# Patient Record
Sex: FEMALE | Race: WHITE | Employment: OTHER | URBAN - METROPOLITAN AREA
[De-identification: names, ages, dates, MRNs, and addresses within clinical notes are randomized per-mention and may not be internally consistent; named-entity substitution may affect disease eponyms.]

---

## 2017-08-15 ENCOUNTER — TELEPHONE (OUTPATIENT)
Dept: INTERNAL MEDICINE CLINIC | Age: 77
End: 2017-08-15

## 2017-08-15 DIAGNOSIS — R53.1 WEAKNESS: Primary | ICD-10-CM

## 2017-09-07 ENCOUNTER — TELEPHONE (OUTPATIENT)
Dept: INTERNAL MEDICINE CLINIC | Age: 77
End: 2017-09-07

## 2017-09-07 RX ORDER — HYDROCODONE BITARTRATE AND ACETAMINOPHEN 10; 325 MG/1; MG/1
1 TABLET ORAL DAILY
Qty: 30 TABLET | Refills: 0 | Status: SHIPPED | OUTPATIENT
Start: 2017-09-07 | End: 2017-09-28 | Stop reason: SDUPTHER

## 2017-09-07 RX ORDER — HYDROCODONE BITARTRATE AND ACETAMINOPHEN 10; 325 MG/1; MG/1
1 TABLET ORAL
Refills: 0 | Status: CANCELLED | OUTPATIENT
Start: 2017-09-07

## 2017-09-18 ENCOUNTER — TELEPHONE (OUTPATIENT)
Dept: INTERNAL MEDICINE CLINIC | Age: 77
End: 2017-09-18

## 2017-09-19 ENCOUNTER — TELEPHONE (OUTPATIENT)
Dept: INTERNAL MEDICINE CLINIC | Age: 77
End: 2017-09-19

## 2017-09-28 ENCOUNTER — TELEPHONE (OUTPATIENT)
Dept: INTERNAL MEDICINE CLINIC | Age: 77
End: 2017-09-28

## 2017-09-28 RX ORDER — HYDROCODONE BITARTRATE AND ACETAMINOPHEN 10; 325 MG/1; MG/1
1 TABLET ORAL DAILY
Qty: 60 TABLET | Refills: 0 | Status: SHIPPED | OUTPATIENT
Start: 2017-09-28 | End: 2017-10-16 | Stop reason: SDUPTHER

## 2017-10-06 ENCOUNTER — TELEPHONE (OUTPATIENT)
Dept: INTERNAL MEDICINE CLINIC | Age: 77
End: 2017-10-06

## 2017-10-16 ENCOUNTER — TELEPHONE (OUTPATIENT)
Dept: INTERNAL MEDICINE CLINIC | Age: 77
End: 2017-10-16

## 2017-10-16 RX ORDER — HYDROCODONE BITARTRATE AND ACETAMINOPHEN 10; 325 MG/1; MG/1
1 TABLET ORAL 2 TIMES DAILY
Qty: 60 TABLET | Refills: 0 | Status: SHIPPED | OUTPATIENT
Start: 2017-10-16 | End: 2017-11-10 | Stop reason: SDUPTHER

## 2017-10-16 RX ORDER — HYDROCODONE BITARTRATE AND ACETAMINOPHEN 10; 325 MG/1; MG/1
1 TABLET ORAL 2 TIMES DAILY
Refills: 0 | Status: CANCELLED | OUTPATIENT
Start: 2017-10-16

## 2017-10-16 NOTE — TELEPHONE ENCOUNTER
Spoke with pt nurse Kathi, vianey Horne pt has been w/o pain medicatiovn for 2 days. Last script per [facility] was written for 1 po daily (previously on 1 po BID).  Please advise  Pt daughter would like a call from Dr Mone Ramirez

## 2017-10-26 ENCOUNTER — TELEPHONE (OUTPATIENT)
Dept: INTERNAL MEDICINE CLINIC | Age: 77
End: 2017-10-26

## 2017-10-26 NOTE — TELEPHONE ENCOUNTER
Pt had asked for a shower chair and a lift chair. They received the scripts but ICD-10 was incorrect. Pt cannot walk, she is in a wheelchair most of the time. They need Dr. Charbel Julian to fill out one question on the Physician Order/CMN form or patient cannot get these two items. Dr. Charbel Julian signed it, dated it and put length of need on the form but did not  put an ICD 10 code on it. Please reply.    [facility]  Phone [phone]  Fax to [phone]

## 2017-10-31 ENCOUNTER — TELEPHONE (OUTPATIENT)
Dept: INTERNAL MEDICINE CLINIC | Age: 77
End: 2017-10-31

## 2017-11-10 RX ORDER — HYDROCODONE BITARTRATE AND ACETAMINOPHEN 10; 325 MG/1; MG/1
1 TABLET ORAL 2 TIMES DAILY
Qty: 60 TABLET | Refills: 0 | Status: SHIPPED | OUTPATIENT
Start: 2017-11-10 | End: 2017-11-15 | Stop reason: SDUPTHER

## 2017-11-15 RX ORDER — HYDROCODONE BITARTRATE AND ACETAMINOPHEN 10; 325 MG/1; MG/1
1 TABLET ORAL 2 TIMES DAILY
Qty: 100 TABLET | Refills: 0 | Status: SHIPPED | OUTPATIENT
Start: 2017-11-15 | End: 2017-12-01 | Stop reason: SDUPTHER

## 2017-12-01 RX ORDER — HYDROCODONE BITARTRATE AND ACETAMINOPHEN 10; 325 MG/1; MG/1
1 TABLET ORAL 2 TIMES DAILY
Qty: 100 TABLET | Refills: 0 | Status: SHIPPED | OUTPATIENT
Start: 2017-12-01 | End: 2018-01-03 | Stop reason: SDUPTHER

## 2018-01-02 ENCOUNTER — OFFICE VISIT (OUTPATIENT)
Dept: INTERNAL MEDICINE CLINIC | Age: 78
End: 2018-01-02

## 2018-01-02 VITALS
TEMPERATURE: 99.2 F | HEART RATE: 54 BPM | DIASTOLIC BLOOD PRESSURE: 70 MMHG | OXYGEN SATURATION: 97 % | SYSTOLIC BLOOD PRESSURE: 140 MMHG | RESPIRATION RATE: 14 BRPM

## 2018-01-02 DIAGNOSIS — R10.84 GENERALIZED ABDOMINAL PAIN: Primary | ICD-10-CM

## 2018-01-02 DIAGNOSIS — J40 BRONCHITIS: ICD-10-CM

## 2018-01-02 PROCEDURE — 99213 OFFICE O/P EST LOW 20 MIN: CPT | Performed by: INTERNAL MEDICINE

## 2018-01-02 PROCEDURE — 1036F TOBACCO NON-USER: CPT | Performed by: INTERNAL MEDICINE

## 2018-01-02 PROCEDURE — G8420 CALC BMI NORM PARAMETERS: HCPCS | Performed by: INTERNAL MEDICINE

## 2018-01-02 PROCEDURE — G8484 FLU IMMUNIZE NO ADMIN: HCPCS | Performed by: INTERNAL MEDICINE

## 2018-01-02 PROCEDURE — 4040F PNEUMOC VAC/ADMIN/RCVD: CPT | Performed by: INTERNAL MEDICINE

## 2018-01-02 PROCEDURE — 1090F PRES/ABSN URINE INCON ASSESS: CPT | Performed by: INTERNAL MEDICINE

## 2018-01-02 PROCEDURE — G8427 DOCREV CUR MEDS BY ELIG CLIN: HCPCS | Performed by: INTERNAL MEDICINE

## 2018-01-02 PROCEDURE — G8400 PT W/DXA NO RESULTS DOC: HCPCS | Performed by: INTERNAL MEDICINE

## 2018-01-02 PROCEDURE — 1123F ACP DISCUSS/DSCN MKR DOCD: CPT | Performed by: INTERNAL MEDICINE

## 2018-01-03 DIAGNOSIS — M47.816 LUMBAR SPONDYLOSIS: Primary | ICD-10-CM

## 2018-01-03 RX ORDER — HYDROCODONE BITARTRATE AND ACETAMINOPHEN 10; 325 MG/1; MG/1
1 TABLET ORAL 2 TIMES DAILY
Qty: 60 TABLET | Refills: 0 | Status: SHIPPED | OUTPATIENT
Start: 2018-01-03 | End: 2018-02-03

## 2018-01-03 RX ORDER — HYDROCODONE BITARTRATE AND ACETAMINOPHEN 10; 325 MG/1; MG/1
1 TABLET ORAL 2 TIMES DAILY
Qty: 60 TABLET | Refills: 0 | Status: CANCELLED | OUTPATIENT
Start: 2018-01-03 | End: 2018-02-03

## 2018-01-26 ASSESSMENT — ENCOUNTER SYMPTOMS
NAUSEA: 1
COUGH: 1
WHEEZING: 0
CHOKING: 0
SHORTNESS OF BREATH: 0
ABDOMINAL PAIN: 1

## 2018-01-26 NOTE — PROGRESS NOTES
Subjective:      Patient ID: Billie Yoo is a 69 y.o. female. HPI  Oj Thorpe is seen in follow-up from ER where she was evaluated for abdominal pain. The pain was periumbilical   And her work-up was entirely unremarkable including labs and CT. At this time it seems some better but it has not resolved. .    Also noted with some cough and phlegm. No chest pain or sob. No fever. Using cough medication at the [facility]. Review of Systems   Constitutional: Negative for chills and fever. Respiratory: Positive for cough. Negative for choking, shortness of breath and wheezing. Cardiovascular: Negative. Gastrointestinal: Positive for abdominal pain and nausea. Objective:   Physical Exam   Constitutional: She appears well-developed and well-nourished. No distress. HENT:   Mouth/Throat: Oropharynx is clear and moist.   Neck: No JVD present. Cardiovascular: Normal rate and regular rhythm. Pulmonary/Chest: Effort normal and breath sounds normal. She has no wheezes. Abdominal: Soft. Bowel sounds are normal. She exhibits no distension. There is no tenderness. Musculoskeletal: She exhibits no edema. Lymphadenopathy:     She has no cervical adenopathy. Neurological: She is alert. Skin: Skin is warm and dry. Assessment:      1. Generalized abdominal pain    2. Bronchitis                Plan:        Oj Thorpe was seen today for follow-up from hospital and cough. Diagnoses and all orders for this visit:    Generalized abdominal pain, etiology unclear  -     [facility] - [address] [place] [address] Deloris Sexton MD (BUBBA)    Bronchitis        - Symptomatic treatment with HHN and cough medication.

## 2018-02-05 DIAGNOSIS — M15.9 PRIMARY OSTEOARTHRITIS INVOLVING MULTIPLE JOINTS: Primary | ICD-10-CM

## 2018-02-05 RX ORDER — HYDROCODONE BITARTRATE AND ACETAMINOPHEN 10; 325 MG/1; MG/1
1 TABLET ORAL EVERY 6 HOURS PRN
Qty: 100 TABLET | Refills: 0 | Status: SHIPPED | OUTPATIENT
Start: 2018-02-05 | End: 2018-03-08

## 2018-03-26 DIAGNOSIS — M54.50 CHRONIC MIDLINE LOW BACK PAIN WITHOUT SCIATICA: Primary | ICD-10-CM

## 2018-03-26 DIAGNOSIS — G89.29 CHRONIC MIDLINE LOW BACK PAIN WITHOUT SCIATICA: Primary | ICD-10-CM

## 2018-03-27 RX ORDER — HYDROCODONE BITARTRATE AND ACETAMINOPHEN 10; 325 MG/1; MG/1
1 TABLET ORAL 2 TIMES DAILY
Qty: 60 TABLET | Refills: 0 | Status: SHIPPED | OUTPATIENT
Start: 2018-03-27 | End: 2018-04-24 | Stop reason: SDUPTHER

## 2018-04-24 DIAGNOSIS — M54.50 CHRONIC MIDLINE LOW BACK PAIN WITHOUT SCIATICA: ICD-10-CM

## 2018-04-24 DIAGNOSIS — G89.29 CHRONIC MIDLINE LOW BACK PAIN WITHOUT SCIATICA: ICD-10-CM

## 2018-04-24 RX ORDER — HYDROCODONE BITARTRATE AND ACETAMINOPHEN 10; 325 MG/1; MG/1
1 TABLET ORAL 2 TIMES DAILY
Qty: 60 TABLET | Refills: 0 | Status: SHIPPED | OUTPATIENT
Start: 2018-04-24 | End: 2018-05-24

## 2018-05-30 ENCOUNTER — TELEPHONE (OUTPATIENT)
Dept: INTERNAL MEDICINE CLINIC | Age: 78
End: 2018-05-30

## 2018-05-30 DIAGNOSIS — L08.9 FOOT INFECTION: Primary | ICD-10-CM

## 2018-05-30 RX ORDER — CEPHALEXIN 250 MG/1
250 CAPSULE ORAL 3 TIMES DAILY
Qty: 21 CAPSULE | Refills: 0 | Status: SHIPPED | OUTPATIENT
Start: 2018-05-30 | End: 2018-05-30 | Stop reason: SDUPTHER

## 2018-05-30 RX ORDER — CEPHALEXIN 250 MG/1
250 CAPSULE ORAL 3 TIMES DAILY
Qty: 21 CAPSULE | Refills: 0 | Status: SHIPPED | OUTPATIENT
Start: 2018-05-30 | End: 2018-06-06

## 2018-06-01 DIAGNOSIS — M15.9 PRIMARY OSTEOARTHRITIS INVOLVING MULTIPLE JOINTS: Primary | ICD-10-CM

## 2018-06-01 RX ORDER — HYDROCODONE BITARTRATE AND ACETAMINOPHEN 10; 325 MG/1; MG/1
1 TABLET ORAL 2 TIMES DAILY
Qty: 60 TABLET | Refills: 0 | Status: SHIPPED | OUTPATIENT
Start: 2018-06-01 | End: 2018-07-05 | Stop reason: SDUPTHER

## 2018-06-06 ENCOUNTER — TELEPHONE (OUTPATIENT)
Dept: INTERNAL MEDICINE CLINIC | Age: 78
End: 2018-06-06

## 2018-07-05 DIAGNOSIS — M15.9 PRIMARY OSTEOARTHRITIS INVOLVING MULTIPLE JOINTS: ICD-10-CM

## 2018-07-05 RX ORDER — HYDROCODONE BITARTRATE AND ACETAMINOPHEN 10; 325 MG/1; MG/1
TABLET ORAL
Qty: 60 TABLET | Refills: 0 | Status: SHIPPED | OUTPATIENT
Start: 2018-07-05 | End: 2018-07-30 | Stop reason: SDUPTHER

## 2018-07-30 DIAGNOSIS — M15.9 PRIMARY OSTEOARTHRITIS INVOLVING MULTIPLE JOINTS: ICD-10-CM

## 2018-07-30 RX ORDER — HYDROCODONE BITARTRATE AND ACETAMINOPHEN 10; 325 MG/1; MG/1
TABLET ORAL
Qty: 60 TABLET | Refills: 0 | Status: SHIPPED | OUTPATIENT
Start: 2018-07-30 | End: 2018-08-27 | Stop reason: SDUPTHER

## 2018-08-27 DIAGNOSIS — M15.9 PRIMARY OSTEOARTHRITIS INVOLVING MULTIPLE JOINTS: ICD-10-CM

## 2018-08-27 RX ORDER — HYDROCODONE BITARTRATE AND ACETAMINOPHEN 10; 325 MG/1; MG/1
TABLET ORAL
Qty: 60 TABLET | Refills: 0 | Status: SHIPPED | OUTPATIENT
Start: 2018-08-27 | End: 2018-09-25 | Stop reason: SDUPTHER

## 2018-09-25 DIAGNOSIS — M15.9 PRIMARY OSTEOARTHRITIS INVOLVING MULTIPLE JOINTS: ICD-10-CM

## 2018-09-25 RX ORDER — HYDROCODONE BITARTRATE AND ACETAMINOPHEN 10; 325 MG/1; MG/1
TABLET ORAL
Qty: 60 TABLET | Refills: 0 | Status: SHIPPED | OUTPATIENT
Start: 2018-09-25 | End: 2018-10-23 | Stop reason: SDUPTHER

## 2018-10-23 DIAGNOSIS — M15.9 PRIMARY OSTEOARTHRITIS INVOLVING MULTIPLE JOINTS: ICD-10-CM

## 2018-10-23 RX ORDER — HYDROCODONE BITARTRATE AND ACETAMINOPHEN 10; 325 MG/1; MG/1
TABLET ORAL
Qty: 60 TABLET | Refills: 0 | Status: ON HOLD | OUTPATIENT
Start: 2018-10-23 | End: 2018-11-09 | Stop reason: HOSPADM

## 2018-11-07 ENCOUNTER — APPOINTMENT (OUTPATIENT)
Dept: GENERAL RADIOLOGY | Age: 78
DRG: 481 | End: 2018-11-07
Payer: COMMERCIAL

## 2018-11-07 ENCOUNTER — HOSPITAL ENCOUNTER (INPATIENT)
Age: 78
LOS: 6 days | Discharge: NURSING, REHAB OR LONG-TERM CARE | DRG: 481 | End: 2018-11-13
Attending: INTERNAL MEDICINE | Admitting: INTERNAL MEDICINE
Payer: COMMERCIAL

## 2018-11-07 DIAGNOSIS — S72.144A CLOSED NONDISPLACED INTERTROCHANTERIC FRACTURE OF RIGHT FEMUR, INITIAL ENCOUNTER (HCC): Primary | ICD-10-CM
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
## 2018-11-07 PROBLEM — S72.141A CLOSED INTERTROCHANTERIC FRACTURE OF RIGHT FEMUR (HCC): Status: ACTIVE | Noted: 2018-11-07

## 2018-11-07 PROBLEM — S72.001A HIP FRACTURE REQUIRING OPERATIVE REPAIR, RIGHT, CLOSED, INITIAL ENCOUNTER (HCC): Status: ACTIVE | Noted: 2018-11-07

## 2018-11-07 LAB
ABO/RH: NORMAL
ANION GAP SERPL CALCULATED.3IONS-SCNC: 15 MMOL/L (ref 3–16)
ANTIBODY SCREEN: NORMAL
BASOPHILS ABSOLUTE: 0.1 K/UL (ref 0–0.2)
BASOPHILS RELATIVE PERCENT: 0.7 %
BUN BLDV-MCNC: 14 MG/DL (ref 7–20)
CALCIUM SERPL-MCNC: 8.4 MG/DL (ref 8.3–10.6)
CHLORIDE BLD-SCNC: 100 MMOL/L (ref 99–110)
CO2: 26 MMOL/L (ref 21–32)
CREAT SERPL-MCNC: 0.8 MG/DL (ref 0.6–1.2)
EOSINOPHILS ABSOLUTE: 0 K/UL (ref 0–0.6)
EOSINOPHILS RELATIVE PERCENT: 0.1 %
GFR AFRICAN AMERICAN: >60
GFR NON-AFRICAN AMERICAN: >60
GLUCOSE BLD-MCNC: 131 MG/DL (ref 70–99)
HCT VFR BLD CALC: 39.5 % (ref 36–48)
HEMOGLOBIN: 13.3 G/DL (ref 12–16)
INR BLD: 1.04 (ref 0.86–1.14)
LYMPHOCYTES ABSOLUTE: 1.4 K/UL (ref 1–5.1)
LYMPHOCYTES RELATIVE PERCENT: 14.6 %
MCH RBC QN AUTO: 31.3 PG (ref 26–34)
MCHC RBC AUTO-ENTMCNC: 33.7 G/DL (ref 31–36)
MCV RBC AUTO: 92.9 FL (ref 80–100)
MONOCYTES ABSOLUTE: 0.5 K/UL (ref 0–1.3)
MONOCYTES RELATIVE PERCENT: 5.3 %
NEUTROPHILS ABSOLUTE: 7.4 K/UL (ref 1.7–7.7)
NEUTROPHILS RELATIVE PERCENT: 79.3 %
PDW BLD-RTO: 14.4 % (ref 12.4–15.4)
PLATELET # BLD: 249 K/UL (ref 135–450)
PMV BLD AUTO: 9.3 FL (ref 5–10.5)
POTASSIUM SERPL-SCNC: 3.9 MMOL/L (ref 3.5–5.1)
PROTHROMBIN TIME: 11.9 SEC (ref 9.8–13)
RBC # BLD: 4.26 M/UL (ref 4–5.2)
SODIUM BLD-SCNC: 141 MMOL/L (ref 136–145)
WBC # BLD: 9.3 K/UL (ref 4–11)

## 2018-11-07 PROCEDURE — 86901 BLOOD TYPING SEROLOGIC RH(D): CPT

## 2018-11-07 PROCEDURE — 73502 X-RAY EXAM HIP UNI 2-3 VIEWS: CPT

## 2018-11-07 PROCEDURE — 6360000002 HC RX W HCPCS: Performed by: INTERNAL MEDICINE

## 2018-11-07 PROCEDURE — 96374 THER/PROPH/DIAG INJ IV PUSH: CPT

## 2018-11-07 PROCEDURE — 99223 1ST HOSP IP/OBS HIGH 75: CPT | Performed by: INTERNAL MEDICINE

## 2018-11-07 PROCEDURE — 1200000000 HC SEMI PRIVATE

## 2018-11-07 PROCEDURE — 71045 X-RAY EXAM CHEST 1 VIEW: CPT

## 2018-11-07 PROCEDURE — 93005 ELECTROCARDIOGRAM TRACING: CPT | Performed by: INTERNAL MEDICINE

## 2018-11-07 PROCEDURE — 99222 1ST HOSP IP/OBS MODERATE 55: CPT | Performed by: ORTHOPAEDIC SURGERY

## 2018-11-07 PROCEDURE — 86900 BLOOD TYPING SEROLOGIC ABO: CPT

## 2018-11-07 PROCEDURE — 86850 RBC ANTIBODY SCREEN: CPT

## 2018-11-07 PROCEDURE — 2580000003 HC RX 258: Performed by: INTERNAL MEDICINE

## 2018-11-07 PROCEDURE — 51702 INSERT TEMP BLADDER CATH: CPT

## 2018-11-07 PROCEDURE — 85610 PROTHROMBIN TIME: CPT

## 2018-11-07 PROCEDURE — 80048 BASIC METABOLIC PNL TOTAL CA: CPT

## 2018-11-07 PROCEDURE — 73560 X-RAY EXAM OF KNEE 1 OR 2: CPT

## 2018-11-07 PROCEDURE — 85025 COMPLETE CBC W/AUTO DIFF WBC: CPT

## 2018-11-07 PROCEDURE — 99285 EMERGENCY DEPT VISIT HI MDM: CPT

## 2018-11-07 RX ORDER — HYDROXYCHLOROQUINE SULFATE 200 MG/1
200 TABLET, FILM COATED ORAL DAILY
Status: DISCONTINUED | OUTPATIENT
Start: 2018-11-08 | End: 2018-11-08

## 2018-11-07 RX ORDER — MORPHINE SULFATE 4 MG/ML
4 INJECTION, SOLUTION INTRAMUSCULAR; INTRAVENOUS
Status: DISCONTINUED | OUTPATIENT
Start: 2018-11-07 | End: 2018-11-13 | Stop reason: HOSPADM

## 2018-11-07 RX ORDER — DOCUSATE SODIUM 100 MG/1
100 CAPSULE, LIQUID FILLED ORAL 2 TIMES DAILY PRN
Status: DISCONTINUED | OUTPATIENT
Start: 2018-11-07 | End: 2018-11-13 | Stop reason: HOSPADM

## 2018-11-07 RX ORDER — BUSPIRONE HYDROCHLORIDE 10 MG/1
10 TABLET ORAL 2 TIMES DAILY
Status: DISCONTINUED | OUTPATIENT
Start: 2018-11-08 | End: 2018-11-13 | Stop reason: HOSPADM

## 2018-11-07 RX ORDER — MORPHINE SULFATE 2 MG/ML
INJECTION, SOLUTION INTRAMUSCULAR; INTRAVENOUS
Status: DISPENSED
Start: 2018-11-07 | End: 2018-11-08

## 2018-11-07 RX ORDER — FLUOXETINE HYDROCHLORIDE 20 MG/1
20 CAPSULE ORAL DAILY
Status: DISCONTINUED | OUTPATIENT
Start: 2018-11-08 | End: 2018-11-08

## 2018-11-07 RX ORDER — POTASSIUM CHLORIDE 750 MG/1
10 TABLET, FILM COATED, EXTENDED RELEASE ORAL DAILY
Status: DISCONTINUED | OUTPATIENT
Start: 2018-11-08 | End: 2018-11-13 | Stop reason: HOSPADM

## 2018-11-07 RX ORDER — SODIUM CHLORIDE 9 MG/ML
INJECTION, SOLUTION INTRAVENOUS CONTINUOUS
Status: DISCONTINUED | OUTPATIENT
Start: 2018-11-07 | End: 2018-11-08 | Stop reason: ALTCHOICE

## 2018-11-07 RX ORDER — SODIUM CHLORIDE 0.9 % (FLUSH) 0.9 %
10 SYRINGE (ML) INJECTION PRN
Status: DISCONTINUED | OUTPATIENT
Start: 2018-11-07 | End: 2018-11-13 | Stop reason: HOSPADM

## 2018-11-07 RX ORDER — AMITRIPTYLINE HYDROCHLORIDE 25 MG/1
25 TABLET, FILM COATED ORAL NIGHTLY
Status: DISCONTINUED | OUTPATIENT
Start: 2018-11-08 | End: 2018-11-13 | Stop reason: HOSPADM

## 2018-11-07 RX ORDER — SODIUM CHLORIDE 0.9 % (FLUSH) 0.9 %
10 SYRINGE (ML) INJECTION EVERY 12 HOURS SCHEDULED
Status: DISCONTINUED | OUTPATIENT
Start: 2018-11-07 | End: 2018-11-13 | Stop reason: HOSPADM

## 2018-11-07 RX ORDER — MORPHINE SULFATE 2 MG/ML
2 INJECTION, SOLUTION INTRAMUSCULAR; INTRAVENOUS ONCE
Status: COMPLETED | OUTPATIENT
Start: 2018-11-07 | End: 2018-11-07

## 2018-11-07 RX ORDER — FLUTICASONE PROPIONATE 50 MCG
1 SPRAY, SUSPENSION (ML) NASAL DAILY
Status: DISCONTINUED | OUTPATIENT
Start: 2018-11-08 | End: 2018-11-13 | Stop reason: HOSPADM

## 2018-11-07 RX ORDER — LISINOPRIL 20 MG/1
20 TABLET ORAL DAILY
Status: DISCONTINUED | OUTPATIENT
Start: 2018-11-08 | End: 2018-11-13 | Stop reason: HOSPADM

## 2018-11-07 RX ORDER — ASPIRIN 81 MG/1
81 TABLET, CHEWABLE ORAL DAILY
Status: DISCONTINUED | OUTPATIENT
Start: 2018-11-08 | End: 2018-11-13 | Stop reason: HOSPADM

## 2018-11-07 RX ORDER — AMLODIPINE BESYLATE 5 MG/1
5 TABLET ORAL DAILY
Status: DISCONTINUED | OUTPATIENT
Start: 2018-11-08 | End: 2018-11-13 | Stop reason: HOSPADM

## 2018-11-07 RX ORDER — LEVOTHYROXINE SODIUM 0.1 MG/1
100 TABLET ORAL DAILY
Status: DISCONTINUED | OUTPATIENT
Start: 2018-11-08 | End: 2018-11-13 | Stop reason: HOSPADM

## 2018-11-07 RX ORDER — ONDANSETRON 2 MG/ML
4 INJECTION INTRAMUSCULAR; INTRAVENOUS EVERY 6 HOURS PRN
Status: DISCONTINUED | OUTPATIENT
Start: 2018-11-07 | End: 2018-11-13 | Stop reason: HOSPADM

## 2018-11-07 RX ORDER — PANTOPRAZOLE SODIUM 40 MG/1
40 TABLET, DELAYED RELEASE ORAL
Status: DISCONTINUED | OUTPATIENT
Start: 2018-11-08 | End: 2018-11-13 | Stop reason: HOSPADM

## 2018-11-07 RX ORDER — MORPHINE SULFATE 2 MG/ML
2 INJECTION, SOLUTION INTRAMUSCULAR; INTRAVENOUS
Status: DISCONTINUED | OUTPATIENT
Start: 2018-11-07 | End: 2018-11-13 | Stop reason: HOSPADM

## 2018-11-07 RX ORDER — METOPROLOL TARTRATE 50 MG/1
100 TABLET, FILM COATED ORAL 2 TIMES DAILY
Status: DISCONTINUED | OUTPATIENT
Start: 2018-11-07 | End: 2018-11-08

## 2018-11-07 RX ORDER — PREDNISONE 1 MG/1
5 TABLET ORAL DAILY
Status: DISCONTINUED | OUTPATIENT
Start: 2018-11-08 | End: 2018-11-13 | Stop reason: HOSPADM

## 2018-11-07 RX ADMIN — ONDANSETRON 4 MG: 2 INJECTION, SOLUTION INTRAMUSCULAR; INTRAVENOUS at 23:03

## 2018-11-07 RX ADMIN — MORPHINE SULFATE 2 MG: 2 INJECTION, SOLUTION INTRAMUSCULAR; INTRAVENOUS at 23:03

## 2018-11-07 RX ADMIN — SODIUM CHLORIDE: 9 INJECTION, SOLUTION INTRAVENOUS at 23:02

## 2018-11-07 RX ADMIN — MORPHINE SULFATE 2 MG: 2 INJECTION, SOLUTION INTRAMUSCULAR; INTRAVENOUS at 18:28

## 2018-11-07 ASSESSMENT — ENCOUNTER SYMPTOMS
COLOR CHANGE: 0
ABDOMINAL PAIN: 0
NAUSEA: 0
FACIAL SWELLING: 0
VOMITING: 0
SHORTNESS OF BREATH: 0
BACK PAIN: 0

## 2018-11-07 ASSESSMENT — PAIN SCALES - GENERAL
PAINLEVEL_OUTOF10: 6
PAINLEVEL_OUTOF10: 10
PAINLEVEL_OUTOF10: 8
PAINLEVEL_OUTOF10: 10

## 2018-11-07 ASSESSMENT — PAIN DESCRIPTION - FREQUENCY
FREQUENCY: CONTINUOUS
FREQUENCY: CONTINUOUS

## 2018-11-07 ASSESSMENT — PAIN DESCRIPTION - LOCATION
LOCATION: HIP;LEG
LOCATION: LEG

## 2018-11-07 ASSESSMENT — PAIN DESCRIPTION - ORIENTATION
ORIENTATION: RIGHT
ORIENTATION: RIGHT

## 2018-11-07 ASSESSMENT — PAIN DESCRIPTION - PAIN TYPE
TYPE: ACUTE PAIN
TYPE: ACUTE PAIN

## 2018-11-07 ASSESSMENT — PAIN DESCRIPTION - DESCRIPTORS
DESCRIPTORS: SHARP
DESCRIPTORS: SHARP

## 2018-11-07 ASSESSMENT — PAIN DESCRIPTION - ONSET
ONSET: ON-GOING
ONSET: SUDDEN

## 2018-11-07 ASSESSMENT — PAIN DESCRIPTION - PROGRESSION: CLINICAL_PROGRESSION: NOT CHANGED

## 2018-11-07 NOTE — CONSULTS
anxiety, depression   SKIN: Denies skin changes, delayed healing, rash, itching   HEMATOLOGIC: Denies easy bleeding or bruising  ENDOCRINE: Denies excessive thirst, urination, heat/cold  RESPIRATORY: Denies current dyspnea, cough  CARDIOVASCULAR: Negative for chest pain at this time. EYES: Negative for photophobia and visual disturbance. ENT:  Negative for rhinorrhea, epistaxis, sore throat, or hearing loss. GI: Denies nausea, vomiting, diarrhea   : Denies bowel or bladder issues   MUSCULOSKELETAL: Right hip pain  All other ROS reviewed in chart or with patient or family and are grossly negative. PHYSICAL EXAMINATION:  Ms. Edel Lucas is a very pleasant 69 y.o. female who seen today in no acute distress, awake, alert, and oriented. She is well nourished and groomed. Patient with normal affect. Body mass index is 22.91 kg/m². . Skin warm and dry. Resting respiratory rate is 16. Resp deep and easy. Pulse is with regular rate and rhythm    BP (!) 157/69   Pulse 64   Temp 98.4 °F (36.9 °C) (Oral)   Resp 18   Ht 5' 1\" (1.549 m)   Wt 121 lb 4.1 oz (55 kg)   SpO2 98%   BMI 22.91 kg/m²        Airway is intact  Chest: chest clear, no wheezing, rales, normal symmetric air entry, no tachypnea, retractions or cyanosis  Heart: regular rate and rhythm ; heart sounds normal   Hearing intact, pupil equal and reactive bilateral  Lymphatics; No groin or axillary enlarged lymph nodes. Neck; No swelling  Abdomen; soft, non distended. MUSCULOSKELETAL:   Right leg in external rotation with pain with ROM, Examination of both lower extrimiteis showing a decreased range of motion and muscle power of the right hip compare to the other side because of pain. There is mild swelling that can be seen, and ecchymosis over the right hip, but no wound. She has intact sensation and good pedal pulses bilateral.  She has significant tenderness on deep palpation over the right hip.      Good strength, and no instability both 11/7/2018  5:12 PM

## 2018-11-07 NOTE — ED PROVIDER NOTES
**EVALUATED BY ADVANCED PRACTICE PROVIDER**        [address]   eMERGENCY dEPARTMENT eNCOUnter      Pt Name: Robbie Norton  YIO:[id number]  Candygfyulisa 1940  Date of evaluation: 11/7/2018  Provider: BRAULIO Fuller CNP      Chief Complaint:    Chief Complaint   Patient presents with    Fall     mechanical fall after standing up from her wheelchair right upper leg       Nursing Notes, Past Medical Hx, Past Surgical Hx, Social Hx, Allergies, and Family Hx were all reviewed and agreed with or any disagreements were addressed in the HPI.    HPI:  (Location, Duration, Timing, Severity,Quality, Assoc Sx, Context, Modifying factors)  This is a  69 y.o. female who presents to the emergency department today via EMS complaining of right femur pain following a fall. Onset was just prior to arrival.  The context is she was standing up from her wheelchair when she lost her balance falling on her right hip. She denies hitting her head or losing consciousness. She denies head neck or back pain. She states when she moves her leg pain is 10/10. She is pretty comfortable at rest.  No lacerations or abrasions or crepitus. No numbness or tingling. PastMedical/Surgical History:      Diagnosis Date    Arthritis     Diverticulitis     Hypertension          Procedure Laterality Date    SPINE SURGERY      THYROIDECTOMY         Medications:  Current Discharge Medication List      CONTINUE these medications which have NOT CHANGED    Details   HYDROcodone-acetaminophen (NORCO)  MG per tablet TAKE 1 TABLET BY MOUTH TWICE DAILY.   Qty: 60 tablet, Refills: 0    Associated Diagnoses: Primary osteoarthritis involving multiple joints      docusate sodium (COLACE) 100 MG capsule Take 100 mg by mouth 2 times daily as needed for Constipation      melatonin 3 MG TABS tablet Take 3 mg by mouth daily      ondansetron (ZOFRAN) 4 MG tablet Take 4 mg by mouth 3 times daily      dicyclomine (BENTYL) 10 MG capsule Take 1 morphine (PF) injection 2 mg (2 mg Intravenous Given 11/7/18 6057)       Differential diagnosis: includes but not limited to Arterial Injury/Ischemia, Fracture, Dislocation, Infection, Compartment Syndrome, Neurologic Deficit/Injury. Patient presents with right upper leg pain following a fall. She is in a wheelchair but she does a lot out of the wheelchair. See HPI for full presentation. Physical exam as above. Tenderness to right upper leg. X-ray shows right intertrochanteric fracture. No leukocytosis or anemia. No electrolyte abnormalities or kidney dysfunction. I consulted with orthopedic surgery, Dr. Patti Perez. He came down to see and assess patient and advised the patient will go to surgery at 1:30 tomorrow afternoon. Patient was given all test results and given an opportunity to ask and have any questions answered. She was agreeable to admission. Patient's PCP was consulted and agreed to admit patient and write orders. The patient tolerated their visit well. I evaluated the patient. The physician was available for consultation as needed. The patient and / or the family were informed of the results of anytests, a time was given to answer questions, a plan was proposed and they agreed with plan. CLINICAL IMPRESSION:  1. Closed nondisplaced intertrochanteric fracture of right femur, initial encounter ([address]    2.  Fall, initial encounter        DISPOSITION Admitted 11/07/2018 06:42:41 PM      PATIENT REFERRED TO:  Brien Lu, [facility] [place] [id number]            DISCHARGE MEDICATIONS:  Current Discharge Medication List          DISCONTINUED MEDICATIONS:  Current Discharge Medication List                 (Please note the MDM and HPI sections of this note were completed with a voice recognition program.  Efforts weremade to edit the dictations but occasionally words are mis-transcribed.)    Electronically signed, Saundra Dancer, APRN - CNP, Sarah De Guzman, APRN - CNP  11/07/18 2664

## 2018-11-07 NOTE — ED NOTES
C/o mild itching at site after morphine injection. Family sts insomnia with benadryl. Pt denies need for med at this time.      Mary Beth Woodard RN  11/07/18 7716

## 2018-11-08 ENCOUNTER — APPOINTMENT (OUTPATIENT)
Dept: GENERAL RADIOLOGY | Age: 78
DRG: 481 | End: 2018-11-08
Payer: COMMERCIAL

## 2018-11-08 ENCOUNTER — ANESTHESIA (OUTPATIENT)
Dept: OPERATING ROOM | Age: 78
DRG: 481 | End: 2018-11-08
Payer: COMMERCIAL

## 2018-11-08 ENCOUNTER — ANESTHESIA EVENT (OUTPATIENT)
Dept: OPERATING ROOM | Age: 78
DRG: 481 | End: 2018-11-08
Payer: COMMERCIAL

## 2018-11-08 VITALS
TEMPERATURE: 98.6 F | DIASTOLIC BLOOD PRESSURE: 75 MMHG | RESPIRATION RATE: 1 BRPM | OXYGEN SATURATION: 100 % | SYSTOLIC BLOOD PRESSURE: 165 MMHG

## 2018-11-08 PROBLEM — F32.9 MAJOR DEPRESSIVE DISORDER: Status: ACTIVE | Noted: 2018-11-08

## 2018-11-08 PROBLEM — J45.20 MILD INTERMITTENT ASTHMA: Status: ACTIVE | Noted: 2018-11-08

## 2018-11-08 PROBLEM — F41.1 GAD (GENERALIZED ANXIETY DISORDER): Status: ACTIVE | Noted: 2018-11-08

## 2018-11-08 PROBLEM — E03.9 ACQUIRED HYPOTHYROIDISM: Status: ACTIVE | Noted: 2018-11-08

## 2018-11-08 PROBLEM — I10 ESSENTIAL HYPERTENSION: Status: ACTIVE | Noted: 2018-11-08

## 2018-11-08 PROBLEM — M06.9 RHEUMATOID ARTHRITIS (HCC): Status: ACTIVE | Noted: 2018-11-08

## 2018-11-08 PROBLEM — I73.9 PVD (PERIPHERAL VASCULAR DISEASE) (HCC): Status: ACTIVE | Noted: 2018-11-08

## 2018-11-08 PROCEDURE — 6360000002 HC RX W HCPCS: Performed by: ANESTHESIOLOGY

## 2018-11-08 PROCEDURE — 2580000003 HC RX 258: Performed by: ORTHOPAEDIC SURGERY

## 2018-11-08 PROCEDURE — 7100000000 HC PACU RECOVERY - FIRST 15 MIN: Performed by: ORTHOPAEDIC SURGERY

## 2018-11-08 PROCEDURE — 94664 DEMO&/EVAL PT USE INHALER: CPT

## 2018-11-08 PROCEDURE — 7100000001 HC PACU RECOVERY - ADDTL 15 MIN: Performed by: ORTHOPAEDIC SURGERY

## 2018-11-08 PROCEDURE — 2580000003 HC RX 258: Performed by: INTERNAL MEDICINE

## 2018-11-08 PROCEDURE — 2700000000 HC OXYGEN THERAPY PER DAY

## 2018-11-08 PROCEDURE — 93010 ELECTROCARDIOGRAM REPORT: CPT | Performed by: INTERNAL MEDICINE

## 2018-11-08 PROCEDURE — 6370000000 HC RX 637 (ALT 250 FOR IP): Performed by: INTERNAL MEDICINE

## 2018-11-08 PROCEDURE — 2709999900 HC NON-CHARGEABLE SUPPLY: Performed by: ORTHOPAEDIC SURGERY

## 2018-11-08 PROCEDURE — 6360000002 HC RX W HCPCS

## 2018-11-08 PROCEDURE — 3600000004 HC SURGERY LEVEL 4 BASE: Performed by: ORTHOPAEDIC SURGERY

## 2018-11-08 PROCEDURE — 73502 X-RAY EXAM HIP UNI 2-3 VIEWS: CPT

## 2018-11-08 PROCEDURE — 3700000000 HC ANESTHESIA ATTENDED CARE: Performed by: ORTHOPAEDIC SURGERY

## 2018-11-08 PROCEDURE — 2720000010 HC SURG SUPPLY STERILE: Performed by: ORTHOPAEDIC SURGERY

## 2018-11-08 PROCEDURE — C1769 GUIDE WIRE: HCPCS | Performed by: ORTHOPAEDIC SURGERY

## 2018-11-08 PROCEDURE — 99233 SBSQ HOSP IP/OBS HIGH 50: CPT | Performed by: INTERNAL MEDICINE

## 2018-11-08 PROCEDURE — 6360000002 HC RX W HCPCS: Performed by: NURSE PRACTITIONER

## 2018-11-08 PROCEDURE — 2500000003 HC RX 250 WO HCPCS: Performed by: ORTHOPAEDIC SURGERY

## 2018-11-08 PROCEDURE — 3600000014 HC SURGERY LEVEL 4 ADDTL 15MIN: Performed by: ORTHOPAEDIC SURGERY

## 2018-11-08 PROCEDURE — 2500000003 HC RX 250 WO HCPCS

## 2018-11-08 PROCEDURE — 0QS606Z REPOSITION RIGHT UPPER FEMUR WITH INTRAMEDULLARY INTERNAL FIXATION DEVICE, OPEN APPROACH: ICD-10-PCS | Performed by: ORTHOPAEDIC SURGERY

## 2018-11-08 PROCEDURE — 6360000002 HC RX W HCPCS: Performed by: ORTHOPAEDIC SURGERY

## 2018-11-08 PROCEDURE — 6370000000 HC RX 637 (ALT 250 FOR IP): Performed by: ORTHOPAEDIC SURGERY

## 2018-11-08 PROCEDURE — 94760 N-INVAS EAR/PLS OXIMETRY 1: CPT

## 2018-11-08 PROCEDURE — 3209999900 FLUORO FOR SURGICAL PROCEDURES

## 2018-11-08 PROCEDURE — 6360000002 HC RX W HCPCS: Performed by: INTERNAL MEDICINE

## 2018-11-08 PROCEDURE — 94761 N-INVAS EAR/PLS OXIMETRY MLT: CPT

## 2018-11-08 PROCEDURE — C1713 ANCHOR/SCREW BN/BN,TIS/BN: HCPCS | Performed by: ORTHOPAEDIC SURGERY

## 2018-11-08 PROCEDURE — 1200000000 HC SEMI PRIVATE

## 2018-11-08 PROCEDURE — 3700000001 HC ADD 15 MINUTES (ANESTHESIA): Performed by: ORTHOPAEDIC SURGERY

## 2018-11-08 PROCEDURE — 27245 TREAT THIGH FRACTURE: CPT | Performed by: ORTHOPAEDIC SURGERY

## 2018-11-08 DEVICE — LONG NAIL KIT R1.5, TI, RIGHT
Type: IMPLANTABLE DEVICE | Site: HIP | Status: FUNCTIONAL
Brand: GAMMA

## 2018-11-08 DEVICE — LAG SCREW, TI
Type: IMPLANTABLE DEVICE | Site: HIP | Status: FUNCTIONAL
Brand: GAMMA

## 2018-11-08 DEVICE — LOCKING SCREW, FULLY THREADED: Type: IMPLANTABLE DEVICE | Site: HIP | Status: FUNCTIONAL

## 2018-11-08 RX ORDER — SODIUM CHLORIDE 450 MG/100ML
INJECTION, SOLUTION INTRAVENOUS CONTINUOUS
Status: DISCONTINUED | OUTPATIENT
Start: 2018-11-08 | End: 2018-11-13 | Stop reason: HOSPADM

## 2018-11-08 RX ORDER — SODIUM CHLORIDE 0.9 % (FLUSH) 0.9 %
10 SYRINGE (ML) INJECTION EVERY 12 HOURS SCHEDULED
Status: DISCONTINUED | OUTPATIENT
Start: 2018-11-08 | End: 2018-11-08 | Stop reason: SDUPTHER

## 2018-11-08 RX ORDER — ONDANSETRON 2 MG/ML
4 INJECTION INTRAMUSCULAR; INTRAVENOUS
Status: DISCONTINUED | OUTPATIENT
Start: 2018-11-08 | End: 2018-11-08 | Stop reason: HOSPADM

## 2018-11-08 RX ORDER — CYANOCOBALAMIN 1000 UG/ML
1000 INJECTION INTRAMUSCULAR; SUBCUTANEOUS DAILY
COMMUNITY
End: 2019-07-09

## 2018-11-08 RX ORDER — DOCUSATE SODIUM 100 MG/1
100 CAPSULE, LIQUID FILLED ORAL 2 TIMES DAILY
Status: DISCONTINUED | OUTPATIENT
Start: 2018-11-08 | End: 2018-11-13 | Stop reason: HOSPADM

## 2018-11-08 RX ORDER — ONDANSETRON 2 MG/ML
4 INJECTION INTRAMUSCULAR; INTRAVENOUS EVERY 6 HOURS PRN
Status: DISCONTINUED | OUTPATIENT
Start: 2018-11-08 | End: 2018-11-08 | Stop reason: SDUPTHER

## 2018-11-08 RX ORDER — METOPROLOL SUCCINATE 50 MG/1
100 TABLET, EXTENDED RELEASE ORAL DAILY
Status: DISCONTINUED | OUTPATIENT
Start: 2018-11-08 | End: 2018-11-13 | Stop reason: HOSPADM

## 2018-11-08 RX ORDER — METOPROLOL SUCCINATE 25 MG/1
25 TABLET, EXTENDED RELEASE ORAL DAILY
COMMUNITY
End: 2020-01-17

## 2018-11-08 RX ORDER — FLUOXETINE HYDROCHLORIDE 20 MG/1
40 CAPSULE ORAL DAILY
Status: DISCONTINUED | OUTPATIENT
Start: 2018-11-09 | End: 2018-11-13 | Stop reason: HOSPADM

## 2018-11-08 RX ORDER — ACETAMINOPHEN 325 MG/1
650 TABLET ORAL EVERY 4 HOURS PRN
Status: DISCONTINUED | OUTPATIENT
Start: 2018-11-08 | End: 2018-11-13 | Stop reason: HOSPADM

## 2018-11-08 RX ORDER — GUAIFENESIN AND DEXTROMETHORPHAN HYDROBROMIDE 100; 10 MG/5ML; MG/5ML
10 SOLUTION ORAL EVERY 4 HOURS PRN
Status: ON HOLD | COMMUNITY
End: 2018-11-12 | Stop reason: HOSPADM

## 2018-11-08 RX ORDER — ONDANSETRON 2 MG/ML
INJECTION INTRAMUSCULAR; INTRAVENOUS PRN
Status: DISCONTINUED | OUTPATIENT
Start: 2018-11-08 | End: 2018-11-08 | Stop reason: SDUPTHER

## 2018-11-08 RX ORDER — FENTANYL CITRATE 50 UG/ML
50 INJECTION, SOLUTION INTRAMUSCULAR; INTRAVENOUS EVERY 5 MIN PRN
Status: DISCONTINUED | OUTPATIENT
Start: 2018-11-08 | End: 2018-11-08 | Stop reason: HOSPADM

## 2018-11-08 RX ORDER — PROMETHAZINE HYDROCHLORIDE 25 MG/ML
6.25 INJECTION, SOLUTION INTRAMUSCULAR; INTRAVENOUS
Status: DISCONTINUED | OUTPATIENT
Start: 2018-11-08 | End: 2018-11-08 | Stop reason: HOSPADM

## 2018-11-08 RX ORDER — MULTIVIT-MIN/IRON FUM/FOLIC AC 7.5 MG-4
1 TABLET ORAL DAILY
COMMUNITY

## 2018-11-08 RX ORDER — FUROSEMIDE 20 MG/1
20 TABLET ORAL DAILY
Status: ON HOLD | COMMUNITY
End: 2018-11-12 | Stop reason: HOSPADM

## 2018-11-08 RX ORDER — ALBUTEROL SULFATE 90 UG/1
2 AEROSOL, METERED RESPIRATORY (INHALATION) EVERY 6 HOURS PRN
COMMUNITY

## 2018-11-08 RX ORDER — SODIUM CHLORIDE 0.9 % (FLUSH) 0.9 %
10 SYRINGE (ML) INJECTION PRN
Status: DISCONTINUED | OUTPATIENT
Start: 2018-11-08 | End: 2018-11-08 | Stop reason: SDUPTHER

## 2018-11-08 RX ORDER — POLYETHYLENE GLYCOL 3350 17 G/17G
17 POWDER, FOR SOLUTION ORAL DAILY PRN
COMMUNITY
End: 2019-07-09

## 2018-11-08 RX ORDER — PROPOFOL 10 MG/ML
INJECTION, EMULSION INTRAVENOUS PRN
Status: DISCONTINUED | OUTPATIENT
Start: 2018-11-08 | End: 2018-11-08 | Stop reason: SDUPTHER

## 2018-11-08 RX ORDER — PREDNISONE 1 MG/1
5 TABLET ORAL DAILY
COMMUNITY

## 2018-11-08 RX ORDER — BUPIVACAINE HYDROCHLORIDE 5 MG/ML
INJECTION, SOLUTION EPIDURAL; INTRACAUDAL
Status: COMPLETED | OUTPATIENT
Start: 2018-11-08 | End: 2018-11-08

## 2018-11-08 RX ORDER — FENTANYL CITRATE 50 UG/ML
INJECTION, SOLUTION INTRAMUSCULAR; INTRAVENOUS PRN
Status: DISCONTINUED | OUTPATIENT
Start: 2018-11-08 | End: 2018-11-08 | Stop reason: SDUPTHER

## 2018-11-08 RX ORDER — FENTANYL CITRATE 50 UG/ML
25 INJECTION, SOLUTION INTRAMUSCULAR; INTRAVENOUS EVERY 5 MIN PRN
Status: DISCONTINUED | OUTPATIENT
Start: 2018-11-08 | End: 2018-11-08 | Stop reason: HOSPADM

## 2018-11-08 RX ORDER — HYDROXYCHLOROQUINE SULFATE 200 MG/1
200 TABLET, FILM COATED ORAL 2 TIMES DAILY
Status: DISCONTINUED | OUTPATIENT
Start: 2018-11-08 | End: 2018-11-13 | Stop reason: HOSPADM

## 2018-11-08 RX ORDER — CALCIUM CARBONATE 200(500)MG
1 TABLET,CHEWABLE ORAL EVERY 4 HOURS PRN
COMMUNITY
End: 2020-01-21 | Stop reason: ALTCHOICE

## 2018-11-08 RX ORDER — LIDOCAINE HYDROCHLORIDE 20 MG/ML
INJECTION, SOLUTION INFILTRATION; PERINEURAL PRN
Status: DISCONTINUED | OUTPATIENT
Start: 2018-11-08 | End: 2018-11-08 | Stop reason: SDUPTHER

## 2018-11-08 RX ADMIN — MORPHINE SULFATE 2 MG: 2 INJECTION, SOLUTION INTRAMUSCULAR; INTRAVENOUS at 12:41

## 2018-11-08 RX ADMIN — FENTANYL CITRATE 25 MCG: 50 INJECTION INTRAMUSCULAR; INTRAVENOUS at 14:13

## 2018-11-08 RX ADMIN — SODIUM CHLORIDE: 4.5 INJECTION, SOLUTION INTRAVENOUS at 17:21

## 2018-11-08 RX ADMIN — MORPHINE SULFATE 4 MG: 4 INJECTION INTRAVENOUS at 06:24

## 2018-11-08 RX ADMIN — METOPROLOL SUCCINATE 100 MG: 50 TABLET, EXTENDED RELEASE ORAL at 13:31

## 2018-11-08 RX ADMIN — MORPHINE SULFATE 4 MG: 4 INJECTION INTRAVENOUS at 01:03

## 2018-11-08 RX ADMIN — POTASSIUM CHLORIDE 10 MEQ: 750 TABLET, FILM COATED, EXTENDED RELEASE ORAL at 08:20

## 2018-11-08 RX ADMIN — Medication 2 G: at 14:11

## 2018-11-08 RX ADMIN — HYDROXYCHLOROQUINE SULFATE 200 MG: 200 TABLET, FILM COATED ORAL at 08:20

## 2018-11-08 RX ADMIN — FLUOXETINE 20 MG: 20 CAPSULE ORAL at 08:19

## 2018-11-08 RX ADMIN — FENTANYL CITRATE 25 MCG: 50 INJECTION INTRAMUSCULAR; INTRAVENOUS at 14:30

## 2018-11-08 RX ADMIN — MORPHINE SULFATE 2 MG: 2 INJECTION, SOLUTION INTRAMUSCULAR; INTRAVENOUS at 17:17

## 2018-11-08 RX ADMIN — BUSPIRONE HYDROCHLORIDE 10 MG: 10 TABLET ORAL at 00:55

## 2018-11-08 RX ADMIN — SODIUM CHLORIDE: 9 INJECTION, SOLUTION INTRAVENOUS at 12:21

## 2018-11-08 RX ADMIN — LISINOPRIL 20 MG: 20 TABLET ORAL at 08:20

## 2018-11-08 RX ADMIN — FLUTICASONE PROPIONATE 1 SPRAY: 50 SPRAY, METERED NASAL at 11:07

## 2018-11-08 RX ADMIN — PREDNISONE 5 MG: 5 TABLET ORAL at 08:19

## 2018-11-08 RX ADMIN — LIDOCAINE HYDROCHLORIDE 50 MG: 20 INJECTION, SOLUTION INFILTRATION; PERINEURAL at 14:13

## 2018-11-08 RX ADMIN — FENTANYL CITRATE 25 MCG: 50 INJECTION INTRAMUSCULAR; INTRAVENOUS at 15:07

## 2018-11-08 RX ADMIN — FENTANYL CITRATE 25 MCG: 50 INJECTION, SOLUTION INTRAMUSCULAR; INTRAVENOUS at 15:39

## 2018-11-08 RX ADMIN — AMITRIPTYLINE HYDROCHLORIDE 25 MG: 25 TABLET, FILM COATED ORAL at 00:55

## 2018-11-08 RX ADMIN — DOCUSATE SODIUM 100 MG: 100 CAPSULE, LIQUID FILLED ORAL at 23:02

## 2018-11-08 RX ADMIN — PROPOFOL 70 MG: 10 INJECTION, EMULSION INTRAVENOUS at 14:13

## 2018-11-08 RX ADMIN — MORPHINE SULFATE 4 MG: 4 INJECTION INTRAVENOUS at 19:44

## 2018-11-08 RX ADMIN — AMITRIPTYLINE HYDROCHLORIDE 25 MG: 25 TABLET, FILM COATED ORAL at 23:03

## 2018-11-08 RX ADMIN — AMLODIPINE BESYLATE 5 MG: 5 TABLET ORAL at 08:20

## 2018-11-08 RX ADMIN — BUSPIRONE HYDROCHLORIDE 10 MG: 10 TABLET ORAL at 08:20

## 2018-11-08 RX ADMIN — Medication 2 G: at 23:02

## 2018-11-08 RX ADMIN — LEVOTHYROXINE SODIUM 100 MCG: 100 TABLET ORAL at 06:24

## 2018-11-08 RX ADMIN — BUSPIRONE HYDROCHLORIDE 10 MG: 10 TABLET ORAL at 23:02

## 2018-11-08 RX ADMIN — ONDANSETRON 4 MG: 2 INJECTION INTRAMUSCULAR; INTRAVENOUS at 14:30

## 2018-11-08 RX ADMIN — PANTOPRAZOLE SODIUM 40 MG: 40 TABLET, DELAYED RELEASE ORAL at 06:24

## 2018-11-08 RX ADMIN — HYDROXYCHLOROQUINE SULFATE 200 MG: 200 TABLET, FILM COATED ORAL at 23:02

## 2018-11-08 ASSESSMENT — PULMONARY FUNCTION TESTS
PIF_VALUE: 8
PIF_VALUE: 10
PIF_VALUE: 12
PIF_VALUE: 13
PIF_VALUE: 12
PIF_VALUE: 1
PIF_VALUE: 9
PIF_VALUE: 10
PIF_VALUE: 9
PIF_VALUE: 12
PIF_VALUE: 10
PIF_VALUE: 11
PIF_VALUE: 10
PIF_VALUE: 2
PIF_VALUE: 0
PIF_VALUE: 10
PIF_VALUE: 1
PIF_VALUE: 10
PIF_VALUE: 14
PIF_VALUE: 0
PIF_VALUE: 12
PIF_VALUE: 0
PIF_VALUE: 7
PIF_VALUE: 10
PIF_VALUE: 9
PIF_VALUE: 9
PIF_VALUE: 11
PIF_VALUE: 0
PIF_VALUE: 9
PIF_VALUE: 11
PIF_VALUE: 10
PIF_VALUE: 12
PIF_VALUE: 12
PIF_VALUE: 9
PIF_VALUE: 11
PIF_VALUE: 8
PIF_VALUE: 11
PIF_VALUE: 10
PIF_VALUE: 9
PIF_VALUE: 11
PIF_VALUE: 10
PIF_VALUE: 0
PIF_VALUE: 1
PIF_VALUE: 9
PIF_VALUE: 10
PIF_VALUE: 10
PIF_VALUE: 11
PIF_VALUE: 0
PIF_VALUE: 1
PIF_VALUE: 10
PIF_VALUE: 11
PIF_VALUE: 12
PIF_VALUE: 12
PIF_VALUE: 11
PIF_VALUE: 1
PIF_VALUE: 9
PIF_VALUE: 1
PIF_VALUE: 9
PIF_VALUE: 10
PIF_VALUE: 0
PIF_VALUE: 9
PIF_VALUE: 1
PIF_VALUE: 0
PIF_VALUE: 0
PIF_VALUE: 12
PIF_VALUE: 10

## 2018-11-08 ASSESSMENT — PAIN SCALES - GENERAL
PAINLEVEL_OUTOF10: 9
PAINLEVEL_OUTOF10: 7
PAINLEVEL_OUTOF10: 5
PAINLEVEL_OUTOF10: 0
PAINLEVEL_OUTOF10: 5
PAINLEVEL_OUTOF10: 7
PAINLEVEL_OUTOF10: 1
PAINLEVEL_OUTOF10: 5
PAINLEVEL_OUTOF10: 2
PAINLEVEL_OUTOF10: 7

## 2018-11-08 ASSESSMENT — PAIN DESCRIPTION - ONSET
ONSET: ON-GOING

## 2018-11-08 ASSESSMENT — PAIN DESCRIPTION - PAIN TYPE
TYPE: SURGICAL PAIN
TYPE: ACUTE PAIN
TYPE: ACUTE PAIN
TYPE: SURGICAL PAIN
TYPE: ACUTE PAIN

## 2018-11-08 ASSESSMENT — PAIN DESCRIPTION - LOCATION
LOCATION: LEG;HIP
LOCATION: HIP;LEG
LOCATION: HIP
LOCATION: HIP;LEG
LOCATION: LEG;HIP
LOCATION: HIP;LEG
LOCATION: HIP;LEG

## 2018-11-08 ASSESSMENT — PAIN DESCRIPTION - PROGRESSION
CLINICAL_PROGRESSION: GRADUALLY IMPROVING
CLINICAL_PROGRESSION: NOT CHANGED
CLINICAL_PROGRESSION: NOT CHANGED
CLINICAL_PROGRESSION: GRADUALLY IMPROVING
CLINICAL_PROGRESSION: NOT CHANGED

## 2018-11-08 ASSESSMENT — PAIN DESCRIPTION - FREQUENCY
FREQUENCY: CONTINUOUS

## 2018-11-08 ASSESSMENT — PAIN DESCRIPTION - DESCRIPTORS
DESCRIPTORS: ACHING
DESCRIPTORS: ACHING;THROBBING
DESCRIPTORS: ACHING;SHARP
DESCRIPTORS: ACHING
DESCRIPTORS: ACHING
DESCRIPTORS: ACHING;SHARP

## 2018-11-08 ASSESSMENT — PAIN DESCRIPTION - ORIENTATION
ORIENTATION: RIGHT

## 2018-11-08 NOTE — PROGRESS NOTES
Patient arouses easily to name. Resp easy unlabored on on 2L NC with SaO2 98%. Monitor in SB-SR. Patient states pain level 1-2 of 10 and tolerable. Right hip dressing unchanged. Neurovascular checks stable to RLE. VSS. IV patent. Patient stable to transfer to room 3101. Report called to [facility] RN receiving patient into room 3101.

## 2018-11-08 NOTE — PROGRESS NOTES
Patient admitted to PACU from OR. Patient arouses to name, [place], alert. VSS. IV patent to right AC. Resp easy unlabored on room air O2 with SaO2 94%. Monitor in SR-SB. Right hip dressings x 2 dry and intact with ice pack on and stable neurovascular checks to RLE. Patient denies C/O pain or nausea.

## 2018-11-09 LAB
EKG ATRIAL RATE: 62 BPM
EKG DIAGNOSIS: NORMAL
EKG P AXIS: 69 DEGREES
EKG P-R INTERVAL: 192 MS
EKG Q-T INTERVAL: 502 MS
EKG QRS DURATION: 138 MS
EKG QTC CALCULATION (BAZETT): 509 MS
EKG R AXIS: -49 DEGREES
EKG T AXIS: -14 DEGREES
EKG VENTRICULAR RATE: 62 BPM
HCT VFR BLD CALC: 28.8 % (ref 36–48)
HEMOGLOBIN: 9.8 G/DL (ref 12–16)

## 2018-11-09 PROCEDURE — 6370000000 HC RX 637 (ALT 250 FOR IP): Performed by: INTERNAL MEDICINE

## 2018-11-09 PROCEDURE — G8988 SELF CARE GOAL STATUS: HCPCS

## 2018-11-09 PROCEDURE — 36415 COLL VENOUS BLD VENIPUNCTURE: CPT

## 2018-11-09 PROCEDURE — 2700000000 HC OXYGEN THERAPY PER DAY

## 2018-11-09 PROCEDURE — G8987 SELF CARE CURRENT STATUS: HCPCS

## 2018-11-09 PROCEDURE — 1200000000 HC SEMI PRIVATE

## 2018-11-09 PROCEDURE — 6360000002 HC RX W HCPCS: Performed by: ORTHOPAEDIC SURGERY

## 2018-11-09 PROCEDURE — 85014 HEMATOCRIT: CPT

## 2018-11-09 PROCEDURE — 6360000002 HC RX W HCPCS: Performed by: INTERNAL MEDICINE

## 2018-11-09 PROCEDURE — 97530 THERAPEUTIC ACTIVITIES: CPT

## 2018-11-09 PROCEDURE — 6370000000 HC RX 637 (ALT 250 FOR IP): Performed by: NURSE PRACTITIONER

## 2018-11-09 PROCEDURE — G8978 MOBILITY CURRENT STATUS: HCPCS

## 2018-11-09 PROCEDURE — 99233 SBSQ HOSP IP/OBS HIGH 50: CPT | Performed by: INTERNAL MEDICINE

## 2018-11-09 PROCEDURE — 94760 N-INVAS EAR/PLS OXIMETRY 1: CPT

## 2018-11-09 PROCEDURE — 2580000003 HC RX 258: Performed by: ORTHOPAEDIC SURGERY

## 2018-11-09 PROCEDURE — 2580000003 HC RX 258: Performed by: INTERNAL MEDICINE

## 2018-11-09 PROCEDURE — G8979 MOBILITY GOAL STATUS: HCPCS

## 2018-11-09 PROCEDURE — 6370000000 HC RX 637 (ALT 250 FOR IP): Performed by: ORTHOPAEDIC SURGERY

## 2018-11-09 PROCEDURE — 97162 PT EVAL MOD COMPLEX 30 MIN: CPT

## 2018-11-09 PROCEDURE — 97166 OT EVAL MOD COMPLEX 45 MIN: CPT

## 2018-11-09 PROCEDURE — 85018 HEMOGLOBIN: CPT

## 2018-11-09 RX ORDER — ASPIRIN 81 MG/1
81 TABLET ORAL 2 TIMES DAILY
Qty: 60 TABLET | Refills: 0 | Status: SHIPPED | OUTPATIENT
Start: 2018-11-09 | End: 2019-07-09

## 2018-11-09 RX ORDER — HYDROCODONE BITARTRATE AND ACETAMINOPHEN 5; 325 MG/1; MG/1
1 TABLET ORAL EVERY 6 HOURS PRN
Status: DISCONTINUED | OUTPATIENT
Start: 2018-11-09 | End: 2018-11-13 | Stop reason: HOSPADM

## 2018-11-09 RX ORDER — HYDROCODONE BITARTRATE AND ACETAMINOPHEN 5; 325 MG/1; MG/1
2 TABLET ORAL EVERY 6 HOURS PRN
Status: DISCONTINUED | OUTPATIENT
Start: 2018-11-09 | End: 2018-11-13 | Stop reason: HOSPADM

## 2018-11-09 RX ORDER — HYDROCODONE BITARTRATE AND ACETAMINOPHEN 5; 325 MG/1; MG/1
1-2 TABLET ORAL EVERY 6 HOURS PRN
Qty: 60 TABLET | Refills: 0 | Status: SHIPPED | OUTPATIENT
Start: 2018-11-09 | End: 2019-06-07 | Stop reason: SINTOL

## 2018-11-09 RX ADMIN — MORPHINE SULFATE 4 MG: 4 INJECTION INTRAVENOUS at 08:54

## 2018-11-09 RX ADMIN — LISINOPRIL 20 MG: 20 TABLET ORAL at 09:05

## 2018-11-09 RX ADMIN — ENOXAPARIN SODIUM 40 MG: 40 INJECTION SUBCUTANEOUS at 08:57

## 2018-11-09 RX ADMIN — POTASSIUM CHLORIDE 10 MEQ: 750 TABLET, FILM COATED, EXTENDED RELEASE ORAL at 08:57

## 2018-11-09 RX ADMIN — Medication 10 ML: at 19:56

## 2018-11-09 RX ADMIN — Medication 2 G: at 05:41

## 2018-11-09 RX ADMIN — FLUTICASONE PROPIONATE 1 SPRAY: 50 SPRAY, METERED NASAL at 08:55

## 2018-11-09 RX ADMIN — HYDROXYCHLOROQUINE SULFATE 200 MG: 200 TABLET, FILM COATED ORAL at 08:59

## 2018-11-09 RX ADMIN — PREDNISONE 5 MG: 5 TABLET ORAL at 09:03

## 2018-11-09 RX ADMIN — DOCUSATE SODIUM 100 MG: 100 CAPSULE, LIQUID FILLED ORAL at 09:00

## 2018-11-09 RX ADMIN — SODIUM CHLORIDE: 4.5 INJECTION, SOLUTION INTRAVENOUS at 08:53

## 2018-11-09 RX ADMIN — DOCUSATE SODIUM 100 MG: 100 CAPSULE, LIQUID FILLED ORAL at 19:56

## 2018-11-09 RX ADMIN — AMITRIPTYLINE HYDROCHLORIDE 25 MG: 25 TABLET, FILM COATED ORAL at 19:56

## 2018-11-09 RX ADMIN — LEVOTHYROXINE SODIUM 100 MCG: 100 TABLET ORAL at 05:41

## 2018-11-09 RX ADMIN — MORPHINE SULFATE 4 MG: 4 INJECTION INTRAVENOUS at 05:41

## 2018-11-09 RX ADMIN — HYDROCODONE BITARTRATE AND ACETAMINOPHEN 1 TABLET: 5; 325 TABLET ORAL at 19:55

## 2018-11-09 RX ADMIN — MORPHINE SULFATE 4 MG: 4 INJECTION INTRAVENOUS at 23:39

## 2018-11-09 RX ADMIN — HYDROXYCHLOROQUINE SULFATE 200 MG: 200 TABLET, FILM COATED ORAL at 19:56

## 2018-11-09 RX ADMIN — BUSPIRONE HYDROCHLORIDE 10 MG: 10 TABLET ORAL at 19:56

## 2018-11-09 RX ADMIN — AMLODIPINE BESYLATE 5 MG: 5 TABLET ORAL at 09:05

## 2018-11-09 RX ADMIN — ASPIRIN 81 MG CHEWABLE TABLET 81 MG: 81 TABLET CHEWABLE at 09:05

## 2018-11-09 RX ADMIN — PANTOPRAZOLE SODIUM 40 MG: 40 TABLET, DELAYED RELEASE ORAL at 05:41

## 2018-11-09 RX ADMIN — MORPHINE SULFATE 2 MG: 2 INJECTION, SOLUTION INTRAMUSCULAR; INTRAVENOUS at 03:13

## 2018-11-09 RX ADMIN — SODIUM CHLORIDE: 4.5 INJECTION, SOLUTION INTRAVENOUS at 23:40

## 2018-11-09 RX ADMIN — METOPROLOL SUCCINATE 100 MG: 50 TABLET, EXTENDED RELEASE ORAL at 08:59

## 2018-11-09 RX ADMIN — FLUOXETINE HYDROCHLORIDE 40 MG: 20 CAPSULE ORAL at 08:58

## 2018-11-09 RX ADMIN — BUSPIRONE HYDROCHLORIDE 10 MG: 10 TABLET ORAL at 09:03

## 2018-11-09 RX ADMIN — HYDROCODONE BITARTRATE AND ACETAMINOPHEN 2 TABLET: 5; 325 TABLET ORAL at 14:15

## 2018-11-09 ASSESSMENT — PAIN DESCRIPTION - PROGRESSION
CLINICAL_PROGRESSION: NOT CHANGED
CLINICAL_PROGRESSION: NOT CHANGED

## 2018-11-09 ASSESSMENT — PAIN SCALES - GENERAL
PAINLEVEL_OUTOF10: 4
PAINLEVEL_OUTOF10: 4
PAINLEVEL_OUTOF10: 5
PAINLEVEL_OUTOF10: 0
PAINLEVEL_OUTOF10: 6
PAINLEVEL_OUTOF10: 9
PAINLEVEL_OUTOF10: 5
PAINLEVEL_OUTOF10: 7
PAINLEVEL_OUTOF10: 0
PAINLEVEL_OUTOF10: 3
PAINLEVEL_OUTOF10: 6
PAINLEVEL_OUTOF10: 7

## 2018-11-09 ASSESSMENT — PAIN DESCRIPTION - PAIN TYPE
TYPE: SURGICAL PAIN

## 2018-11-09 ASSESSMENT — PAIN DESCRIPTION - LOCATION
LOCATION: HIP;LEG
LOCATION: HIP
LOCATION: LEG;HIP

## 2018-11-09 ASSESSMENT — PAIN DESCRIPTION - ONSET
ONSET: ON-GOING

## 2018-11-09 ASSESSMENT — PAIN DESCRIPTION - FREQUENCY
FREQUENCY: CONTINUOUS

## 2018-11-09 ASSESSMENT — PAIN DESCRIPTION - ORIENTATION
ORIENTATION: RIGHT

## 2018-11-09 ASSESSMENT — PAIN DESCRIPTION - DESCRIPTORS
DESCRIPTORS: ACHING;THROBBING
DESCRIPTORS: ACHING
DESCRIPTORS: ACHING;THROBBING

## 2018-11-09 NOTE — OP NOTE
[address] [place] [address] Jaspreet Recinos [address]                                OPERATIVE REPORT    PATIENT NAME: Kendell Alvarez                      :        1940  MED REC NO:   [id number]                          ROOM:       3101  ACCOUNT NO:   [de-identified]                           ADMIT DATE: 2018  PROVIDER:     Kristine Lopez MD      DATE OF PROCEDURE:  2018    PRIMARY CARE PHYSICIAN:  Marianna Cifuentes MD    PREOPERATIVE DIAGNOSIS:  Right intertrochanteric comminuted displaced  fracture. POSTOPERATIVE DIAGNOSIS:  Right intertrochanteric comminuted displaced  fracture. OPERATION PERFORMED:  Open treatment of right femur intertrochanteric  fracture with locked gamma nail. SURGEON:  MD Ricardo Merino, Surgical Assistant. ANESTHESIA:  General anesthesia. ESTIMATED BLOOD LOSS:  Minimal.    COMPLICATIONS:  None. IMPLANTS USED:  Shelly titanium long gamma nail, size 10 x 360, 80-mm  hip screw and two distal locking screws. INDICATIONS:  This is a 71-year-old white female who sustained a fall  with right intertrochanteric comminuted displaced fracture. All risks,  benefits, and alternatives were discussed with the patient and her  family, and they agreed to proceed with surgical treatment. OPERATIVE PROCEDURE:  The patient's right hip was marked. She received  2 gm of Ancef IV preoperatively. The patient was then brought to the  operating room and underwent general anesthesia. The patient was then  transferred to the fracture table. Closed reduction was performed. We  were able to achieve a good reduction. There was only a small step-off  on the lateral view. At this point, we had the right hip and lower  extremity prepped and draped in regular sterile routine fashion. A  time-out was called confirming the patient's name, site and procedure.     A small incision was made proximal to the

## 2018-11-09 NOTE — PLAN OF CARE
Problem: Falls - Risk of:  Goal: Will remain free from falls  Will remain free from falls   Outcome: Ongoing  Fall risk assessment completed. Fall precautions in place. Bed in lowest position, wheels locked, bed/chair exit alarm in place, call light within reach, and non skid footwear on. Walkway free of clutter. Pt alert and oriented and able to make needs known. Pt educated to use call light when needing to get up, and pt utilizes call light to make needs known. Will continue to monitor. Electronically signed by Thong Hughes RN on 11/9/2018 at 11:03 AM      Problem: Pain:  Goal: Pain level will decrease  Pain level will decrease   Outcome: Ongoing  Pt assessed for pain. Pt in pain and assessed with 0-10 pain rating scale. Pt given prescribed analgesic for pain. (See eMar) Pt satisfied with pain relief thus far. Will reassess and continue to monitor. Electronically signed by Thong Hughes RN on 11/9/2018 at 11:03 AM      Problem: SAFETY  Goal: Free from accidental physical injury  Outcome: Ongoing  Bed in lowest position, wheels locked, bed/chair exit alarm in place, call light within reach, and non skid footwear on. Walkway free of clutter. Pt alert and oriented and able to make needs known. Pt educated to use call light when needing to get up, and pt utilizes call light to make needs known. Will continue to monitor. Electronically signed by Thong Hughes RN on 11/9/2018 at 11:04 AM      Problem: DAILY CARE  Goal: Daily care needs are met  Outcome: Ongoing  Patient assessed to determine ability to perform daily needs and ADLs. Patient assisted with any daily needs that were not able to be met individually by patient. [place] encouraged, although patient educated to ask for assistance when needed. Will continue to monitor and assist patient with meeting daily care needs as needed.    Electronically signed by Thong Hughes RN on 11/9/2018 at 11:04 AM      Problem: SKIN INTEGRITY  Goal: Skin integrity is maintained or improved  Outcome: Ongoing  Will monitor skin and mucous members. Will turn patient every 2 hours, monitor for friction and sheering, and change dressings as needed. Will preform skin assessment every shift. Electronically signed by Abhinav Chisholm RN on 11/9/2018 at 11:04 AM      Problem: DISCHARGE BARRIERS  Goal: Patient's continuum of care needs are met  Outcome: Ongoing  The current medical regimen is effective;  continue present plan and medications.   Electronically signed by Abhinav Chisholm RN on 11/9/2018 at 11:05 AM

## 2018-11-09 NOTE — DISCHARGE INSTR - COC
and prefers mixed with applesauce    Wound Care Documentation and Therapy:  Keep tan Mepilex dressing clean and intact for 7 days after surgery then remove and leave wound to air. Mepilex is waterproof for showering. Incision 11/08/18 Hip Right (Active)   Wound Assessment JANNY 11/9/2018  8:04 AM   [place]-wound Assessment JANNY 11/9/2018  8:04 AM   Closure JANNY 11/9/2018  8:04 AM   Culture Taken No 11/8/2018  4:30 PM   Drainage Amount Small 11/9/2018  8:04 AM   Drainage Description Serosanguinous 11/9/2018  8:04 AM   Odor None 11/9/2018  8:04 AM   Dressing/Treatment Dry dressing;Ice applied 11/9/2018  8:04 AM   Dressing Changed Changed/New 11/8/2018  7:44 PM   Dressing Status Old drainage; Intact 11/9/2018  8:04 AM   Number of days: 0        Elimination:  Urinary Catheter: None   Colostomy/Ileostomy: No  Continence:   · Bowel: No  · Bladder: No  Date of Last BM: 11/12/18    Intake/Output Summary (Last 24 hours)     Intake/Output Summary (Last 24 hours) at 11/09/18 1013  Last data filed at 11/09/18 0605   Gross per 24 hour   Intake          1356.65 ml   Output             1050 ml   Net           306.65 ml     Safety Concerns:     History of Falls (last 30 days) and At Risk for Falls    Impairments/Disabilities:      Vision and Hearing    Nutrition Therapy:  Current Nutrition Therapy: DIET GENERAL;  Dietary Nutrition Supplements: Standard High Calorie Oral Supplement  Routes of Feeding: Oral  Liquids: No Restrictions  Daily Fluid Restriction: no  Last Modified Barium Swallow with Video (Video Swallowing Test): not done    Treatments at the Time of Hospital Discharge:   Respiratory Treatments: N/A  Oxygen Therapy:  is not on home oxygen therapy.   Ventilator:    - No ventilator support    Lab orders for discharge:        Rehab Therapies: Physical Therapy, Occupational Therapy and nursing care  Weight Bearing Status/Restrictions: No weight bearing restirctions  Other Medical Equipment (for information only, NOT a DME order):

## 2018-11-10 PROCEDURE — 6370000000 HC RX 637 (ALT 250 FOR IP): Performed by: INTERNAL MEDICINE

## 2018-11-10 PROCEDURE — 6370000000 HC RX 637 (ALT 250 FOR IP): Performed by: ORTHOPAEDIC SURGERY

## 2018-11-10 PROCEDURE — 94760 N-INVAS EAR/PLS OXIMETRY 1: CPT

## 2018-11-10 PROCEDURE — 1200000000 HC SEMI PRIVATE

## 2018-11-10 PROCEDURE — 6360000002 HC RX W HCPCS: Performed by: ORTHOPAEDIC SURGERY

## 2018-11-10 PROCEDURE — 2580000003 HC RX 258: Performed by: INTERNAL MEDICINE

## 2018-11-10 PROCEDURE — 6370000000 HC RX 637 (ALT 250 FOR IP): Performed by: NURSE PRACTITIONER

## 2018-11-10 PROCEDURE — 99232 SBSQ HOSP IP/OBS MODERATE 35: CPT | Performed by: INTERNAL MEDICINE

## 2018-11-10 PROCEDURE — 2700000000 HC OXYGEN THERAPY PER DAY

## 2018-11-10 RX ADMIN — PANTOPRAZOLE SODIUM 40 MG: 40 TABLET, DELAYED RELEASE ORAL at 06:13

## 2018-11-10 RX ADMIN — ACETAMINOPHEN 650 MG: 325 TABLET, FILM COATED ORAL at 23:16

## 2018-11-10 RX ADMIN — DOCUSATE SODIUM 100 MG: 100 CAPSULE, LIQUID FILLED ORAL at 08:46

## 2018-11-10 RX ADMIN — Medication 10 ML: at 08:44

## 2018-11-10 RX ADMIN — AMLODIPINE BESYLATE 5 MG: 5 TABLET ORAL at 08:44

## 2018-11-10 RX ADMIN — HYDROCODONE BITARTRATE AND ACETAMINOPHEN 1 TABLET: 5; 325 TABLET ORAL at 06:13

## 2018-11-10 RX ADMIN — POTASSIUM CHLORIDE 10 MEQ: 750 TABLET, FILM COATED, EXTENDED RELEASE ORAL at 08:45

## 2018-11-10 RX ADMIN — FLUOXETINE HYDROCHLORIDE 40 MG: 20 CAPSULE ORAL at 08:44

## 2018-11-10 RX ADMIN — ACETAMINOPHEN 650 MG: 325 TABLET, FILM COATED ORAL at 15:46

## 2018-11-10 RX ADMIN — LEVOTHYROXINE SODIUM 100 MCG: 100 TABLET ORAL at 06:13

## 2018-11-10 RX ADMIN — PREDNISONE 5 MG: 5 TABLET ORAL at 08:45

## 2018-11-10 RX ADMIN — Medication 10 ML: at 20:11

## 2018-11-10 RX ADMIN — BUSPIRONE HYDROCHLORIDE 10 MG: 10 TABLET ORAL at 08:45

## 2018-11-10 RX ADMIN — AMITRIPTYLINE HYDROCHLORIDE 25 MG: 25 TABLET, FILM COATED ORAL at 20:10

## 2018-11-10 RX ADMIN — DOCUSATE SODIUM 100 MG: 100 CAPSULE, LIQUID FILLED ORAL at 20:10

## 2018-11-10 RX ADMIN — HYDROXYCHLOROQUINE SULFATE 200 MG: 200 TABLET, FILM COATED ORAL at 08:44

## 2018-11-10 RX ADMIN — ACETAMINOPHEN 650 MG: 325 TABLET, FILM COATED ORAL at 08:45

## 2018-11-10 RX ADMIN — LISINOPRIL 20 MG: 20 TABLET ORAL at 08:45

## 2018-11-10 RX ADMIN — ENOXAPARIN SODIUM 40 MG: 40 INJECTION SUBCUTANEOUS at 08:44

## 2018-11-10 RX ADMIN — BUSPIRONE HYDROCHLORIDE 10 MG: 10 TABLET ORAL at 20:10

## 2018-11-10 RX ADMIN — METOPROLOL SUCCINATE 100 MG: 50 TABLET, EXTENDED RELEASE ORAL at 08:45

## 2018-11-10 RX ADMIN — HYDROXYCHLOROQUINE SULFATE 200 MG: 200 TABLET, FILM COATED ORAL at 20:10

## 2018-11-10 RX ADMIN — ASPIRIN 81 MG CHEWABLE TABLET 81 MG: 81 TABLET CHEWABLE at 08:45

## 2018-11-10 ASSESSMENT — PAIN SCALES - GENERAL
PAINLEVEL_OUTOF10: 0
PAINLEVEL_OUTOF10: 2
PAINLEVEL_OUTOF10: 3
PAINLEVEL_OUTOF10: 0
PAINLEVEL_OUTOF10: 3
PAINLEVEL_OUTOF10: 3
PAINLEVEL_OUTOF10: 4
PAINLEVEL_OUTOF10: 3

## 2018-11-10 ASSESSMENT — PAIN DESCRIPTION - PROGRESSION
CLINICAL_PROGRESSION: NOT CHANGED
CLINICAL_PROGRESSION: GRADUALLY WORSENING
CLINICAL_PROGRESSION: NOT CHANGED

## 2018-11-10 ASSESSMENT — PAIN DESCRIPTION - FREQUENCY
FREQUENCY: CONTINUOUS

## 2018-11-10 ASSESSMENT — PAIN DESCRIPTION - LOCATION
LOCATION: HIP

## 2018-11-10 ASSESSMENT — PAIN DESCRIPTION - PAIN TYPE
TYPE: SURGICAL PAIN

## 2018-11-10 ASSESSMENT — PAIN DESCRIPTION - ONSET
ONSET: ON-GOING

## 2018-11-10 ASSESSMENT — PAIN DESCRIPTION - DESCRIPTORS
DESCRIPTORS: ACHING

## 2018-11-10 ASSESSMENT — PAIN DESCRIPTION - ORIENTATION
ORIENTATION: RIGHT

## 2018-11-10 NOTE — PROGRESS NOTES
[facility] Progress Note  11/10/2018 10:50 AM  Subjective:   Admit Date: 11/7/2018      Chief Complaint: s/p hip pinning --vaughn well     Interval History: Pain is controlled  No c/p or SOB     Diet: DIET GENERAL;  Dietary Nutrition Supplements: Standard High Calorie Oral Supplement  Medications:   Scheduled Meds:   FLUoxetine  40 mg Oral Daily    hydroxychloroquine  200 mg Oral BID    metoprolol succinate  100 mg Oral Daily    docusate sodium  100 mg Oral BID    enoxaparin  40 mg Subcutaneous Daily    amitriptyline  25 mg Oral Nightly    amLODIPine  5 mg Oral Daily    aspirin  81 mg Oral Daily    busPIRone  10 mg Oral BID    fluticasone  1 spray Nasal Daily    levothyroxine  100 mcg Oral Daily    lisinopril  20 mg Oral Daily    pantoprazole  40 mg Oral QAM AC    potassium chloride  10 mEq Oral Daily    predniSONE  5 mg Oral Daily    sodium chloride flush  10 mL Intravenous 2 times per day     Continuous Infusions:   sodium chloride 75 mL/hr at 11/09/18 2340       Review of Systems -   General ROS: afebrile  Respiratory ROS: no cough, shortness of breath or wheezing  Cardiovascular ROS: no chest pain  Musculoskeletal ROS:positive for - rt hip pain   Neurological ROS: no TIA or stroke symptoms    Objective:   Vitals: BP (!) 142/65   Pulse 74   Temp 99.4 °F (37.4 °C) (Axillary)   Resp 16   Ht 5' 1\" (1.549 m)   Wt 112 lb 7 oz (51 kg)   SpO2 92%   BMI 21.24 kg/m²   General appearance: alert and cooperative with exam  HEENT: Head: Normocephalic, no lesions, without obvious abnormality.   Neck: no adenopathy, no carotid bruit, no JVD, supple, symmetrical, trachea midline and thyroid not enlarged, symmetric, no tenderness/mass/nodules  Lungs: diminished breath sounds bibasilar  Heart: regular rate and rhythm, S1, S2 normal, no murmur, click, rub or gallop  Abdomen: soft, non-tender; bowel sounds normal; no masses,  no organomegaly  Extremities: dry dressing   Neurologic: Mental status: Alert, oriented,

## 2018-11-10 NOTE — PROGRESS NOTES
Checking on patient Q2H for nutrition needs, hygiene needs, comfort measures, mobility, fall risk interventions, and safe environment. All precautions and interventions in place. Educated patient on use of call light and telephone. Patient verbalizes understanding. Call light/telephone in reach.   Electronically signed by Oleg Aguilera RN on 11/10/2018 at 11:57 AM

## 2018-11-11 PROCEDURE — 99232 SBSQ HOSP IP/OBS MODERATE 35: CPT | Performed by: INTERNAL MEDICINE

## 2018-11-11 PROCEDURE — 6360000002 HC RX W HCPCS: Performed by: ORTHOPAEDIC SURGERY

## 2018-11-11 PROCEDURE — 6360000002 HC RX W HCPCS: Performed by: INTERNAL MEDICINE

## 2018-11-11 PROCEDURE — 6370000000 HC RX 637 (ALT 250 FOR IP): Performed by: ORTHOPAEDIC SURGERY

## 2018-11-11 PROCEDURE — 6370000000 HC RX 637 (ALT 250 FOR IP): Performed by: NURSE PRACTITIONER

## 2018-11-11 PROCEDURE — 1200000000 HC SEMI PRIVATE

## 2018-11-11 PROCEDURE — 2580000003 HC RX 258: Performed by: INTERNAL MEDICINE

## 2018-11-11 PROCEDURE — 6370000000 HC RX 637 (ALT 250 FOR IP): Performed by: INTERNAL MEDICINE

## 2018-11-11 PROCEDURE — 94760 N-INVAS EAR/PLS OXIMETRY 1: CPT

## 2018-11-11 RX ADMIN — ENOXAPARIN SODIUM 40 MG: 40 INJECTION SUBCUTANEOUS at 09:32

## 2018-11-11 RX ADMIN — PANTOPRAZOLE SODIUM 40 MG: 40 TABLET, DELAYED RELEASE ORAL at 05:19

## 2018-11-11 RX ADMIN — HYDROCODONE BITARTRATE AND ACETAMINOPHEN 1 TABLET: 5; 325 TABLET ORAL at 21:03

## 2018-11-11 RX ADMIN — POTASSIUM CHLORIDE 10 MEQ: 750 TABLET, FILM COATED, EXTENDED RELEASE ORAL at 09:33

## 2018-11-11 RX ADMIN — Medication 10 ML: at 21:06

## 2018-11-11 RX ADMIN — LISINOPRIL 20 MG: 20 TABLET ORAL at 09:33

## 2018-11-11 RX ADMIN — AMITRIPTYLINE HYDROCHLORIDE 25 MG: 25 TABLET, FILM COATED ORAL at 21:03

## 2018-11-11 RX ADMIN — BUSPIRONE HYDROCHLORIDE 10 MG: 10 TABLET ORAL at 09:33

## 2018-11-11 RX ADMIN — HYDROCODONE BITARTRATE AND ACETAMINOPHEN 1 TABLET: 5; 325 TABLET ORAL at 03:18

## 2018-11-11 RX ADMIN — DOCUSATE SODIUM 100 MG: 100 CAPSULE, LIQUID FILLED ORAL at 21:03

## 2018-11-11 RX ADMIN — AMLODIPINE BESYLATE 5 MG: 5 TABLET ORAL at 09:33

## 2018-11-11 RX ADMIN — LEVOTHYROXINE SODIUM 100 MCG: 100 TABLET ORAL at 05:19

## 2018-11-11 RX ADMIN — HYDROXYCHLOROQUINE SULFATE 200 MG: 200 TABLET, FILM COATED ORAL at 21:03

## 2018-11-11 RX ADMIN — HYDROCODONE BITARTRATE AND ACETAMINOPHEN 2 TABLET: 5; 325 TABLET ORAL at 09:42

## 2018-11-11 RX ADMIN — Medication 10 ML: at 09:44

## 2018-11-11 RX ADMIN — BUSPIRONE HYDROCHLORIDE 10 MG: 10 TABLET ORAL at 21:03

## 2018-11-11 RX ADMIN — METOPROLOL SUCCINATE 100 MG: 50 TABLET, EXTENDED RELEASE ORAL at 09:32

## 2018-11-11 RX ADMIN — PREDNISONE 5 MG: 5 TABLET ORAL at 09:32

## 2018-11-11 RX ADMIN — ASPIRIN 81 MG CHEWABLE TABLET 81 MG: 81 TABLET CHEWABLE at 09:32

## 2018-11-11 RX ADMIN — FLUOXETINE HYDROCHLORIDE 40 MG: 20 CAPSULE ORAL at 09:33

## 2018-11-11 RX ADMIN — HYDROXYCHLOROQUINE SULFATE 200 MG: 200 TABLET, FILM COATED ORAL at 09:33

## 2018-11-11 RX ADMIN — MORPHINE SULFATE 2 MG: 2 INJECTION, SOLUTION INTRAMUSCULAR; INTRAVENOUS at 13:28

## 2018-11-11 RX ADMIN — DOCUSATE SODIUM 100 MG: 100 CAPSULE, LIQUID FILLED ORAL at 09:32

## 2018-11-11 ASSESSMENT — PAIN DESCRIPTION - ORIENTATION
ORIENTATION: RIGHT

## 2018-11-11 ASSESSMENT — PAIN DESCRIPTION - LOCATION
LOCATION: HIP

## 2018-11-11 ASSESSMENT — PAIN SCALES - GENERAL
PAINLEVEL_OUTOF10: 5
PAINLEVEL_OUTOF10: 7
PAINLEVEL_OUTOF10: 3
PAINLEVEL_OUTOF10: 1
PAINLEVEL_OUTOF10: 9
PAINLEVEL_OUTOF10: 3
PAINLEVEL_OUTOF10: 6
PAINLEVEL_OUTOF10: 6

## 2018-11-11 ASSESSMENT — PAIN DESCRIPTION - PROGRESSION
CLINICAL_PROGRESSION: NOT CHANGED

## 2018-11-11 ASSESSMENT — PAIN DESCRIPTION - FREQUENCY
FREQUENCY: CONTINUOUS

## 2018-11-11 ASSESSMENT — PAIN DESCRIPTION - DESCRIPTORS
DESCRIPTORS: ACHING

## 2018-11-11 ASSESSMENT — PAIN DESCRIPTION - ONSET
ONSET: ON-GOING

## 2018-11-11 ASSESSMENT — PAIN DESCRIPTION - PAIN TYPE
TYPE: SURGICAL PAIN

## 2018-11-11 NOTE — PROGRESS NOTES
[facility] Orthopedic Surgery   Progress Note      S/P :  SUBJECTIVE  In bed. Alert and oriented. . Pain is   described in right hip and with the intensity of moderate. Pain is described as aching. OBJECTIVE              Physical                      VITALS:  BP (!) 154/62   Pulse 78   Temp 98.4 °F (36.9 °C) (Oral)   Resp 16   Ht 5' 1\" (1.549 m)   Wt 113 lb 8.6 oz (51.5 kg)   SpO2 94%   BMI 21.45 kg/m²                     MUSCULOSKELETAL:  right foot NVI. Wiggles toes to command. Pedal pulses are palpable. NEUROLOGIC:                                  Sensory:  Touch:  Right Lower Extremity:  normal                                                 Surgical wound appears clean and dry right hip with Mepilex AG dressing.  Ice to hip    Data       CBC:   Lab Results   Component Value Date    WBC 9.3 11/07/2018    RBC 4.26 11/07/2018    HGB 9.8 11/09/2018    HCT 28.8 11/09/2018    MCV 92.9 11/07/2018    MCH 31.3 11/07/2018    MCHC 33.7 11/07/2018    RDW 14.4 11/07/2018     11/07/2018    MPV 9.3 11/07/2018        WBC:    Lab Results   Component Value Date    WBC 9.3 11/07/2018        Hemoglobin/Hematocrit:    Lab Results   Component Value Date    HGB 9.8 11/09/2018    HCT 28.8 11/09/2018        PT/INR:    Lab Results   Component Value Date    PROTIME 11.9 11/07/2018    INR 1.04 11/07/2018              Current Inpatient Medications             Current Facility-Administered Medications: HYDROcodone-acetaminophen (NORCO) 5-325 MG per tablet 1 tablet, 1 tablet, Oral, Q6H PRN  HYDROcodone-acetaminophen (NORCO) 5-325 MG per tablet 2 tablet, 2 tablet, Oral, Q6H PRN  FLUoxetine (PROZAC) capsule 40 mg, 40 mg, Oral, Daily  hydroxychloroquine (PLAQUENIL) tablet 200 mg, 200 mg, Oral, BID  metoprolol succinate (TOPROL XL) extended release tablet 100 mg, 100 mg, Oral, Daily  0.45 % sodium chloride infusion, , Intravenous, Continuous  acetaminophen (TYLENOL) tablet 650 mg, 650 mg, Oral, Q4H PRN  docusate

## 2018-11-11 NOTE — PROGRESS NOTES
MD   25 mg at 11/10/18 2010    amLODIPine (NORVASC) tablet 5 mg  5 mg Oral Daily Fifi Schuler MD   5 mg at 11/11/18 1997    aspirin chewable tablet 81 mg  81 mg Oral Daily Fifi Schuler MD   81 mg at 11/11/18 0932    busPIRone (BUSPAR) tablet 10 mg  10 mg Oral BID Fifi Schuler MD   10 mg at 11/11/18 5507    docusate sodium (COLACE) capsule 100 mg  100 mg Oral BID PRN Fifi Schuler MD        fluticasone (FLONASE) 50 MCG/ACT nasal spray 1 spray  1 spray Nasal Daily Fifi Schuler MD   Stopped at 11/10/18 1021    levothyroxine (SYNTHROID) tablet 100 mcg  100 mcg Oral Daily Fifi Schuler MD   100 mcg at 11/11/18 0519    lisinopril (PRINIVIL;ZESTRIL) tablet 20 mg  20 mg Oral Daily Fifi Schuler MD   20 mg at 11/11/18 0933    pantoprazole (PROTONIX) tablet 40 mg  40 mg Oral QAM AC Fifi Schuler MD   40 mg at 11/11/18 0519    potassium chloride (KLOR-CON) extended release tablet 10 mEq  10 mEq Oral Daily Fifi Schuler MD   10 mEq at 11/11/18 0933    predniSONE (DELTASONE) tablet 5 mg  5 mg Oral Daily Fifi Schuler MD   5 mg at 11/11/18 0932    sodium chloride flush 0.9 % injection 10 mL  10 mL Intravenous 2 times per day Fifi Schuler MD   10 mL at 11/11/18 0944    sodium chloride flush 0.9 % injection 10 mL  10 mL Intravenous PRN Fifi Schuler MD        magnesium hydroxide (MILK OF MAGNESIA) 400 MG/5ML suspension 30 mL  30 mL Oral Daily PRN Fifi Schuler MD        ondansetron [facility]) injection 4 mg  4 mg Intravenous Q6H PRN Fifi Schuler MD   4 mg at 11/07/18 2303    morphine (PF) injection 2 mg  2 mg Intravenous Q2H PRN Fifi Schuler MD   2 mg at 11/11/18 1328    Or    morphine (PF) injection 4 mg  4 mg Intravenous Q2H PRN Fifi Schuler MD   4 mg at 11/09/18 2339     Past Medical History:   Diagnosis Date    Arthritis     Diverticulitis     Hypertension      Past Surgical History:   Procedure Laterality Date    [place] OFFICE/OUTPT VISIT,PROCEDURE ONLY

## 2018-11-12 LAB
ANION GAP SERPL CALCULATED.3IONS-SCNC: 12 MMOL/L (ref 3–16)
BASOPHILS ABSOLUTE: 0 K/UL (ref 0–0.2)
BASOPHILS RELATIVE PERCENT: 0.5 %
BUN BLDV-MCNC: 13 MG/DL (ref 7–20)
CALCIUM SERPL-MCNC: 7.8 MG/DL (ref 8.3–10.6)
CHLORIDE BLD-SCNC: 103 MMOL/L (ref 99–110)
CO2: 22 MMOL/L (ref 21–32)
CREAT SERPL-MCNC: 0.6 MG/DL (ref 0.6–1.2)
EOSINOPHILS ABSOLUTE: 0 K/UL (ref 0–0.6)
EOSINOPHILS RELATIVE PERCENT: 0 %
GFR AFRICAN AMERICAN: >60
GFR NON-AFRICAN AMERICAN: >60
GLUCOSE BLD-MCNC: 92 MG/DL (ref 70–99)
HCT VFR BLD CALC: 24.6 % (ref 36–48)
HEMOGLOBIN: 8.3 G/DL (ref 12–16)
LYMPHOCYTES ABSOLUTE: 1.9 K/UL (ref 1–5.1)
LYMPHOCYTES RELATIVE PERCENT: 21.6 %
MCH RBC QN AUTO: 31.7 PG (ref 26–34)
MCHC RBC AUTO-ENTMCNC: 33.6 G/DL (ref 31–36)
MCV RBC AUTO: 94.2 FL (ref 80–100)
MONOCYTES ABSOLUTE: 0.8 K/UL (ref 0–1.3)
MONOCYTES RELATIVE PERCENT: 8.7 %
NEUTROPHILS ABSOLUTE: 6.1 K/UL (ref 1.7–7.7)
NEUTROPHILS RELATIVE PERCENT: 69.2 %
PDW BLD-RTO: 14.4 % (ref 12.4–15.4)
PLATELET # BLD: 203 K/UL (ref 135–450)
PMV BLD AUTO: 8.8 FL (ref 5–10.5)
POTASSIUM SERPL-SCNC: 3.9 MMOL/L (ref 3.5–5.1)
RBC # BLD: 2.61 M/UL (ref 4–5.2)
SODIUM BLD-SCNC: 137 MMOL/L (ref 136–145)
WBC # BLD: 8.9 K/UL (ref 4–11)

## 2018-11-12 PROCEDURE — 94760 N-INVAS EAR/PLS OXIMETRY 1: CPT

## 2018-11-12 PROCEDURE — 94761 N-INVAS EAR/PLS OXIMETRY MLT: CPT

## 2018-11-12 PROCEDURE — 1200000000 HC SEMI PRIVATE

## 2018-11-12 PROCEDURE — 97535 SELF CARE MNGMENT TRAINING: CPT

## 2018-11-12 PROCEDURE — 6370000000 HC RX 637 (ALT 250 FOR IP): Performed by: NURSE PRACTITIONER

## 2018-11-12 PROCEDURE — 6360000002 HC RX W HCPCS: Performed by: NURSE PRACTITIONER

## 2018-11-12 PROCEDURE — 80048 BASIC METABOLIC PNL TOTAL CA: CPT

## 2018-11-12 PROCEDURE — 6360000002 HC RX W HCPCS: Performed by: ORTHOPAEDIC SURGERY

## 2018-11-12 PROCEDURE — 99239 HOSP IP/OBS DSCHRG MGMT >30: CPT | Performed by: INTERNAL MEDICINE

## 2018-11-12 PROCEDURE — 6370000000 HC RX 637 (ALT 250 FOR IP): Performed by: ORTHOPAEDIC SURGERY

## 2018-11-12 PROCEDURE — 6370000000 HC RX 637 (ALT 250 FOR IP): Performed by: INTERNAL MEDICINE

## 2018-11-12 PROCEDURE — 97530 THERAPEUTIC ACTIVITIES: CPT

## 2018-11-12 PROCEDURE — 85025 COMPLETE CBC W/AUTO DIFF WBC: CPT

## 2018-11-12 PROCEDURE — 2580000003 HC RX 258: Performed by: INTERNAL MEDICINE

## 2018-11-12 RX ORDER — BISACODYL 10 MG
10 SUPPOSITORY, RECTAL RECTAL DAILY PRN
Status: DISCONTINUED | OUTPATIENT
Start: 2018-11-12 | End: 2018-11-13 | Stop reason: HOSPADM

## 2018-11-12 RX ADMIN — PANTOPRAZOLE SODIUM 40 MG: 40 TABLET, DELAYED RELEASE ORAL at 06:55

## 2018-11-12 RX ADMIN — HYDROXYCHLOROQUINE SULFATE 200 MG: 200 TABLET, FILM COATED ORAL at 09:56

## 2018-11-12 RX ADMIN — LISINOPRIL 20 MG: 20 TABLET ORAL at 09:56

## 2018-11-12 RX ADMIN — ASPIRIN 81 MG CHEWABLE TABLET 81 MG: 81 TABLET CHEWABLE at 09:56

## 2018-11-12 RX ADMIN — AMITRIPTYLINE HYDROCHLORIDE 25 MG: 25 TABLET, FILM COATED ORAL at 20:45

## 2018-11-12 RX ADMIN — BUSPIRONE HYDROCHLORIDE 10 MG: 10 TABLET ORAL at 20:45

## 2018-11-12 RX ADMIN — BUSPIRONE HYDROCHLORIDE 10 MG: 10 TABLET ORAL at 09:56

## 2018-11-12 RX ADMIN — AMLODIPINE BESYLATE 5 MG: 5 TABLET ORAL at 09:57

## 2018-11-12 RX ADMIN — HYDROXYCHLOROQUINE SULFATE 200 MG: 200 TABLET, FILM COATED ORAL at 20:44

## 2018-11-12 RX ADMIN — DOCUSATE SODIUM 100 MG: 100 CAPSULE, LIQUID FILLED ORAL at 20:45

## 2018-11-12 RX ADMIN — Medication 10 ML: at 09:57

## 2018-11-12 RX ADMIN — PREDNISONE 5 MG: 5 TABLET ORAL at 09:56

## 2018-11-12 RX ADMIN — FLUOXETINE HYDROCHLORIDE 40 MG: 20 CAPSULE ORAL at 09:56

## 2018-11-12 RX ADMIN — LEVOTHYROXINE SODIUM 100 MCG: 100 TABLET ORAL at 06:55

## 2018-11-12 RX ADMIN — FLUTICASONE PROPIONATE 1 SPRAY: 50 SPRAY, METERED NASAL at 09:57

## 2018-11-12 RX ADMIN — METOPROLOL SUCCINATE 100 MG: 50 TABLET, EXTENDED RELEASE ORAL at 09:57

## 2018-11-12 RX ADMIN — BISACODYL 10 MG: 10 SUPPOSITORY RECTAL at 16:18

## 2018-11-12 RX ADMIN — HYDROCODONE BITARTRATE AND ACETAMINOPHEN 1 TABLET: 5; 325 TABLET ORAL at 18:14

## 2018-11-12 RX ADMIN — POTASSIUM CHLORIDE 10 MEQ: 750 TABLET, FILM COATED, EXTENDED RELEASE ORAL at 09:56

## 2018-11-12 RX ADMIN — METHYLNALTREXONE BROMIDE 12 MG: 12 INJECTION, SOLUTION SUBCUTANEOUS at 13:43

## 2018-11-12 RX ADMIN — ENOXAPARIN SODIUM 40 MG: 40 INJECTION SUBCUTANEOUS at 09:56

## 2018-11-12 RX ADMIN — DOCUSATE SODIUM 100 MG: 100 CAPSULE, LIQUID FILLED ORAL at 09:57

## 2018-11-12 ASSESSMENT — PAIN SCALES - GENERAL
PAINLEVEL_OUTOF10: 6
PAINLEVEL_OUTOF10: 5
PAINLEVEL_OUTOF10: 6

## 2018-11-12 ASSESSMENT — PAIN DESCRIPTION - PROGRESSION: CLINICAL_PROGRESSION: NOT CHANGED

## 2018-11-12 ASSESSMENT — PAIN DESCRIPTION - ORIENTATION: ORIENTATION: RIGHT

## 2018-11-12 ASSESSMENT — PAIN DESCRIPTION - FREQUENCY: FREQUENCY: CONTINUOUS

## 2018-11-12 ASSESSMENT — PAIN DESCRIPTION - DESCRIPTORS: DESCRIPTORS: ACHING

## 2018-11-12 ASSESSMENT — PAIN DESCRIPTION - PAIN TYPE: TYPE: SURGICAL PAIN

## 2018-11-12 ASSESSMENT — PAIN DESCRIPTION - LOCATION: LOCATION: FOOT;HIP

## 2018-11-12 ASSESSMENT — PAIN DESCRIPTION - ONSET: ONSET: ON-GOING

## 2018-11-12 NOTE — PROGRESS NOTES
Patient alert and oriented. In bed eating dinner. Patient had small bowel movement in brief. PRN suppository given. Set to be discharged tomorrow. No IV access. Patient tolerating PO fluids. Denies pain. Call light within reach. Bed alarm on. Patient able to make needs known.

## 2018-11-12 NOTE — PLAN OF CARE
at 11:30 AM      Problem: SKIN INTEGRITY  Goal: Skin integrity is maintained or improved  Outcome: Ongoing  Pt assessed for skin break down. Skin was warm and dry to touch. Pt able to turn self and regulate head of bed with assistance. Pt reminded to turn or reposition at least every 2 hours to prevent skin breakdown. Will continue to monitor and assess. Electronically signed by Flip Guerrero RN on 11/12/18 at 11:30 AM      Problem: KNOWLEDGE DEFICIT  Goal: Patient/S.O. demonstrates understanding of disease process, treatment plan, medications, and discharge instructions. Outcome: Ongoing  Patient's level of knowledge regarding condition assessed, and patient educated on condition, treatment, and medications. All questions answered and reviewed. Will continue to monitor patient and assess for further education needs. Electronically signed by Flip Guerrero RN on 11/12/18 at 11:30 AM      Problem: DISCHARGE BARRIERS  Goal: Patient's continuum of care needs are met  Outcome: Ongoing  Assessed patients knowledge of discharge. Will continue to work with patient on discharge planning and answer patient questions. Will consult case management and  as necessary. Electronically signed by Flip Guerrero RN on 11/12/18 at 11:30 AM      Problem: Risk for Impaired Skin Integrity  Goal: Tissue integrity - skin and mucous membranes  Structural intactness and normal physiological function of skin and  mucous membranes. Outcome: Ongoing  Pt assessed for skin break down. Skin was warm and dry to touch. Pt able to turn self and regulate head of bed with assistance. Pt reminded to turn or reposition at least every 2 hours to prevent skin breakdown. Will continue to monitor and assess. Electronically signed by Flip Guerrero RN on 11/12/18 at 11:30 AM      Problem: Musculor/Skeletal Functional Status  Goal: Highest potential functional level  Outcome: Ongoing  Early and frequent ambulqtion encouraged.   Educated

## 2018-11-12 NOTE — PROGRESS NOTES
[facility] Orthopedic Surgery   Progress Note      S/P :  SUBJECTIVE  In chair. Alert and oriented. . Pain is   described in right hip and with the intensity of moderate. Pain is described as aching. OBJECTIVE              Physical                      VITALS:  BP (!) 153/66   Pulse 69   Temp 98.9 °F (37.2 °C) (Oral)   Resp 16   Ht 5' 1\" (1.549 m)   Wt 111 lb 1.8 oz (50.4 kg)   SpO2 95%   BMI 20.99 kg/m²                     MUSCULOSKELETAL:  right foot NVI. Wiggles toes to command. Pedal pulses are palpable. NEUROLOGIC:                                  Sensory:  Touch:  Right Lower Extremity:  normal                                                 Surgical wound appears clean and dry right hip with Mepilex AG dressing. Ice applied.      Data       CBC:   Lab Results   Component Value Date    WBC 8.9 11/12/2018    RBC 2.61 11/12/2018    HGB 8.3 11/12/2018    HCT 24.6 11/12/2018    MCV 94.2 11/12/2018    MCH 31.7 11/12/2018    MCHC 33.6 11/12/2018    RDW 14.4 11/12/2018     11/12/2018    MPV 8.8 11/12/2018        WBC:    Lab Results   Component Value Date    WBC 8.9 11/12/2018        Hemoglobin/Hematocrit:    Lab Results   Component Value Date    HGB 8.3 11/12/2018    HCT 24.6 11/12/2018        PT/INR:    Lab Results   Component Value Date    PROTIME 11.9 11/07/2018    INR 1.04 11/07/2018              Current Inpatient Medications             Current Facility-Administered Medications: HYDROcodone-acetaminophen (NORCO) 5-325 MG per tablet 1 tablet, 1 tablet, Oral, Q6H PRN  HYDROcodone-acetaminophen (NORCO) 5-325 MG per tablet 2 tablet, 2 tablet, Oral, Q6H PRN  FLUoxetine (PROZAC) capsule 40 mg, 40 mg, Oral, Daily  hydroxychloroquine (PLAQUENIL) tablet 200 mg, 200 mg, Oral, BID  metoprolol succinate (TOPROL XL) extended release tablet 100 mg, 100 mg, Oral, Daily  0.45 % sodium chloride infusion, , Intravenous, Continuous  acetaminophen (TYLENOL) tablet 650 mg, 650 mg, Oral, Q4H

## 2018-11-12 NOTE — CARE COORDINATION
Per Rn, patient has not had a bowel movement as of yet and will not dc to [facility] until she has. Sw informed Kris[facility] and [facility] of above. Art spoke with daughter, Elmer Yu, regarding above.      Electronically signed by Juliann Chávez on 11/12/2018 at 4:10 PM

## 2018-11-12 NOTE — CARE COORDINATION
Sw spoke with patient regarding dc plan and IMM letter. She is agreeable to dc to [facility] today. Art spoke with patient's daughter, Monisha Chiang, regarding dc today to [facility]  She is agreeable to [facility] [employer].      Electronically signed by BRIAN Ryan on 11/12/2018 at 11:46 AM

## 2018-11-13 VITALS
WEIGHT: 112.43 LBS | TEMPERATURE: 98.5 F | DIASTOLIC BLOOD PRESSURE: 64 MMHG | HEIGHT: 61 IN | RESPIRATION RATE: 16 BRPM | BODY MASS INDEX: 21.23 KG/M2 | HEART RATE: 66 BPM | SYSTOLIC BLOOD PRESSURE: 116 MMHG | OXYGEN SATURATION: 96 %

## 2018-11-13 PROCEDURE — 6370000000 HC RX 637 (ALT 250 FOR IP): Performed by: ORTHOPAEDIC SURGERY

## 2018-11-13 PROCEDURE — 97535 SELF CARE MNGMENT TRAINING: CPT

## 2018-11-13 PROCEDURE — 97530 THERAPEUTIC ACTIVITIES: CPT

## 2018-11-13 PROCEDURE — 6370000000 HC RX 637 (ALT 250 FOR IP): Performed by: INTERNAL MEDICINE

## 2018-11-13 PROCEDURE — 6360000002 HC RX W HCPCS: Performed by: ORTHOPAEDIC SURGERY

## 2018-11-13 PROCEDURE — 94760 N-INVAS EAR/PLS OXIMETRY 1: CPT

## 2018-11-13 PROCEDURE — 6370000000 HC RX 637 (ALT 250 FOR IP): Performed by: NURSE PRACTITIONER

## 2018-11-13 RX ORDER — SODIUM PHOSPHATE, DIBASIC AND SODIUM PHOSPHATE, MONOBASIC 7; 19 G/133ML; G/133ML
1 ENEMA RECTAL
Status: DISCONTINUED | OUTPATIENT
Start: 2018-11-13 | End: 2018-11-13 | Stop reason: HOSPADM

## 2018-11-13 RX ADMIN — ENOXAPARIN SODIUM 40 MG: 40 INJECTION SUBCUTANEOUS at 08:28

## 2018-11-13 RX ADMIN — ASPIRIN 81 MG CHEWABLE TABLET 81 MG: 81 TABLET CHEWABLE at 08:28

## 2018-11-13 RX ADMIN — POTASSIUM CHLORIDE 10 MEQ: 750 TABLET, FILM COATED, EXTENDED RELEASE ORAL at 08:27

## 2018-11-13 RX ADMIN — AMLODIPINE BESYLATE 5 MG: 5 TABLET ORAL at 08:27

## 2018-11-13 RX ADMIN — LEVOTHYROXINE SODIUM 100 MCG: 100 TABLET ORAL at 05:45

## 2018-11-13 RX ADMIN — HYDROCODONE BITARTRATE AND ACETAMINOPHEN 2 TABLET: 5; 325 TABLET ORAL at 00:06

## 2018-11-13 RX ADMIN — BUSPIRONE HYDROCHLORIDE 10 MG: 10 TABLET ORAL at 08:28

## 2018-11-13 RX ADMIN — METOPROLOL SUCCINATE 100 MG: 50 TABLET, EXTENDED RELEASE ORAL at 08:27

## 2018-11-13 RX ADMIN — FLUOXETINE HYDROCHLORIDE 40 MG: 20 CAPSULE ORAL at 08:28

## 2018-11-13 RX ADMIN — PREDNISONE 5 MG: 5 TABLET ORAL at 08:27

## 2018-11-13 RX ADMIN — HYDROXYCHLOROQUINE SULFATE 200 MG: 200 TABLET, FILM COATED ORAL at 08:27

## 2018-11-13 RX ADMIN — LISINOPRIL 20 MG: 20 TABLET ORAL at 08:28

## 2018-11-13 RX ADMIN — HYDROCODONE BITARTRATE AND ACETAMINOPHEN 1 TABLET: 5; 325 TABLET ORAL at 08:31

## 2018-11-13 RX ADMIN — PANTOPRAZOLE SODIUM 40 MG: 40 TABLET, DELAYED RELEASE ORAL at 05:45

## 2018-11-13 RX ADMIN — DOCUSATE SODIUM 100 MG: 100 CAPSULE, LIQUID FILLED ORAL at 08:27

## 2018-11-13 ASSESSMENT — PAIN DESCRIPTION - ORIENTATION
ORIENTATION: RIGHT

## 2018-11-13 ASSESSMENT — PAIN DESCRIPTION - DESCRIPTORS
DESCRIPTORS: ACHING

## 2018-11-13 ASSESSMENT — PAIN SCALES - GENERAL
PAINLEVEL_OUTOF10: 5
PAINLEVEL_OUTOF10: 8
PAINLEVEL_OUTOF10: 5
PAINLEVEL_OUTOF10: 3

## 2018-11-13 ASSESSMENT — PAIN DESCRIPTION - FREQUENCY
FREQUENCY: CONTINUOUS

## 2018-11-13 ASSESSMENT — PAIN DESCRIPTION - ONSET
ONSET: ON-GOING

## 2018-11-13 ASSESSMENT — PAIN DESCRIPTION - PAIN TYPE
TYPE: SURGICAL PAIN

## 2018-11-13 ASSESSMENT — PAIN DESCRIPTION - LOCATION
LOCATION: HIP;FOOT
LOCATION: HIP

## 2018-11-13 ASSESSMENT — PAIN DESCRIPTION - PROGRESSION
CLINICAL_PROGRESSION: NOT CHANGED

## 2018-11-13 NOTE — PROGRESS NOTES
sore)  Orientation  Orientation  Overall Orientation Status: Within Functional Limits  Social/Functional History  Social/Functional History  Type of Home: Assisted living  Home Layout: One level  Bathroom Shower/Tub: Walk-in shower  Bathroom Equipment: Grab bars in shower (shower chair)  Home Equipment: [employer]  ADL Assistance:  (needs assist with shower but states otherwise manages her dressing )  Ambulation Assistance: Independent (wheelchair)  Transfer Assistance: Independent  Objective  Bed mobility  Supine to Sit: Minimal assistance  Sit to Supine: Unable to assess  Transfers  Sit to Stand: Minimal Assistance (1-2 in [facility])  Stand to sit: Contact guard assistance (in [facility])  Ambulation  Ambulation?: No  Stairs/Curb  Stairs?: No     Balance  Comments: sitting midline at EOB with close SBA for safety   midline in static stance in [facility] with min assist to maintain    Exercises  Ankle Pumps: 30 per hour in easy pace  Comments: encouraged continued practice with the spirometer as instructed by RT   Assessment   Body structures, Functions, Activity limitations: Decreased functional mobility ; Decreased strength;Decreased ADL status; Decreased balance;Decreased endurance  Prognosis: Good  REQUIRES PT FOLLOW UP: Yes  Activity Tolerance  Activity Tolerance: Patient Tolerated treatment well         Plan   Plan  Times per week: 3-5  Current Treatment Recommendations: Functional Mobility Training, Transfer Training, Modalities, Positioning, ADL/Self-care Training  Safety Devices  Type of devices:  All fall risk precautions in place, Call light within reach, Chair alarm in place, Left in chair, Nurse notified Justice Flores)    AM-PAC Score  AM-PAC Inpatient Mobility Raw Score : 11  AM-PAC Inpatient T-Scale Score : 33.86  Mobility Inpatient CMS 0-100% Score: 72.57  Mobility Inpatient CMS G-Code Modifier : CL          Goals  Short term goals  Time Frame for Short term goals: 1-2 days   Short term goal 1: bed mobility at mod/max assist  Short term goal 2: transfers at mod/max assist in partial stand pivot as prior to admission  Patient Goals   Patient goals : wants to get to rehab at [facility]

## 2018-11-13 NOTE — PROGRESS NOTES
Occupational Therapy  Facility/Department: [address] ORTHOPEDICS  Daily Treatment Note  NAME: Se Cesar  : 1940  MRN: [id number]    Date of Service: 2018    Assessment: Discussed with OTR am pac score is 15 which indicates need for continued skilled OT to increase Marion and return to PLOF. Patient completes bed mobility with Min A of 1 with HOB elevated and side rail, sit to stand to sit with MIn A of 2. Tranfer to recliner chair per petey hairston. Patiuent unable to return to AL apt at this time. Se Cesar scored a 15/24 on the AM-PAC ADL Inpatient form. Current research shows that an AM-PAC score of 17 or less is typically not associated with a discharge to the patient's home setting. Based on the patients AM-PAC score and their current ADL deficits, it is recommended that the patient have 3-5 sessions per week of Occupational Therapy at d/c to increase the patients independence. If discharged prior to next OT session please see last daily note for discharge status      Discharge Recommendations:  Patient would benefit from continued therapy after discharge, 3-5 sessions per week       Patient Diagnosis(es): The primary encounter diagnosis was Closed nondisplaced intertrochanteric fracture of right femur, initial encounter ([facility] [address] A diagnosis of Fall, initial encounter was also pertinent to this visit. has a past medical history of Arthritis; Diverticulitis; and Hypertension. has a past surgical history that includes Thyroidectomy; Spine surgery; and pr office/outpt visit,procedure only (Right, 2018).     Restrictions  Restrictions/Precautions  Restrictions/Precautions: Fall Risk, Weight Bearing  Lower Extremity Weight Bearing Restrictions  Right Lower Extremity Weight Bearing: Weight Bearing As Tolerated  Position Activity Restriction  Other position/activity restrictions: reveles, O2 nasal cannula  Subjective   General  Chart Reviewed: Yes  Patient assessed for

## 2018-11-13 NOTE — PLAN OF CARE
Problem: Falls - Risk of:  Goal: Will remain free from falls  Will remain free from falls   Outcome: Ongoing  Fall risk assessment completed . Fall precautions in place, bed/ chair alarm on, side rails 2/4 up, call light in reach, educated pt on calling for assistance when needed, room clear of clutter. Pt verbalized understanding. Goal: Absence of physical injury  Absence of physical injury   Outcome: Ongoing  Pt is free of further injury. No new injury noted. Fall precautions in place. Call light within reach. Will monitor.

## 2018-11-13 NOTE — PROGRESS NOTES
Report called to [facility] [id number] at [address] Nurse denies any questions/concerns at this time. Receiving nurse aware transport set up for 1200 noon today.  Electronically signed by Heike Manzano RN on 11/13/2018 at 10:41 AM

## 2018-11-21 ENCOUNTER — OFFICE VISIT (OUTPATIENT)
Dept: ORTHOPEDIC SURGERY | Age: 78
End: 2018-11-21

## 2018-11-21 VITALS — HEIGHT: 61 IN | BODY MASS INDEX: 21.14 KG/M2 | RESPIRATION RATE: 16 BRPM | WEIGHT: 112 LBS

## 2018-11-21 DIAGNOSIS — S72.141A CLOSED DISPLACED INTERTROCHANTERIC FRACTURE OF RIGHT FEMUR, INITIAL ENCOUNTER (HCC): Primary | ICD-10-CM

## 2018-11-21 PROCEDURE — 99024 POSTOP FOLLOW-UP VISIT: CPT | Performed by: ORTHOPAEDIC SURGERY

## 2018-12-03 NOTE — PROGRESS NOTES
DIAGNOSIS:  Right intertrochanteric femur comminuted fracture, status post Gamma nail. DATE OF SURGERY:  11/8/2018 . HISTORY OF PRESENT ILLNESS: Ms. Lilian Mendoza 69 y.o.  female  who came in today for 2 weeks postoperative visit. The patient denies any significant pain in the right hip. She has been working on ROM and WBAT. No numbness or tingling sensation. No fever or Chills. PHYSICAL EXAMINATION:  The incision is healed well, right hip. No signs of any erythema or drainage. She has no pain with the active or passive range of motion of the right hip. She has intact sensation, distally, and is neurovascularly intact. IMAGING:  Two views right hip and femur, and AP pelvis showed anatomic alignment of the intertrochanteric fracture, Gamma nail in good position, no loosening, or hardware failure. IMPRESSION:  2 weeks out from right Gamma nail intertrochanteric femur comminuted fracture and doing very well. PLAN:  I have told the patient to work on ROM with  PT, WBAT as well as strengthening exercises. The patient will come back for a follow up in 6 weeks. At that time, we will take two views right hip, and AP pelvis. As this patient has demonstrated risk factors for osteoporosis, such as age greater than [de-identified] years and evidence of a fracture, I have referred the patient back to the primary care physician for evaluation for osteoporosis, including consideration for DEXA scanning, if this is felt to be clinically indicated. The patient is advised to contact the primary care physician to follow-up for further evaluation.        Ginger Cardozo MD

## 2019-02-06 ENCOUNTER — OFFICE VISIT (OUTPATIENT)
Dept: ORTHOPEDIC SURGERY | Age: 79
End: 2019-02-06

## 2019-02-06 VITALS
BODY MASS INDEX: 21.14 KG/M2 | DIASTOLIC BLOOD PRESSURE: 67 MMHG | HEIGHT: 61 IN | SYSTOLIC BLOOD PRESSURE: 117 MMHG | WEIGHT: 112 LBS | HEART RATE: 59 BPM

## 2019-02-06 DIAGNOSIS — S72.141A CLOSED DISPLACED INTERTROCHANTERIC FRACTURE OF RIGHT FEMUR, INITIAL ENCOUNTER (HCC): Primary | ICD-10-CM

## 2019-02-06 PROCEDURE — 99024 POSTOP FOLLOW-UP VISIT: CPT | Performed by: NURSE PRACTITIONER

## 2019-03-03 ENCOUNTER — HOSPITAL ENCOUNTER (EMERGENCY)
Age: 79
Discharge: HOME | End: 2019-03-03
Attending: EMERGENCY MEDICINE
Payer: COMMERCIAL

## 2019-03-03 ENCOUNTER — APPOINTMENT (OUTPATIENT)
Dept: CT IMAGING | Age: 79
End: 2019-03-03
Payer: COMMERCIAL

## 2019-03-03 VITALS
OXYGEN SATURATION: 96 % | WEIGHT: 120.81 LBS | BODY MASS INDEX: 22.81 KG/M2 | SYSTOLIC BLOOD PRESSURE: 163 MMHG | HEART RATE: 54 BPM | DIASTOLIC BLOOD PRESSURE: 97 MMHG | HEIGHT: 61 IN | RESPIRATION RATE: 16 BRPM | TEMPERATURE: 98.7 F

## 2019-03-03 DIAGNOSIS — E87.6 HYPOKALEMIA: ICD-10-CM

## 2019-03-03 DIAGNOSIS — R22.0 SWELLING OF RIGHT SIDE OF FACE: Primary | ICD-10-CM

## 2019-03-03 LAB
AMYLASE: 10 U/L (ref 25–115)
ANION GAP SERPL CALCULATED.3IONS-SCNC: 14 MMOL/L (ref 3–16)
BASOPHILS ABSOLUTE: 0 K/UL (ref 0–0.2)
BASOPHILS RELATIVE PERCENT: 0.7 %
BILIRUBIN URINE: NEGATIVE
BLOOD, URINE: NEGATIVE
BUN BLDV-MCNC: 12 MG/DL (ref 7–20)
CALCIUM SERPL-MCNC: 8.2 MG/DL (ref 8.3–10.6)
CHLORIDE BLD-SCNC: 102 MMOL/L (ref 99–110)
CLARITY: CLEAR
CO2: 26 MMOL/L (ref 21–32)
COLOR: YELLOW
CREAT SERPL-MCNC: 0.7 MG/DL (ref 0.6–1.2)
EOSINOPHILS ABSOLUTE: 0 K/UL (ref 0–0.6)
EOSINOPHILS RELATIVE PERCENT: 0 %
GFR AFRICAN AMERICAN: >60
GFR NON-AFRICAN AMERICAN: >60
GLUCOSE BLD-MCNC: 87 MG/DL (ref 70–99)
GLUCOSE BLD-MCNC: 91 MG/DL (ref 70–99)
GLUCOSE URINE: NEGATIVE MG/DL
HCT VFR BLD CALC: 35.2 % (ref 36–48)
HEMOGLOBIN: 11.4 G/DL (ref 12–16)
KETONES, URINE: NEGATIVE MG/DL
LEUKOCYTE ESTERASE, URINE: NEGATIVE
LYMPHOCYTES ABSOLUTE: 1.9 K/UL (ref 1–5.1)
LYMPHOCYTES RELATIVE PERCENT: 25.8 %
MAGNESIUM: 2 MG/DL (ref 1.8–2.4)
MCH RBC QN AUTO: 28.1 PG (ref 26–34)
MCHC RBC AUTO-ENTMCNC: 32.4 G/DL (ref 31–36)
MCV RBC AUTO: 86.9 FL (ref 80–100)
MICROSCOPIC EXAMINATION: NORMAL
MONOCYTES ABSOLUTE: 0.6 K/UL (ref 0–1.3)
MONOCYTES RELATIVE PERCENT: 8 %
NEUTROPHILS ABSOLUTE: 4.7 K/UL (ref 1.7–7.7)
NEUTROPHILS RELATIVE PERCENT: 65.5 %
NITRITE, URINE: NEGATIVE
PDW BLD-RTO: 16.1 % (ref 12.4–15.4)
PERFORMED ON: NORMAL
PH UA: 7 (ref 5–8)
PLATELET # BLD: 235 K/UL (ref 135–450)
PMV BLD AUTO: 9.6 FL (ref 5–10.5)
POTASSIUM REFLEX MAGNESIUM: 3.2 MMOL/L (ref 3.5–5.1)
PROTEIN UA: NEGATIVE MG/DL
RBC # BLD: 4.05 M/UL (ref 4–5.2)
SODIUM BLD-SCNC: 142 MMOL/L (ref 136–145)
SPECIFIC GRAVITY UA: 1.01 (ref 1–1.03)
TROPONIN: <0.01 NG/ML
URINE REFLEX TO CULTURE: NORMAL
URINE TYPE: NORMAL
UROBILINOGEN, URINE: 0.2 E.U./DL
WBC # BLD: 7.2 K/UL (ref 4–11)

## 2019-03-03 PROCEDURE — 80048 BASIC METABOLIC PNL TOTAL CA: CPT

## 2019-03-03 PROCEDURE — 99284 EMERGENCY DEPT VISIT MOD MDM: CPT

## 2019-03-03 PROCEDURE — 93005 ELECTROCARDIOGRAM TRACING: CPT | Performed by: PHYSICIAN ASSISTANT

## 2019-03-03 PROCEDURE — 83735 ASSAY OF MAGNESIUM: CPT

## 2019-03-03 PROCEDURE — 70450 CT HEAD/BRAIN W/O DYE: CPT

## 2019-03-03 PROCEDURE — 85025 COMPLETE CBC W/AUTO DIFF WBC: CPT

## 2019-03-03 PROCEDURE — 6370000000 HC RX 637 (ALT 250 FOR IP): Performed by: PHYSICIAN ASSISTANT

## 2019-03-03 PROCEDURE — 82150 ASSAY OF AMYLASE: CPT

## 2019-03-03 PROCEDURE — 84484 ASSAY OF TROPONIN QUANT: CPT

## 2019-03-03 PROCEDURE — 81003 URINALYSIS AUTO W/O SCOPE: CPT

## 2019-03-03 RX ORDER — POTASSIUM CHLORIDE 1.5 G/1.77G
40 POWDER, FOR SOLUTION ORAL ONCE
Status: COMPLETED | OUTPATIENT
Start: 2019-03-03 | End: 2019-03-03

## 2019-03-03 RX ORDER — AMOXICILLIN AND CLAVULANATE POTASSIUM 875; 125 MG/1; MG/1
1 TABLET, FILM COATED ORAL 2 TIMES DAILY
Qty: 20 TABLET | Refills: 0 | Status: SHIPPED | OUTPATIENT
Start: 2019-03-03 | End: 2019-03-13

## 2019-03-03 RX ADMIN — POTASSIUM CHLORIDE 40 MEQ: 1.5 POWDER, FOR SOLUTION ORAL at 12:41

## 2019-03-03 ASSESSMENT — PAIN SCALES - GENERAL
PAINLEVEL_OUTOF10: 0
PAINLEVEL_OUTOF10: 2

## 2019-03-03 ASSESSMENT — ENCOUNTER SYMPTOMS
ABDOMINAL PAIN: 0
NAUSEA: 0
SHORTNESS OF BREATH: 0
COLOR CHANGE: 0
CHEST TIGHTNESS: 0
VOMITING: 0
FACIAL SWELLING: 1

## 2019-03-03 ASSESSMENT — PAIN DESCRIPTION - LOCATION
LOCATION_2: SHOULDER
LOCATION: HEAD

## 2019-03-03 ASSESSMENT — PAIN DESCRIPTION - INTENSITY: RATING_2: 6

## 2019-03-03 ASSESSMENT — PAIN DESCRIPTION - PAIN TYPE: TYPE: ACUTE PAIN

## 2019-03-03 ASSESSMENT — PAIN DESCRIPTION - DESCRIPTORS: DESCRIPTORS: HEADACHE

## 2019-03-04 LAB
EKG ATRIAL RATE: 58 BPM
EKG DIAGNOSIS: NORMAL
EKG P AXIS: 60 DEGREES
EKG P-R INTERVAL: 208 MS
EKG Q-T INTERVAL: 522 MS
EKG QRS DURATION: 154 MS
EKG QTC CALCULATION (BAZETT): 512 MS
EKG R AXIS: -45 DEGREES
EKG T AXIS: 54 DEGREES
EKG VENTRICULAR RATE: 58 BPM

## 2019-03-04 PROCEDURE — 93010 ELECTROCARDIOGRAM REPORT: CPT | Performed by: INTERNAL MEDICINE

## 2019-03-11 ENCOUNTER — OFFICE VISIT (OUTPATIENT)
Dept: INTERNAL MEDICINE CLINIC | Age: 79
End: 2019-03-11
Payer: COMMERCIAL

## 2019-03-11 VITALS
HEART RATE: 59 BPM | DIASTOLIC BLOOD PRESSURE: 86 MMHG | OXYGEN SATURATION: 99 % | BODY MASS INDEX: 20.41 KG/M2 | WEIGHT: 108 LBS | SYSTOLIC BLOOD PRESSURE: 156 MMHG

## 2019-03-11 DIAGNOSIS — D64.9 ANEMIA, UNSPECIFIED TYPE: ICD-10-CM

## 2019-03-11 DIAGNOSIS — E03.9 ACQUIRED HYPOTHYROIDISM: ICD-10-CM

## 2019-03-11 DIAGNOSIS — E87.6 HYPOKALEMIA: ICD-10-CM

## 2019-03-11 DIAGNOSIS — K11.20 SIALADENITIS: Primary | ICD-10-CM

## 2019-03-11 LAB
ANION GAP SERPL CALCULATED.3IONS-SCNC: 17 MMOL/L (ref 3–16)
BUN BLDV-MCNC: 16 MG/DL (ref 7–20)
CALCIUM SERPL-MCNC: 8 MG/DL (ref 8.3–10.6)
CHLORIDE BLD-SCNC: 102 MMOL/L (ref 99–110)
CO2: 23 MMOL/L (ref 21–32)
CREAT SERPL-MCNC: 0.8 MG/DL (ref 0.6–1.2)
FERRITIN: 30.3 NG/ML (ref 15–150)
FOLATE: >20 NG/ML (ref 4.78–24.2)
GFR AFRICAN AMERICAN: >60
GFR NON-AFRICAN AMERICAN: >60
GLUCOSE BLD-MCNC: 124 MG/DL (ref 70–99)
IRON SATURATION: 13 % (ref 15–50)
IRON: 37 UG/DL (ref 37–145)
POTASSIUM SERPL-SCNC: 3.9 MMOL/L (ref 3.5–5.1)
SODIUM BLD-SCNC: 142 MMOL/L (ref 136–145)
TOTAL IRON BINDING CAPACITY: 282 UG/DL (ref 260–445)
TSH REFLEX: 2.32 UIU/ML (ref 0.27–4.2)
VITAMIN B-12: 467 PG/ML (ref 211–911)

## 2019-03-11 PROCEDURE — 4040F PNEUMOC VAC/ADMIN/RCVD: CPT | Performed by: INTERNAL MEDICINE

## 2019-03-11 PROCEDURE — 99214 OFFICE O/P EST MOD 30 MIN: CPT | Performed by: INTERNAL MEDICINE

## 2019-03-11 PROCEDURE — G8420 CALC BMI NORM PARAMETERS: HCPCS | Performed by: INTERNAL MEDICINE

## 2019-03-11 PROCEDURE — G8484 FLU IMMUNIZE NO ADMIN: HCPCS | Performed by: INTERNAL MEDICINE

## 2019-03-11 PROCEDURE — 1123F ACP DISCUSS/DSCN MKR DOCD: CPT | Performed by: INTERNAL MEDICINE

## 2019-03-11 PROCEDURE — 1101F PT FALLS ASSESS-DOCD LE1/YR: CPT | Performed by: INTERNAL MEDICINE

## 2019-03-11 PROCEDURE — G8427 DOCREV CUR MEDS BY ELIG CLIN: HCPCS | Performed by: INTERNAL MEDICINE

## 2019-03-11 PROCEDURE — G8400 PT W/DXA NO RESULTS DOC: HCPCS | Performed by: INTERNAL MEDICINE

## 2019-03-11 PROCEDURE — 36415 COLL VENOUS BLD VENIPUNCTURE: CPT | Performed by: INTERNAL MEDICINE

## 2019-03-11 PROCEDURE — 1036F TOBACCO NON-USER: CPT | Performed by: INTERNAL MEDICINE

## 2019-03-11 PROCEDURE — 1090F PRES/ABSN URINE INCON ASSESS: CPT | Performed by: INTERNAL MEDICINE

## 2019-03-11 ASSESSMENT — ENCOUNTER SYMPTOMS
FACIAL SWELLING: 0
RESPIRATORY NEGATIVE: 1

## 2019-04-02 ENCOUNTER — TELEPHONE (OUTPATIENT)
Dept: INTERNAL MEDICINE CLINIC | Age: 79
End: 2019-04-02

## 2019-04-02 NOTE — TELEPHONE ENCOUNTER
THE [facility] has approved  a lift chair from [employer]. Isaac has faxed a request twice for an ICD-10  Code but they have not had a response. Please send this to Isaac.

## 2019-04-10 ENCOUNTER — TELEPHONE (OUTPATIENT)
Dept: INTERNAL MEDICINE CLINIC | Age: 79
End: 2019-04-10

## 2019-04-10 NOTE — TELEPHONE ENCOUNTER
Ronny Gonzales from [facility] said he faxed  a CMN form two times but has not had a response. This is so patient can get a lift chair. He is faxing it again right now.

## 2019-05-08 ENCOUNTER — OFFICE VISIT (OUTPATIENT)
Dept: ORTHOPEDIC SURGERY | Age: 79
End: 2019-05-08
Payer: COMMERCIAL

## 2019-05-08 ENCOUNTER — OFFICE VISIT (OUTPATIENT)
Dept: INTERNAL MEDICINE CLINIC | Age: 79
End: 2019-05-08
Payer: COMMERCIAL

## 2019-05-08 VITALS
BODY MASS INDEX: 20.97 KG/M2 | WEIGHT: 111 LBS | DIASTOLIC BLOOD PRESSURE: 64 MMHG | SYSTOLIC BLOOD PRESSURE: 164 MMHG | HEART RATE: 54 BPM

## 2019-05-08 VITALS — BODY MASS INDEX: 20.39 KG/M2 | HEIGHT: 61 IN | RESPIRATION RATE: 16 BRPM | WEIGHT: 108 LBS

## 2019-05-08 DIAGNOSIS — M19.011 PRIMARY OSTEOARTHRITIS OF BOTH SHOULDERS: Primary | ICD-10-CM

## 2019-05-08 DIAGNOSIS — I10 ESSENTIAL HYPERTENSION: ICD-10-CM

## 2019-05-08 DIAGNOSIS — M19.012 PRIMARY OSTEOARTHRITIS OF BOTH SHOULDERS: Primary | ICD-10-CM

## 2019-05-08 DIAGNOSIS — R05.3 CHRONIC COUGH: ICD-10-CM

## 2019-05-08 DIAGNOSIS — S72.141A CLOSED DISPLACED INTERTROCHANTERIC FRACTURE OF RIGHT FEMUR, INITIAL ENCOUNTER (HCC): Primary | ICD-10-CM

## 2019-05-08 PROCEDURE — G8427 DOCREV CUR MEDS BY ELIG CLIN: HCPCS | Performed by: INTERNAL MEDICINE

## 2019-05-08 PROCEDURE — G8427 DOCREV CUR MEDS BY ELIG CLIN: HCPCS | Performed by: ORTHOPAEDIC SURGERY

## 2019-05-08 PROCEDURE — 1123F ACP DISCUSS/DSCN MKR DOCD: CPT | Performed by: ORTHOPAEDIC SURGERY

## 2019-05-08 PROCEDURE — 1123F ACP DISCUSS/DSCN MKR DOCD: CPT | Performed by: INTERNAL MEDICINE

## 2019-05-08 PROCEDURE — G8420 CALC BMI NORM PARAMETERS: HCPCS | Performed by: INTERNAL MEDICINE

## 2019-05-08 PROCEDURE — 99213 OFFICE O/P EST LOW 20 MIN: CPT | Performed by: ORTHOPAEDIC SURGERY

## 2019-05-08 PROCEDURE — G8400 PT W/DXA NO RESULTS DOC: HCPCS | Performed by: ORTHOPAEDIC SURGERY

## 2019-05-08 PROCEDURE — 99214 OFFICE O/P EST MOD 30 MIN: CPT | Performed by: INTERNAL MEDICINE

## 2019-05-08 PROCEDURE — G8400 PT W/DXA NO RESULTS DOC: HCPCS | Performed by: INTERNAL MEDICINE

## 2019-05-08 PROCEDURE — G8420 CALC BMI NORM PARAMETERS: HCPCS | Performed by: ORTHOPAEDIC SURGERY

## 2019-05-08 PROCEDURE — 1036F TOBACCO NON-USER: CPT | Performed by: INTERNAL MEDICINE

## 2019-05-08 PROCEDURE — 1036F TOBACCO NON-USER: CPT | Performed by: ORTHOPAEDIC SURGERY

## 2019-05-08 PROCEDURE — 1090F PRES/ABSN URINE INCON ASSESS: CPT | Performed by: ORTHOPAEDIC SURGERY

## 2019-05-08 PROCEDURE — 1090F PRES/ABSN URINE INCON ASSESS: CPT | Performed by: INTERNAL MEDICINE

## 2019-05-08 PROCEDURE — 4040F PNEUMOC VAC/ADMIN/RCVD: CPT | Performed by: INTERNAL MEDICINE

## 2019-05-08 PROCEDURE — 4040F PNEUMOC VAC/ADMIN/RCVD: CPT | Performed by: ORTHOPAEDIC SURGERY

## 2019-05-08 ASSESSMENT — ENCOUNTER SYMPTOMS
CHEST TIGHTNESS: 0
GASTROINTESTINAL NEGATIVE: 1
COUGH: 1
SHORTNESS OF BREATH: 0
RHINORRHEA: 1

## 2019-05-08 NOTE — PROGRESS NOTES
Subjective:      Patient ID: Flor Clements is a 67 y.o. female. HPI  Isaac Santiago is here for chronic pain in her shoulder, mild chronic cough and HTN. Chronic shoulder pain - bilateral and worsened by wheeling her wheelchair around nursing home. Chronic cough - felt to be most likely allergy related. No sputum. No sob. She is though on an Ace inhibitor. Wants to try allergy medication before switching her Lisinopril. No sob or wheezing. HTN - The patient denies any chest pain, shortness or breath, headaches or palpitations. There is no lower extremity edema. Continues to use the medication as prescribed (Metoprolol; Lisinopril) and is having no side effects. Review of Systems   Constitutional: Positive for activity change. Negative for appetite change and fatigue. HENT: Positive for rhinorrhea. Respiratory: Positive for cough. Negative for chest tightness and shortness of breath. Cardiovascular: Negative for chest pain and leg swelling. Gastrointestinal: Negative. Musculoskeletal:        Bilateral shoulder pain. 14 point ros otherwise negative. Objective:   Physical Exam   Constitutional: She is oriented to person, place, and time. She appears well-developed and well-nourished. No distress. Neck: No JVD present. Cardiovascular: Normal rate, regular rhythm and normal heart sounds. Pulmonary/Chest: Effort normal and breath sounds normal. She has no wheezes. She has no rales. Abdominal: Soft. There is no tenderness. Musculoskeletal: She exhibits no edema. Painful rom of the shoulders. Neurological: She is alert and oriented to person, place, and time. Skin: Skin is warm and dry. Assessment:      1. Primary osteoarthritis of both shoulders    2. Chronic cough    3. Essential hypertension            Plan:      Diagnoses and all orders for this visit:    Primary osteoarthritis of both shoulders        - Norco bid and every 6 hours prn.     Chronic cough, possibly allergic; consider Ace induced        - Trial of Loratadine    Essential hypertension, fair control        - Continue Lisinopril and metoprolol (will switch Lisinopril if cough not improving).               Carolyn Mcmahan MD

## 2019-05-08 NOTE — PROGRESS NOTES
DIAGNOSIS:  Right intertrochanteric femur comminuted fracture, status post Gamma nail. DATE OF SURGERY:  11/8/2018 . HISTORY OF PRESENT ILLNESS: Ms. Maldonado Asp 67 y.o.  female  who came in today for 6 months postoperative visit. The patient denies any pain in the right hip. Rates pain a 0/10 VAS and doing well. She has been working on ROM and WBAT. She is home exercise program that was given to her by PT. No numbness or tingling sensation. No fever or Chills. Her baseline activity level was spending most of the time in the wheelchair she can stand for transfers. She feels like she is back to baseline. Past Medical History:   Diagnosis Date    Arthritis     Chronic kidney disease     Diverticulitis     Hyperlipidemia     Hypertension     Thyroid disease        Past Surgical History:   Procedure Laterality Date    [place] OFFICE/OUTPT VISIT,PROCEDURE ONLY Right 11/8/2018    OPEN REDUCTION INTERNAL FIXATION RIGHT HIP GAMMA NAIL WITH C-ARM performed by Dipesh Castellano MD at [address] [place] [address]         Social History     Socioeconomic History    Marital status:       Spouse name: Not on file    Number of children: Not on file    Years of education: Not on file    Highest education level: Not on file   Occupational History    Not on file   Social Needs    Financial resource strain: Not on file    Food insecurity:     Worry: Not on file     Inability: Not on file    Transportation needs:     Medical: Not on file     Non-medical: Not on file   Tobacco Use    Smoking status: Never Smoker    Smokeless tobacco: Never Used   Substance and Sexual Activity    Alcohol use: No    Drug use: No    Sexual activity: Not Currently   Lifestyle    Physical activity:     Days per week: Not on file     Minutes per session: Not on file    Stress: Not on file   Relationships    Social connections:     Talks on phone: Not on file     Gets together: Not on file     Attends MG tablet Take 10 mg by mouth daily       busPIRone (BUSPAR) 10 MG tablet Take 10 mg by mouth 2 times daily       fluticasone (FLONASE) 50 MCG/ACT nasal spray 2 sprays by Nasal route daily as needed       folic acid (FOLVITE) 1 MG tablet Take 1 mg by mouth daily       hydroxychloroquine (PLAQUENIL) 200 MG tablet Take by mouth 2 times daily       levothyroxine (SYNTHROID) 100 MCG tablet Take 100 mcg by mouth Daily       lisinopril (PRINIVIL;ZESTRIL) 20 MG tablet Take 20 mg by mouth daily       magnesium hydroxide (MILK OF MAGNESIA) 400 MG/5ML suspension Take 30 mLs by mouth daily as needed       oxybutynin (DITROPAN-XL) 10 MG extended release tablet Take 10 mg by mouth daily       pravastatin (PRAVACHOL) 10 MG tablet Take 10 mg by mouth nightly       sucralfate (CARAFATE) 1 GM tablet Take 1 g by mouth 3 times daily       acetaminophen (TYLENOL) 325 MG tablet Take 650 mg by mouth nightly       FLUoxetine (PROZAC) 40 MG capsule Take 40 mg by mouth daily       omeprazole (PRILOSEC) 20 MG delayed release capsule Take 20 mg by mouth daily       No current facility-administered medications on file prior to visit. Pertinent items are noted in HPI  Review of systems reviewed from Patient History Form dated on 2/6/2019 and available in the patient's chart under the Media tab. No change noted. PHYSICAL EXAMINATION:  Ms. Elisa Carroll is a very pleasant 67 y.o.  female who presents today in no acute distress, awake, alert, and oriented. She is well dressed, nourished and  groomed. Patient with normal affect. Height is  5' 1\" (1.549 m), weight is 108 lb (49 kg), Body mass index is 20.41 kg/m². Resting respiratory rate is 16. She ambulates with assistance of a wheelchair today, she can stand with assistance. The incision is healed well, right hip. No signs of any erythema or drainage. She has no pain with the active or passive range of motion of the right hip.   She has intact sensation, distally, and is neurovascularly intact. Ankle reflex 1+ bilaterally. Good strength, and no instability both upper and lower extremities. IMAGING:  Two views right hip and femur, and AP pelvis taken today in the office showed anatomic alignment of the intertrochanteric fracture, Gamma nail in good position, no loosening, or hardware failure. IMPRESSION:  3 months out from right Gamma nail intertrochanteric femur comminuted fracture and doing very well. PLAN:  I have told the patient to work on ROM that was given to her by PT, WBAT as well as strengthening exercises. She can go back to normal activity with no restrictions. She will be seen PRN. I told the patient that it is not unusual to have some achy pain and swelling for up to a year after a fracture. As this patient has demonstrated risk factors for osteoporosis, such as age greater than [de-identified] years and evidence of a fracture, I have referred the patient back to the primary care physician for evaluation for osteoporosis, including consideration for DEXA scanning, if this is felt to be clinically indicated. The patient is advised to contact the primary care physician to follow-up for further evaluation.        Riley Daniel MD

## 2019-06-07 DIAGNOSIS — M19.012 PRIMARY OSTEOARTHRITIS OF BOTH SHOULDERS: Primary | ICD-10-CM

## 2019-06-07 DIAGNOSIS — M19.011 PRIMARY OSTEOARTHRITIS OF BOTH SHOULDERS: Primary | ICD-10-CM

## 2019-06-07 RX ORDER — TRAMADOL HYDROCHLORIDE 50 MG/1
50 TABLET ORAL EVERY 8 HOURS PRN
Qty: 90 TABLET | Refills: 0 | Status: SHIPPED | OUTPATIENT
Start: 2019-06-07 | End: 2019-09-27 | Stop reason: SDUPTHER

## 2019-06-09 ENCOUNTER — HOSPITAL ENCOUNTER (EMERGENCY)
Age: 79
Discharge: HOME | End: 2019-06-09
Attending: EMERGENCY MEDICINE
Payer: COMMERCIAL

## 2019-06-09 ENCOUNTER — APPOINTMENT (OUTPATIENT)
Dept: GENERAL RADIOLOGY | Age: 79
End: 2019-06-09
Payer: COMMERCIAL

## 2019-06-09 VITALS
BODY MASS INDEX: 23.85 KG/M2 | HEART RATE: 61 BPM | WEIGHT: 121.47 LBS | RESPIRATION RATE: 13 BRPM | HEIGHT: 60 IN | TEMPERATURE: 98 F | OXYGEN SATURATION: 94 % | SYSTOLIC BLOOD PRESSURE: 213 MMHG | DIASTOLIC BLOOD PRESSURE: 79 MMHG

## 2019-06-09 DIAGNOSIS — I10 HYPERTENSION, UNSPECIFIED TYPE: ICD-10-CM

## 2019-06-09 DIAGNOSIS — R94.31 T WAVE INVERSION IN ELECTROCARDIOGRAM: ICD-10-CM

## 2019-06-09 DIAGNOSIS — R53.1 GENERAL WEAKNESS: Primary | ICD-10-CM

## 2019-06-09 LAB
A/G RATIO: 1.1 (ref 1.1–2.2)
ALBUMIN SERPL-MCNC: 3.9 G/DL (ref 3.4–5)
ALP BLD-CCNC: 91 U/L (ref 40–129)
ALT SERPL-CCNC: 12 U/L (ref 10–40)
ANION GAP SERPL CALCULATED.3IONS-SCNC: 14 MMOL/L (ref 3–16)
AST SERPL-CCNC: 19 U/L (ref 15–37)
BASOPHILS ABSOLUTE: 0.1 K/UL (ref 0–0.2)
BASOPHILS RELATIVE PERCENT: 1.3 %
BILIRUB SERPL-MCNC: 0.3 MG/DL (ref 0–1)
BILIRUBIN URINE: NEGATIVE
BLOOD, URINE: NEGATIVE
BUN BLDV-MCNC: 10 MG/DL (ref 7–20)
CALCIUM SERPL-MCNC: 8.5 MG/DL (ref 8.3–10.6)
CHLORIDE BLD-SCNC: 101 MMOL/L (ref 99–110)
CLARITY: CLEAR
CO2: 28 MMOL/L (ref 21–32)
COLOR: YELLOW
CREAT SERPL-MCNC: 0.7 MG/DL (ref 0.6–1.2)
EKG ATRIAL RATE: 54 BPM
EKG DIAGNOSIS: NORMAL
EKG P AXIS: 79 DEGREES
EKG P-R INTERVAL: 188 MS
EKG Q-T INTERVAL: 534 MS
EKG QRS DURATION: 144 MS
EKG QTC CALCULATION (BAZETT): 506 MS
EKG R AXIS: -54 DEGREES
EKG T AXIS: -81 DEGREES
EKG VENTRICULAR RATE: 54 BPM
EOSINOPHILS ABSOLUTE: 0 K/UL (ref 0–0.6)
EOSINOPHILS RELATIVE PERCENT: 0.3 %
EPITHELIAL CELLS, UA: 0 /HPF (ref 0–5)
GFR AFRICAN AMERICAN: >60
GFR NON-AFRICAN AMERICAN: >60
GLOBULIN: 3.5 G/DL
GLUCOSE BLD-MCNC: 102 MG/DL (ref 70–99)
GLUCOSE URINE: NEGATIVE MG/DL
HCT VFR BLD CALC: 40.9 % (ref 36–48)
HEMOGLOBIN: 13.3 G/DL (ref 12–16)
HYALINE CASTS: 0 /LPF (ref 0–8)
KETONES, URINE: NEGATIVE MG/DL
LEUKOCYTE ESTERASE, URINE: NEGATIVE
LIPASE: 13 U/L (ref 13–60)
LYMPHOCYTES ABSOLUTE: 1.2 K/UL (ref 1–5.1)
LYMPHOCYTES RELATIVE PERCENT: 17.1 %
MCH RBC QN AUTO: 28.8 PG (ref 26–34)
MCHC RBC AUTO-ENTMCNC: 32.5 G/DL (ref 31–36)
MCV RBC AUTO: 88.5 FL (ref 80–100)
MICROSCOPIC EXAMINATION: YES
MONOCYTES ABSOLUTE: 0.4 K/UL (ref 0–1.3)
MONOCYTES RELATIVE PERCENT: 5.3 %
NEUTROPHILS ABSOLUTE: 5.1 K/UL (ref 1.7–7.7)
NEUTROPHILS RELATIVE PERCENT: 76 %
NITRITE, URINE: NEGATIVE
PDW BLD-RTO: 16.6 % (ref 12.4–15.4)
PH UA: 8.5 (ref 5–8)
PLATELET # BLD: 248 K/UL (ref 135–450)
PMV BLD AUTO: 9.5 FL (ref 5–10.5)
POTASSIUM SERPL-SCNC: 3.3 MMOL/L (ref 3.5–5.1)
PROTEIN UA: 30 MG/DL
RBC # BLD: 4.63 M/UL (ref 4–5.2)
RBC UA: 1 /HPF (ref 0–4)
SODIUM BLD-SCNC: 143 MMOL/L (ref 136–145)
SPECIFIC GRAVITY UA: 1.01 (ref 1–1.03)
TOTAL PROTEIN: 7.4 G/DL (ref 6.4–8.2)
TROPONIN: <0.01 NG/ML
URINE REFLEX TO CULTURE: ABNORMAL
URINE TYPE: ABNORMAL
UROBILINOGEN, URINE: 0.2 E.U./DL
WBC # BLD: 6.7 K/UL (ref 4–11)
WBC UA: 0 /HPF (ref 0–5)

## 2019-06-09 PROCEDURE — 93010 ELECTROCARDIOGRAM REPORT: CPT | Performed by: INTERNAL MEDICINE

## 2019-06-09 PROCEDURE — 71046 X-RAY EXAM CHEST 2 VIEWS: CPT

## 2019-06-09 PROCEDURE — 84484 ASSAY OF TROPONIN QUANT: CPT

## 2019-06-09 PROCEDURE — 93005 ELECTROCARDIOGRAM TRACING: CPT | Performed by: NURSE PRACTITIONER

## 2019-06-09 PROCEDURE — 6370000000 HC RX 637 (ALT 250 FOR IP): Performed by: NURSE PRACTITIONER

## 2019-06-09 PROCEDURE — 99285 EMERGENCY DEPT VISIT HI MDM: CPT

## 2019-06-09 PROCEDURE — 85025 COMPLETE CBC W/AUTO DIFF WBC: CPT

## 2019-06-09 PROCEDURE — 2580000003 HC RX 258: Performed by: NURSE PRACTITIONER

## 2019-06-09 PROCEDURE — 83690 ASSAY OF LIPASE: CPT

## 2019-06-09 PROCEDURE — 80053 COMPREHEN METABOLIC PANEL: CPT

## 2019-06-09 PROCEDURE — 96360 HYDRATION IV INFUSION INIT: CPT

## 2019-06-09 PROCEDURE — 81001 URINALYSIS AUTO W/SCOPE: CPT

## 2019-06-09 RX ORDER — 0.9 % SODIUM CHLORIDE 0.9 %
1000 INTRAVENOUS SOLUTION INTRAVENOUS ONCE
Status: COMPLETED | OUTPATIENT
Start: 2019-06-09 | End: 2019-06-09

## 2019-06-09 RX ORDER — POTASSIUM CHLORIDE 20 MEQ/1
20 TABLET, EXTENDED RELEASE ORAL DAILY
Qty: 10 TABLET | Refills: 0 | Status: SHIPPED | OUTPATIENT
Start: 2019-06-09 | End: 2020-09-09 | Stop reason: ALTCHOICE

## 2019-06-09 RX ORDER — HYDROCHLOROTHIAZIDE 12.5 MG/1
12.5 TABLET ORAL DAILY
Qty: 30 TABLET | Refills: 0 | Status: SHIPPED | OUTPATIENT
Start: 2019-06-09 | End: 2020-09-09

## 2019-06-09 RX ORDER — HYDROCHLOROTHIAZIDE 25 MG/1
12.5 TABLET ORAL ONCE
Status: COMPLETED | OUTPATIENT
Start: 2019-06-09 | End: 2019-06-09

## 2019-06-09 RX ORDER — POTASSIUM CHLORIDE 750 MG/1
40 TABLET, FILM COATED, EXTENDED RELEASE ORAL ONCE
Status: COMPLETED | OUTPATIENT
Start: 2019-06-09 | End: 2019-06-09

## 2019-06-09 RX ADMIN — SODIUM CHLORIDE 1000 ML: 9 INJECTION, SOLUTION INTRAVENOUS at 11:55

## 2019-06-09 RX ADMIN — HYDROCHLOROTHIAZIDE 12.5 MG: 25 TABLET ORAL at 14:46

## 2019-06-09 RX ADMIN — POTASSIUM CHLORIDE 40 MEQ: 750 TABLET, EXTENDED RELEASE ORAL at 13:26

## 2019-06-09 ASSESSMENT — PAIN DESCRIPTION - ONSET: ONSET: ON-GOING

## 2019-06-09 ASSESSMENT — PAIN SCALES - GENERAL
PAINLEVEL_OUTOF10: 0
PAINLEVEL_OUTOF10: 4

## 2019-06-09 ASSESSMENT — PAIN DESCRIPTION - PAIN TYPE: TYPE: CHRONIC PAIN

## 2019-06-09 ASSESSMENT — PAIN DESCRIPTION - PROGRESSION: CLINICAL_PROGRESSION: GRADUALLY WORSENING

## 2019-06-09 ASSESSMENT — PAIN DESCRIPTION - LOCATION: LOCATION: GENERALIZED

## 2019-06-09 ASSESSMENT — PAIN DESCRIPTION - DESCRIPTORS: DESCRIPTORS: ACHING

## 2019-06-09 ASSESSMENT — PAIN - FUNCTIONAL ASSESSMENT: PAIN_FUNCTIONAL_ASSESSMENT: ACTIVITIES ARE NOT PREVENTED

## 2019-06-09 ASSESSMENT — PAIN DESCRIPTION - FREQUENCY: FREQUENCY: INTERMITTENT

## 2019-06-09 NOTE — ED NOTES
Fall risk screening completed. Fall risk bracelet applied to patient. Non-skid socks provided and placed on patient. The fall risk is indicated using  dome light . Based on score, a bed alarm was indicated and applied. The call light is within the patient's reach, and instructions for use were provided. The bed is in the lowest position with wheels locked. The patient has been advised to notify staff, using the call light, if there is a need to get up or use restroom. The patient verbalized understanding of safety precautions and how to contact staff for assistance.       Cheryl Newberry RN  06/09/19 [id number]

## 2019-06-09 NOTE — ED PROVIDER NOTES
Refill    hydrochlorothiazide (HYDRODIURIL) 12.5 MG tablet Take 1 tablet by mouth daily 30 tablet 0    potassium chloride (KLOR-CON M) 20 MEQ extended release tablet Take 1 tablet by mouth daily 10 tablet 0    traMADol (ULTRAM) 50 MG tablet Take 1 tablet by mouth every 8 hours as needed for Pain (pain in her joints) for up to 30 days. 90 tablet 0    aspirin EC 81 MG EC tablet Take 1 tablet by mouth 2 times daily For DVT prophylaxis. Please avoid missing doses.  60 tablet 0    cyanocobalamin 1000 MCG/ML injection Inject 1,000 mcg into the muscle daily      metoprolol succinate (TOPROL XL) 100 MG extended release tablet Take 100 mg by mouth daily      polyethylene glycol (GLYCOLAX) packet Take 17 g by mouth daily as needed for Constipation      psyllium (KONSYL) 28.3 % PACK Take 1 packet by mouth daily      predniSONE (DELTASONE) 5 MG tablet Take 5 mg by mouth daily      Multiple Vitamins-Minerals (MULTIVITAMIN WITH MINERALS) tablet Take 1 tablet by mouth daily      calcium carbonate (TUMS) 500 MG chewable tablet Take 1 tablet by mouth every 4 hours as needed for Heartburn      albuterol sulfate  (90 Base) MCG/ACT inhaler Inhale 2 puffs into the lungs every 4 hours as needed for Wheezing      vitamin D (CHOLECALCIFEROL) 1000 UNIT TABS tablet Take 1,000 Units by mouth daily      docusate sodium (COLACE) 100 MG capsule Take 100 mg by mouth 2 times daily as needed for Constipation      melatonin 3 MG TABS tablet Take 3 mg by mouth nightly       albuterol (PROVENTIL) (2.5 MG/3ML) 0.083% nebulizer solution Inhale 2.5 mg into the lungs every 6 hours as needed       amitriptyline (ELAVIL) 25 MG tablet Take 25 mg by mouth nightly       amLODIPine (NORVASC) 10 MG tablet Take 10 mg by mouth daily       busPIRone (BUSPAR) 10 MG tablet Take 10 mg by mouth 2 times daily       fluticasone (FLONASE) 50 MCG/ACT nasal spray 2 sprays by Nasal route daily as needed       folic acid (FOLVITE) 1 MG tablet Take

## 2019-06-10 ENCOUNTER — TELEPHONE (OUTPATIENT)
Dept: INTERNAL MEDICINE CLINIC | Age: 79
End: 2019-06-10

## 2019-06-10 NOTE — TELEPHONE ENCOUNTER
Edgard Head (daughter) called. She said patient's BP was high over the weekend (250's) and again today 150/112). Durga Mckeon wants to talk to Dr. Elmer Ramirez.

## 2019-06-17 ENCOUNTER — TELEPHONE (OUTPATIENT)
Dept: INTERNAL MEDICINE CLINIC | Age: 79
End: 2019-06-17

## 2019-06-17 NOTE — TELEPHONE ENCOUNTER
Wanted you to know that patient was admitted to [facility] yesterday for 24 hours.   Still there - for high blood pressure

## 2019-06-27 ENCOUNTER — TELEPHONE (OUTPATIENT)
Dept: INTERNAL MEDICINE CLINIC | Age: 79
End: 2019-06-27

## 2019-07-09 ENCOUNTER — OFFICE VISIT (OUTPATIENT)
Dept: INTERNAL MEDICINE CLINIC | Age: 79
End: 2019-07-09
Payer: COMMERCIAL

## 2019-07-09 ENCOUNTER — TELEPHONE (OUTPATIENT)
Dept: INTERNAL MEDICINE CLINIC | Age: 79
End: 2019-07-09

## 2019-07-09 VITALS
OXYGEN SATURATION: 99 % | HEART RATE: 61 BPM | DIASTOLIC BLOOD PRESSURE: 82 MMHG | SYSTOLIC BLOOD PRESSURE: 158 MMHG | TEMPERATURE: 98.5 F

## 2019-07-09 DIAGNOSIS — R00.1 BRADYCARDIA: ICD-10-CM

## 2019-07-09 DIAGNOSIS — I10 ESSENTIAL HYPERTENSION: Primary | ICD-10-CM

## 2019-07-09 PROCEDURE — 4040F PNEUMOC VAC/ADMIN/RCVD: CPT | Performed by: INTERNAL MEDICINE

## 2019-07-09 PROCEDURE — 1123F ACP DISCUSS/DSCN MKR DOCD: CPT | Performed by: INTERNAL MEDICINE

## 2019-07-09 PROCEDURE — G8420 CALC BMI NORM PARAMETERS: HCPCS | Performed by: INTERNAL MEDICINE

## 2019-07-09 PROCEDURE — 99214 OFFICE O/P EST MOD 30 MIN: CPT | Performed by: INTERNAL MEDICINE

## 2019-07-09 PROCEDURE — G8400 PT W/DXA NO RESULTS DOC: HCPCS | Performed by: INTERNAL MEDICINE

## 2019-07-09 PROCEDURE — G8427 DOCREV CUR MEDS BY ELIG CLIN: HCPCS | Performed by: INTERNAL MEDICINE

## 2019-07-09 PROCEDURE — 1036F TOBACCO NON-USER: CPT | Performed by: INTERNAL MEDICINE

## 2019-07-09 PROCEDURE — 1090F PRES/ABSN URINE INCON ASSESS: CPT | Performed by: INTERNAL MEDICINE

## 2019-07-09 RX ORDER — NIFEDIPINE 60 MG/1
60 TABLET, FILM COATED, EXTENDED RELEASE ORAL DAILY
COMMUNITY
Start: 2019-06-19

## 2019-07-19 ASSESSMENT — ENCOUNTER SYMPTOMS
GASTROINTESTINAL NEGATIVE: 1
RESPIRATORY NEGATIVE: 1

## 2019-09-27 ENCOUNTER — TELEPHONE (OUTPATIENT)
Dept: INTERNAL MEDICINE CLINIC | Age: 79
End: 2019-09-27

## 2019-09-27 DIAGNOSIS — M19.011 PRIMARY OSTEOARTHRITIS OF BOTH SHOULDERS: ICD-10-CM

## 2019-09-27 DIAGNOSIS — M19.012 PRIMARY OSTEOARTHRITIS OF BOTH SHOULDERS: ICD-10-CM

## 2019-09-27 RX ORDER — TRAMADOL HYDROCHLORIDE 50 MG/1
50 TABLET ORAL EVERY 8 HOURS PRN
Qty: 90 TABLET | Refills: 0 | Status: SHIPPED | OUTPATIENT
Start: 2019-09-27 | End: 2019-10-27

## 2019-10-25 ENCOUNTER — TELEPHONE (OUTPATIENT)
Dept: INTERNAL MEDICINE CLINIC | Age: 79
End: 2019-10-25

## 2019-10-28 ENCOUNTER — OFFICE VISIT (OUTPATIENT)
Dept: INTERNAL MEDICINE CLINIC | Age: 79
End: 2019-10-28
Payer: COMMERCIAL

## 2019-10-28 VITALS
OXYGEN SATURATION: 98 % | RESPIRATION RATE: 18 BRPM | TEMPERATURE: 98 F | HEART RATE: 79 BPM | SYSTOLIC BLOOD PRESSURE: 136 MMHG | DIASTOLIC BLOOD PRESSURE: 78 MMHG

## 2019-10-28 DIAGNOSIS — I10 ESSENTIAL HYPERTENSION: Primary | ICD-10-CM

## 2019-10-28 DIAGNOSIS — L29.9 CHRONIC PRURITUS: ICD-10-CM

## 2019-10-28 DIAGNOSIS — R10.13 EPIGASTRIC ABDOMINAL PAIN: ICD-10-CM

## 2019-10-28 DIAGNOSIS — R60.0 LOCALIZED EDEMA: ICD-10-CM

## 2019-10-28 PROBLEM — F51.01 PRIMARY INSOMNIA: Status: ACTIVE | Noted: 2017-03-03

## 2019-10-28 PROBLEM — F33.0 MDD (MAJOR DEPRESSIVE DISORDER), RECURRENT EPISODE, MILD (HCC): Status: ACTIVE | Noted: 2017-03-03

## 2019-10-28 PROBLEM — E53.8 VITAMIN B12 DEFICIENCY: Status: ACTIVE | Noted: 2017-03-03

## 2019-10-28 PROBLEM — R20.2 NUMBNESS AND TINGLING OF BOTH FEET: Status: ACTIVE | Noted: 2019-06-16

## 2019-10-28 PROBLEM — M35.3 POLYMYALGIA RHEUMATICA (HCC): Status: ACTIVE | Noted: 2017-03-03

## 2019-10-28 PROBLEM — K59.09 CHRONIC CONSTIPATION: Status: ACTIVE | Noted: 2019-07-12

## 2019-10-28 PROBLEM — R20.0 NUMBNESS AND TINGLING OF BOTH FEET: Status: ACTIVE | Noted: 2019-06-16

## 2019-10-28 PROBLEM — E78.00 PURE HYPERCHOLESTEROLEMIA: Status: ACTIVE | Noted: 2017-03-03

## 2019-10-28 PROBLEM — H90.3 SENSORINEURAL HEARING LOSS (SNHL) OF BOTH EARS: Status: ACTIVE | Noted: 2017-03-03

## 2019-10-28 PROBLEM — I16.0 HYPERTENSIVE URGENCY: Status: ACTIVE | Noted: 2019-06-16

## 2019-10-28 PROBLEM — J31.0 CHRONIC RHINITIS: Status: ACTIVE | Noted: 2018-07-12

## 2019-10-28 PROBLEM — I73.9 PERIPHERAL VASCULAR DISEASE OF LOWER EXTREMITY (HCC): Status: ACTIVE | Noted: 2017-03-03

## 2019-10-28 PROBLEM — R00.1 BRADYCARDIA: Status: ACTIVE | Noted: 2019-06-16

## 2019-10-28 PROBLEM — N39.41 URGE INCONTINENCE: Status: ACTIVE | Noted: 2018-07-12

## 2019-10-28 PROBLEM — K21.9 HIATAL HERNIA WITH GASTROESOPHAGEAL REFLUX DISEASE WITHOUT ESOPHAGITIS: Status: ACTIVE | Noted: 2017-03-03

## 2019-10-28 PROBLEM — K44.9 HIATAL HERNIA WITH GASTROESOPHAGEAL REFLUX DISEASE WITHOUT ESOPHAGITIS: Status: ACTIVE | Noted: 2017-03-03

## 2019-10-28 PROBLEM — I12.9 HYPERTENSIVE KIDNEY DISEASE WITH CHRONIC KIDNEY DISEASE STAGE II: Status: ACTIVE | Noted: 2017-03-03

## 2019-10-28 PROBLEM — N18.2 HYPERTENSIVE KIDNEY DISEASE WITH CHRONIC KIDNEY DISEASE STAGE II: Status: ACTIVE | Noted: 2017-03-03

## 2019-10-28 LAB
A/G RATIO: 2 (ref 1.1–2.2)
ALBUMIN SERPL-MCNC: 4.3 G/DL (ref 3.4–5)
ALP BLD-CCNC: 92 U/L (ref 40–129)
ALT SERPL-CCNC: 10 U/L (ref 10–40)
ANION GAP SERPL CALCULATED.3IONS-SCNC: 17 MMOL/L (ref 3–16)
AST SERPL-CCNC: 14 U/L (ref 15–37)
BASOPHILS ABSOLUTE: 0.1 K/UL (ref 0–0.2)
BASOPHILS RELATIVE PERCENT: 1 %
BILIRUB SERPL-MCNC: <0.2 MG/DL (ref 0–1)
BUN BLDV-MCNC: 16 MG/DL (ref 7–20)
CALCIUM SERPL-MCNC: 8 MG/DL (ref 8.3–10.6)
CHLORIDE BLD-SCNC: 100 MMOL/L (ref 99–110)
CO2: 20 MMOL/L (ref 21–32)
CREAT SERPL-MCNC: 0.7 MG/DL (ref 0.6–1.2)
EOSINOPHILS ABSOLUTE: 0 K/UL (ref 0–0.6)
EOSINOPHILS RELATIVE PERCENT: 0 %
GFR AFRICAN AMERICAN: >60
GFR NON-AFRICAN AMERICAN: >60
GLOBULIN: 2.1 G/DL
GLUCOSE BLD-MCNC: 126 MG/DL (ref 70–99)
HCT VFR BLD CALC: 37.8 % (ref 36–48)
HEMOGLOBIN: 12.5 G/DL (ref 12–16)
LYMPHOCYTES ABSOLUTE: 1 K/UL (ref 1–5.1)
LYMPHOCYTES RELATIVE PERCENT: 13.5 %
MCH RBC QN AUTO: 29.3 PG (ref 26–34)
MCHC RBC AUTO-ENTMCNC: 33.2 G/DL (ref 31–36)
MCV RBC AUTO: 88.3 FL (ref 80–100)
MONOCYTES ABSOLUTE: 0.3 K/UL (ref 0–1.3)
MONOCYTES RELATIVE PERCENT: 4.3 %
NEUTROPHILS ABSOLUTE: 6.3 K/UL (ref 1.7–7.7)
NEUTROPHILS RELATIVE PERCENT: 81.2 %
PDW BLD-RTO: 15.6 % (ref 12.4–15.4)
PLATELET # BLD: 255 K/UL (ref 135–450)
PMV BLD AUTO: 9.2 FL (ref 5–10.5)
POTASSIUM SERPL-SCNC: 4.2 MMOL/L (ref 3.5–5.1)
RBC # BLD: 4.28 M/UL (ref 4–5.2)
SODIUM BLD-SCNC: 137 MMOL/L (ref 136–145)
TOTAL PROTEIN: 6.4 G/DL (ref 6.4–8.2)
WBC # BLD: 7.7 K/UL (ref 4–11)

## 2019-10-28 PROCEDURE — G8420 CALC BMI NORM PARAMETERS: HCPCS | Performed by: INTERNAL MEDICINE

## 2019-10-28 PROCEDURE — G8400 PT W/DXA NO RESULTS DOC: HCPCS | Performed by: INTERNAL MEDICINE

## 2019-10-28 PROCEDURE — G8427 DOCREV CUR MEDS BY ELIG CLIN: HCPCS | Performed by: INTERNAL MEDICINE

## 2019-10-28 PROCEDURE — 1036F TOBACCO NON-USER: CPT | Performed by: INTERNAL MEDICINE

## 2019-10-28 PROCEDURE — 1123F ACP DISCUSS/DSCN MKR DOCD: CPT | Performed by: INTERNAL MEDICINE

## 2019-10-28 PROCEDURE — 99214 OFFICE O/P EST MOD 30 MIN: CPT | Performed by: INTERNAL MEDICINE

## 2019-10-28 PROCEDURE — 36415 COLL VENOUS BLD VENIPUNCTURE: CPT | Performed by: INTERNAL MEDICINE

## 2019-10-28 PROCEDURE — G8484 FLU IMMUNIZE NO ADMIN: HCPCS | Performed by: INTERNAL MEDICINE

## 2019-10-28 PROCEDURE — 4040F PNEUMOC VAC/ADMIN/RCVD: CPT | Performed by: INTERNAL MEDICINE

## 2019-10-28 PROCEDURE — 1090F PRES/ABSN URINE INCON ASSESS: CPT | Performed by: INTERNAL MEDICINE

## 2019-10-28 RX ORDER — ALUMINA, MAGNESIA, AND SIMETHICONE 2400; 2400; 240 MG/30ML; MG/30ML; MG/30ML
30 SUSPENSION ORAL
COMMUNITY
End: 2020-09-09

## 2019-10-28 RX ORDER — CYCLOBENZAPRINE HCL 5 MG
5-10 TABLET ORAL 3 TIMES DAILY PRN
COMMUNITY

## 2019-10-28 RX ORDER — METOPROLOL TARTRATE 100 MG/1
TABLET ORAL
COMMUNITY
End: 2020-01-17

## 2019-10-28 RX ORDER — LORATADINE 10 MG/1
10 TABLET ORAL DAILY
COMMUNITY
End: 2022-04-12

## 2019-10-28 RX ORDER — TRAMADOL HYDROCHLORIDE 50 MG/1
50 TABLET ORAL
COMMUNITY
End: 2020-01-17 | Stop reason: SDUPTHER

## 2019-10-28 RX ORDER — NYSTATIN 100000 [USP'U]/G
POWDER TOPICAL 2 TIMES DAILY PRN
COMMUNITY

## 2019-10-28 ASSESSMENT — ENCOUNTER SYMPTOMS
VOMITING: 0
NAUSEA: 0
RESPIRATORY NEGATIVE: 1
DIARRHEA: 0
ABDOMINAL PAIN: 1
CONSTIPATION: 0

## 2019-10-29 DIAGNOSIS — R10.10 UPPER ABDOMINAL PAIN: Primary | ICD-10-CM

## 2019-11-23 ENCOUNTER — HOSPITAL ENCOUNTER (OUTPATIENT)
Dept: CT IMAGING | Age: 79
Discharge: HOME | End: 2019-11-23
Payer: COMMERCIAL

## 2019-11-23 DIAGNOSIS — R10.10 UPPER ABDOMINAL PAIN: ICD-10-CM

## 2019-11-23 PROCEDURE — 6360000004 HC RX CONTRAST MEDICATION: Performed by: INTERNAL MEDICINE

## 2019-11-23 PROCEDURE — 74176 CT ABD & PELVIS W/O CONTRAST: CPT

## 2019-11-23 RX ADMIN — IOHEXOL 50 ML: 240 INJECTION, SOLUTION INTRATHECAL; INTRAVASCULAR; INTRAVENOUS; ORAL at 10:01

## 2020-01-19 PROBLEM — I16.0 HYPERTENSIVE URGENCY: Status: RESOLVED | Noted: 2019-06-16 | Resolved: 2020-01-19

## 2020-01-21 PROBLEM — S72.001A HIP FRACTURE REQUIRING OPERATIVE REPAIR, RIGHT, CLOSED, INITIAL ENCOUNTER (HCC): Status: RESOLVED | Noted: 2018-11-07 | Resolved: 2020-01-21

## 2020-01-21 PROBLEM — S72.141A CLOSED INTERTROCHANTERIC FRACTURE OF RIGHT FEMUR (HCC): Status: RESOLVED | Noted: 2018-11-07 | Resolved: 2020-01-21

## 2020-03-20 ENCOUNTER — HOSPITAL ENCOUNTER (OUTPATIENT)
Dept: CT IMAGING | Age: 80
Discharge: HOME | End: 2020-03-20
Payer: COMMERCIAL

## 2020-03-20 PROCEDURE — 70450 CT HEAD/BRAIN W/O DYE: CPT

## 2020-04-24 LAB
SARS-COV-2: NOT DETECTED
SOURCE: NORMAL

## 2020-07-12 ENCOUNTER — HOSPITAL ENCOUNTER (EMERGENCY)
Age: 80
Discharge: HOME | End: 2020-07-12
Attending: EMERGENCY MEDICINE
Payer: COMMERCIAL

## 2020-07-12 ENCOUNTER — APPOINTMENT (OUTPATIENT)
Dept: CT IMAGING | Age: 80
End: 2020-07-12
Payer: COMMERCIAL

## 2020-07-12 ENCOUNTER — APPOINTMENT (OUTPATIENT)
Dept: GENERAL RADIOLOGY | Age: 80
End: 2020-07-12
Payer: COMMERCIAL

## 2020-07-12 VITALS
SYSTOLIC BLOOD PRESSURE: 160 MMHG | HEIGHT: 60 IN | DIASTOLIC BLOOD PRESSURE: 129 MMHG | BODY MASS INDEX: 20.34 KG/M2 | HEART RATE: 67 BPM | OXYGEN SATURATION: 98 % | TEMPERATURE: 98.6 F | RESPIRATION RATE: 12 BRPM | WEIGHT: 103.62 LBS

## 2020-07-12 LAB
A/G RATIO: 1.3 (ref 1.1–2.2)
ALBUMIN SERPL-MCNC: 4.2 G/DL (ref 3.4–5)
ALP BLD-CCNC: 73 U/L (ref 40–129)
ALT SERPL-CCNC: 21 U/L (ref 10–40)
ANION GAP SERPL CALCULATED.3IONS-SCNC: 13 MMOL/L (ref 3–16)
AST SERPL-CCNC: 42 U/L (ref 15–37)
BASOPHILS ABSOLUTE: 0.1 K/UL (ref 0–0.2)
BASOPHILS RELATIVE PERCENT: 1 %
BILIRUB SERPL-MCNC: 0.4 MG/DL (ref 0–1)
BILIRUBIN URINE: NEGATIVE
BLOOD, URINE: NEGATIVE
BUN BLDV-MCNC: 8 MG/DL (ref 7–20)
CALCIUM SERPL-MCNC: 8.5 MG/DL (ref 8.3–10.6)
CHLORIDE BLD-SCNC: 97 MMOL/L (ref 99–110)
CLARITY: ABNORMAL
CO2: 22 MMOL/L (ref 21–32)
COLOR: YELLOW
CREAT SERPL-MCNC: 0.7 MG/DL (ref 0.6–1.2)
EOSINOPHILS ABSOLUTE: 0 K/UL (ref 0–0.6)
EOSINOPHILS RELATIVE PERCENT: 0.1 %
EPITHELIAL CELLS, UA: 0 /HPF (ref 0–5)
GFR AFRICAN AMERICAN: >60
GFR NON-AFRICAN AMERICAN: >60
GLOBULIN: 3.3 G/DL
GLUCOSE BLD-MCNC: 106 MG/DL (ref 70–99)
GLUCOSE URINE: NEGATIVE MG/DL
HCT VFR BLD CALC: 43.1 % (ref 36–48)
HEMOGLOBIN: 14.6 G/DL (ref 12–16)
HYALINE CASTS: 0 /LPF (ref 0–8)
KETONES, URINE: NEGATIVE MG/DL
LACTIC ACID: 1.1 MMOL/L (ref 0.4–2)
LEUKOCYTE ESTERASE, URINE: NEGATIVE
LIPASE: 16 U/L (ref 13–60)
LYMPHOCYTES ABSOLUTE: 1.6 K/UL (ref 1–5.1)
LYMPHOCYTES RELATIVE PERCENT: 19.6 %
MCH RBC QN AUTO: 30.7 PG (ref 26–34)
MCHC RBC AUTO-ENTMCNC: 33.9 G/DL (ref 31–36)
MCV RBC AUTO: 90.4 FL (ref 80–100)
MICROSCOPIC EXAMINATION: YES
MONOCYTES ABSOLUTE: 0.7 K/UL (ref 0–1.3)
MONOCYTES RELATIVE PERCENT: 8.3 %
NEUTROPHILS ABSOLUTE: 5.8 K/UL (ref 1.7–7.7)
NEUTROPHILS RELATIVE PERCENT: 71 %
NITRITE, URINE: NEGATIVE
PDW BLD-RTO: 15.6 % (ref 12.4–15.4)
PH UA: 8 (ref 5–8)
PLATELET # BLD: 257 K/UL (ref 135–450)
PMV BLD AUTO: 8.9 FL (ref 5–10.5)
POTASSIUM REFLEX MAGNESIUM: 3.9 MMOL/L (ref 3.5–5.1)
PROTEIN UA: NEGATIVE MG/DL
RBC # BLD: 4.77 M/UL (ref 4–5.2)
RBC UA: 1 /HPF (ref 0–4)
SODIUM BLD-SCNC: 132 MMOL/L (ref 136–145)
SPECIFIC GRAVITY UA: 1.01 (ref 1–1.03)
TOTAL PROTEIN: 7.5 G/DL (ref 6.4–8.2)
TROPONIN: <0.01 NG/ML
URINE REFLEX TO CULTURE: ABNORMAL
URINE TYPE: ABNORMAL
UROBILINOGEN, URINE: 0.2 E.U./DL
WBC # BLD: 8.1 K/UL (ref 4–11)
WBC UA: 1 /HPF (ref 0–5)

## 2020-07-12 PROCEDURE — 87040 BLOOD CULTURE FOR BACTERIA: CPT

## 2020-07-12 PROCEDURE — 96375 TX/PRO/DX INJ NEW DRUG ADDON: CPT

## 2020-07-12 PROCEDURE — 93005 ELECTROCARDIOGRAM TRACING: CPT | Performed by: EMERGENCY MEDICINE

## 2020-07-12 PROCEDURE — 81001 URINALYSIS AUTO W/SCOPE: CPT

## 2020-07-12 PROCEDURE — 6360000002 HC RX W HCPCS: Performed by: EMERGENCY MEDICINE

## 2020-07-12 PROCEDURE — 83690 ASSAY OF LIPASE: CPT

## 2020-07-12 PROCEDURE — 96376 TX/PRO/DX INJ SAME DRUG ADON: CPT

## 2020-07-12 PROCEDURE — 71046 X-RAY EXAM CHEST 2 VIEWS: CPT

## 2020-07-12 PROCEDURE — 84484 ASSAY OF TROPONIN QUANT: CPT

## 2020-07-12 PROCEDURE — 83605 ASSAY OF LACTIC ACID: CPT

## 2020-07-12 PROCEDURE — 2580000003 HC RX 258: Performed by: EMERGENCY MEDICINE

## 2020-07-12 PROCEDURE — 85025 COMPLETE CBC W/AUTO DIFF WBC: CPT

## 2020-07-12 PROCEDURE — 96374 THER/PROPH/DIAG INJ IV PUSH: CPT

## 2020-07-12 PROCEDURE — 80053 COMPREHEN METABOLIC PANEL: CPT

## 2020-07-12 PROCEDURE — 74176 CT ABD & PELVIS W/O CONTRAST: CPT

## 2020-07-12 PROCEDURE — 99284 EMERGENCY DEPT VISIT MOD MDM: CPT

## 2020-07-12 PROCEDURE — 36415 COLL VENOUS BLD VENIPUNCTURE: CPT

## 2020-07-12 RX ORDER — FENTANYL CITRATE 50 UG/ML
25 INJECTION, SOLUTION INTRAMUSCULAR; INTRAVENOUS ONCE
Status: COMPLETED | OUTPATIENT
Start: 2020-07-12 | End: 2020-07-12

## 2020-07-12 RX ORDER — FENTANYL CITRATE 50 UG/ML
12.5 INJECTION, SOLUTION INTRAMUSCULAR; INTRAVENOUS ONCE
Status: COMPLETED | OUTPATIENT
Start: 2020-07-12 | End: 2020-07-12

## 2020-07-12 RX ORDER — AMOXICILLIN AND CLAVULANATE POTASSIUM 875; 125 MG/1; MG/1
1 TABLET, FILM COATED ORAL 2 TIMES DAILY
Qty: 20 TABLET | Refills: 0 | Status: SHIPPED | OUTPATIENT
Start: 2020-07-12 | End: 2020-07-22

## 2020-07-12 RX ORDER — TRAMADOL HYDROCHLORIDE 50 MG/1
50 TABLET ORAL EVERY 8 HOURS PRN
Qty: 9 TABLET | Refills: 0 | Status: SHIPPED | OUTPATIENT
Start: 2020-07-12 | End: 2020-07-15

## 2020-07-12 RX ORDER — ONDANSETRON 2 MG/ML
4 INJECTION INTRAMUSCULAR; INTRAVENOUS ONCE
Status: COMPLETED | OUTPATIENT
Start: 2020-07-12 | End: 2020-07-12

## 2020-07-12 RX ORDER — 0.9 % SODIUM CHLORIDE 0.9 %
250 INTRAVENOUS SOLUTION INTRAVENOUS ONCE
Status: COMPLETED | OUTPATIENT
Start: 2020-07-12 | End: 2020-07-12

## 2020-07-12 RX ADMIN — FENTANYL CITRATE 12.5 MCG: 50 INJECTION INTRAMUSCULAR; INTRAVENOUS at 12:41

## 2020-07-12 RX ADMIN — FENTANYL CITRATE 25 MCG: 50 INJECTION, SOLUTION INTRAMUSCULAR; INTRAVENOUS at 14:31

## 2020-07-12 RX ADMIN — SODIUM CHLORIDE 250 ML: 9 INJECTION, SOLUTION INTRAVENOUS at 11:34

## 2020-07-12 RX ADMIN — ONDANSETRON 4 MG: 2 INJECTION INTRAMUSCULAR; INTRAVENOUS at 12:41

## 2020-07-12 ASSESSMENT — PAIN DESCRIPTION - DESCRIPTORS
DESCRIPTORS: ACHING
DESCRIPTORS: ACHING

## 2020-07-12 ASSESSMENT — PAIN SCALES - GENERAL
PAINLEVEL_OUTOF10: 8
PAINLEVEL_OUTOF10: 3
PAINLEVEL_OUTOF10: 4
PAINLEVEL_OUTOF10: 7

## 2020-07-12 ASSESSMENT — PAIN DESCRIPTION - LOCATION
LOCATION: ABDOMEN
LOCATION: ABDOMEN

## 2020-07-12 ASSESSMENT — PAIN - FUNCTIONAL ASSESSMENT
PAIN_FUNCTIONAL_ASSESSMENT: ACTIVITIES ARE NOT PREVENTED
PAIN_FUNCTIONAL_ASSESSMENT: 0-10
PAIN_FUNCTIONAL_ASSESSMENT: ACTIVITIES ARE NOT PREVENTED

## 2020-07-12 ASSESSMENT — PAIN DESCRIPTION - FREQUENCY
FREQUENCY: CONTINUOUS
FREQUENCY: CONTINUOUS

## 2020-07-12 ASSESSMENT — ENCOUNTER SYMPTOMS
SHORTNESS OF BREATH: 0
DIARRHEA: 0
ABDOMINAL PAIN: 1
RHINORRHEA: 0
VOMITING: 0
EYE REDNESS: 0
NAUSEA: 0
COUGH: 0
BACK PAIN: 0
SORE THROAT: 0

## 2020-07-12 ASSESSMENT — PAIN DESCRIPTION - ORIENTATION
ORIENTATION: MID
ORIENTATION: MID

## 2020-07-12 ASSESSMENT — PAIN DESCRIPTION - PAIN TYPE
TYPE: ACUTE PAIN
TYPE: ACUTE PAIN

## 2020-07-12 ASSESSMENT — PAIN DESCRIPTION - ONSET
ONSET: ON-GOING
ONSET: ON-GOING

## 2020-07-12 ASSESSMENT — PAIN DESCRIPTION - PROGRESSION: CLINICAL_PROGRESSION: GRADUALLY IMPROVING

## 2020-07-12 NOTE — ED PROVIDER NOTES
[facility]  eMERGENCY dEPARTMENT eNCOUnter      Pt Name: Chelsey Rock  MRN: [id number]  [place] 1940  Date of evaluation: 7/12/2020  Provider: Delgado Fair MD    CHIEF COMPLAINT       Chief Complaint   Patient presents with    Abdominal Pain     pt c/o abd pain since yesterday. pt. family states that she also threw up yesterday         HISTORY OF PRESENT ILLNESS   (Location/Symptom, Timing/Onset,Context/Setting, Quality, Duration, Modifying Factors, Severity)  Note limiting factors. Chelsey Rock is a 79 y.o. female who presents to the emergency department planing of abdominal pain. The patient comes us from a care facility. She states that she has been having abdominal pain since eating yesterday. She describes the pain is in her lower left side of her abdomen. She rates the pain as moderate. Nothing seems to make it better or worse. Today she had an episode of shaking chills. She states the pain is constant. Does not radiate. Is worse with certain movements. Nothing seems to be making it better. No dysuria or frequency. No known fever. No cough. No shortness of breath. No known exposures to COVID. HPI    NursingNotes were reviewed. REVIEW OF SYSTEMS    (2-9 systems for level 4, 10 or more for level 5)     Review of Systems   Constitutional: Positive for chills. Negative for fever. HENT: Negative for rhinorrhea and sore throat. Eyes: Negative for redness. Respiratory: Negative for cough and shortness of breath. Cardiovascular: Negative for chest pain. Gastrointestinal: Positive for abdominal pain. Negative for diarrhea, nausea and vomiting. Genitourinary: Negative for flank pain. Musculoskeletal: Negative for back pain. Neurological: Negative for headaches. Hematological: Negative for adenopathy. Psychiatric/Behavioral: Negative for confusion.        Except as noted above the remainder of the review of systems was reviewed and negative. PAST MEDICAL HISTORY     Past Medical History:   Diagnosis Date    Arthritis     Chronic kidney disease     Diverticulitis     Hyperlipidemia     Hypertension     Thyroid disease          SURGICALHISTORY       Past Surgical History:   Procedure Laterality Date    NE OFFICE/OUTPT VISIT,PROCEDURE ONLY Right 11/8/2018    OPEN REDUCTION INTERNAL FIXATION RIGHT HIP GAMMA NAIL WITH C-ARM performed by Latoya Huizar MD at [address] [place] [address]       Previous Medications    ACETAMINOPHEN (TYLENOL) 325 MG TABLET    Take 650 mg by mouth nightly     ALBUTEROL (PROVENTIL) (2.5 MG/3ML) 0.083% NEBULIZER SOLUTION    Inhale 2.5 mg into the lungs every 6 hours as needed     ALBUTEROL SULFATE  (90 BASE) MCG/ACT INHALER    Inhale 2 puffs into the lungs every 4 hours as needed for Wheezing    ALUMINUM & MAGNESIUM HYDROXIDE-SIMETHICONE (ALMACONE DOUBLE STRENGTH) 400-400-40 MG/5ML SUSP    Take 30 mLs by mouth    ASPIRIN 81 MG TABLET    Take by mouth    BETAMETHASONE VALERATE (VALISONE) 0.1 % CREAM    by Intratympanic route 2 times daily    BUSPIRONE (BUSPAR) 10 MG TABLET    Take 10 mg by mouth 4 times daily    CLONIDINE (CATAPRES) 0.1 MG TABLET    Take 0.1 mg by mouth 2 times daily    CYCLOBENZAPRINE (FLEXERIL) 5 MG TABLET    Take 5-10 mg by mouth    ECONAZOLE NITRATE 1 % CREAM    Apply topically daily. FLUOXETINE (PROZAC) 40 MG CAPSULE    Take 40 mg by mouth daily    FLUTICASONE (FLONASE) 50 MCG/ACT NASAL SPRAY    2 sprays by Nasal route daily as needed     FOLIC ACID (FOLVITE) 1 MG TABLET    Take 1 mg by mouth daily     HYDROCHLOROTHIAZIDE (HYDRODIURIL) 12.5 MG TABLET    Take 1 tablet by mouth daily    HYDROXYCHLOROQUINE (PLAQUENIL) 200 MG TABLET    Take by mouth 2 times daily     HYDROXYZINE (ATARAX) 25 MG TABLET    Take 25 mg by mouth 3 times daily as needed    KETOCONAZOLE (NIZORAL) 2 % CREAM    Apply topically daily. LEVOTHYROXINE (SYNTHROID) 100 MCG TABLET    Take 100 mcg by mouth Daily     LINACLOTIDE (LINZESS) 72 MCG CAPS CAPSULE    Take 1 capsule by mouth every morning (before breakfast)    LISINOPRIL (PRINIVIL;ZESTRIL) 20 MG TABLET    Take 40 mg by mouth daily     LORATADINE (CLARITIN) 10 MG TABLET    Take 10 mg by mouth    MAGNESIUM HYDROXIDE (MILK OF MAGNESIA) 400 MG/5ML SUSPENSION    Take 30 mLs by mouth daily as needed     MELATONIN 3 MG TABS TABLET    Take 5 mg by mouth nightly     MULTIPLE VITAMINS-MINERALS (MULTIVITAMIN WITH MINERALS) TABLET    Take 1 tablet by mouth daily    NIFEDIPINE (ADALAT CC) 60 MG EXTENDED RELEASE TABLET    Take 60 mg by mouth    NYSTATIN (MYCOSTATIN) 175158 UNIT/GM POWDER    by Intratympanic route    OMEPRAZOLE (PRILOSEC) 20 MG DELAYED RELEASE CAPSULE    Take 20 mg by mouth daily    OXYBUTYNIN (DITROPAN-XL) 5 MG EXTENDED RELEASE TABLET    Take 5 mg by mouth daily     POTASSIUM CHLORIDE (KLOR-CON M) 20 MEQ EXTENDED RELEASE TABLET    Take 1 tablet by mouth daily    PRAVASTATIN (PRAVACHOL) 10 MG TABLET    Take 10 mg by mouth nightly     PREDNISONE (DELTASONE) 5 MG TABLET    Take 5 mg by mouth daily    PSYLLIUM (KONSYL) 28.3 % PACK    Take 1 packet by mouth daily    SUCRALFATE (CARAFATE) 1 GM TABLET    Take 1 g by mouth 3 times daily     TRAZODONE (DESYREL) 50 MG TABLET    Take 2 tablets by mouth nightly       ALLERGIES     Alendronate sodium; Benadryl [diphenhydramine]; Ciprofloxacin; Crestor [rosuvastatin calcium]; Hydroxychloroquine sulfate; Minocycline; Naprosyn [naproxen]; Niaspan [niacin]; Red dye; and Risedronate    FAMILY HISTORY     History reviewed. No pertinent family history. SOCIAL HISTORY       Social History     Socioeconomic History    Marital status:       Spouse name: None    Number of children: None    Years of education: None    Highest education level: None   Occupational History    None   Social Needs    Financial resource strain: None    Food insecurity Worry: None     Inability: None    Transportation needs     Medical: None     Non-medical: None   Tobacco Use    Smoking status: Never Smoker    Smokeless tobacco: Never Used   Substance and Sexual Activity    Alcohol use: No    Drug use: No    Sexual activity: Not Currently   Lifestyle    Physical activity     Days per week: None     Minutes per session: None    Stress: None   Relationships    Social connections     Talks on phone: None     Gets together: None     Attends [employer] service: None     Active member of club or organization: None     Attends meetings of clubs or organizations: None     Relationship status: None    Intimate partner violence     Fear of current or ex partner: None     Emotionally abused: None     Physically abused: None     Forced sexual activity: None   Other Topics Concern    None   Social History Narrative    None       SCREENINGS    [place] Coma Scale  Eye Opening: Spontaneous  Best Verbal Response: Oriented  Best Motor Response: Obeys commands  Sid Coma Scale Score: 15        PHYSICAL EXAM    (up to 7 for level 4, 8 or more for level 5)     ED Triage Vitals [07/12/20 1026]   BP Temp Temp Source Pulse Resp SpO2 Height Weight   (!) 179/119 98.6 °F (37 °C) Oral 73 10 100 % 5' (1.524 m) 103 lb 9.9 oz (47 kg)       Physical Exam  Vitals signs and nursing note reviewed. Constitutional:       Appearance: She is well-developed. She is not diaphoretic. HENT:      Head: Normocephalic and atraumatic. Eyes:      General: No scleral icterus. Right eye: No discharge. Left eye: No discharge. Conjunctiva/sclera: Conjunctivae normal.   Neck:      Musculoskeletal: Neck supple. Cardiovascular:      Rate and Rhythm: Normal rate. Heart sounds: No murmur. Pulmonary:      Effort: Pulmonary effort is normal. No respiratory distress. Breath sounds: Normal breath sounds. No wheezing, rhonchi or rales.    Abdominal:      General: There is no distension. Palpations: Abdomen is soft. Tenderness: There is abdominal tenderness. There is no guarding or rebound. Comments: Tender LLQ. No rebound or guarding   Musculoskeletal: Normal range of motion. Skin:     General: Skin is warm and dry. Capillary Refill: Capillary refill takes less than 2 seconds. Neurological:      Mental Status: She is alert and oriented to person, place, and time. Psychiatric:         Behavior: Behavior normal.         DIAGNOSTIC RESULTS     EKG: All EKG's are interpreted by the Emergency Department Physician who either signs or Co-signsthis chart in the absence of a cardiologist.    The Ekg interpreted by me shows  normal sinus rhythm with a rate of 65  Axis is   Left axis deviation  QTc is  561 msec  Right bundle branch block, left anterior fascicular block, first-degree AV block  ST Segments: nonspecific changes  No significant change from prior EKG dated June 9, 2019          RADIOLOGY:   Non-plain filmimages such as CT, Ultrasound and MRI are read by the radiologist. Plain radiographic images are visualized and preliminarily interpreted by the emergency physician with the below findings:        Interpretation per the Radiologist below, if available at the time ofthis note:    XR CHEST STANDARD (2 VW)   Final Result   No convincing evidence for acute cardiopulmonary pathology. Hiatal hernia. Chronic deformity of the bilateral humeral heads. CT ABDOMEN PELVIS WO CONTRAST Additional Contrast? None   Final Result   No acute findings.                ED BEDSIDE ULTRASOUND:   Performed by ED Physician - none    LABS:  Labs Reviewed   CBC WITH AUTO DIFFERENTIAL - Abnormal; Notable for the following components:       Result Value    RDW 15.6 (*)     All other components within normal limits    Narrative:     Performed at:  [address] [address]   Phone [phone] METABOLIC PANEL W/ REFLEX TO MG FOR LOW K - Abnormal; Notable for the following components:    Sodium 132 (*)     Chloride 97 (*)     Glucose 106 (*)     AST 42 (*)     All other components within normal limits    Narrative:     Performed at:  [facility]  [address] [facility] [place] [address]   Phone [phone]   URINE RT REFLEX TO CULTURE - Abnormal; Notable for the following components:    Clarity, UA CLOUDY (*)     All other components within normal limits    Narrative:     Performed at:  [facility]  [address], [place] [address]   Phone [phone]   CULTURE, BLOOD 2   CULTURE, BLOOD 1   LIPASE    Narrative:     Performed at:  [facility]  [address], [place] [facility] [place] [address]   Phone [phone]   TROPONIN    Narrative:     Performed at:  [facility]  [address] [place] [address] [place] [address]   Phone [phone]   LACTIC ACID, PLASMA    Narrative:     Performed at:  [address] [facility] [place] [address]   Phone [phone]   MICROSCOPIC URINALYSIS    Narrative:     Performed at:  [facility]  [address] [place] [address] [facility] [place] [address]   Phone [phone]       All other labs were within normal range or not returned as of this dictation.     EMERGENCY DEPARTMENT COURSE and DIFFERENTIAL DIAGNOSIS/MDM:   Vitals:    Vitals:    07/12/20 1331 07/12/20 1347 07/12/20 1403 07/12/20 1417   BP: (!) 178/79 130/67 (!) 187/81 (!) 142/102   Pulse: 73 58 76 78   Resp: 19 16 18 21   Temp:       TempSrc:       SpO2: 97% 97% (!) 85%    Weight:       Height:               MDM  Number of Diagnoses or Management Options  Abdominal pain, left lower quadrant:   Chills without fever:   Diagnosis management comments: DDX: Hernia, colitis, diverticulitis, mesenteric ischemia, UTI, Take 1 tablet by mouth every 8 hours as needed for Pain for up to 3 days.           (Please note that portions of this note were completed with a voice recognition program.Efforts were made to edit the dictations but occasionally words are mis-transcribed.)    Reagan Masters MD (electronically signed)  Attending Emergency Physician         Reagan Masters MD  07/12/20 1811

## 2020-07-13 LAB
EKG ATRIAL RATE: 65 BPM
EKG DIAGNOSIS: NORMAL
EKG P AXIS: 67 DEGREES
EKG P-R INTERVAL: 204 MS
EKG Q-T INTERVAL: 540 MS
EKG QRS DURATION: 152 MS
EKG QTC CALCULATION (BAZETT): 561 MS
EKG R AXIS: -63 DEGREES
EKG T AXIS: -6 DEGREES
EKG VENTRICULAR RATE: 65 BPM

## 2020-07-13 PROCEDURE — 93010 ELECTROCARDIOGRAM REPORT: CPT | Performed by: INTERNAL MEDICINE

## 2020-07-14 ENCOUNTER — APPOINTMENT (OUTPATIENT)
Dept: CT IMAGING | Age: 80
End: 2020-07-14
Payer: COMMERCIAL

## 2020-07-14 ENCOUNTER — HOSPITAL ENCOUNTER (EMERGENCY)
Age: 80
Discharge: HOME | End: 2020-07-14
Attending: EMERGENCY MEDICINE
Payer: COMMERCIAL

## 2020-07-14 VITALS
DIASTOLIC BLOOD PRESSURE: 89 MMHG | SYSTOLIC BLOOD PRESSURE: 135 MMHG | WEIGHT: 106.2 LBS | HEIGHT: 61 IN | HEART RATE: 61 BPM | TEMPERATURE: 98.1 F | OXYGEN SATURATION: 100 % | BODY MASS INDEX: 20.05 KG/M2 | RESPIRATION RATE: 18 BRPM

## 2020-07-14 LAB
A/G RATIO: 1.4 (ref 1.1–2.2)
ALBUMIN SERPL-MCNC: 4 G/DL (ref 3.4–5)
ALP BLD-CCNC: 73 U/L (ref 40–129)
ALT SERPL-CCNC: 14 U/L (ref 10–40)
ANION GAP SERPL CALCULATED.3IONS-SCNC: 13 MMOL/L (ref 3–16)
AST SERPL-CCNC: 20 U/L (ref 15–37)
BASOPHILS ABSOLUTE: 0 K/UL (ref 0–0.2)
BASOPHILS RELATIVE PERCENT: 0.5 %
BILIRUB SERPL-MCNC: 0.4 MG/DL (ref 0–1)
BILIRUBIN URINE: NEGATIVE
BLOOD, URINE: NEGATIVE
BUN BLDV-MCNC: 9 MG/DL (ref 7–20)
CALCIUM SERPL-MCNC: 8.6 MG/DL (ref 8.3–10.6)
CHLORIDE BLD-SCNC: 100 MMOL/L (ref 99–110)
CLARITY: CLEAR
CO2: 23 MMOL/L (ref 21–32)
COLOR: YELLOW
CREAT SERPL-MCNC: 0.9 MG/DL (ref 0.6–1.2)
EOSINOPHILS ABSOLUTE: 0 K/UL (ref 0–0.6)
EOSINOPHILS RELATIVE PERCENT: 0 %
EPITHELIAL CELLS, UA: 2 /HPF (ref 0–5)
GFR AFRICAN AMERICAN: >60
GFR NON-AFRICAN AMERICAN: >60
GLOBULIN: 2.9 G/DL
GLUCOSE BLD-MCNC: 126 MG/DL (ref 70–99)
GLUCOSE URINE: NEGATIVE MG/DL
HCT VFR BLD CALC: 41.8 % (ref 36–48)
HEMOGLOBIN: 13.9 G/DL (ref 12–16)
HYALINE CASTS: 5 /LPF (ref 0–8)
KETONES, URINE: NEGATIVE MG/DL
LACTIC ACID, SEPSIS: 1 MMOL/L (ref 0.4–1.9)
LEUKOCYTE ESTERASE, URINE: NEGATIVE
LIPASE: 11 U/L (ref 13–60)
LYMPHOCYTES ABSOLUTE: 0.6 K/UL (ref 1–5.1)
LYMPHOCYTES RELATIVE PERCENT: 7.8 %
MCH RBC QN AUTO: 30.2 PG (ref 26–34)
MCHC RBC AUTO-ENTMCNC: 33.2 G/DL (ref 31–36)
MCV RBC AUTO: 90.9 FL (ref 80–100)
MICROSCOPIC EXAMINATION: YES
MONOCYTES ABSOLUTE: 0.4 K/UL (ref 0–1.3)
MONOCYTES RELATIVE PERCENT: 6 %
NEUTROPHILS ABSOLUTE: 6.3 K/UL (ref 1.7–7.7)
NEUTROPHILS RELATIVE PERCENT: 85.7 %
NITRITE, URINE: NEGATIVE
PDW BLD-RTO: 15.2 % (ref 12.4–15.4)
PH UA: 6 (ref 5–8)
PLATELET # BLD: 243 K/UL (ref 135–450)
PMV BLD AUTO: 8.8 FL (ref 5–10.5)
POTASSIUM REFLEX MAGNESIUM: 3.7 MMOL/L (ref 3.5–5.1)
PROTEIN UA: ABNORMAL MG/DL
RBC # BLD: 4.59 M/UL (ref 4–5.2)
RBC UA: 1 /HPF (ref 0–4)
SODIUM BLD-SCNC: 136 MMOL/L (ref 136–145)
SPECIFIC GRAVITY UA: 1.01 (ref 1–1.03)
TOTAL PROTEIN: 6.9 G/DL (ref 6.4–8.2)
URINE REFLEX TO CULTURE: ABNORMAL
URINE TYPE: ABNORMAL
UROBILINOGEN, URINE: 0.2 E.U./DL
WBC # BLD: 7.4 K/UL (ref 4–11)
WBC UA: 5 /HPF (ref 0–5)

## 2020-07-14 PROCEDURE — 83605 ASSAY OF LACTIC ACID: CPT

## 2020-07-14 PROCEDURE — 74177 CT ABD & PELVIS W/CONTRAST: CPT

## 2020-07-14 PROCEDURE — 80053 COMPREHEN METABOLIC PANEL: CPT

## 2020-07-14 PROCEDURE — 99284 EMERGENCY DEPT VISIT MOD MDM: CPT

## 2020-07-14 PROCEDURE — 6360000004 HC RX CONTRAST MEDICATION: Performed by: EMERGENCY MEDICINE

## 2020-07-14 PROCEDURE — 83690 ASSAY OF LIPASE: CPT

## 2020-07-14 PROCEDURE — 81001 URINALYSIS AUTO W/SCOPE: CPT

## 2020-07-14 PROCEDURE — 85025 COMPLETE CBC W/AUTO DIFF WBC: CPT

## 2020-07-14 RX ORDER — POLYETHYLENE GLYCOL 3350 17 G/17G
17 POWDER, FOR SOLUTION ORAL DAILY
Qty: 1 BOTTLE | Refills: 0 | Status: SHIPPED | OUTPATIENT
Start: 2020-07-14 | End: 2020-07-21

## 2020-07-14 RX ADMIN — IOPAMIDOL 75 ML: 755 INJECTION, SOLUTION INTRAVENOUS at 17:13

## 2020-07-14 ASSESSMENT — PAIN DESCRIPTION - PROGRESSION: CLINICAL_PROGRESSION: NOT CHANGED

## 2020-07-14 ASSESSMENT — PAIN SCALES - GENERAL: PAINLEVEL_OUTOF10: 10

## 2020-07-14 ASSESSMENT — PAIN DESCRIPTION - PAIN TYPE: TYPE: ACUTE PAIN

## 2020-07-14 ASSESSMENT — PAIN DESCRIPTION - ONSET: ONSET: ON-GOING

## 2020-07-14 ASSESSMENT — PAIN DESCRIPTION - DESCRIPTORS: DESCRIPTORS: ACHING

## 2020-07-14 ASSESSMENT — PAIN DESCRIPTION - ORIENTATION: ORIENTATION: MID

## 2020-07-14 ASSESSMENT — PAIN - FUNCTIONAL ASSESSMENT: PAIN_FUNCTIONAL_ASSESSMENT: PREVENTS OR INTERFERES SOME ACTIVE ACTIVITIES AND ADLS

## 2020-07-14 ASSESSMENT — PAIN DESCRIPTION - FREQUENCY: FREQUENCY: CONTINUOUS

## 2020-07-14 ASSESSMENT — PAIN DESCRIPTION - LOCATION: LOCATION: ABDOMEN

## 2020-07-14 NOTE — ED TRIAGE NOTES
Pt arrived to ED via private vehicle with complaints of abdominal pain. On initial assessment, pt states they were seen on Sunday for similar complaint. Pt was dx with diverticulitis. Pt states they have nausea without diarrhea or fever. Pt states it feel like her abdomen is full. VS noted and stable. Patient A&Ox4. Respirations easy and unlabored. Skin warm and dry and appropriate for ethnicity. No acute distress noted at this time.

## 2020-07-14 NOTE — ED PROVIDER NOTES
[address] [facility]      Pt Name: Albert Newman  MRN: [id number]  [place] 1940  Date of evaluation: 7/14/2020  Provider: Oksana Horne, [address] [place] [address]       Chief Complaint   Patient presents with    Abdominal Pain     mid abd pain. pt recently seen for same. nausea. denies diarrhea. denies fever         HISTORY OF PRESENT ILLNESS   (Location/Symptom, Timing/Onset, Context/Setting, Quality, Duration, Modifying Factors, Severity)  Note limiting factors. Albert Newman is a 79 y.o. female who presents to the emergency department with a complaint of abdominal pain that began today upon waking at approximately 6 AM.  She describes the pain is constant sharp and stabbing. She currently rates it an 8/10 intensity. She reports some slight nausea but denies any vomiting or diarrhea. She denies any fever or chills. She denies any melena medic easy or hematemesis. She denies any fall trauma or injury. She began having some pain approximately 3 days ago with nausea. The pain was very mild at that time and she came to the emergency department on July 12, 2 days ago. She underwent CT abdomen and pelvis without contrast which was unremarkable. However, there was some concern for diverticulitis and she was prescribed Augmentin 875 mg twice daily and tramadol. She has not yet followed up with her primary care physician. The patient denies any dysuria hematuria frequency urgency. She denies any back pain or flank pain. She denies any melena or hematochezia. She does have an issue with chronic constipation. She takes a laxative daily as well as Linzess. Her last bowel movement was 2 days ago. She states that that bowel movement was very large. HPI    Nursing Notes were reviewed. REVIEW OF SYSTEMS    (2-9 systems for level 4, 10 or more for level 5)       Constitutional: Negative for fever or chills.    Respiratory: Negative for shortness of breath or dyspnea on exertion. Cardiovascular: Negative for chest pain. Genitourinary: Negative for flank pain. Negative for dysuria. Negative for hematuria. Neurological: Negative for headache. Musculoskeletal:  Negative edema. Hematological: Negative for adenopathy. All systems are reviewed and are negative except for those listed above in the history of present illness and ROS. PAST MEDICAL HISTORY     Past Medical History:   Diagnosis Date    Arthritis     Chronic kidney disease     Diverticulitis     Hyperlipidemia     Hypertension     Thyroid disease          SURGICAL HISTORY       Past Surgical History:   Procedure Laterality Date    ME OFFICE/OUTPT VISIT,PROCEDURE ONLY Right 11/8/2018    OPEN REDUCTION INTERNAL FIXATION RIGHT HIP GAMMA NAIL WITH C-ARM performed by Brandy Wilkerson MD at [address] [place] [address]       Previous Medications    ACETAMINOPHEN (TYLENOL) 325 MG TABLET    Take 650 mg by mouth nightly     ALBUTEROL (PROVENTIL) (2.5 MG/3ML) 0.083% NEBULIZER SOLUTION    Inhale 2.5 mg into the lungs every 6 hours as needed     ALBUTEROL SULFATE  (90 BASE) MCG/ACT INHALER    Inhale 2 puffs into the lungs every 4 hours as needed for Wheezing    ALUMINUM & MAGNESIUM HYDROXIDE-SIMETHICONE (ALMACONE DOUBLE STRENGTH) 400-400-40 MG/5ML SUSP    Take 30 mLs by mouth    AMOXICILLIN-CLAVULANATE (AUGMENTIN) 875-125 MG PER TABLET    Take 1 tablet by mouth 2 times daily for 10 days    ASPIRIN 81 MG TABLET    Take by mouth    BETAMETHASONE VALERATE (VALISONE) 0.1 % CREAM    by Intratympanic route 2 times daily    BUSPIRONE (BUSPAR) 10 MG TABLET    Take 10 mg by mouth 4 times daily    CLONIDINE (CATAPRES) 0.1 MG TABLET    Take 0.1 mg by mouth 2 times daily    CYCLOBENZAPRINE (FLEXERIL) 5 MG TABLET    Take 5-10 mg by mouth    ECONAZOLE NITRATE 1 % CREAM    Apply topically daily.     FLUOXETINE (PROZAC) 40 MG CAPSULE Take 40 mg by mouth daily    FLUTICASONE (FLONASE) 50 MCG/ACT NASAL SPRAY    2 sprays by Nasal route daily as needed     FOLIC ACID (FOLVITE) 1 MG TABLET    Take 1 mg by mouth daily     HYDROCHLOROTHIAZIDE (HYDRODIURIL) 12.5 MG TABLET    Take 1 tablet by mouth daily    HYDROXYCHLOROQUINE (PLAQUENIL) 200 MG TABLET    Take by mouth 2 times daily     HYDROXYZINE (ATARAX) 25 MG TABLET    Take 25 mg by mouth 3 times daily as needed    KETOCONAZOLE (NIZORAL) 2 % CREAM    Apply topically daily. LEVOTHYROXINE (SYNTHROID) 100 MCG TABLET    Take 100 mcg by mouth Daily     LINACLOTIDE (LINZESS) 72 MCG CAPS CAPSULE    Take 1 capsule by mouth every morning (before breakfast)    LISINOPRIL (PRINIVIL;ZESTRIL) 20 MG TABLET    Take 40 mg by mouth daily     LORATADINE (CLARITIN) 10 MG TABLET    Take 10 mg by mouth    MAGNESIUM HYDROXIDE (MILK OF MAGNESIA) 400 MG/5ML SUSPENSION    Take 30 mLs by mouth daily as needed     MELATONIN 3 MG TABS TABLET    Take 5 mg by mouth nightly     MULTIPLE VITAMINS-MINERALS (MULTIVITAMIN WITH MINERALS) TABLET    Take 1 tablet by mouth daily    NIFEDIPINE (ADALAT CC) 60 MG EXTENDED RELEASE TABLET    Take 60 mg by mouth    NYSTATIN (MYCOSTATIN) 639142 UNIT/GM POWDER    by Intratympanic route    OMEPRAZOLE (PRILOSEC) 20 MG DELAYED RELEASE CAPSULE    Take 20 mg by mouth daily    OXYBUTYNIN (DITROPAN-XL) 5 MG EXTENDED RELEASE TABLET    Take 5 mg by mouth daily     POTASSIUM CHLORIDE (KLOR-CON M) 20 MEQ EXTENDED RELEASE TABLET    Take 1 tablet by mouth daily    PRAVASTATIN (PRAVACHOL) 10 MG TABLET    Take 10 mg by mouth nightly     PREDNISONE (DELTASONE) 5 MG TABLET    Take 5 mg by mouth daily    PSYLLIUM (KONSYL) 28.3 % PACK    Take 1 packet by mouth daily    SUCRALFATE (CARAFATE) 1 GM TABLET    Take 1 g by mouth 3 times daily     TRAMADOL (ULTRAM) 50 MG TABLET    Take 1 tablet by mouth every 8 hours as needed for Pain for up to 3 days.     TRAZODONE (DESYREL) 50 MG TABLET    Take 2 tablets by mouth evidence of trauma. Normocephalic. Eyes: Pupils equal and reactive. No photophobia. Conjunctiva normal.    HENT: Oral mucosa moist.  Airway patent. Pharynx without erythema. Nares were clear. Neck:  Soft and supple. Nontender. Heart:  Regular rate and rhythm. No murmur. Lungs:  Clear to auscultation. No wheezes, rales, or ronchi. No conversational dyspnea or accessory muscle use. Chest: Chest wall non-tender. No evidence of trauma. Abdomen:  Soft, nondistended, bowel sounds present. Mild tenderness noted in the left lower quadrant. No visible trauma. No guarding rigidity or rebound. No masses. Musculoskeletal: Extremities non-tender with full range of motion. Radial and dorsalis pedis pulses were intact. No calf tenderness erythema or edema. Neurological: Alert and oriented x 3. Speech clear. Cranial nerves II-XII intact. No facial droop. No acute focal motor or sensory deficits. Skin: Skin is warm and dry. No rash. Lymphatic:  No lympadenopathy. Psychiatric: Normal mood and affect. Behavior is normal.         DIAGNOSTIC RESULTS     EKG: All EKG's are interpreted by the Emergency Department Physician who either signs or Co-signs this chart in the absence of a cardiologist.        RADIOLOGY:   Non-plain film images such as CT, Ultrasound and MRI are read by the radiologist. Plain radiographic images are visualized and preliminarily interpreted by the emergency physician with the below findings:        Interpretation per the Radiologist below, if available at the time of this note:    CT ABDOMEN PELVIS W IV CONTRAST Additional Contrast? None   Final Result   1. No acute intra-process identified. 2. Moderate to large volume of stool throughout the proximal and mid colon   with near complete collapse of the more distal colon. No bowel obstruction   identified. The small bowel is normal in caliber. 3. Atherosclerotic disease. 4. Moderate-sized hiatal hernia.                ED BEDSIDE ULTRASOUND:   Performed by ED Physician - none    LABS:  Labs Reviewed   CBC WITH AUTO DIFFERENTIAL - Abnormal; Notable for the following components:       Result Value    Lymphocytes Absolute 0.6 (*)     All other components within normal limits    Narrative:     Performed at:  [address] [place] [address]   Phone [phone]   COMPREHENSIVE METABOLIC PANEL W/ REFLEX TO MG FOR LOW K - Abnormal; Notable for the following components:    Glucose 126 (*)     All other components within normal limits    Narrative:     Performed at:  [address] [place] [address]   Phone [phone]   LIPASE - Abnormal; Notable for the following components:    Lipase 11.0 (*)     All other components within normal limits    Narrative:     Performed at:  [address] [place] [address]   Phone [phone]   URINE RT REFLEX TO CULTURE - Abnormal; Notable for the following components:    Protein, UA TRACE (*)     All other components within normal limits    Narrative:     Performed at:  [address] [place] [address]   Phone [phone]   LACTATE, SEPSIS    Narrative:     Performed at:  [address] [place] [address]   Phone [phone]   MICROSCOPIC URINALYSIS    Narrative:     Performed at:  [facility]  [address] [place] [address] [place] [address]   Phone [phone]       All other labs were within normal range or not returned as of this dictation.     EMERGENCY DEPARTMENT COURSE and DIFFERENTIAL DIAGNOSIS/MDM:   Vitals:    Vitals:    07/14/20 1424 07/14/20 1427 07/14/20 1544 07/14/20 1648   BP:  (!) 108/57 (!) 137/98 (!) 147/113   Pulse: 83   68   Resp: 18   18   Temp:       SpO2: 95%  92% 100% Weight: 106 lb 3.2 oz (48.2 kg)      Height: 5' 1\" (1.549 m)          Patient presented with lower abdominal pain as noted above. She does have some mild to moderate tenderness in the left lower quadrant that reproduces her pain. She was sent for CT abdomen pelvis with IV contrast for further evaluation. She was offered pain medication but declined. Laboratory studies were reviewed and were unremarkable. MDM      REASSESSMENT          6:51 PM: Laboratory studies were reviewed. No significant abnormalities. Urinalysis was unremarkable. Lactate is normal.  White blood cell count is normal.  CT abdomen and pelvis with IV contrast was unremarkable. Creatinine is normal.  Patient does have a fairly large amount of stool in the colon but no evidence of obstruction. Although I cannot exclude the possibility of some mild diverticulitis, I suspect that there is a constipation component to her pain. She does appear quite comfortable and did not require medication while in the emergency department. She is hemodynamically stable. She is stable for outpatient management. Call was placed to her primary care physician Dr. Nadya Rios. The patient will be given a prescription for magnesium citrate as well as MiraLAX. I advised her to drink plenty of fluids. Advised her to continue the Augmentin which was previously prescribed. If there is no improvement in her symptoms, she may need further evaluation with colonoscopy by the gastroenterologist.  She was given a referral to gastroenterology. CRITICAL CARE TIME   Total Critical Care time was 0 minutes, excluding separately reportable procedures. There was a high probability of clinically significant/life threatening deterioration in the patient's condition which required my urgent intervention. CONSULTS:  None    PROCEDURES:  Unless otherwise noted below, none     Procedures        FINAL IMPRESSION      1. Acute abdominal pain    2.  Constipation, unspecified constipation type          DISPOSITION/PLAN   DISPOSITION        PATIENT REFERRED TO:  Travisliamcarol England, [address] [facility]  [address] [facility] [address]  [phone]    Call today      Ke Oconnell MD  [address]  [address] [place]    Call today        DISCHARGE MEDICATIONS:  New Prescriptions    MAGNESIUM CITRATE SOLUTION    Take 296 mLs by mouth once for 1 dose    POLYETHYLENE GLYCOL (MIRALAX) 17 GM/SCOOP POWDER    Take 17 g by mouth daily for 7 days PRN constipation     Controlled Substances Monitoring:     No flowsheet data found. (Please note that portions of this note were completed with a voice recognition program.  Efforts were made to edit the dictations but occasionally words are mis-transcribed. )    [address] Kulwinder Leach DO (electronically signed)  Attending Emergency Physician          Farideh Greene DO  07/14/20 2005

## 2020-07-15 ENCOUNTER — CARE COORDINATION (OUTPATIENT)
Dept: CARE COORDINATION | Age: 80
End: 2020-07-15

## 2020-07-15 NOTE — ED NOTES
D/C: Order noted for d/c. Pt confirmed d/c paperwork and 2 rx have correct name. Discharge and education instructions reviewed with patient. Teach-back successful. Pt verbalized understanding and signed d/c papers. Pt denied questions at this time. No acute distress noted. Patient instructed to follow-up as noted - return to emergency department if symptoms worsen. Patient verbalized understanding. Discharged per EDMD with discharge instructions. Pt discharged to private vehicle. Patient stable upon departure. Thanked patient for choosing [facility] for care. Provider aware of patient pain at time of discharge.      Tarun Kent RN  07/14/20 2026

## 2020-07-15 NOTE — CARE COORDINATION
Patient contacted regarding recent discharge and COVID-19 risk. Discussed COVID-19 related testing which was not done at this time. Test results were not done. Patient informed of results, if available? N/A  Patient was tested for COVID 19 in April test results Negative     Care Transition Nurse/ Ambulatory Care Manager contacted the patient by telephone to perform post discharge assessment. Verified name and  with patient as identifiers. Patient has following risk factors of: asthma, chronic kidney disease and HTN. CTN/ACM reviewed discharge instructions, medical action plan and red flags related to discharge diagnosis. Reviewed and educated them on any new and changed medications related to discharge diagnosis. Advised obtaining a 90-day supply of all daily and as-needed medications. Patient seen in ED for Abdominal pain. Patient says she is doing much better today. Patient says she has a little pain to her abdomin, but nothing like yesterday. Patient says she is going to spend today resting. Education provided regarding infection prevention, and signs and symptoms of COVID-19 and when to seek medical attention with patient who verbalized understanding. Discussed exposure protocols and quarantine from [address] you at higher risk for severe illness  and given an opportunity for questions and concerns. The patient agrees to contact the COVID-19 hotline [phone] or PCP office for questions related to their healthcare. CTN/ACM provided contact information for future reference. From CDC: Are you at higher risk for severe illness?  Wash your hands often.  Avoid close contact (6 feet, which is about two arm lengths) with people who are sick.  Put distance between yourself and other people if COVID-19 is spreading in your community.  Clean and disinfect frequently touched surfaces.  Avoid all cruise travel and non-essential air travel.    Call your healthcare professional if you have concerns about COVID-19 and your underlying condition or if you are sick. For more information on steps you can take to protect yourself, see [facility]'s How to [address] [facility] for follow-up call in 7-14 days based on severity of symptoms and risk factors.

## 2020-07-16 LAB
BLOOD CULTURE, ROUTINE: NORMAL
CULTURE, BLOOD 2: NORMAL

## 2020-07-29 ENCOUNTER — HOSPITAL ENCOUNTER (OUTPATIENT)
Dept: CT IMAGING | Age: 80
Discharge: HOME | End: 2020-07-29
Payer: COMMERCIAL

## 2020-07-29 PROCEDURE — 74174 CTA ABD&PLVS W/CONTRAST: CPT

## 2020-07-29 PROCEDURE — 6360000004 HC RX CONTRAST MEDICATION: Performed by: INTERNAL MEDICINE

## 2020-07-29 RX ADMIN — IOPAMIDOL 75 ML: 755 INJECTION, SOLUTION INTRAVENOUS at 17:10

## 2020-07-31 ENCOUNTER — CARE COORDINATION (OUTPATIENT)
Dept: CARE COORDINATION | Age: 80
End: 2020-07-31

## 2020-08-10 ENCOUNTER — INITIAL CONSULT (OUTPATIENT)
Dept: SURGERY | Age: 80
End: 2020-08-10
Payer: COMMERCIAL

## 2020-08-10 VITALS
HEART RATE: 66 BPM | HEIGHT: 61 IN | WEIGHT: 105 LBS | DIASTOLIC BLOOD PRESSURE: 66 MMHG | BODY MASS INDEX: 19.83 KG/M2 | SYSTOLIC BLOOD PRESSURE: 110 MMHG

## 2020-08-10 PROCEDURE — 99203 OFFICE O/P NEW LOW 30 MIN: CPT | Performed by: STUDENT IN AN ORGANIZED HEALTH CARE EDUCATION/TRAINING PROGRAM

## 2020-08-10 ASSESSMENT — ENCOUNTER SYMPTOMS
CHEST TIGHTNESS: 0
ABDOMINAL DISTENTION: 0
BACK PAIN: 1
COLOR CHANGE: 0
ABDOMINAL PAIN: 1
SHORTNESS OF BREATH: 0
BLOOD IN STOOL: 0
NAUSEA: 0
COUGH: 0
CONSTIPATION: 0
VOMITING: 0

## 2020-08-10 NOTE — PROGRESS NOTES
[facility] ([facility]) Vascular and Endovascular Surgery  Leo Zhou, , RPVI      Chief Complain:   Chief Complaint   Patient presents with    New Patient     Patient presents to the office for consultation reagrding celiac artery stenosis. She was referred today by Dr. Artur Bass. HPI  Pepe Casarez is a pleasant 79 y.o. 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 Jenn Guerrero presents after several months of ongoing abdominal pain and discomfort. She admits to weight loss as well. She does not recall an exact amount but merely states that her clothes are not fitting her as well as they used to. She was seen in the presence of her daughter. She currently resides in a nursing home. Due to concern for the possibility of mesenteric ischemia she underwent a CTA demonstrating moderate stenosis of her celiac artery. She is scheduled to be evaluated by Gastroenterology in the near future as well. She does not exactly endorse post-prandial pain. She describes her pain as constant, particularly in the mid-epigastrium. She denies any nausea or vomiting or change in her bowel habits at this time. She does not endorse a history of reflux. Dietary changes have not ameliorated her symptoms. She denies chest pain or shortness of breath. She no longer ambulates due to a history of TIA and currently resides in a wheelchair.      PMH  Past Medical History:   Diagnosis Date    Arthritis     Chronic kidney disease     Diverticulitis     Hyperlipidemia     Hypertension     Thyroid disease        PSH  Past Surgical History:   Procedure Laterality Date    UT OFFICE/OUTPT VISIT,PROCEDURE ONLY Right 11/8/2018    OPEN REDUCTION INTERNAL FIXATION RIGHT HIP GAMMA NAIL WITH C-ARM performed by Lexii Byrd MD at [address] [place] [address] THYROIDECTOMY         Med  Current Outpatient Medications   Medication Sig Dispense Refill    Nutritional Supplements (ENSURE CLEAR) LIQD Take 1 Bottle by mouth 2 times daily 60 Bottle 5  HYDROcodone-acetaminophen (NORCO) 5-325 MG per tablet Take 1 tablet by mouth every 4 hours as needed for Pain for up to 30 days. Intended supply: 5 days. Take lowest dose possible to manage pain 120 tablet 0    levothyroxine (SYNTHROID) 112 MCG tablet Take 1 tablet by mouth daily 90 tablet 1    ondansetron (ZOFRAN) 4 MG tablet Take 1 tablet by mouth daily as needed for Nausea or Vomiting 30 tablet 0    pantoprazole (PROTONIX) 40 MG tablet Take 1 tablet by mouth daily 30 tablet 3    traZODone (DESYREL) 50 MG tablet Take 2 tablets by mouth nightly 30 tablet 5    ketoconazole (NIZORAL) 2 % cream Apply topically daily. 60 g 1    betamethasone valerate (VALISONE) 0.1 % cream by Intratympanic route 2 times daily      econazole nitrate 1 % cream Apply topically daily.  30 g 0    linaCLOtide (LINZESS) 72 MCG CAPS capsule Take 1 capsule by mouth every morning (before breakfast) 28 capsule 0    cloNIDine (CATAPRES) 0.1 MG tablet Take 0.1 mg by mouth 2 times daily      hydrOXYzine (ATARAX) 25 MG tablet Take 25 mg by mouth 3 times daily as needed      FLUoxetine (PROZAC) 40 MG capsule Take 40 mg by mouth daily      aspirin 81 MG tablet Take by mouth      cyclobenzaprine (FLEXERIL) 5 MG tablet Take 5-10 mg by mouth      loratadine (CLARITIN) 10 MG tablet Take 10 mg by mouth      nystatin (MYCOSTATIN) 462367 UNIT/GM powder by Intratympanic route      NIFEdipine (ADALAT CC) 60 MG extended release tablet Take 60 mg by mouth      potassium chloride (KLOR-CON M) 20 MEQ extended release tablet Take 1 tablet by mouth daily 10 tablet 0    psyllium (KONSYL) 28.3 % PACK Take 1 packet by mouth daily      predniSONE (DELTASONE) 5 MG tablet Take 5 mg by mouth daily      Multiple Vitamins-Minerals (MULTIVITAMIN WITH MINERALS) tablet Take 1 tablet by mouth daily      albuterol sulfate  (90 Base) MCG/ACT inhaler Inhale 2 puffs into the lungs every 4 hours as needed for Wheezing      melatonin 3 MG TABS tablet insecurity     Worry: None     Inability: None    Transportation needs     Medical: None     Non-medical: None   Tobacco Use    Smoking status: Never Smoker    Smokeless tobacco: Never Used   Substance and Sexual Activity    Alcohol use: No    Drug use: No    Sexual activity: Not Currently   Lifestyle    Physical activity     Days per week: None     Minutes per session: None    Stress: None   Relationships    Social connections     Talks on phone: None     Gets together: None     Attends Rastafari service: None     Active member of club or organization: None     Attends meetings of clubs or organizations: None     Relationship status: None    Intimate partner violence     Fear of current or ex partner: None     Emotionally abused: None     Physically abused: None     Forced sexual activity: None   Other Topics Concern    None   Social History Narrative    None       Family History  No family history on file. ROS  Review of Systems   Constitutional: Positive for activity change, appetite change, chills and unexpected weight change. Negative for diaphoresis, fatigue and fever. HENT: Negative for congestion. Respiratory: Negative for cough, chest tightness and shortness of breath. Cardiovascular: Negative for chest pain, palpitations and leg swelling. Gastrointestinal: Positive for abdominal pain (multi-focal). Negative for abdominal distention, blood in stool, constipation, nausea and vomiting. Endocrine: Positive for cold intolerance. Genitourinary: Negative for difficulty urinating. Musculoskeletal: Positive for arthralgias and back pain. Skin: Negative for color change and rash. Neurological: Negative for dizziness, facial asymmetry and headaches. Hematological: Bruises/bleeds easily. Psychiatric/Behavioral: Negative for confusion. Physical Exam  Physical Exam  Vitals signs reviewed. Constitutional:       Appearance: She is not toxic-appearing or diaphoretic.    HENT: Head: Normocephalic and atraumatic. Ears:      Comments: Hard of hearing    Eyes:      Pupils: Pupils are equal, round, and reactive to light. Neck:      Musculoskeletal: Normal range of motion. No muscular tenderness. Cardiovascular:      Rate and Rhythm: Normal rate and regular rhythm. Pulses: Normal pulses. Heart sounds: Normal heart sounds. Pulmonary:      Effort: Pulmonary effort is normal. No respiratory distress. Breath sounds: Normal breath sounds. Abdominal:      General: There is no distension. Tenderness: There is abdominal tenderness (diffuse tenderness, worse in mid-epigastrium). There is no guarding or rebound. Musculoskeletal:         General: No swelling, tenderness or deformity. Right lower leg: No edema. Left lower leg: No edema. Skin:     General: Skin is warm and dry. Capillary Refill: Capillary refill takes less than 2 seconds. Neurological:      General: No focal deficit present. Mental Status: She is alert and oriented to person, place, and time. Cranial Nerves: No cranial nerve deficit. Psychiatric:         Mood and Affect: Mood normal.         Behavior: Behavior normal.         Thought Content: Thought content normal.         Judgment: Judgment normal.          Vitals  Vitals:    08/10/20 1350   BP: 110/66   Pulse: 66   Weight: 105 lb (47.6 kg)   Height: 5' 1\" (1.549 m)       Imaging  CTA   1. No acute findings within the abdomen or pelvis.  No CT evidence of    appendicitis.  Mild retained stool in the proximal colon. 2. No acute aortic pathology.  Mild aortoiliac atherosclerotic plaque with no    aneurysm or dissection. 3. 50-70% stenosis at the origin of the celiac artery.  The ZENOBIA is patent and    there is no SMA stenosis. 4. Osteopenia.  Stable T11 compression fracture. Assessment  1.  Celiac artery stenosis (HCC)  No intervention recommended at this time  Recommend follow up with GI for likely upper endoscopy  Would recommend medical management           Plan  Chris Carr is a 79 y.o. 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 Her abdominal pain is concerning given that she does endorse tenderness to palpation. However, based on her CTA results, she does not have associated disease in her SMA and ZENOBIA that would otherwise suggest a chronic mesenteric ischemic presentation. Her weight loss is concerning and there is an associated food fear but it does not appear that her abdominal pain is initiated by eating but rather constant which is inconsistent with mesenteric ischemia as the principle contributor. The celiac artery stenosis is of the moderate to severe quality with some demonstration of post-stenotic dilatation. However, I would still recommend that she undergo a GI evaluation first to include an upper endoscopy to get a better idea of the possible source for her symptoms. If her upper endoscopy is normal or she has evidence of mucosal ischemia then it might be prudent to re-evaluate her for treatment of her celiac artery stenosis. But given the relative poor patency and risks associated with the procedure would not recommend at this time. The patient and her daughter were both in agreement with this plan.       Orquidea Becker DO, [address] [facility] Vascular and Endovascular Surgery

## 2020-08-10 NOTE — Clinical Note
Dr. Pham Schmitt,    Thank you for sending this patient. Please see my note for my plan regarding their care.     Sincerely,    [facility], DO, [address]

## 2020-08-20 ENCOUNTER — HOSPITAL ENCOUNTER (OUTPATIENT)
Dept: GENERAL RADIOLOGY | Age: 80
Discharge: HOME | End: 2020-08-20
Payer: COMMERCIAL

## 2020-08-20 PROCEDURE — 74250 X-RAY XM SM INT 1CNTRST STD: CPT

## 2020-09-08 NOTE — PROGRESS NOTES
CLARI with Marisol Peraza at [facility], spoke with patient she states she thinks she is at [facility] [employer], [address] for daughter to call PAT. Patient states she has had a few mini strokes and her memory was affected.

## 2020-09-08 NOTE — PROGRESS NOTES
Faxed [facility]([place]) Pre-operative interview form with confirmation and notified nurse Barbara Scott , taking care of patient. Await return of information. Covid done at [facility] today per Kathi instructed her to please fax results when available.

## 2020-09-08 NOTE — PROGRESS NOTES
Preoperative Screening for Elective Surgery/Invasive Procedures While COVID-19 present in the community     Have you tested positive or have been told to self-isolate for COVID-19 like symptoms within the past 28 days? no   Do you currently have any of the following symptoms?no  o Fever >100.0 F or 99.9 F in immunocompromised patients? o New onset cough, shortness of breath or difficulty breathing?  o New onset sore throat, myalgia (muscle aches and pains), headache, loss of taste/smell or diarrhea?  Have you had a potential exposure to COVID-19 within the past 14 days by:  o Close contact with a confirmed case?no  o Close contact with a healthcare worker,  or essential infrastructure worker (grocery store, [employer], gas station, public utilities or transportation)? lives in [place]  o Do you reside in a congregate setting such as; skilled nursing facility, adult home, correctional facility, homeless shelter or other institutional setting?lives  In [place]  o Have you had recent travel to a known COVID-19 hotspot? [place]    Indicate if the patient has a positive screen by answering yes to one or more of the above questions. Patients who test positive or screen positive prior to surgery or on the day of surgery should be evaluated in conjunction with the surgeon/proceduralist/anesthesiologist to determine the urgency of the procedure.

## 2020-09-09 RX ORDER — CALCIUM CARBONATE 200(500)MG
1 TABLET,CHEWABLE ORAL EVERY 8 HOURS PRN
COMMUNITY
End: 2021-10-13

## 2020-09-09 RX ORDER — POLYETHYLENE GLYCOL 3350 17 G/17G
17 POWDER, FOR SOLUTION ORAL DAILY PRN
COMMUNITY

## 2020-09-09 NOTE — PROGRESS NOTES
[address] [facility] time_1030___________        Surgery time____1200________    Take the following medications with a sip of water: Follow your MD/Surgeons pre-procedure instructions regarding your medications    Do not eat or drink anything after 12:00 midnight prior to your surgery. This includes water chewing gum, mints and ice chips. You may brush your teeth and gargle the morning of your surgery, but do not swallow the water     Please see your family doctor/pediatrician for a history and physical and/or concerning medications. Bring any test results/reports from your physicians office. If you are under the care of a heart doctor or specialist doctor, please be aware that you may be asked to them for clearance    You may be asked to stop blood thinners such as Coumadin, Plavix, Fragmin, Lovenox, etc., or any anti-inflammatories such as:  Aspirin, Ibuprofen, Advil, Naproxen prior to your surgery. We also ask that you stop any OTC medications such as fish oil, vitamin E, glucosamine, garlic, Multivitamins, COQ 10, etc.    We ask that you do not smoke 24 hours prior to surgery  We ask that you do not  drink any alcoholic beverages 24 hours prior to surgery     You must make arrangements for a responsible adult to take you home after your surgery. For your safety you will not be allowed to leave alone or drive yourself home. Your surgery will be cancelled if you do not have a ride home. Also for your safety, it is strongly suggested that someone stay with you the first 24 hours after your surgery. A parent or legal guardian must accompany a child scheduled for surgery and plan to stay at the hospital until the child is discharged. Please do not bring other children with you. For your comfort, please wear simple loose fitting clothing to the hospital.  Please do not bring valuables.     Do not wear any make-up or nail polish on your fingers or toes      For your safety, please do not wear any jewelry or body piercing's on the day of surgery. All jewelry must be removed. If you have dentures, they will be removed before going to operating room. For your convenience, we will provide you with a container. If you wear contact lenses or glasses, they will be removed, please bring a case for them. If you have a living will and a durable power of  for healthcare, please bring in a copy. As part of our patient safety program to minimize surgical site infections, we ask you to do the following:    · Please notify your surgeon if you develop any illness between         now and the  day of your surgery. · This includes a cough, cold, fever, sore throat, nausea,         or vomiting, and diarrhea, etc.  ·  Please notify your surgeon if you experience dizziness, shortness         of breath or blurred vision between now and the time of your surgery. Do not shave your operative site 96 hours prior to surgery. For face and neck surgery, men may use an electric razor 48 hours   prior to surgery. You may shower the night before surgery or the morning of   your surgery with an antibacterial soap. You will need to bring a photo ID and insurance card    [facility] has an onsite pharmacy, would you like to utilize our pharmacy     If you will be staying overnight and use a C-pap machine, please bring   your C-pap to hospital     Our goal is to provide you with excellent care, therefore, visitors will be limited to two(2) in the room at a time so that we may focus on providing this care for you. Please contact pre-admission testing if you have any further questions. [facility] phone number:  [phone] PAT fax number:  [phone]  Please note these are generalized instructions for all surgical cases, you may be provided with more specific instructions according to your surgery.

## 2020-09-09 NOTE — PROGRESS NOTES
Pre-operative instructions faxed with confirmation, to nursing [place] EVAN ENGLE [facility]) and nurse Thiago emerson . Requested the Covid result collected 9/8/20 be faxed to PAT.

## 2020-09-09 NOTE — PROGRESS NOTES
C-Difficile admission screening and protocol:     * Admitted with diarrhea? *Prior history of C-Diff. In last 3 months? *Antibiotic use in the past 6-8 weeks? *Prior hospitalization or nursing home in the last month?     From [facility]

## 2020-09-11 ENCOUNTER — ANESTHESIA EVENT (OUTPATIENT)
Dept: ENDOSCOPY | Age: 80
End: 2020-09-11
Payer: COMMERCIAL

## 2020-09-14 ENCOUNTER — HOSPITAL ENCOUNTER (OUTPATIENT)
Age: 80
Setting detail: OUTPATIENT SURGERY
Discharge: HOME | End: 2020-09-14
Attending: INTERNAL MEDICINE | Admitting: INTERNAL MEDICINE
Payer: COMMERCIAL

## 2020-09-14 ENCOUNTER — ANESTHESIA (OUTPATIENT)
Dept: ENDOSCOPY | Age: 80
End: 2020-09-14
Payer: COMMERCIAL

## 2020-09-14 VITALS
DIASTOLIC BLOOD PRESSURE: 54 MMHG | BODY MASS INDEX: 16.99 KG/M2 | WEIGHT: 90 LBS | SYSTOLIC BLOOD PRESSURE: 115 MMHG | HEART RATE: 56 BPM | TEMPERATURE: 97 F | RESPIRATION RATE: 18 BRPM | HEIGHT: 61 IN | OXYGEN SATURATION: 97 %

## 2020-09-14 VITALS
OXYGEN SATURATION: 98 % | RESPIRATION RATE: 16 BRPM | SYSTOLIC BLOOD PRESSURE: 173 MMHG | DIASTOLIC BLOOD PRESSURE: 81 MMHG

## 2020-09-14 LAB — SARS-COV-2, NAAT: NOT DETECTED

## 2020-09-14 PROCEDURE — 7100000000 HC PACU RECOVERY - FIRST 15 MIN: Performed by: INTERNAL MEDICINE

## 2020-09-14 PROCEDURE — 2709999900 HC NON-CHARGEABLE SUPPLY: Performed by: INTERNAL MEDICINE

## 2020-09-14 PROCEDURE — 3700000001 HC ADD 15 MINUTES (ANESTHESIA): Performed by: INTERNAL MEDICINE

## 2020-09-14 PROCEDURE — 7100000001 HC PACU RECOVERY - ADDTL 15 MIN: Performed by: INTERNAL MEDICINE

## 2020-09-14 PROCEDURE — 3609017100 HC EGD: Performed by: INTERNAL MEDICINE

## 2020-09-14 PROCEDURE — 6360000002 HC RX W HCPCS: Performed by: NURSE ANESTHETIST, CERTIFIED REGISTERED

## 2020-09-14 PROCEDURE — 3700000000 HC ANESTHESIA ATTENDED CARE: Performed by: INTERNAL MEDICINE

## 2020-09-14 PROCEDURE — 2580000003 HC RX 258: Performed by: ANESTHESIOLOGY

## 2020-09-14 PROCEDURE — U0002 COVID-19 LAB TEST NON-CDC: HCPCS

## 2020-09-14 PROCEDURE — 2500000003 HC RX 250 WO HCPCS: Performed by: NURSE ANESTHETIST, CERTIFIED REGISTERED

## 2020-09-14 PROCEDURE — 7100000011 HC PHASE II RECOVERY - ADDTL 15 MIN: Performed by: INTERNAL MEDICINE

## 2020-09-14 PROCEDURE — 7100000010 HC PHASE II RECOVERY - FIRST 15 MIN: Performed by: INTERNAL MEDICINE

## 2020-09-14 RX ORDER — LIDOCAINE HYDROCHLORIDE 20 MG/ML
INJECTION, SOLUTION INFILTRATION; PERINEURAL PRN
Status: DISCONTINUED | OUTPATIENT
Start: 2020-09-14 | End: 2020-09-14 | Stop reason: SDUPTHER

## 2020-09-14 RX ORDER — METOCLOPRAMIDE 5 MG/1
5 TABLET ORAL 3 TIMES DAILY
Qty: 120 TABLET | Refills: 3 | OUTPATIENT
Start: 2020-09-14

## 2020-09-14 RX ORDER — SODIUM CHLORIDE 0.9 % (FLUSH) 0.9 %
10 SYRINGE (ML) INJECTION EVERY 12 HOURS SCHEDULED
Status: DISCONTINUED | OUTPATIENT
Start: 2020-09-14 | End: 2020-09-14 | Stop reason: HOSPADM

## 2020-09-14 RX ORDER — SODIUM CHLORIDE 9 MG/ML
INJECTION, SOLUTION INTRAVENOUS CONTINUOUS
Status: DISCONTINUED | OUTPATIENT
Start: 2020-09-14 | End: 2020-09-14 | Stop reason: HOSPADM

## 2020-09-14 RX ORDER — ONDANSETRON 2 MG/ML
4 INJECTION INTRAMUSCULAR; INTRAVENOUS
Status: DISCONTINUED | OUTPATIENT
Start: 2020-09-14 | End: 2020-09-14 | Stop reason: HOSPADM

## 2020-09-14 RX ORDER — PROPOFOL 10 MG/ML
INJECTION, EMULSION INTRAVENOUS PRN
Status: DISCONTINUED | OUTPATIENT
Start: 2020-09-14 | End: 2020-09-14 | Stop reason: SDUPTHER

## 2020-09-14 RX ORDER — SODIUM CHLORIDE 0.9 % (FLUSH) 0.9 %
10 SYRINGE (ML) INJECTION PRN
Status: DISCONTINUED | OUTPATIENT
Start: 2020-09-14 | End: 2020-09-14 | Stop reason: HOSPADM

## 2020-09-14 RX ADMIN — PROPOFOL 30 MG: 10 INJECTION, EMULSION INTRAVENOUS at 11:33

## 2020-09-14 RX ADMIN — SODIUM CHLORIDE: 9 INJECTION, SOLUTION INTRAVENOUS at 11:21

## 2020-09-14 RX ADMIN — PROPOFOL 40 MG: 10 INJECTION, EMULSION INTRAVENOUS at 11:29

## 2020-09-14 RX ADMIN — LIDOCAINE HYDROCHLORIDE 60 MG: 20 INJECTION, SOLUTION INFILTRATION; PERINEURAL at 11:29

## 2020-09-14 RX ADMIN — SODIUM CHLORIDE: 9 INJECTION, SOLUTION INTRAVENOUS at 10:59

## 2020-09-14 ASSESSMENT — PAIN SCALES - GENERAL
PAINLEVEL_OUTOF10: 0

## 2020-09-14 ASSESSMENT — PAIN DESCRIPTION - DESCRIPTORS: DESCRIPTORS: ACHING

## 2020-09-14 ASSESSMENT — PULMONARY FUNCTION TESTS
PIF_VALUE: 1
PIF_VALUE: 0
PIF_VALUE: 1
PIF_VALUE: 1
PIF_VALUE: 0
PIF_VALUE: 1
PIF_VALUE: 0
PIF_VALUE: 1
PIF_VALUE: 1

## 2020-09-14 ASSESSMENT — PAIN - FUNCTIONAL ASSESSMENT
PAIN_FUNCTIONAL_ASSESSMENT: PREVENTS OR INTERFERES SOME ACTIVE ACTIVITIES AND ADLS
PAIN_FUNCTIONAL_ASSESSMENT: 0-10

## 2020-09-14 ASSESSMENT — ENCOUNTER SYMPTOMS: SHORTNESS OF BREATH: 0

## 2020-09-14 NOTE — PROGRESS NOTES
Incontinent of urine. Melida care given and diaper changed. Tolerating oral intake and being up in chair. Discharge instructions given to patient and daughter. Verbalize understanding. Script given.

## 2020-09-14 NOTE — ANESTHESIA PRE PROCEDURE
Yes Historical Provider, MD   econazole nitrate 1 % cream Apply topically daily.  2/17/20  Yes Kimberly Welch,    cloNIDine (CATAPRES) 0.1 MG tablet Take 0.1 mg by mouth 2 times daily   Yes Historical Provider, MD   hydrOXYzine (ATARAX) 25 MG tablet Take 25 mg by mouth 3 times daily as needed   Yes Historical Provider, MD   FLUoxetine (PROZAC) 40 MG capsule Take 40 mg by mouth daily   Yes Historical Provider, MD   aspirin 81 MG tablet Take by mouth   Yes Historical Provider, MD   cyclobenzaprine (FLEXERIL) 5 MG tablet Take 5-10 mg by mouth 3 times daily as needed    Yes Historical Provider, MD   loratadine (CLARITIN) 10 MG tablet Take 10 mg by mouth   Yes Historical Provider, MD   nystatin (MYCOSTATIN) 114232 UNIT/GM powder by Intratympanic route   Yes Historical Provider, MD   NIFEdipine (ADALAT CC) 60 MG extended release tablet Take 60 mg by mouth 6/19/19  Yes Historical Provider, MD   psyllium (KONSYL) 28.3 % PACK Take 1 packet by mouth daily   Yes Historical Provider, MD   predniSONE (DELTASONE) 5 MG tablet Take 5 mg by mouth daily   Yes Historical Provider, MD   Multiple Vitamins-Minerals (MULTIVITAMIN WITH MINERALS) tablet Take 1 tablet by mouth daily   Yes Historical Provider, MD   albuterol sulfate  (90 Base) MCG/ACT inhaler Inhale 2 puffs into the lungs every 4 hours as needed for Wheezing   Yes Historical Provider, MD   melatonin 3 MG TABS tablet Take 5 mg by mouth nightly    Yes Historical Provider, MD   albuterol (PROVENTIL) (2.5 MG/3ML) 0.083% nebulizer solution Inhale 2.5 mg into the lungs every 6 hours as needed    Yes Historical Provider, MD   busPIRone (BUSPAR) 10 MG tablet Take 10 mg by mouth 4 times daily   Yes Historical Provider, MD   fluticasone (FLONASE) 50 MCG/ACT nasal spray 2 sprays by Nasal route daily as needed    Yes Historical Provider, MD   folic acid (FOLVITE) 1 MG tablet Take 1 mg by mouth daily    Yes Historical Provider, MD   hydroxychloroquine (PLAQUENIL) 200 MG tablet Take by mouth 2 times daily    Yes Historical Provider, MD   lisinopril (PRINIVIL;ZESTRIL) 20 MG tablet Take 40 mg by mouth daily    Yes Historical Provider, MD   magnesium hydroxide (MILK OF MAGNESIA) 400 MG/5ML suspension Take 30 mLs by mouth daily as needed    Yes Historical Provider, MD   oxybutynin (DITROPAN-XL) 5 MG extended release tablet Take 10 mg by mouth nightly    Yes Historical Provider, MD   pravastatin (PRAVACHOL) 10 MG tablet Take 10 mg by mouth nightly    Yes Historical Provider, MD   sucralfate (CARAFATE) 1 GM tablet Take 1 g by mouth 4 times daily 9/12/16  Yes Historical Provider, MD   acetaminophen (TYLENOL) 325 MG tablet Take 650 mg by mouth nightly    Yes Historical Provider, MD   magnesium citrate solution Take 296 mLs by mouth once for 1 dose 7/14/20 7/14/20  Rafat Guzman,    ketoconazole (NIZORAL) 2 % cream Apply topically daily. 3/10/20   Kimberly Welch DO       Current medications:    Current Facility-Administered Medications   Medication Dose Route Frequency Provider Last Rate Last Dose    0.9 % sodium chloride infusion   Intravenous Continuous Boone Mcdermott MD        sodium chloride flush 0.9 % injection 10 mL  10 mL Intravenous 2 times per day Boone Mcdemrott MD        sodium chloride flush 0.9 % injection 10 mL  10 mL Intravenous PRN Boone Mcdermott MD           Allergies:     Allergies   Allergen Reactions    Alendronate Sodium     Benadryl [Diphenhydramine]      insomnia    Ciprofloxacin     Crestor [Rosuvastatin Calcium]     Hydroxychloroquine Sulfate     Minocycline     Naprosyn [Naproxen]     Niaspan [Niacin]     Red Dye     Risedronate        Problem List:    Patient Active Problem List   Diagnosis Code    Essential hypertension I10    Rheumatoid arthritis ([facility] [address] M06.9    Major depressive disorder F32.9    Acquired hypothyroidism E03.9    REJI (generalized anxiety disorder) F41.1    Mild intermittent asthma J45.20    PVD (peripheral vascular disease) ([facility] [address] I73.9    Anemia D64.9    Bradycardia R00.1    Chronic constipation K59.09    Chronic kidney disease N18.9    Chronic rhinitis J31.0    Encounter for long-term (current) use of other medications Z79.899    Hiatal hernia with gastroesophageal reflux disease without esophagitis K44.9, K21.9    Hyperlipidemia E78.5    Hypertensive kidney disease with chronic kidney disease stage II I12.9, N18.2    MDD (major depressive disorder), recurrent episode, mild (HCC) F33.0    Myalgia and myositis [id number]    Numbness and tingling of both feet R20.0, R20.2    Osteoporosis M81.0    Peripheral vascular disease of lower extremity ([facility]) I73.9    Polymyalgia rheumatica ([facility]) M35.3    Polypharmacy Z79.899    Primary insomnia F51.01    Pure hypercholesterolemia E78.00    Sensorineural hearing loss (SNHL) of both ears H90.3    Spinal stenosis of lumbar region M48.061    Spondylosis of lumbosacral spine without myelopathy M47.817    Urge incontinence N39.41    Vitamin B12 deficiency E53.8    Vitamin D deficiency E55.9    Weight loss R63.4       Past Medical History:        Diagnosis Date    Allergic rhinitis     Anxiety     Arthritis     Asthma     Chronic kidney disease     CKD (chronic kidney disease)     Depression     Diverticulitis     Diverticulitis     GERD (gastroesophageal reflux disease)     Hyperlipidemia     Hypertension     Overactive bladder     PNA (pneumonia)     PVD (peripheral vascular disease) ([facility])     Thyroid disease     Tinea corporis     Vitamin B12 deficiency        Past Surgical History:        Procedure Laterality Date    [place] OFFICE/OUTPT VISIT,PROCEDURE ONLY Right 11/8/2018    OPEN REDUCTION INTERNAL FIXATION RIGHT HIP GAMMA NAIL WITH C-ARM performed by Cindy Joseph MD at [address] [place] [address]         Social History:    Social History     Tobacco Use    Smoking status: Never Smoker    Smokeless tobacco: Never Used Substance Use Topics    Alcohol use: No                                Counseling given: Not Answered      Vital Signs (Current):   Vitals:    09/14/20 1027   BP: (!) 145/85   Pulse: 62   Resp: 16   Temp: 97.4 °F (36.3 °C)   TempSrc: Temporal   SpO2: 98%   Weight: 90 lb (40.8 kg)   Height: 5' 1\" (1.549 m)                                              BP Readings from Last 3 Encounters:   09/14/20 (!) 145/85   09/03/20 104/60   08/10/20 110/66       NPO Status: Time of last liquid consumption: 1800                        Time of last solid consumption: 1800                        Date of last liquid consumption: 09/13/20                        Date of last solid food consumption: 09/12/20    BMI:   Wt Readings from Last 3 Encounters:   09/14/20 90 lb (40.8 kg)   08/10/20 105 lb (47.6 kg)   07/14/20 106 lb 3.2 oz (48.2 kg)     Body mass index is 17.01 kg/m². CBC:   Lab Results   Component Value Date    WBC 7.4 07/14/2020    RBC 4.59 07/14/2020    HGB 13.9 07/14/2020    HCT 41.8 07/14/2020    MCV 90.9 07/14/2020    RDW 15.2 07/14/2020     07/14/2020       CMP:   Lab Results   Component Value Date     07/14/2020    K 3.7 07/14/2020     07/14/2020    CO2 23 07/14/2020    BUN 9 07/14/2020    CREATININE 0.9 07/14/2020    GFRAA >60 07/14/2020    GFRAA 57 07/09/2011    AGRATIO 1.4 07/14/2020    LABGLOM >60 07/14/2020    GLUCOSE 126 07/14/2020    PROT 6.9 07/14/2020    PROT 6.6 07/09/2011    CALCIUM 8.6 07/14/2020    BILITOT 0.4 07/14/2020    ALKPHOS 73 07/14/2020    AST 20 07/14/2020    ALT 14 07/14/2020       POC Tests: No results for input(s): POCGLU, POCNA, POCK, POCCL, POCBUN, POCHEMO, POCHCT in the last 72 hours.     Coags:   Lab Results   Component Value Date    PROTIME 11.9 11/07/2018    INR 1.04 11/07/2018    APTT 31.8 12/22/2017       HCG (If Applicable): No results found for: PREGTESTUR, PREGSERUM, HCG, HCGQUANT     ABGs: No results found for: PHART, PO2ART, XGA6DEC, UML7HKO, BEART, [id number]

## 2020-09-14 NOTE — ANESTHESIA POSTPROCEDURE EVALUATION
[facility] Department of Anesthesiology  Post-Anesthesia Note       Name:  Ana Yanez                                  Age:  79 y.o. MRN:  [id number]     Last Vitals & Oxygen Saturation: /63   Pulse 50   Temp 96.9 °F (36.1 °C) (Temporal)   Resp 19   Ht 5' 1\" (1.549 m)   Wt 90 lb (40.8 kg)   SpO2 100%   BMI 17.01 kg/m²   Patient Vitals for the past 4 hrs:   BP Temp Temp src Pulse Resp SpO2 Height Weight   09/14/20 1205 138/63 96.9 °F (36.1 °C) Temporal 50 19 100 % -- --   09/14/20 1200 122/63 -- -- 51 11 100 % -- --   09/14/20 1155 119/60 -- -- (!) 46 17 100 % -- --   09/14/20 1150 (!) 115/59 -- -- (!) 45 15 100 % -- --   09/14/20 1145 (!) 104/58 -- -- (!) 44 17 100 % -- --   09/14/20 1140 (!) 104/58 96.9 °F (36.1 °C) Temporal (!) 45 16 97 % -- --   09/14/20 1027 (!) 145/85 97.4 °F (36.3 °C) Temporal 62 16 98 % 5' 1\" (1.549 m) 90 lb (40.8 kg)       Level of consciousness:  Awake, alert    Respiratory: Respirations easy, no distress. Stable. Cardiovascular: Hemodynamically stable. Hydration: Adequate. PONV: Adequately managed. Post-op pain: Adequately controlled. Post-op assessment: Tolerated anesthetic well without complication. Complications:  None.     Lorraine Matthews MD  September 14, 2020   12:34 PM

## 2020-09-14 NOTE — PROGRESS NOTES
Arrives to PACU, vital signs stable, IV infusing without complications, no response to voice, respirations easy and even.

## 2020-09-14 NOTE — H&P
Pre-operative History and Physical    Patient: Jah Marvin  : 1940  Acct#:     Intended Procedure:  EGD    HISTORY OF PRESENT ILLNESS:  The patient is a 79 y.o. female  who presents for/due to Nausea, vomiting, weight loss. Past Medical History:        Diagnosis Date    Allergic rhinitis     Anxiety     Arthritis     Asthma     Chronic kidney disease     CKD (chronic kidney disease)     Depression     Diverticulitis     Diverticulitis     GERD (gastroesophageal reflux disease)     Hyperlipidemia     Hypertension     Overactive bladder     PNA (pneumonia)     PVD (peripheral vascular disease) (HCC)     Thyroid disease     Tinea corporis     Vitamin B12 deficiency      Past Surgical History:        Procedure Laterality Date    [place] OFFICE/OUTPT VISIT,PROCEDURE ONLY Right 2018    OPEN REDUCTION INTERNAL FIXATION RIGHT HIP GAMMA NAIL WITH C-ARM performed by Yuniel Dye MD at [address] [place] [address]       Medications Prior to Admission:   Prior to Admission medications    Medication Sig Start Date End Date Taking? Authorizing Provider   calcium carbonate (TUMS) 500 MG chewable tablet Take 1 tablet by mouth daily   Yes Historical Provider, MD   polyethylene glycol (GLYCOLAX) 17 GM/SCOOP powder Take 17 g by mouth daily as needed   Yes Historical Provider, MD   hydrALAZINE (APRESOLINE) 10 MG tablet Take 1 tablet by mouth 2 times daily 9/3/20 9/3/21 Yes Kimberly Welch DO   pantoprazole (PROTONIX) 40 MG tablet Take 1 tablet by mouth 2 times daily 9/3/20 8/29/21 Yes Kimberly Welch DO   HYDROcodone-acetaminophen (NORCO) 5-325 MG per tablet Take 1 tablet by mouth every 8 hours as needed for Pain for up to 30 days. Intended supply: 5 days. Take lowest dose possible to manage pain  Patient taking differently: Take 1 tablet by mouth every 4 hours as needed for Pain. Intended supply: 5 days.  Take lowest dose possible to manage pain 9/3/20 10/3/20 Yes Clifford Ham DO linaCLOtide (LINZESS) 72 MCG CAPS capsule Take 1 capsule by mouth every morning (before breakfast) 8/12/20  Yes Kimberly Welch DO   Nutritional Supplements (ENSURE CLEAR) LIQD Take 1 Bottle by mouth 2 times daily 8/7/20 9/9/20 Yes Kimbelry Welch DO   levothyroxine (SYNTHROID) 112 MCG tablet Take 1 tablet by mouth daily 7/24/20  Yes Kimberly Welch DO   ondansetron (ZOFRAN) 4 MG tablet Take 1 tablet by mouth daily as needed for Nausea or Vomiting 7/23/20  Yes Kimberly Welch DO   traZODone (DESYREL) 50 MG tablet Take 2 tablets by mouth nightly 6/1/20  Yes Kimberly Welch DO   betamethasone valerate (VALISONE) 0.1 % cream by Intratympanic route 2 times daily as needed    Yes Historical Provider, MD   econazole nitrate 1 % cream Apply topically daily.  2/17/20  Yes Kimberly Welch DO   cloNIDine (CATAPRES) 0.1 MG tablet Take 0.1 mg by mouth 2 times daily   Yes Historical Provider, MD   hydrOXYzine (ATARAX) 25 MG tablet Take 25 mg by mouth 3 times daily as needed   Yes Historical Provider, MD   FLUoxetine (PROZAC) 40 MG capsule Take 40 mg by mouth daily   Yes Historical Provider, MD   aspirin 81 MG tablet Take by mouth   Yes Historical Provider, MD   cyclobenzaprine (FLEXERIL) 5 MG tablet Take 5-10 mg by mouth 3 times daily as needed    Yes Historical Provider, MD   loratadine (CLARITIN) 10 MG tablet Take 10 mg by mouth   Yes Historical Provider, MD   nystatin (MYCOSTATIN) 240360 UNIT/GM powder by Intratympanic route   Yes Historical Provider, MD   NIFEdipine (ADALAT CC) 60 MG extended release tablet Take 60 mg by mouth 6/19/19  Yes Historical Provider, MD   psyllium (KONSYL) 28.3 % PACK Take 1 packet by mouth daily   Yes Historical Provider, MD   predniSONE (DELTASONE) 5 MG tablet Take 5 mg by mouth daily   Yes Historical Provider, MD   Multiple Vitamins-Minerals (MULTIVITAMIN WITH MINERALS) tablet Take 1 tablet by mouth daily   Yes Historical Provider, MD   albuterol sulfate  (90 Base) MCG/ACT inhaler Inhale 2 reports no history of drug use. PHYSICAL EXAM:      Vital Signs: There were no vitals taken for this visit. Airway: No stridor or wheezing noted. Good air movement  Pulmonary: without wheezes. Clear to auscultation  Cardiac:regular rate and rhythm without loud murmurs  Abdomen:soft, nontender,  Bowel sounds present    Pre-Procedure Assessment / Plan:  1) EGD    ASA Grade:  ASA 3 - Patient with moderate systemic disease with functional limitations  Mallampati Classification:  Class II    Level of Sedation Plan:Deep sedation    Post Procedure plan: Return to same level of care    I assessed the patient and find that the patient is in satisfactory condition to proceed with the planned procedure and sedation plan. I have explained the risk, benefits, and alternatives to the procedure; the patient understands and agrees to proceed.        Jordyn Novak MD  9/14/2020

## 2020-09-14 NOTE — OP NOTE
Endoscopy Note    Patient: Albert Newman  : 1940  Acct#:     Procedure: Esophagogastroduodenoscopy                         Date:  2020     Surgeon:   Radha Parikh MD    Referring Physician:  Scott Salcedo DO    Indications: This is a 79y.o. year old female who presents today with Nausea, vomiting, weight loss. Postoperative Diagnosis:  1. Tortious esophagus 2. Moderate Hiatal hernia     Anesthesia:  The patient was administered IV propofol per anesthesiology team.  Please see their operative records for full details. Consent:  The patient or their legal guardian has signed an informed consent, and is aware of the potential risks, benefits, alternatives, and potential complications of this procedure. These include, but are not limited to hemorrhage, bleeding, post procedural pain, perforation, phlebitis, aspiration, hypotension, hypoxia, cardiovascular events such as arryhthmia, and possibly death. Description of Procedure: The patient was then taken to the endoscopy suite, placed in the left lateral decubitus position and the above IV sedation was administrered. The Olympus video endoscope was placed through the patient's oropharynx without difficulty to the extent of the 2nd portion of the duodenum. Both forward and retroflexed views of the stomach were obtained. Findings:    Esophagus: The esophagus has tortious but otherwise appeared normal without evidence of Low's esophagus or reflux esophagitis. Stomach: The stomach had evidence of a moderate sized hiatal hernia (38-32 cm from incisors). There was some food retained in the hernia sac. No Rohit's ulcers present. The stomach otherwise appeared normal on forward and retroflexed views. No other pathology contributing to gastric outlet obstruction.      Duodenum: The first and 2nd portions of the duodenum appeared normal with normal villous pattern    Estimated blood loss: None    The scope was then withdrawn back into the stomach, it was decompressed, and the scope was completely withdrawn. The patient tolerated the procedure well and was taken to the post anesthesia care unit in good condition. Impression:   1) See post procedure diagnoses    Recommendations:   1) Recommend trial of Reglan to promote gastric emptying. 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 Will CC LITZY Smith on note from today.  SBFT obtained in 8/20 was normal.     Jaye Vaughn MD  [address] [facility]

## 2020-10-12 ENCOUNTER — TELEPHONE (OUTPATIENT)
Dept: SURGERY | Age: 80
End: 2020-10-12

## 2020-10-12 ENCOUNTER — HOSPITAL ENCOUNTER (OUTPATIENT)
Dept: VASCULAR LAB | Age: 80
Discharge: HOME | End: 2020-10-12
Payer: COMMERCIAL

## 2020-10-12 PROCEDURE — 93976 VASCULAR STUDY: CPT

## 2020-10-12 NOTE — TELEPHONE ENCOUNTER
----- Message from Leyla Diaz DO sent at 10/12/2020  2:03 PM EDT -----  Followup to discuss possible celiac artery stent placement      Spoke with Estrella she said she will have her daughter call tomorrow to set up appt.

## 2020-10-15 ENCOUNTER — OFFICE VISIT (OUTPATIENT)
Dept: SURGERY | Age: 80
End: 2020-10-15
Payer: COMMERCIAL

## 2020-10-15 VITALS — DIASTOLIC BLOOD PRESSURE: 66 MMHG | HEIGHT: 61 IN | BODY MASS INDEX: 17.01 KG/M2 | SYSTOLIC BLOOD PRESSURE: 140 MMHG

## 2020-10-15 PROCEDURE — 99213 OFFICE O/P EST LOW 20 MIN: CPT | Performed by: STUDENT IN AN ORGANIZED HEALTH CARE EDUCATION/TRAINING PROGRAM

## 2020-10-15 ASSESSMENT — ENCOUNTER SYMPTOMS
SHORTNESS OF BREATH: 0
ABDOMINAL DISTENTION: 0
BACK PAIN: 0
CHEST TIGHTNESS: 0
VOMITING: 0
EYES NEGATIVE: 1
NAUSEA: 0
COUGH: 0
ABDOMINAL PAIN: 1

## 2020-10-15 NOTE — PROGRESS NOTES
[facility]) Vascular and Endovascular Surgery  Geraldine Cowan DO, RPVI      Chief Complaint:   Chief Complaint   Patient presents with    Follow-up     discuss procedure. JAMEEL Zaragoza is a 79 y.o. 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 The patient unfortunately has been losing quite a bit of weight. She has issues with eating. She was recently seen in the office for this evaluation given that she underwent a CT angiogram which did demonstrate severe plaque at the origin of her celiac artery. The rest of her vessels were patent. However given that celiac artery stenosis is unlikely to contribute fully to mesenteric ischemia she underwent a more thorough evaluation. She was evaluated by gastroenterology and underwent endoscopy. The findings were equivocal and there was no clear answer as to why she is having the condition she has. For the most part she has had no significant change. She does have pain. She states that pain pills appear to alleviate it. However once again she does state that she is having difficulty eating and keeping food down. She is also been losing a lot of weight. She underwent an arterial duplex this week which demonstrated increased velocities across her celiac artery greater than 400 cm a second.     PMH  Past Medical History:   Diagnosis Date    Allergic rhinitis     Anxiety     Arthritis     Asthma     Chronic kidney disease     CKD (chronic kidney disease)     Depression     Diverticulitis     Diverticulitis     GERD (gastroesophageal reflux disease)     Hyperlipidemia     Hypertension     Overactive bladder     PNA (pneumonia)     PVD (peripheral vascular disease) (HCC)     Thyroid disease     Tinea corporis     Vitamin B12 deficiency        PSH  Past Surgical History:   Procedure Laterality Date    [place] OFFICE/OUTPT VISIT,PROCEDURE ONLY Right 11/8/2018    OPEN REDUCTION INTERNAL FIXATION RIGHT HIP GAMMA NAIL WITH C-ARM performed by Nicole Jimenez MD at [address] [facility] GASTROINTESTINAL ENDOSCOPY N/A 9/14/2020    ESOPHAGOGASTRODUODENOSCOPY performed by Kris Mayberry MD at [facility]  Current Outpatient Medications   Medication Sig Dispense Refill    metoclopramide (REGLAN) 5 MG tablet Take 1 tablet by mouth 3 times daily 120 tablet 3    calcium carbonate (TUMS) 500 MG chewable tablet Take 1 tablet by mouth daily      polyethylene glycol (GLYCOLAX) 17 GM/SCOOP powder Take 17 g by mouth daily as needed      hydrALAZINE (APRESOLINE) 10 MG tablet Take 1 tablet by mouth 2 times daily 90 tablet 3    pantoprazole (PROTONIX) 40 MG tablet Take 1 tablet by mouth 2 times daily 180 tablet 3    linaCLOtide (LINZESS) 72 MCG CAPS capsule Take 1 capsule by mouth every morning (before breakfast) 28 capsule 5    Nutritional Supplements (ENSURE CLEAR) LIQD Take 1 Bottle by mouth 2 times daily 60 Bottle 5    levothyroxine (SYNTHROID) 112 MCG tablet Take 1 tablet by mouth daily 90 tablet 1    ondansetron (ZOFRAN) 4 MG tablet Take 1 tablet by mouth daily as needed for Nausea or Vomiting 30 tablet 0    traZODone (DESYREL) 50 MG tablet Take 2 tablets by mouth nightly 30 tablet 5    ketoconazole (NIZORAL) 2 % cream Apply topically daily. 60 g 1    betamethasone valerate (VALISONE) 0.1 % cream by Intratympanic route 2 times daily as needed       econazole nitrate 1 % cream Apply topically daily.  30 g 0    cloNIDine (CATAPRES) 0.1 MG tablet Take 0.1 mg by mouth 2 times daily      hydrOXYzine (ATARAX) 25 MG tablet Take 25 mg by mouth 3 times daily as needed      aspirin 81 MG tablet Take by mouth      cyclobenzaprine (FLEXERIL) 5 MG tablet Take 5-10 mg by mouth 3 times daily as needed       loratadine (CLARITIN) 10 MG tablet Take 10 mg by mouth      nystatin (MYCOSTATIN) 182692 UNIT/GM powder by Intratympanic route      NIFEdipine (ADALAT CC) 60 MG extended release tablet Take 60 mg by mouth      psyllium (KONSYL) 28.3 % PACK Take 1 packet by mouth daily      predniSONE (DELTASONE) 5 MG tablet Take 5 mg by mouth daily      Multiple Vitamins-Minerals (MULTIVITAMIN WITH MINERALS) tablet Take 1 tablet by mouth daily      albuterol sulfate  (90 Base) MCG/ACT inhaler Inhale 2 puffs into the lungs every 4 hours as needed for Wheezing      melatonin 3 MG TABS tablet Take 5 mg by mouth nightly       albuterol (PROVENTIL) (2.5 MG/3ML) 0.083% nebulizer solution Inhale 2.5 mg into the lungs every 6 hours as needed       busPIRone (BUSPAR) 10 MG tablet Take 10 mg by mouth 4 times daily      fluticasone (FLONASE) 50 MCG/ACT nasal spray 2 sprays by Nasal route daily as needed       folic acid (FOLVITE) 1 MG tablet Take 1 mg by mouth daily       hydroxychloroquine (PLAQUENIL) 200 MG tablet Take by mouth 2 times daily       lisinopril (PRINIVIL;ZESTRIL) 20 MG tablet Take 40 mg by mouth daily       magnesium hydroxide (MILK OF MAGNESIA) 400 MG/5ML suspension Take 30 mLs by mouth daily as needed       oxybutynin (DITROPAN-XL) 5 MG extended release tablet Take 10 mg by mouth nightly       pravastatin (PRAVACHOL) 10 MG tablet Take 10 mg by mouth nightly       sucralfate (CARAFATE) 1 GM tablet Take 1 g by mouth 4 times daily      acetaminophen (TYLENOL) 325 MG tablet Take 650 mg by mouth nightly       magnesium citrate solution Take 296 mLs by mouth once for 1 dose 296 mL 0    FLUoxetine (PROZAC) 40 MG capsule Take 40 mg by mouth daily       No current facility-administered medications for this visit. Allergies  Allergies   Allergen Reactions    Alendronate Sodium     Benadryl [Diphenhydramine]      insomnia    Ciprofloxacin     Crestor [Rosuvastatin Calcium]     Hydroxychloroquine Sulfate     Minocycline     Naprosyn [Naproxen]     Niaspan [Niacin]     Red Dye     Risedronate        Social History  Social History     Socioeconomic History    Marital status:   Exam  Physical Exam  Constitutional:       Appearance: Normal appearance. Comments: Frail     HENT:      Head: Normocephalic and atraumatic. Nose: Nose normal.   Eyes:      Pupils: Pupils are equal, round, and reactive to light. Neck:      Musculoskeletal: Normal range of motion. Cardiovascular:      Rate and Rhythm: Normal rate and regular rhythm. Pulmonary:      Effort: Pulmonary effort is normal. No respiratory distress. Breath sounds: No wheezing. Abdominal:      General: There is no distension. Tenderness: There is no abdominal tenderness. Musculoskeletal: Normal range of motion. General: No swelling. Skin:     General: Skin is warm and dry. Capillary Refill: Capillary refill takes less than 2 seconds. Neurological:      General: No focal deficit present. Mental Status: She is alert. Cranial Nerves: No cranial nerve deficit. Psychiatric:         Mood and Affect: Mood normal.         Behavior: Behavior normal.         Thought Content: Thought content normal.         Judgment: Judgment normal.        Vascular: 2+ radial    Vitals  Vitals:    10/15/20 1445   BP: (!) 140/66   Site: Left Upper Arm   Position: Sitting   Cuff Size: Medium Adult   Height: 5' 1\" (1.549 m)       Imaging  Visceral Duplex        SMA appears patent with less than 70% stenosis based on velocity of 200 cm/s          Celiac trunk exhibits significant amount of aliasing and has a peak systolic     velocity of 305 cm/s and end diastolic velocity of 82 cm/s suggestive of     greater than 70% stenosis; this is more severe compared to the CTA     evaluation. Assessment  1. Celiac artery stenosis (HCC)  Recommend visceral angiogram - patient declines at this time      [address] Sharla is a 79 y.o. 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   The patient symptoms are likely multifactorial.  However given her upper endoscopy was not overtly pathologic there is some concern now that potentially she has an ischemic problem. I discussed with the patient and her daughter the possibility of placing a celiac artery stent and evaluating the SMA thoroughly. This could be done from her radial artery. Therefore the risk from an access standpoint is likely low. However the calcium burden in the in the celiac artery is severe and the risk of rupture or stent collapses there. Given these concerns the patient decided not to undergo any procedure at this time. The other issue implies that celiac artery stents have very poor patency at 1 year. Sometimes it instrumentation alone can lead to further problems. While I do formally recommend that she undergo at least a visceral angiogram to determine if stent placement is feasible I understand her reticence. For now she can follow with me as needed. If her symptoms worsen then she should contact our office immediately. Her daughter states that she will discuss with the other members of the family about possible intervention later on down the line. The patient asked me about an appetite stimulant however I will defer this to her primary care physician. All questions were answered. This document was dictated using voice recognition software.        Mallorie Ya DO, [address] [facility] Vascular and Endovascular Surgery

## 2021-02-02 ENCOUNTER — TELEPHONE (OUTPATIENT)
Dept: SURGERY | Age: 81
End: 2021-02-02

## 2021-02-02 NOTE — TELEPHONE ENCOUNTER
Patient daughter, Laura Morin called. Family and patient have decided on having the surgery with Dr. Kenneth Fernandez. Would like to know if she needs to be seen prior to surgery? Please call to schedule appointment or surgery. Thanks !      Laura Morin [phone]

## 2021-02-02 NOTE — LETTER
[facility]  Cardiac Cath Lab    Phone [phone] Fax: [phone]          Patient Scheduling Form:     Date:02-   Time: 12:00  (Arrival: 10:30)    Procedure: Angiogram of the Aorta and Mesenteric Artery, Left Radial Access, Possible Brachial Access, Possible Femoral Access, Possible Revascularization    Anesthesia Required:  LOC/SED    Physician: Nadine Meraz DO  -------------------------------------------------------------------------------------------------------------------  Patients Name: Lacy Almaraz Al 89    YOB: 1940 Sex:  Female    Patient Social Security #:     Mailing Address:  [address] [place] [address]      Home Phone #: [phone]      Primary Care Physician: Christina Christian DO    Diagnosis / ICD-10 Code:Chronic K55.1 - Mesenteric Ischemia   ------------------------------------------------------------------------------------------------------------------  INSURANCE INFORMAITON:  Payor/Plan Subscr  Sex Relation Sub. Ins. ID Effective Group Num   1.  Kel Garrett* [address]  1940 Female Self [id number] 10/1/15 [id number]                                   [address] [address]       Form Completed ByJovanni Gaspar             [id number]           [id number]           [id number]           [id number]

## 2021-02-04 NOTE — TELEPHONE ENCOUNTER
Can go ahead and schedule: mesenteric angiogram, left radial possible brachial, possible femoral access. Dx: chronic mesenteric ischemia. Will need [employer] rep: Cristóbal Carr. Scheduling sheet sent to franky. Called & notified Cristóbal Carr of procedure date & time.

## 2021-02-05 ENCOUNTER — TELEPHONE (OUTPATIENT)
Dept: SURGERY | Age: 81
End: 2021-02-05

## 2021-02-05 NOTE — TELEPHONE ENCOUNTER
Spoke with patient's Daughter, Rita Roland,  regarding scheduled Angiogram on 02- with Dr. Beryle Martin. Patient is asked to arrive by 10:30 AM @ [facility]  after midnight. May take any Heart or Blood Pressure medication with a small sip of water to wash them down. Hold ASA on the morning of this procedure. She may use her Inhaler if necessary. She will need someone to drive her home following this procedure, and to stay with her for the remainder of the day. Please bring a Photo ID & Insurance card with you, and check-in at the [address] [place] [address] 149 down the right-hand hallway on the first floor. Angiogram is scheduled to start at approx. 12:00 PM and should take approx. 90 minutes. She will need to lay still in Recovery for at least an hour after the procedure to allow the incision to properly clot before she may leave. She is allowed one support person with her on the day of the procedure. If the hospital needs any further information, someone will give you a call. Patient's Daughter expressed a verbal understanding of these instructions and had no further questions at this time. Call ended.

## 2021-02-10 ENCOUNTER — HOSPITAL ENCOUNTER (OUTPATIENT)
Dept: CARDIAC CATH/INVASIVE PROCEDURES | Age: 81
Discharge: HOME | End: 2021-02-10
Payer: COMMERCIAL

## 2021-02-10 VITALS
SYSTOLIC BLOOD PRESSURE: 199 MMHG | WEIGHT: 106 LBS | DIASTOLIC BLOOD PRESSURE: 106 MMHG | HEIGHT: 61 IN | TEMPERATURE: 97.4 F | RESPIRATION RATE: 16 BRPM | OXYGEN SATURATION: 100 % | BODY MASS INDEX: 20.01 KG/M2 | HEART RATE: 78 BPM

## 2021-02-10 LAB
CALCIUM IONIZED: 1.03 MMOL/L (ref 1.12–1.32)
GFR AFRICAN AMERICAN: 52
GFR NON-AFRICAN AMERICAN: 43
GLUCOSE BLD-MCNC: 105 MG/DL (ref 70–99)
PERFORMED ON: ABNORMAL
POC CHLORIDE: 97 MMOL/L (ref 99–110)
POC CREATININE: 1.2 MG/DL (ref 0.6–1.2)
POC POTASSIUM: 4.9 MMOL/L (ref 3.5–5.1)
POC SAMPLE TYPE: ABNORMAL
POC SODIUM: 131 MMOL/L (ref 136–145)

## 2021-02-10 PROCEDURE — 2709999900 HC NON-CHARGEABLE SUPPLY

## 2021-02-10 PROCEDURE — 37236 OPEN/PERQ PLACE STENT 1ST: CPT

## 2021-02-10 PROCEDURE — C1769 GUIDE WIRE: HCPCS

## 2021-02-10 PROCEDURE — 6360000004 HC RX CONTRAST MEDICATION: Performed by: STUDENT IN AN ORGANIZED HEALTH CARE EDUCATION/TRAINING PROGRAM

## 2021-02-10 PROCEDURE — C1894 INTRO/SHEATH, NON-LASER: HCPCS

## 2021-02-10 PROCEDURE — 82565 ASSAY OF CREATININE: CPT

## 2021-02-10 PROCEDURE — 84295 ASSAY OF SERUM SODIUM: CPT

## 2021-02-10 PROCEDURE — 82330 ASSAY OF CALCIUM: CPT

## 2021-02-10 PROCEDURE — 82435 ASSAY OF BLOOD CHLORIDE: CPT

## 2021-02-10 PROCEDURE — 82947 ASSAY GLUCOSE BLOOD QUANT: CPT

## 2021-02-10 PROCEDURE — 99153 MOD SED SAME PHYS/QHP EA: CPT

## 2021-02-10 PROCEDURE — 6360000002 HC RX W HCPCS

## 2021-02-10 PROCEDURE — 2500000003 HC RX 250 WO HCPCS

## 2021-02-10 PROCEDURE — 36246 INS CATH ABD/L-EXT ART 2ND: CPT

## 2021-02-10 PROCEDURE — C1725 CATH, TRANSLUMIN NON-LASER: HCPCS

## 2021-02-10 PROCEDURE — C1887 CATHETER, GUIDING: HCPCS

## 2021-02-10 PROCEDURE — C1874 STENT, COATED/COV W/DEL SYS: HCPCS

## 2021-02-10 PROCEDURE — 76937 US GUIDE VASCULAR ACCESS: CPT | Performed by: STUDENT IN AN ORGANIZED HEALTH CARE EDUCATION/TRAINING PROGRAM

## 2021-02-10 PROCEDURE — 36246 INS CATH ABD/L-EXT ART 2ND: CPT | Performed by: STUDENT IN AN ORGANIZED HEALTH CARE EDUCATION/TRAINING PROGRAM

## 2021-02-10 PROCEDURE — 75726 ARTERY X-RAYS ABDOMEN: CPT

## 2021-02-10 PROCEDURE — 37236 OPEN/PERQ PLACE STENT 1ST: CPT | Performed by: STUDENT IN AN ORGANIZED HEALTH CARE EDUCATION/TRAINING PROGRAM

## 2021-02-10 PROCEDURE — 84132 ASSAY OF SERUM POTASSIUM: CPT

## 2021-02-10 PROCEDURE — 99152 MOD SED SAME PHYS/QHP 5/>YRS: CPT

## 2021-02-10 RX ORDER — SODIUM CHLORIDE 0.9 % (FLUSH) 0.9 %
10 SYRINGE (ML) INJECTION PRN
Status: DISCONTINUED | OUTPATIENT
Start: 2021-02-10 | End: 2021-02-11 | Stop reason: HOSPADM

## 2021-02-10 RX ORDER — SODIUM CHLORIDE 9 MG/ML
INJECTION, SOLUTION INTRAVENOUS CONTINUOUS
Status: DISCONTINUED | OUTPATIENT
Start: 2021-02-10 | End: 2021-02-11 | Stop reason: HOSPADM

## 2021-02-10 RX ORDER — ACETAMINOPHEN 325 MG/1
650 TABLET ORAL EVERY 4 HOURS PRN
Status: DISCONTINUED | OUTPATIENT
Start: 2021-02-10 | End: 2021-02-11 | Stop reason: HOSPADM

## 2021-02-10 RX ORDER — SODIUM CHLORIDE 0.9 % (FLUSH) 0.9 %
10 SYRINGE (ML) INJECTION EVERY 12 HOURS SCHEDULED
Status: DISCONTINUED | OUTPATIENT
Start: 2021-02-10 | End: 2021-02-11 | Stop reason: HOSPADM

## 2021-02-10 RX ADMIN — IOPAMIDOL 125 ML: 755 INJECTION, SOLUTION INTRAVENOUS at 12:22

## 2021-02-10 NOTE — H&P
Does not bruise/bleed easily. Psychiatric/Behavioral: Negative for agitation and behavioral problems. Past Medical History:   Diagnosis Date    Allergic rhinitis     Anxiety     Arthritis     Asthma     Chronic kidney disease     Depression     Diverticulitis     GERD (gastroesophageal reflux disease)     Hyperlipidemia     Hypertension     Overactive bladder     PVD [facility]peripheral vascular disease) ([facility])     Thyroid disease     Tinea corporis     Vitamin B12 deficiency        Past Surgical History:   Procedure Laterality Date    PA OFFICE/OUTPT VISIT,PROCEDURE ONLY Right 11/8/2018    OPEN REDUCTION INTERNAL FIXATION RIGHT HIP GAMMA NAIL WITH C-ARM performed by Flory Leiva MD at [address] [place] [address]      UPPER GASTROINTESTINAL ENDOSCOPY N/A 9/14/2020    ESOPHAGOGASTRODUODENOSCOPY performed by Christiane Mota MD at [place] [address]   Allergen Reactions    Alendronate Sodium     Benadryl [Diphenhydramine]      insomnia    Ciprofloxacin     Crestor [Rosuvastatin Calcium]     Hydroxychloroquine Sulfate     Minocycline     Naprosyn [Naproxen]     Niaspan [Niacin]     Red Dye     Risedronate        Social History     Socioeconomic History    Marital status:       Spouse name: Not on file    Number of children: Not on file    Years of education: Not on file    Highest education level: Not on file   Occupational History    Not on file   Social Needs    Financial resource strain: Not on file    Food insecurity     Worry: Not on file     Inability: Not on file    Transportation needs     Medical: Not on file     Non-medical: Not on file   Tobacco Use    Smoking status: Never Smoker    Smokeless tobacco: Never Used   Substance and Sexual Activity    Alcohol use: No    Drug use: No    Sexual activity: Not Currently   Lifestyle    Physical activity     Days per week: Not on file     Minutes per session: Not on file    Stress: Not on file   Relationships    Social connections     Talks on phone: Not on file     Gets together: Not on file     Attends Jainism service: Not on file     Active member of club or organization: Not on file     Attends meetings of clubs or organizations: Not on file     Relationship status: Not on file    Intimate partner violence     Fear of current or ex partner: Not on file     Emotionally abused: Not on file     Physically abused: Not on file     Forced sexual activity: Not on file   Other Topics Concern    Not on file   Social History Narrative    Not on file       No family history on file. - No history of bleeding or clotting disorders    Vital Signs  There were no vitals filed for this visit. Physical Examination  Physical Exam  Constitutional:       Appearance: Normal appearance. Comments: Frail     HENT:      Head: Normocephalic and atraumatic. Nose: Nose normal.   Eyes:      Pupils: Pupils are equal, round, and reactive to light. Neck:      Musculoskeletal: Normal range of motion. Cardiovascular:      Rate and Rhythm: Normal rate and regular rhythm. Pulmonary:      Effort: Pulmonary effort is normal. No respiratory distress. Breath sounds: No wheezing. Abdominal:      General: There is no distension. Tenderness: There is no abdominal tenderness. Musculoskeletal: Normal range of motion. General: No swelling. Skin:     General: Skin is warm and dry. Capillary Refill: Capillary refill takes less than 2 seconds. Neurological:      General: No focal deficit present. Mental Status: She is alert. Cranial Nerves: No cranial nerve deficit. Psychiatric:         Mood and Affect: Mood normal.         Behavior: Behavior normal.         Thought Content:  Thought content normal.         Judgment: Judgment normal.     Labs  Lab Results   Component Value Date    WBC 7.4 07/14/2020    HGB 13.9 07/14/2020    HCT 41.8 07/14/2020    MCV 90.9 07/14/2020    PLT 243 07/14/2020     Lab Results   Component Value Date     07/14/2020    K 3.7 07/14/2020     07/14/2020    CO2 23 07/14/2020    BUN 9 07/14/2020    CREATININE 1.2 02/10/2021    CREATININE 0.9 07/14/2020      No components found for: GLU    Imaging:   Visceral Duplex        SMA appears patent with less than 70% stenosis based on velocity of 200 cm/s          Celiac trunk exhibits significant amount of aliasing and has a peak systolic     velocity of 400 cm/s and end diastolic velocity of 82 cm/s suggestive of     greater than 70% stenosis; this is more severe compared to the CTA     evaluation. Assessment:   Celiac stenosis      Plan:  All risks and benefits of angiography reviewed with patient and family. Will likely need left brachial puncture. Patient and family wish to proceed.      ASA: III  Mallampatti: II      Aundrea Washburn DO, [facility] and Endovascular Surgery  2/10/2021  11:00 AM

## 2021-02-11 NOTE — OP NOTE
[address] [address]                                OPERATIVE REPORT    PATIENT NAME: Whitley Marie                      :        1940  MED REC NO:   [id number]                          ROOM:  ACCOUNT NO:   [de-identified]                           ADMIT DATE: 02/10/2021  PROVIDER:     Gabby Welch DO      DATE OF PROCEDURE:  02/10/2021    PREOPERATIVE DIAGNOSES:  Chronic mesenteric ischemia with postprandial  pain and weight loss. POSTOPERATIVE DIAGNOSES:  Chronic mesenteric ischemia with postprandial  pain and weight loss. OPERATION PERFORMED:  1. Ultrasound-guided access to the left brachial artery. Vessel was  patent and pulsatile. An image was saved and placed in the patient's  medical record. 2.  Abdominal-visceral aortogram.  3.  Selective catheterization of the celiac artery with selective  catheterization of the splenic artery. 4.  Angioplasty of the celiac artery using a 3 x 40 mm balloon. 5.  Stenting of the celiac artery using a 7 x 16 mm Lifestream-covered  stent with postdilatation via an 8 x 20 mm balloon. SURGEON:  Gabby Welch DO    ASSISTANTS:  None. CONTRAST:  100 mL. ANESTHESIA:  Local sedation. COMPLICATIONS:  None apparent. ESTIMATED BLOOD LOSS:  Minimal.    ASA CLASSIFICATION:  II.    MALLAMPATI SCORE:  II.    FINDINGS:  The patient had severe stenosis of the celiac from  poststenotic dilatation, responded well to angioplasty and stenting  apparently. INDICATIONS:  This is a 76-year-old female patient who presented to my  office five or six months ago. She previously had been evaluated for  abdominal pain. She underwent a CT scan, which demonstrated a severe  ostial stenosis of her celiac artery.   Her abdominal pain was mostly  postprandial in nature, and she was having significant amount of weight loss.  She underwent multiple examinations by other physicians in a  multidisciplinary fashion. This included upper endoscopy, which  unfortunately was concerning that her stomach was showing signs of  ischemia. The patient once again continues to lose weight and exhibit  postprandial pain. Unfortunately, as a result of that, she has been  declining in overall physical health. She re-presented to me for  discussion about celiac artery stenting. I did reassure her that the  possibility that this is the main cause of her weight loss is unlikely. It could be considered postprandial in nature, however, if there was  poor collateralization of the SMA and celiac, then this could possibly  be the source. However, her SMA is widely patent. We came to agreement  that we would probably just table this for a few months and we did. However, the patient has persisted to lose weight and she has gone  through all the other testing routes and determined other etiologies to  not be main source of her issue. Therefore, the family is requesting  that we discuss the celiac stenting procedure. All risks and benefits  including pain, infection, bleeding, embolization, rupture,  pseudoaneurysms were discussed clearly with the patient and the family. A written informed consent was signed. OPERATIVE PROCEDURE:  The patient was brought into the cath lab and  placed supine on the table. Appropriate monitoring lines including  continuous blood pressure, EKG, and pulse oximetry were placed on the  patient. A qualified nurse observer was in the room to administer  sedation. We used a combination of Versed and fentanyl. I directly  monitored this. The left brachial artery was then visualized under  ultrasound. The left antecubital space was prepped and draped in the  usual sterile fashion. A time-out was called for the indication,  patient, and laterality. of 20 minutes. There was no evidence of complication or hematoma. The  patient's left arm was then wrapped in fluffs and Coban. She was taken  to recovery room in stable condition. There were no immediate  complications. I was present and performed all aspects of this  procedure.         Leyla Smith DO    D: 02/10/2021 12:34:08       T: 02/10/2021 15:25:30     [email or web address]  Job#: [id number]     Doc#: [id number]    CC:

## 2021-02-12 ENCOUNTER — TELEPHONE (OUTPATIENT)
Dept: SURGERY | Age: 81
End: 2021-02-12

## 2021-02-12 DIAGNOSIS — I73.9 PVD (PERIPHERAL VASCULAR DISEASE) (HCC): ICD-10-CM

## 2021-02-12 DIAGNOSIS — K55.9 MESENTERIC ISCHEMIA (HCC): Primary | ICD-10-CM

## 2021-02-12 DIAGNOSIS — I65.9 OCCLUSION AND STENOSIS OF UNSPECIFIED PRECEREBRAL ARTERY: ICD-10-CM

## 2021-02-12 NOTE — TELEPHONE ENCOUNTER
----- Message from Gabby Welch DO sent at 2/12/2021  7:47 AM EST -----  One month follow up in office: visceral duplex, celiac, SMA prior to visit    Order placed, left vm with afshin (daughter) to call back and schedule 1m f/u & give number to central scheduling to schedule scan.

## 2021-03-08 DIAGNOSIS — E03.9 ACQUIRED HYPOTHYROIDISM: ICD-10-CM

## 2021-03-08 DIAGNOSIS — I10 ESSENTIAL HYPERTENSION: ICD-10-CM

## 2021-03-08 DIAGNOSIS — R20.2 PARESTHESIA OF LEFT UPPER EXTREMITY: ICD-10-CM

## 2021-03-08 LAB
A/G RATIO: 1.1 (ref 1.1–2.2)
ALBUMIN SERPL-MCNC: 3.7 G/DL (ref 3.4–5)
ALP BLD-CCNC: 76 U/L (ref 40–129)
ALT SERPL-CCNC: 8 U/L (ref 10–40)
ANION GAP SERPL CALCULATED.3IONS-SCNC: 17 MMOL/L (ref 3–16)
AST SERPL-CCNC: 22 U/L (ref 15–37)
BILIRUB SERPL-MCNC: 0.3 MG/DL (ref 0–1)
BUN BLDV-MCNC: 13 MG/DL (ref 7–20)
CALCIUM SERPL-MCNC: 8.8 MG/DL (ref 8.3–10.6)
CHLORIDE BLD-SCNC: 95 MMOL/L (ref 99–110)
CO2: 19 MMOL/L (ref 21–32)
CREAT SERPL-MCNC: 0.7 MG/DL (ref 0.6–1.2)
GFR AFRICAN AMERICAN: >60
GFR NON-AFRICAN AMERICAN: >60
GLOBULIN: 3.4 G/DL
GLUCOSE BLD-MCNC: 92 MG/DL (ref 70–99)
POTASSIUM SERPL-SCNC: 4.6 MMOL/L (ref 3.5–5.1)
SODIUM BLD-SCNC: 131 MMOL/L (ref 136–145)
T4 FREE: 1.5 NG/DL (ref 0.9–1.8)
TSH REFLEX FT4: 4.54 UIU/ML (ref 0.27–4.2)

## 2021-03-09 LAB
FOLATE: >20 NG/ML (ref 4.78–24.2)
VITAMIN B-12: 604 PG/ML (ref 211–911)

## 2021-03-10 LAB
ALBUMIN SERPL-MCNC: 3.5 G/DL (ref 3.1–4.9)
ALPHA-1-GLOBULIN: 0.3 G/DL (ref 0.2–0.4)
ALPHA-2-GLOBULIN: 1.2 G/DL (ref 0.4–1.1)
BETA GLOBULIN: 1.2 G/DL (ref 0.9–1.6)
GAMMA GLOBULIN: 0.9 G/DL (ref 0.6–1.8)
TOTAL PROTEIN: 7.1 G/DL (ref 6.4–8.2)

## 2021-03-11 LAB — SPE/IFE INTERPRETATION: NORMAL

## 2021-03-16 ENCOUNTER — HOSPITAL ENCOUNTER (OUTPATIENT)
Dept: VASCULAR LAB | Age: 81
Discharge: HOME | End: 2021-03-16
Payer: COMMERCIAL

## 2021-03-16 DIAGNOSIS — I73.9 PVD (PERIPHERAL VASCULAR DISEASE) (HCC): ICD-10-CM

## 2021-03-16 DIAGNOSIS — I65.9 OCCLUSION AND STENOSIS OF UNSPECIFIED PRECEREBRAL ARTERY: ICD-10-CM

## 2021-03-16 PROCEDURE — 93975 VASCULAR STUDY: CPT

## 2021-03-18 ENCOUNTER — OFFICE VISIT (OUTPATIENT)
Dept: SURGERY | Age: 81
End: 2021-03-18
Payer: COMMERCIAL

## 2021-03-18 VITALS — BODY MASS INDEX: 20.03 KG/M2 | WEIGHT: 106 LBS | SYSTOLIC BLOOD PRESSURE: 100 MMHG | DIASTOLIC BLOOD PRESSURE: 80 MMHG

## 2021-03-18 DIAGNOSIS — K55.1 MESENTERIC ARTERY STENOSIS (HCC): Primary | ICD-10-CM

## 2021-03-18 PROCEDURE — 1123F ACP DISCUSS/DSCN MKR DOCD: CPT | Performed by: STUDENT IN AN ORGANIZED HEALTH CARE EDUCATION/TRAINING PROGRAM

## 2021-03-18 PROCEDURE — 99213 OFFICE O/P EST LOW 20 MIN: CPT | Performed by: STUDENT IN AN ORGANIZED HEALTH CARE EDUCATION/TRAINING PROGRAM

## 2021-03-18 PROCEDURE — 1036F TOBACCO NON-USER: CPT | Performed by: STUDENT IN AN ORGANIZED HEALTH CARE EDUCATION/TRAINING PROGRAM

## 2021-03-18 PROCEDURE — G8484 FLU IMMUNIZE NO ADMIN: HCPCS | Performed by: STUDENT IN AN ORGANIZED HEALTH CARE EDUCATION/TRAINING PROGRAM

## 2021-03-18 PROCEDURE — G8420 CALC BMI NORM PARAMETERS: HCPCS | Performed by: STUDENT IN AN ORGANIZED HEALTH CARE EDUCATION/TRAINING PROGRAM

## 2021-03-18 PROCEDURE — 4040F PNEUMOC VAC/ADMIN/RCVD: CPT | Performed by: STUDENT IN AN ORGANIZED HEALTH CARE EDUCATION/TRAINING PROGRAM

## 2021-03-18 PROCEDURE — G8427 DOCREV CUR MEDS BY ELIG CLIN: HCPCS | Performed by: STUDENT IN AN ORGANIZED HEALTH CARE EDUCATION/TRAINING PROGRAM

## 2021-03-18 PROCEDURE — 1090F PRES/ABSN URINE INCON ASSESS: CPT | Performed by: STUDENT IN AN ORGANIZED HEALTH CARE EDUCATION/TRAINING PROGRAM

## 2021-03-18 PROCEDURE — G8400 PT W/DXA NO RESULTS DOC: HCPCS | Performed by: STUDENT IN AN ORGANIZED HEALTH CARE EDUCATION/TRAINING PROGRAM

## 2021-03-18 ASSESSMENT — ENCOUNTER SYMPTOMS
ABDOMINAL PAIN: 0
SHORTNESS OF BREATH: 0

## 2021-03-18 NOTE — PROGRESS NOTES
[address] (:  1940) is a 79 y.o. female,Established patient, here for evaluation of the following chief complaint(s):  Post-Op Check (S/p, angiogram performed 3/16, pt states she is feeling better however bilateral hands are numb from fingertips to elbows and bilateral legs are very weak, left arm being the worst to where she cant hardly feed herself, all of this occured after having angiogram done. )      ASSESSMENT/PLAN:  1. Mesenteric artery stenosis (HCC)    Overall improved from the perspective of her oral intake and without significant abdominal pain after eating. The numbness I cannot explain as it appears unrelated and is in all of her extremities. Likely some unusual side effect of her vaccine or something else. She is planning to undergo an EMG to further evaluate. We discussed serial imaging of her abdominal aorta and mesenteric circulation; they opted to follow up essentially PRN. For now will make plans for 1 year follow up and go from there. All questions were answered. Return in about 1 year (around 3/18/2022). SUBJECTIVE/OBJECTIVE:  This is a 79year old female patient who presents s/p celiac artery stenting. She is eating now without gross evidence of stomach pain as she had before. From that perspective she appears to have improved. However she has been experiencing numbness in all of her extremities particularly in her fingers. This apparently started after obtaining her COVID vaccine and was occurring even before performing her procedure. She has no overt weakness in her hands but she does in her legs. Review of Systems   Constitutional: Positive for appetite change. Negative for activity change. HENT: Negative for congestion. Eyes: Negative for visual disturbance. Respiratory: Negative for shortness of breath. Cardiovascular: Negative for leg swelling. Gastrointestinal: Negative for abdominal pain. Musculoskeletal: Positive for gait problem.    Skin: Negative for wound. Neurological: Positive for weakness and numbness. Psychiatric/Behavioral: Negative for agitation. Physical Exam  Constitutional:       General: She is not in acute distress. HENT:      Head: Normocephalic and atraumatic. Nose: Nose normal.   Eyes:      Extraocular Movements: Extraocular movements intact. Neck:      Musculoskeletal: Normal range of motion. Cardiovascular:      Rate and Rhythm: Normal rate and regular rhythm. Pulmonary:      Effort: Pulmonary effort is normal. No respiratory distress. Abdominal:      Palpations: Abdomen is soft. Tenderness: There is no abdominal tenderness. Musculoskeletal: Normal range of motion. Skin:     General: Skin is warm and dry. Capillary Refill: Capillary refill takes less than 2 seconds. Comments: No hematoma, minimal residual bruising from procedure   Neurological:      General: No focal deficit present. Mental Status: She is alert. Psychiatric:         Mood and Affect: Mood normal.         Behavior: Behavior normal.         Thought Content:  Thought content normal.         Judgment: Judgment normal.         DIAGNOSIS: mesenteric ischemia, chronic  VASCULAR SURGERY HX: celiac artery stent placement    PULSE EXAMINATION  Femoral:palp  Radial: palp  Brachial: palp, soft no hematoma    Aundrea Washburn DO, [address] [facility] Vascular and Endovascular Surgery

## 2021-03-22 ENCOUNTER — HOSPITAL ENCOUNTER (OUTPATIENT)
Dept: NEUROLOGY | Age: 81
Discharge: HOME | End: 2021-03-22
Payer: COMMERCIAL

## 2021-03-22 DIAGNOSIS — R20.2 PARESTHESIA AND PAIN OF BOTH UPPER EXTREMITIES: ICD-10-CM

## 2021-03-22 DIAGNOSIS — M79.601 PARESTHESIA AND PAIN OF BOTH UPPER EXTREMITIES: ICD-10-CM

## 2021-03-22 DIAGNOSIS — M79.602 PARESTHESIA AND PAIN OF BOTH UPPER EXTREMITIES: ICD-10-CM

## 2021-03-22 PROCEDURE — 95911 NRV CNDJ TEST 9-10 STUDIES: CPT

## 2021-03-22 PROCEDURE — 95886 MUSC TEST DONE W/N TEST COMP: CPT | Performed by: PSYCHIATRY & NEUROLOGY

## 2021-03-22 PROCEDURE — 95912 NRV CNDJ TEST 11-12 STUDIES: CPT

## 2021-03-22 PROCEDURE — 95911 NRV CNDJ TEST 9-10 STUDIES: CPT | Performed by: PSYCHIATRY & NEUROLOGY

## 2021-03-22 NOTE — PROCEDURES
[facility]                     [facility], [address]                             ELECTROMYOGRAM REPORT    PATIENT NAME: Leopold Andrea                      :        1940  MED REC NO:   [id number]                          ROOM:  ACCOUNT NO:   [de-identified]                           ADMIT DATE: 2021  PROVIDER:     Gage Barragan MD    DATE OF EM2021    REASON FOR EMG:  Bilateral arm pain and numbness. SUMMARY:  Bilateral median sensory nerve studies had prolonged distal  latencies. Bilateral ulnar sensory nerve studies were not recordable. The right radial sensory study had a prolonged distal latency. Bilateral median motor nerve studies had prolonged distal latencies and  moderately severe slowing of conduction velocities. Bilateral ulnar  motor nerve studies had prolonged distal latencies and diffuse slowing  along the length of the nerve, more so across the elbows. Needle EMG of  several muscles in both upper extremities showed decreased motor units  in the abductor pollicis brevis and first dorsal interosseous muscles  bilaterally. EMG DIAGNOSIS:  This patient has moderately severe generalized  sensorimotor polyneuropathy. In the presence of such neuropathy, it is  electrophysiologically not possible to diagnose any superadded  mononeuropathy like carpal tunnel syndrome as well as ulnar nerve  entrapment at the elbow.         Nora Diaz MD    D: 2021 13:52:24       T: 2021 13:56:47     [email or web address]  Job#: [id number]     Doc#: [id number]    CC:

## 2021-08-14 ENCOUNTER — HOSPITAL ENCOUNTER (INPATIENT)
Age: 81
LOS: 9 days | Discharge: NURSING, REHAB OR LONG-TERM CARE | DRG: 644 | End: 2021-08-23
Attending: EMERGENCY MEDICINE | Admitting: INTERNAL MEDICINE
Payer: COMMERCIAL

## 2021-08-14 ENCOUNTER — APPOINTMENT (OUTPATIENT)
Dept: GENERAL RADIOLOGY | Age: 81
DRG: 644 | End: 2021-08-14
Payer: COMMERCIAL

## 2021-08-14 DIAGNOSIS — M48.061 SPINAL STENOSIS OF LUMBAR REGION, UNSPECIFIED WHETHER NEUROGENIC CLAUDICATION PRESENT: ICD-10-CM

## 2021-08-14 DIAGNOSIS — E87.1 HYPONATREMIA: ICD-10-CM

## 2021-08-14 DIAGNOSIS — E87.5 HYPERKALEMIA: ICD-10-CM

## 2021-08-14 DIAGNOSIS — R00.1 SYMPTOMATIC BRADYCARDIA: Primary | ICD-10-CM

## 2021-08-14 DIAGNOSIS — M47.817 SPONDYLOSIS OF LUMBOSACRAL SPINE WITHOUT MYELOPATHY: ICD-10-CM

## 2021-08-14 DIAGNOSIS — K55.1 CHRONIC MESENTERIC ISCHEMIA (HCC): ICD-10-CM

## 2021-08-14 DIAGNOSIS — I50.9 ACUTE CONGESTIVE HEART FAILURE, UNSPECIFIED HEART FAILURE TYPE (HCC): ICD-10-CM

## 2021-08-14 DIAGNOSIS — I73.9 PERIPHERAL VASCULAR DISEASE OF LOWER EXTREMITY (HCC): ICD-10-CM

## 2021-08-14 PROBLEM — R79.89 ELEVATED TROPONIN: Status: ACTIVE | Noted: 2021-08-14

## 2021-08-14 PROBLEM — R77.8 ELEVATED TROPONIN: Status: ACTIVE | Noted: 2021-08-14

## 2021-08-14 LAB
A/G RATIO: 1.2 (ref 1.1–2.2)
ALBUMIN SERPL-MCNC: 3.8 G/DL (ref 3.4–5)
ALP BLD-CCNC: 96 U/L (ref 40–129)
ALT SERPL-CCNC: 13 U/L (ref 10–40)
ANION GAP SERPL CALCULATED.3IONS-SCNC: 15 MMOL/L (ref 3–16)
ANION GAP SERPL CALCULATED.3IONS-SCNC: 18 MMOL/L (ref 3–16)
AST SERPL-CCNC: 20 U/L (ref 15–37)
BASE EXCESS VENOUS: -5.9 MMOL/L
BASOPHILS ABSOLUTE: 0 K/UL (ref 0–0.2)
BASOPHILS RELATIVE PERCENT: 0.4 %
BILIRUB SERPL-MCNC: 0.3 MG/DL (ref 0–1)
BUN BLDV-MCNC: 19 MG/DL (ref 7–20)
BUN BLDV-MCNC: 20 MG/DL (ref 7–20)
CALCIUM SERPL-MCNC: 8.2 MG/DL (ref 8.3–10.6)
CALCIUM SERPL-MCNC: 8.4 MG/DL (ref 8.3–10.6)
CARBOXYHEMOGLOBIN: 1.3 %
CHLORIDE BLD-SCNC: 79 MMOL/L (ref 99–110)
CHLORIDE BLD-SCNC: 80 MMOL/L (ref 99–110)
CO2: 12 MMOL/L (ref 21–32)
CO2: 16 MMOL/L (ref 21–32)
CORTISOL - AM: 4.8 UG/DL (ref 4.3–22.4)
CREAT SERPL-MCNC: 1.3 MG/DL (ref 0.6–1.2)
CREAT SERPL-MCNC: 1.4 MG/DL (ref 0.6–1.2)
CREATININE URINE: 58.8 MG/DL (ref 28–259)
EOSINOPHILS ABSOLUTE: 0 K/UL (ref 0–0.6)
EOSINOPHILS RELATIVE PERCENT: 0 %
GFR AFRICAN AMERICAN: 44
GFR AFRICAN AMERICAN: 48
GFR NON-AFRICAN AMERICAN: 36
GFR NON-AFRICAN AMERICAN: 39
GLOBULIN: 3.2 G/DL
GLUCOSE BLD-MCNC: 103 MG/DL (ref 70–99)
GLUCOSE BLD-MCNC: 114 MG/DL (ref 70–99)
HCO3 VENOUS: 18 MMOL/L (ref 23–29)
HCT VFR BLD CALC: 30.4 % (ref 36–48)
HEMOGLOBIN: 10.4 G/DL (ref 12–16)
LACTIC ACID: 1.1 MMOL/L (ref 0.4–2)
LYMPHOCYTES ABSOLUTE: 0.5 K/UL (ref 1–5.1)
LYMPHOCYTES RELATIVE PERCENT: 7.1 %
MAGNESIUM: 2 MG/DL (ref 1.8–2.4)
MCH RBC QN AUTO: 28.9 PG (ref 26–34)
MCHC RBC AUTO-ENTMCNC: 34 G/DL (ref 31–36)
MCV RBC AUTO: 84.8 FL (ref 80–100)
METHEMOGLOBIN VENOUS: 0.5 %
MONOCYTES ABSOLUTE: 0.6 K/UL (ref 0–1.3)
MONOCYTES RELATIVE PERCENT: 8 %
NEUTROPHILS ABSOLUTE: 6.3 K/UL (ref 1.7–7.7)
NEUTROPHILS RELATIVE PERCENT: 84.5 %
O2 SAT, VEN: 87 %
O2 THERAPY: ABNORMAL
OSMOLALITY URINE: 293 MOSM/KG (ref 390–1070)
OSMOLALITY: 240 MOSM/KG (ref 280–301)
PCO2, VEN: 30 MMHG (ref 40–50)
PDW BLD-RTO: 15.1 % (ref 12.4–15.4)
PH VENOUS: 7.39 (ref 7.35–7.45)
PLATELET # BLD: 242 K/UL (ref 135–450)
PMV BLD AUTO: 8.8 FL (ref 5–10.5)
PO2, VEN: 53 MMHG
POTASSIUM REFLEX MAGNESIUM: 5.9 MMOL/L (ref 3.5–5.1)
POTASSIUM SERPL-SCNC: 5.5 MMOL/L (ref 3.5–5.1)
POTASSIUM SERPL-SCNC: 5.9 MMOL/L (ref 3.5–5.1)
PRO-BNP: 3112 PG/ML (ref 0–449)
RBC # BLD: 3.59 M/UL (ref 4–5.2)
SODIUM BLD-SCNC: 110 MMOL/L (ref 136–145)
SODIUM BLD-SCNC: 110 MMOL/L (ref 136–145)
SODIUM URINE: 33 MMOL/L
TCO2 CALC VENOUS: 19 MMOL/L
TOTAL PROTEIN: 7 G/DL (ref 6.4–8.2)
TROPONIN: 0.07 NG/ML
TSH REFLEX: 2.69 UIU/ML (ref 0.27–4.2)
URIC ACID, SERUM: 6.1 MG/DL (ref 2.6–6)
WBC # BLD: 7.5 K/UL (ref 4–11)

## 2021-08-14 PROCEDURE — 84443 ASSAY THYROID STIM HORMONE: CPT

## 2021-08-14 PROCEDURE — 2000000000 HC ICU R&B

## 2021-08-14 PROCEDURE — 6360000002 HC RX W HCPCS: Performed by: EMERGENCY MEDICINE

## 2021-08-14 PROCEDURE — 85025 COMPLETE CBC W/AUTO DIFF WBC: CPT

## 2021-08-14 PROCEDURE — 84484 ASSAY OF TROPONIN QUANT: CPT

## 2021-08-14 PROCEDURE — 99222 1ST HOSP IP/OBS MODERATE 55: CPT | Performed by: INTERNAL MEDICINE

## 2021-08-14 PROCEDURE — 82570 ASSAY OF URINE CREATININE: CPT

## 2021-08-14 PROCEDURE — 99283 EMERGENCY DEPT VISIT LOW MDM: CPT

## 2021-08-14 PROCEDURE — 80053 COMPREHEN METABOLIC PANEL: CPT

## 2021-08-14 PROCEDURE — 84550 ASSAY OF BLOOD/URIC ACID: CPT

## 2021-08-14 PROCEDURE — 83935 ASSAY OF URINE OSMOLALITY: CPT

## 2021-08-14 PROCEDURE — 2500000003 HC RX 250 WO HCPCS: Performed by: INTERNAL MEDICINE

## 2021-08-14 PROCEDURE — 82803 BLOOD GASES ANY COMBINATION: CPT

## 2021-08-14 PROCEDURE — 83605 ASSAY OF LACTIC ACID: CPT

## 2021-08-14 PROCEDURE — 2580000003 HC RX 258: Performed by: EMERGENCY MEDICINE

## 2021-08-14 PROCEDURE — 83930 ASSAY OF BLOOD OSMOLALITY: CPT

## 2021-08-14 PROCEDURE — 2580000003 HC RX 258: Performed by: INTERNAL MEDICINE

## 2021-08-14 PROCEDURE — 82533 TOTAL CORTISOL: CPT

## 2021-08-14 PROCEDURE — 6370000000 HC RX 637 (ALT 250 FOR IP): Performed by: INTERNAL MEDICINE

## 2021-08-14 PROCEDURE — 6360000002 HC RX W HCPCS: Performed by: INTERNAL MEDICINE

## 2021-08-14 PROCEDURE — 71045 X-RAY EXAM CHEST 1 VIEW: CPT

## 2021-08-14 PROCEDURE — 96374 THER/PROPH/DIAG INJ IV PUSH: CPT

## 2021-08-14 PROCEDURE — 83735 ASSAY OF MAGNESIUM: CPT

## 2021-08-14 PROCEDURE — 96376 TX/PRO/DX INJ SAME DRUG ADON: CPT

## 2021-08-14 PROCEDURE — 93005 ELECTROCARDIOGRAM TRACING: CPT | Performed by: PHYSICIAN ASSISTANT

## 2021-08-14 PROCEDURE — 83880 ASSAY OF NATRIURETIC PEPTIDE: CPT

## 2021-08-14 PROCEDURE — 51702 INSERT TEMP BLADDER CATH: CPT

## 2021-08-14 PROCEDURE — 84300 ASSAY OF URINE SODIUM: CPT

## 2021-08-14 PROCEDURE — 36415 COLL VENOUS BLD VENIPUNCTURE: CPT

## 2021-08-14 PROCEDURE — 94761 N-INVAS EAR/PLS OXIMETRY MLT: CPT

## 2021-08-14 RX ORDER — MIRTAZAPINE 15 MG/1
15 TABLET, FILM COATED ORAL NIGHTLY
COMMUNITY

## 2021-08-14 RX ORDER — PANTOPRAZOLE SODIUM 40 MG/1
40 TABLET, DELAYED RELEASE ORAL 2 TIMES DAILY
Status: DISCONTINUED | OUTPATIENT
Start: 2021-08-14 | End: 2021-08-23 | Stop reason: HOSPADM

## 2021-08-14 RX ORDER — GABAPENTIN 100 MG/1
100 CAPSULE ORAL 2 TIMES DAILY
COMMUNITY

## 2021-08-14 RX ORDER — ALBUTEROL SULFATE 2.5 MG/3ML
2.5 SOLUTION RESPIRATORY (INHALATION) EVERY 6 HOURS PRN
Status: DISCONTINUED | OUTPATIENT
Start: 2021-08-14 | End: 2021-08-23 | Stop reason: HOSPADM

## 2021-08-14 RX ORDER — 0.9 % SODIUM CHLORIDE 0.9 %
500 INTRAVENOUS SOLUTION INTRAVENOUS ONCE
Status: DISCONTINUED | OUTPATIENT
Start: 2021-08-14 | End: 2021-08-14

## 2021-08-14 RX ORDER — DOCUSATE SODIUM 100 MG/1
100 CAPSULE, LIQUID FILLED ORAL 2 TIMES DAILY
COMMUNITY
End: 2022-05-11

## 2021-08-14 RX ORDER — ONDANSETRON 4 MG/1
4 TABLET, ORALLY DISINTEGRATING ORAL EVERY 8 HOURS PRN
Status: DISCONTINUED | OUTPATIENT
Start: 2021-08-14 | End: 2021-08-23 | Stop reason: HOSPADM

## 2021-08-14 RX ORDER — PREDNISONE 1 MG/1
5 TABLET ORAL DAILY
Status: DISCONTINUED | OUTPATIENT
Start: 2021-08-15 | End: 2021-08-23 | Stop reason: HOSPADM

## 2021-08-14 RX ORDER — HYDRALAZINE HYDROCHLORIDE 10 MG/1
10 TABLET, FILM COATED ORAL 2 TIMES DAILY
Status: DISCONTINUED | OUTPATIENT
Start: 2021-08-14 | End: 2021-08-14 | Stop reason: DRUGHIGH

## 2021-08-14 RX ORDER — CALCIUM GLUCONATE 20 MG/ML
1000 INJECTION, SOLUTION INTRAVENOUS ONCE
Status: COMPLETED | OUTPATIENT
Start: 2021-08-14 | End: 2021-08-14

## 2021-08-14 RX ORDER — HYDRALAZINE HYDROCHLORIDE 10 MG/1
10 TABLET, FILM COATED ORAL EVERY 8 HOURS SCHEDULED
Status: DISCONTINUED | OUTPATIENT
Start: 2021-08-14 | End: 2021-08-17

## 2021-08-14 RX ORDER — HYDROXYCHLOROQUINE SULFATE 200 MG/1
200 TABLET, FILM COATED ORAL 2 TIMES DAILY
Status: DISCONTINUED | OUTPATIENT
Start: 2021-08-14 | End: 2021-08-23 | Stop reason: HOSPADM

## 2021-08-14 RX ORDER — MIRTAZAPINE 15 MG/1
7.5 TABLET, FILM COATED ORAL NIGHTLY
Status: DISCONTINUED | OUTPATIENT
Start: 2021-08-14 | End: 2021-08-15

## 2021-08-14 RX ORDER — LISINOPRIL 20 MG/1
40 TABLET ORAL DAILY
Status: DISCONTINUED | OUTPATIENT
Start: 2021-08-15 | End: 2021-08-14

## 2021-08-14 RX ORDER — ALUMINA, MAGNESIA, AND SIMETHICONE 2400; 2400; 240 MG/30ML; MG/30ML; MG/30ML
30 SUSPENSION ORAL EVERY 6 HOURS PRN
COMMUNITY

## 2021-08-14 RX ORDER — LATANOPROST 50 UG/ML
1 SOLUTION/ DROPS OPHTHALMIC NIGHTLY
COMMUNITY

## 2021-08-14 RX ORDER — SODIUM CHLORIDE 9 MG/ML
INJECTION, SOLUTION INTRAVENOUS CONTINUOUS
Status: DISCONTINUED | OUTPATIENT
Start: 2021-08-14 | End: 2021-08-14

## 2021-08-14 RX ORDER — ONDANSETRON 2 MG/ML
4 INJECTION INTRAMUSCULAR; INTRAVENOUS EVERY 6 HOURS PRN
Status: DISCONTINUED | OUTPATIENT
Start: 2021-08-14 | End: 2021-08-23 | Stop reason: HOSPADM

## 2021-08-14 RX ORDER — ASPIRIN 81 MG/1
81 TABLET, CHEWABLE ORAL DAILY
Status: DISCONTINUED | OUTPATIENT
Start: 2021-08-15 | End: 2021-08-22

## 2021-08-14 RX ORDER — ACETAMINOPHEN 325 MG/1
650 TABLET ORAL EVERY 6 HOURS PRN
Status: DISCONTINUED | OUTPATIENT
Start: 2021-08-14 | End: 2021-08-23 | Stop reason: HOSPADM

## 2021-08-14 RX ORDER — MIRTAZAPINE 15 MG/1
15 TABLET, FILM COATED ORAL NIGHTLY
Status: DISCONTINUED | OUTPATIENT
Start: 2021-08-14 | End: 2021-08-14 | Stop reason: DRUGHIGH

## 2021-08-14 RX ORDER — FOLIC ACID 1 MG/1
1 TABLET ORAL DAILY
Status: DISCONTINUED | OUTPATIENT
Start: 2021-08-15 | End: 2021-08-23 | Stop reason: HOSPADM

## 2021-08-14 RX ORDER — LEVOTHYROXINE SODIUM 0.07 MG/1
150 TABLET ORAL DAILY
Status: DISCONTINUED | OUTPATIENT
Start: 2021-08-15 | End: 2021-08-23 | Stop reason: HOSPADM

## 2021-08-14 RX ORDER — SODIUM CHLORIDE 9 MG/ML
25 INJECTION, SOLUTION INTRAVENOUS PRN
Status: DISCONTINUED | OUTPATIENT
Start: 2021-08-14 | End: 2021-08-23 | Stop reason: HOSPADM

## 2021-08-14 RX ORDER — POTASSIUM CHLORIDE 750 MG/1
20 CAPSULE, EXTENDED RELEASE ORAL DAILY
Status: ON HOLD | COMMUNITY
End: 2021-08-23 | Stop reason: HOSPADM

## 2021-08-14 RX ORDER — LEVOTHYROXINE SODIUM 0.15 MG/1
150 TABLET ORAL DAILY
COMMUNITY

## 2021-08-14 RX ORDER — FLUTICASONE PROPIONATE 50 MCG
2 SPRAY, SUSPENSION (ML) NASAL DAILY PRN
Status: DISCONTINUED | OUTPATIENT
Start: 2021-08-14 | End: 2021-08-23 | Stop reason: HOSPADM

## 2021-08-14 RX ORDER — LATANOPROST 50 UG/ML
1 SOLUTION/ DROPS OPHTHALMIC NIGHTLY
Status: DISCONTINUED | OUTPATIENT
Start: 2021-08-14 | End: 2021-08-23 | Stop reason: HOSPADM

## 2021-08-14 RX ORDER — ACETAMINOPHEN 650 MG/1
650 SUPPOSITORY RECTAL EVERY 6 HOURS PRN
Status: DISCONTINUED | OUTPATIENT
Start: 2021-08-14 | End: 2021-08-23 | Stop reason: HOSPADM

## 2021-08-14 RX ORDER — HYDROCODONE BITARTRATE AND ACETAMINOPHEN 5; 325 MG/1; MG/1
1 TABLET ORAL EVERY 4 HOURS PRN
Status: ON HOLD | COMMUNITY
End: 2021-08-20 | Stop reason: SDUPTHER

## 2021-08-14 RX ORDER — FUROSEMIDE 10 MG/ML
20 INJECTION INTRAMUSCULAR; INTRAVENOUS ONCE
Status: COMPLETED | OUTPATIENT
Start: 2021-08-14 | End: 2021-08-14

## 2021-08-14 RX ORDER — ATROPINE SULFATE 0.1 MG/ML
0.5 INJECTION INTRAVENOUS ONCE
Status: COMPLETED | OUTPATIENT
Start: 2021-08-14 | End: 2021-08-14

## 2021-08-14 RX ORDER — SODIUM CHLORIDE 0.9 % (FLUSH) 0.9 %
5-40 SYRINGE (ML) INJECTION EVERY 12 HOURS SCHEDULED
Status: DISCONTINUED | OUTPATIENT
Start: 2021-08-14 | End: 2021-08-23 | Stop reason: HOSPADM

## 2021-08-14 RX ORDER — GABAPENTIN 100 MG/1
100 CAPSULE ORAL 2 TIMES DAILY
Status: DISCONTINUED | OUTPATIENT
Start: 2021-08-14 | End: 2021-08-23 | Stop reason: HOSPADM

## 2021-08-14 RX ORDER — ATROPINE SULFATE 1 MG/ML
0.5 INJECTION, SOLUTION INTRAMUSCULAR; INTRAVENOUS; SUBCUTANEOUS
Status: COMPLETED | OUTPATIENT
Start: 2021-08-14 | End: 2021-08-14

## 2021-08-14 RX ORDER — HYDROXYCHLOROQUINE SULFATE 200 MG/1
200 TABLET, FILM COATED ORAL DAILY
Status: DISCONTINUED | OUTPATIENT
Start: 2021-08-14 | End: 2021-08-14 | Stop reason: DRUGHIGH

## 2021-08-14 RX ORDER — HYDRALAZINE HYDROCHLORIDE 10 MG/1
10 TABLET, FILM COATED ORAL EVERY 8 HOURS
COMMUNITY

## 2021-08-14 RX ORDER — LOPERAMIDE HYDROCHLORIDE 2 MG/1
2 CAPSULE ORAL 4 TIMES DAILY PRN
COMMUNITY

## 2021-08-14 RX ORDER — SODIUM CHLORIDE 0.9 % (FLUSH) 0.9 %
5-40 SYRINGE (ML) INJECTION PRN
Status: DISCONTINUED | OUTPATIENT
Start: 2021-08-14 | End: 2021-08-23 | Stop reason: HOSPADM

## 2021-08-14 RX ORDER — GUAIFENESIN AND DEXTROMETHORPHAN HYDROBROMIDE 100; 10 MG/5ML; MG/5ML
10 SOLUTION ORAL EVERY 4 HOURS PRN
COMMUNITY
End: 2022-05-11

## 2021-08-14 RX ORDER — POLYETHYLENE GLYCOL 3350 17 G/17G
17 POWDER, FOR SOLUTION ORAL DAILY PRN
Status: DISCONTINUED | OUTPATIENT
Start: 2021-08-14 | End: 2021-08-23 | Stop reason: HOSPADM

## 2021-08-14 RX ORDER — BUSPIRONE HYDROCHLORIDE 10 MG/1
10 TABLET ORAL 4 TIMES DAILY
Status: DISCONTINUED | OUTPATIENT
Start: 2021-08-14 | End: 2021-08-23 | Stop reason: HOSPADM

## 2021-08-14 RX ADMIN — ISOPROTERENOL HYDROCHLORIDE 0.5 MCG/MIN: 0.2 INJECTION, SOLUTION INTRAMUSCULAR; INTRAVENOUS at 20:14

## 2021-08-14 RX ADMIN — ENOXAPARIN SODIUM 30 MG: 30 INJECTION SUBCUTANEOUS at 20:46

## 2021-08-14 RX ADMIN — SODIUM ZIRCONIUM CYCLOSILICATE 10 G: 10 POWDER, FOR SUSPENSION ORAL at 21:47

## 2021-08-14 RX ADMIN — SODIUM BICARBONATE 25 MEQ: 84 INJECTION, SOLUTION INTRAVENOUS at 21:42

## 2021-08-14 RX ADMIN — BUSPIRONE HYDROCHLORIDE 10 MG: 10 TABLET ORAL at 18:01

## 2021-08-14 RX ADMIN — MIRTAZAPINE 7.5 MG: 15 TABLET, FILM COATED ORAL at 20:08

## 2021-08-14 RX ADMIN — HYDRALAZINE HYDROCHLORIDE 10 MG: 10 TABLET, FILM COATED ORAL at 21:47

## 2021-08-14 RX ADMIN — PANTOPRAZOLE SODIUM 40 MG: 40 TABLET, DELAYED RELEASE ORAL at 20:07

## 2021-08-14 RX ADMIN — ATROPINE SULFATE 0.5 MG: 1 INJECTION, SOLUTION INTRAMUSCULAR; INTRAVENOUS; SUBCUTANEOUS at 18:01

## 2021-08-14 RX ADMIN — SODIUM CHLORIDE, PRESERVATIVE FREE 10 ML: 5 INJECTION INTRAVENOUS at 20:09

## 2021-08-14 RX ADMIN — ATROPINE SULFATE 0.5 MG: 0.1 INJECTION PARENTERAL at 13:54

## 2021-08-14 RX ADMIN — CALCIUM GLUCONATE 1000 MG: 20 INJECTION, SOLUTION INTRAVENOUS at 21:41

## 2021-08-14 RX ADMIN — SODIUM BICARBONATE: 84 INJECTION, SOLUTION INTRAVENOUS at 22:56

## 2021-08-14 RX ADMIN — GABAPENTIN 100 MG: 100 CAPSULE ORAL at 20:07

## 2021-08-14 RX ADMIN — LATANOPROST 1 DROP: 50 SOLUTION OPHTHALMIC at 20:06

## 2021-08-14 RX ADMIN — ATROPINE SULFATE 0.5 MG: 0.1 INJECTION PARENTERAL at 14:43

## 2021-08-14 RX ADMIN — FUROSEMIDE 20 MG: 10 INJECTION, SOLUTION INTRAMUSCULAR; INTRAVENOUS at 17:27

## 2021-08-14 RX ADMIN — SODIUM CHLORIDE: 9 INJECTION, SOLUTION INTRAVENOUS at 14:44

## 2021-08-14 RX ADMIN — BUSPIRONE HYDROCHLORIDE 10 MG: 10 TABLET ORAL at 20:07

## 2021-08-14 ASSESSMENT — PAIN DESCRIPTION - FREQUENCY: FREQUENCY: CONTINUOUS

## 2021-08-14 ASSESSMENT — ENCOUNTER SYMPTOMS
SHORTNESS OF BREATH: 0
NAUSEA: 0
EYE REDNESS: 0
GASTROINTESTINAL NEGATIVE: 1
BACK PAIN: 0
ABDOMINAL PAIN: 0
CHOKING: 0
FACIAL SWELLING: 0
SORE THROAT: 0
RESPIRATORY NEGATIVE: 1
APNEA: 0
EYE DISCHARGE: 0
VOMITING: 0

## 2021-08-14 ASSESSMENT — PAIN DESCRIPTION - PAIN TYPE: TYPE: ACUTE PAIN;CHRONIC PAIN

## 2021-08-14 ASSESSMENT — PAIN DESCRIPTION - DESCRIPTORS: DESCRIPTORS: ACHING;TIRING

## 2021-08-14 ASSESSMENT — PAIN DESCRIPTION - PROGRESSION: CLINICAL_PROGRESSION: GRADUALLY WORSENING

## 2021-08-14 ASSESSMENT — PAIN DESCRIPTION - LOCATION: LOCATION: ARM

## 2021-08-14 ASSESSMENT — PAIN DESCRIPTION - ORIENTATION: ORIENTATION: RIGHT;LEFT

## 2021-08-14 ASSESSMENT — PAIN SCALES - GENERAL
PAINLEVEL_OUTOF10: 0
PAINLEVEL_OUTOF10: 7

## 2021-08-14 ASSESSMENT — PAIN - FUNCTIONAL ASSESSMENT: PAIN_FUNCTIONAL_ASSESSMENT: PREVENTS OR INTERFERES WITH MANY ACTIVE NOT PASSIVE ACTIVITIES

## 2021-08-14 NOTE — H&P
Hospital Medicine History & Physical      PCP: Delmar Rodríguez MD    Date of Admission: 8/14/2021    Date of Service: Pt seen/examined on 08/14/2021 and Admitted to Inpatient with expected LOS greater than two midnights due to medical therapy. Chief Complaint:  Weakness       History Of Present Illness:      [de-identified] y.o. female who presented to [address] [place] [address] for worsening weakness, no obvious alleviating or aggravating factors, in the context of hypothyroidism, depression, and worsening generalized edema, gradual onset, denies chest pain shortness of breath cough fever or chills she is having abdominal discomfort but denies nausea vomiting or diarrhea. She is on chronic steroids, low-dose though, she is on antidepressant, she is on HCTZ, in emergency department found to be bradycardic also given atropine and cardiology has been consulted by EDMD.  Patient found to have critical sodium of 110, patient will be admitted for further management and treatment    Past Medical History:          Diagnosis Date    Allergic rhinitis     Anxiety     Arthritis     Asthma     Chronic kidney disease     Depression     Diverticulitis     GERD (gastroesophageal reflux disease)     Hyperlipidemia     Hypertension     Overactive bladder     PVD (peripheral vascular disease) ([address]     Thyroid disease     Tinea corporis     Vitamin B12 deficiency        Past Surgical History:          Procedure Laterality Date    UT OFFICE/OUTPT VISIT,PROCEDURE ONLY Right 11/8/2018    OPEN REDUCTION INTERNAL FIXATION RIGHT HIP GAMMA NAIL WITH C-ARM performed by Heddy Sacks, MD at [address] [facility] ENDOSCOPY N/A 9/14/2020    ESOPHAGOGASTRODUODENOSCOPY performed by Norma Turner MD at [facility]       Medications Prior to Admission:      Prior to Admission medications    Medication Sig Start Date End Date Taking?  Authorizing Provider   linaCLOtide (LINZESS) 72 MCG CAPS capsule Take 1 capsule by mouth every morning (before breakfast) 3/4/21  Yes Kimberly Welch DO   metoclopramide (REGLAN) 5 MG tablet Take 1 tablet by mouth 3 times daily 9/14/20  Yes Chantale López MD   pantoprazole (PROTONIX) 40 MG tablet Take 1 tablet by mouth 2 times daily 9/3/20 8/29/21 Yes Kimberly Welch DO   NIFEdipine (ADALAT CC) 60 MG extended release tablet Take 60 mg by mouth 6/19/19  Yes Historical Provider, MD   predniSONE (DELTASONE) 5 MG tablet Take 5 mg by mouth daily   Yes Historical Provider, MD   busPIRone (BUSPAR) 10 MG tablet Take 10 mg by mouth 4 times daily   Yes Historical Provider, MD   hydroxychloroquine (PLAQUENIL) 200 MG tablet Take by mouth 2 times daily    Yes Historical Provider, MD   lisinopril (PRINIVIL;ZESTRIL) 40 MG tablet Take 40 mg by mouth daily    Yes Historical Provider, MD   pravastatin (PRAVACHOL) 10 MG tablet Take 10 mg by mouth nightly    Yes Historical Provider, MD   sucralfate (CARAFATE) 1 GM tablet Take 1 g by mouth 3 times daily  9/12/16  Yes Historical Provider, MD   levothyroxine (SYNTHROID) 137 MCG tablet Take 1 tablet by mouth daily 3/9/21   Kimberly Welch DO   diclofenac sodium (VOLTAREN) 1 % GEL Apply 2 g topically 2 times daily 2/1/21 3/3/21  Kimberly Welch DO   mirtazapine (REMERON) 15 MG tablet Take 1 tablet by mouth nightly 10/30/20   Kimberly Welch DO   calcium carbonate (TUMS) 500 MG chewable tablet Take 1 tablet by mouth daily    Historical Provider, MD   polyethylene glycol (GLYCOLAX) 17 GM/SCOOP powder Take 17 g by mouth daily as needed    Historical Provider, MD   hydrALAZINE (APRESOLINE) 10 MG tablet Take 1 tablet by mouth 2 times daily 9/3/20 9/3/21  Kimberly Welch,    Nutritional Supplements (ENSURE CLEAR) LIQD Take 1 Bottle by mouth 2 times daily 8/7/20 10/15/20  Kimberly Welch DO   ondansetron (ZOFRAN) 4 MG tablet Take 1 tablet by mouth daily as needed for Nausea or Vomiting 7/23/20   Kimberly Welch, DO   ketoconazole (NIZORAL) 2 % cream Apply topically daily. 3/10/20   Kimberly Welch DO   betamethasone valerate (VALISONE) 0.1 % cream by Intratympanic route 2 times daily as needed     Historical Provider, MD   econazole nitrate 1 % cream Apply topically daily.  2/17/20   Kimberly Welch DO   hydrOXYzine (ATARAX) 25 MG tablet Take 25 mg by mouth 3 times daily as needed    Historical Provider, MD   FLUoxetine (PROZAC) 40 MG capsule Take 40 mg by mouth daily    Historical Provider, MD   aspirin 81 MG tablet Take by mouth    Historical Provider, MD   cyclobenzaprine (FLEXERIL) 5 MG tablet Take 5-10 mg by mouth 3 times daily as needed     Historical Provider, MD   loratadine (CLARITIN) 10 MG tablet Take 10 mg by mouth    Historical Provider, MD   nystatin (MYCOSTATIN) 365444 UNIT/GM powder by Intratympanic route    Historical Provider, MD   psyllium (KONSYL) 28.3 % PACK Take 1 packet by mouth daily    Historical Provider, MD   Multiple Vitamins-Minerals (MULTIVITAMIN WITH MINERALS) tablet Take 1 tablet by mouth daily    Historical Provider, MD   albuterol sulfate  (90 Base) MCG/ACT inhaler Inhale 2 puffs into the lungs every 4 hours as needed for Wheezing    Historical Provider, MD   melatonin 3 MG TABS tablet Take 5 mg by mouth nightly     Historical Provider, MD   albuterol (PROVENTIL) (2.5 MG/3ML) 0.083% nebulizer solution Inhale 2.5 mg into the lungs every 6 hours as needed     Historical Provider, MD   fluticasone (FLONASE) 50 MCG/ACT nasal spray 2 sprays by Nasal route daily as needed     Historical Provider, MD   folic acid (FOLVITE) 1 MG tablet Take 1 mg by mouth daily     Historical Provider, MD   magnesium hydroxide (MILK OF MAGNESIA) 400 MG/5ML suspension Take 30 mLs by mouth daily as needed     Historical Provider, MD   oxybutynin (DITROPAN-XL) 5 MG extended release tablet Take 10 mg by mouth nightly     Historical Provider, MD   acetaminophen (TYLENOL) 325 MG tablet Take 650 mg by mouth nightly     Historical Provider, MD       Allergies:  Alendronate sodium, Benadryl [diphenhydramine], Ciprofloxacin, Crestor [rosuvastatin calcium], Hydroxychloroquine sulfate, Minocycline, Naprosyn [naproxen], Niaspan [niacin], Red dye, and Risedronate    Social History:      The patient currently lives at a nursing facility    TOBACCO:   reports that she has never smoked. She has never used smokeless tobacco.  ETOH:   reports no history of alcohol use. E-Cigarettes/Vaping Use     Questions Responses    E-Cigarette/Vaping Use     Start Date     Passive Exposure     Quit Date     Counseling Given     Comments             Family History:      Reviewed in detail and negative for DM    History reviewed. No pertinent family history. REVIEW OF SYSTEMS COMPLETED:   Pertinent positives as noted in the HPI. All other systems reviewed and negative. PHYSICAL EXAM PERFORMED:    /60   Pulse 53   Temp 97.1 °F (36.2 °C) (Oral)   Resp 17   Ht 5' 1\" (1.549 m)   Wt 110 lb (49.9 kg)   SpO2 97%   BMI 20.78 kg/m²     General appearance:  No apparent distress  HEENT:  Normal cephalic  Neck: Supple  Respiratory:  Normal respiratory effort. Clear to auscultation, bilaterally without Rales/Wheezes/Rhonchi. Cardiovascular:  Regular rate and rhythm with normal S1/S2 without murmurs, rubs or gallops. Abdomen: Soft, non-tender, non-distended  Musculoskeletal:  No clubbing, cyanosis. Bilateral leg edema  Skin: Skin color, texture, turgor normal.  No rashes or lesions.   Neurologic: Moving all extremities spontaneously  Psychiatric:  Alert   Capillary Refill: Brisk,3 seconds, normal  Peripheral Pulses: +2 palpable, equal bilaterally       Labs:     Recent Labs     08/14/21  1330   WBC 7.5   HGB 10.4*   HCT 30.4*        Recent Labs     08/14/21  1330   *   K 5.5*   CL 79*   CO2 16*   BUN 19   CREATININE 1.3*   CALCIUM 8.4     Recent Labs     08/14/21  1330   AST 20   ALT 13   BILITOT 0.3   ALKPHOS 96     No results for input(s): INR fluid for now, frequent BMP, I will order urine sodium, creatinine, osmolality, serum osmolarity, morning cortisol, uric acid. Plan of care discussed with in details with patient and daughters. Patient at this time is stable but she is at risk for life-threatening complications patient will be admitted to intensive care unit  2. Cardiac, symptomatic, given atropine, improved, will hold all negative chronotropic's, cardiology already consulted  3. Acute congestive heart failure, likely triggered by bradycardia, I will order echo, will wait for cardiology recommendations. I will hold on starting Lasix at this time  4. Essential hypertension, appears controlled at this time, will continue ACE  5. Rheumatoid arthritis stable for now  6. Hypothyroidism, TSH within normal limits, will continue levothyroxine  7. CKD, nephrology consulted, possible mild acute component of kidney injury. 8.  Depression, patient on Prozac, will hold for now even though she is on other medications that can cause hyponatremia will resume other medication pending further nephrology recommendations  9. Generalized weakness, due to hyponatremia and bradycardia expecting improvement. PT OT  10. Minimally elevated troponin, no chest pain, likely due to bradycardia and heart failure, cardiology will follow  11. Hyperkalemia, mild, will monitor    Critical care time including but not limited to physical exam, ordering and following on critical labs, ordering critical medications on a patient with potential life-threatening complications and not including any procedure time 42 minutes      DVT Prophylaxis: Lovenox  Diet: No diet orders on file  Code Status: Prior    PT/OT Eval Status: Ordered    Dispo -ICU       Wilbert Kiser MD    Thank you Shivam Sy MD for the opportunity to be involved in this patient's care. If you have any questions or concerns please feel free to contact me at [phone].

## 2021-08-14 NOTE — ED PROVIDER NOTES
I independently performed a history and physical on [employer]. All diagnostic, treatment, and disposition decisions were made by myself in conjunction with the advanced practice provider. Briefly, this is a [de-identified] y.o. female here for fatigue. She was sent by her nursing home for evaluation, on arrival to the emergency department she was noted to have bradycardia. Patient reports feeling generally weak over the past several days, typically is able to move herself in a wheelchair however has been unable to transfer. She also reports pain and aching in her left leg and both upper arms which is chronic. She denies any fevers, chills, chest pain or shortness of breath. On exam, patient afebrile and nontoxic. No distress. Heart bradycardic, regular rhythm. Lungs CTAB. Abdomen soft, nondistended. 2+ pitting edema symmetric lower extremities with trace edema of bilateral forearms. Drowsy, awakens easily to voice and light touch. Oriented x4. EKG  EKG was reviewed by emergency department physician in the absence of a cardiologist    Wide complex regular rhythm, rate 38, normal axis, wide QRS intervals, normal Qtc, no ST elevations or depressions, normal t-wave morphology, impression wide-complex regular rhythm, likely junctional escape rhythm, no STEMI      Screenings            MDM    Patient afebrile, nontoxic. She appears in no distress. Drowsy on my initial evaluation, however easily arousable to voice and light touch, she is protecting her airway. EKG shows regular bradycardia, likely junctional escape rhythm. Patient is not hypotensive, not in shock. BNP elevated, with peripheral edema and pulmonary vascular congestion concerning for CHF. Troponin is minimally elevated, likely secondary to strain, patient denies any chest pain, will defer heparinization. No findings of infection, patient is not septic. TSH unremarkable.   Lab work-up with very minimal hyperkalemia, also severe hyponatremia. Acute kidney injury noted. Patient received atropine with improvement in heart rate, on my reevaluation her heart was in the 50s and she was much more alert. She has remained without hypotension. I discussed case with Dr. Amanda Oh for cardiology who agrees with current management plan, if bradycardia recurs or worsens patient may require isoproterenol or dopamine infusion however will hold off for now. I also discussed case with Dr. Deepthi George for nephrology, recommends fluid restriction and will evaluate patient in house, further recommendations to follow. Case discussed with internal medicine team who evaluated patient in the emergency department and will admit for further care. Patient was alert and hemodynamically stable with heart rate in the 50s at time of admission. Critical Care:    I have discussed the case with the advanced practice provider. I have personally performed a history, physical exam, and my own medical decision making. I have reviewed the note and agree with the findings and plan. Upon my evaluation, this patient had a high probability of imminent or life-threatening deterioration due to hyponatremia, symptomatic bradycardia which required my direct attention, intervention, and personal management. Specialist consultation to cardiology and nephrology were obtained. The total critical care time personally spent while evaluating and treating this patient was 40 minutes exclusive of any time spent doing separately billable procedures. This includes time at the bedside, data interpretation, medication management, monitoring for potential decompensation and physician consultation. Specifics of interventions taken and potentially life-threatening diagnostic considerations are listed above in the medical decision making. Patient Referrals:  No follow-up provider specified.     Discharge Medications:  New Prescriptions    No medications on file       FINAL IMPRESSION  1. Symptomatic bradycardia    2. Hyponatremia    3. Acute congestive heart failure, unspecified heart failure type (HCC)        Blood pressure (!) 110/49, pulse 52, temperature 97.1 °F (36.2 °C), temperature source Oral, resp. rate 12, height 5' 1\" (1.549 m), weight 110 lb (49.9 kg), SpO2 97 %, not currently breastfeeding. For further details of [facility] emergency department encounter, please see documentation by advanced practice provider, YOHAN Cobb.     Chance Nicole DO (electronically signed)  Attending Emergency Physician       Chance Nicole DO  08/14/21 1532

## 2021-08-14 NOTE — PROGRESS NOTES
Pt arrived to ICU from ED accompanied by 2 daughters and ED RN Dione December. Pt alert and oriented but occasionally forgetful and easily distracted, states she has numbness in her hands that prevents her from using them and is bothered by her L leg, states it keeps jerking. Daughters state Pt has chronic neuropathy, had MRI with contrast yesterday to determine potential cause. 1745: Call to [facility], return call from Dr Dwight Hill, see new order.

## 2021-08-14 NOTE — PROGRESS NOTES
Pharmacy Medication Reconciliation Note     List of medications patient is currently taking is complete. Source of information:   1. ECF med list      Notes regarding home medications:   1. Added Gabapentin,Hydrocodone/APAP,Latanoprost,Loperamide,Docusate, Tussin & KCL  2. Changed Hydralazine to 10mg tid, Levothyroxine to 150mcg, & Mirtazapine to 7.5mg QHS & Fluoxetine to 30mg daily  3.  Removed Albuterol Neb,Betamethasone,Econazole,& Ketoconazole          [facility], [address] [place] [address] 8/14/2021 5:35 PM

## 2021-08-14 NOTE — CONSULTS
Nephrology Consult Note  [facility]        Reason for Consult: Hyponatremia  Consulting Physician: Flo Olmedo DO    HISTORY OF PRESENT ILLNESS:      The patient is a [de-identified] y.o. female with significant past medical history of depression, GERD, HLD and HTN  who presents with weakness. On admission, labs showed a serum sodium of 110mmol/L, K+ of 5.5mmol/L and serum creatinine of 1.3mg/dL. Noted to have chronic hyponatremia with serum sodium in the low 130s until 07/27/21 when it went down to 128mmo/L. TSH was elevated in May 2021 and Levothyroxine was adjusted. Patient on examination is able to answer questions appropriately. No seizure episode noted. Noted to be bradycardic with episode of hypotension. Cardiology was also consulted. Home meds include KCl and Lisinopril. Past Medical History:        Diagnosis Date    Allergic rhinitis     Anxiety     Arthritis     Asthma     Chronic kidney disease     Depression     Diverticulitis     GERD (gastroesophageal reflux disease)     Hyperlipidemia     Hypertension     Overactive bladder     PVD (peripheral vascular disease) (HCC)     Thyroid disease     Tinea corporis     Vitamin B12 deficiency        Past Surgical History:        Procedure Laterality Date    [facility] OFFICE/OUTPT VISIT,PROCEDURE ONLY Right 11/8/2018    OPEN REDUCTION INTERNAL FIXATION RIGHT HIP GAMMA NAIL WITH C-ARM performed by Yomi Haider MD at [address]      UPPER GASTROINTESTINAL ENDOSCOPY N/A 9/14/2020    ESOPHAGOGASTRODUODENOSCOPY performed by Kaila Avila MD at [facility]       Current Medications:    No current facility-administered medications on file prior to encounter. Current Outpatient Medications on File Prior to Encounter   Medication Sig Dispense Refill    docusate sodium (COLACE) 100 MG capsule Take 100 mg by mouth 2 times daily      gabapentin (NEURONTIN) 100 MG capsule Take 100 mg by mouth 2 times daily.       hydrALAZINE (APRESOLINE) 10 MG tablet Take 10 mg by mouth every 8 hours      potassium chloride (MICRO-K) 10 MEQ extended release capsule Take 20 mEq by mouth daily      latanoprost (XALATAN) 0.005 % ophthalmic solution Place 1 drop into both eyes nightly      levothyroxine (SYNTHROID) 150 MCG tablet Take 150 mcg by mouth Daily      mirtazapine (REMERON) 15 MG tablet Take 7.5 mg by mouth nightly      linaCLOtide (LINZESS) 72 MCG CAPS capsule Take 1 capsule by mouth every morning (before breakfast) 90 capsule 5    diclofenac sodium (VOLTAREN) 1 % GEL Apply 2 g topically 2 times daily 120 g 0    metoclopramide (REGLAN) 5 MG tablet Take 1 tablet by mouth 3 times daily (Patient taking differently: Take 5 mg by mouth 3 times daily (with meals) Hold for loose stools) 120 tablet 3    pantoprazole (PROTONIX) 40 MG tablet Take 1 tablet by mouth 2 times daily 180 tablet 3    FLUoxetine (PROZAC) 10 MG capsule Take 30 mg by mouth daily       aspirin 81 MG tablet Take 81 mg by mouth daily       loratadine (CLARITIN) 10 MG tablet Take 10 mg by mouth daily       NIFEdipine (ADALAT CC) 60 MG extended release tablet Take 60 mg by mouth daily       psyllium (KONSYL) 28.3 % PACK Take 1 packet by mouth daily      predniSONE (DELTASONE) 5 MG tablet Take 5 mg by mouth daily      Multiple Vitamins-Minerals (MULTIVITAMIN WITH MINERALS) tablet Take 1 tablet by mouth daily      melatonin 5 MG TABS tablet Take 5 mg by mouth nightly       busPIRone (BUSPAR) 10 MG tablet Take 10 mg by mouth 4 times daily      fluticasone (FLONASE) 50 MCG/ACT nasal spray 2 sprays by Nasal route nightly       folic acid (FOLVITE) 1 MG tablet Take 1 mg by mouth daily       hydroxychloroquine (PLAQUENIL) 200 MG tablet Take by mouth 2 times daily       lisinopril (PRINIVIL;ZESTRIL) 40 MG tablet Take 40 mg by mouth daily       oxybutynin (DITROPAN-XL) 5 MG extended release tablet Take 5 mg by mouth nightly       pravastatin (PRAVACHOL) 10 MG tablet Take 10 mg by mouth nightly       sucralfate (CARAFATE) 1 GM tablet Take 1 g by mouth 3 times daily (before meals)       loperamide (IMODIUM) 2 MG capsule Take 2 mg by mouth 4 times daily as needed for Diarrhea      aluminum & magnesium hydroxide-simethicone (MYLANTA) 400-400-40 MG/5ML SUSP Take 30 mLs by mouth every 6 hours as needed      HYDROcodone-acetaminophen (NORCO) 5-325 MG per tablet Take 1 tablet by mouth every 4 hours as needed for Pain.       Dextromethorphan-guaiFENesin  MG/5ML SYRP Take 10 mLs by mouth every 4 hours as needed for Cough      calcium carbonate (TUMS) 500 MG chewable tablet Take 1 tablet by mouth every 8 hours as needed       polyethylene glycol (GLYCOLAX) 17 GM/SCOOP powder Take 17 g by mouth daily as needed      Nutritional Supplements (ENSURE CLEAR) LIQD Take 1 Bottle by mouth 2 times daily 60 Bottle 5    ondansetron (ZOFRAN) 4 MG tablet Take 1 tablet by mouth daily as needed for Nausea or Vomiting (Patient taking differently: Take 4 mg by mouth every 8 hours as needed for Nausea or Vomiting ) 30 tablet 0    hydrOXYzine (ATARAX) 25 MG tablet Take 25 mg by mouth 3 times daily as needed      cyclobenzaprine (FLEXERIL) 5 MG tablet Take 5-10 mg by mouth 3 times daily as needed       nystatin (MYCOSTATIN) 478902 UNIT/GM powder by Intratympanic route 2 times daily as needed       albuterol sulfate  (90 Base) MCG/ACT inhaler Inhale 2 puffs into the lungs every 6 hours as needed for Wheezing       magnesium hydroxide (MILK OF MAGNESIA) 400 MG/5ML suspension Take 30 mLs by mouth daily as needed       acetaminophen (TYLENOL) 325 MG tablet Take 650 mg by mouth every 6 hours as needed          Allergies:  Alendronate sodium, Benadryl [diphenhydramine], Ciprofloxacin, Crestor [rosuvastatin calcium], Hydroxychloroquine sulfate, Minocycline, Naprosyn [naproxen], Niaspan [niacin], Red dye, and Risedronate    Social History:    Social History     Socioeconomic History intake/output data recorded. No intake/output data recorded. Physical Exam:  Physical Exam  Vitals reviewed. Constitutional:       General: She is not in acute distress. Appearance: Normal appearance. HENT:      Head: Normocephalic and atraumatic. Eyes:      General: No scleral icterus. Conjunctiva/sclera: Conjunctivae normal.   Cardiovascular:      Rate and Rhythm: Bradycardia present. Rhythm irregular. Pulmonary:      Effort: Pulmonary effort is normal. No respiratory distress. Breath sounds: Rales present. Abdominal:      General: Bowel sounds are normal. There is no distension. Tenderness: There is no abdominal tenderness. Musculoskeletal:      Right lower leg: Edema present. Left lower leg: Edema present. Neurological:      Mental Status: She is alert. DATA:    Recent Labs     08/14/21  1330   WBC 7.5   HGB 10.4*   HCT 30.4*   MCV 84.8        Recent Labs     08/14/21  1330   *   K 5.5*   CL 79*   CO2 16*   GLUCOSE 114*   MG 2.00   BUN 19   CREATININE 1.3*   LABGLOM 39*   GFRAA 48*           IMPRESSION/RECOMMENDATIONS:      Hyponatremia.  - Has history of chronic hyponatremia with baseline in the low 130smmol/L. TSH was elevated in 05-07/2021 but Levothyroxine was adjusted and level this admission was 2.69mmol/L.  - Work-up ordered. - Clinically hypervolemic. Will give Lasix 20mg IV x 1 and recheck sodium in 4H.  - Goal correction of not more than 6-8mmol/L in 24H. Goal of around 114mmol/L by tomorrow morning. Hyperkalemia.  - In the setting of SHANELL. Also was on KCL and Lisinopril prior to admission.  - D/C potassium supplements and ACEI. - Should improve with Lasix. - Low potassium diet. Metabolic Acidosis. - From SHANELL +/- hypoperfusion. Check lactic acid. Acute Kidney Injury.  - Suspect pre-renal in etiology in the setting of bradycardia/hypotension.  - Avoid hypotension and nephrotoxic medications. - Check urinalysis.   - Akhtar catheter for accurate Is and Os. Bradycardia. - Per cardiology. Received Atropine. Thank you for allowing me to participate in the care of this patient. Please do not hesitate to contact me if with questions. Ollie King MD  [facility] [employer]  8/14/2021

## 2021-08-14 NOTE — ED PROVIDER NOTES
[id number]      Pt Name: Boogie Klein  YYV:[id number]  [place] 1940  Date of evaluation: 8/14/2021  Provider: Keyshawn Handley PA-C     This patient was seen and evaluated by attending physician Dr. Karon Montgomery MD    Chief Complaint:    Chief Complaint   Patient presents with    Fatigue     been feeling weak over the last couple of days. MRI done yesterday.  + extremity swelling and pain in both arms. Nursing Notes, Past Medical Hx, Past Surgical Hx, Social Hx, Allergies, and Family Hx were all reviewed and agreed with or any disagreements were addressed in the HPI.    HPI: (Location, Duration, Timing, Severity, Quality, Assoc Sx, Context, Modifying factors)  This is a  [de-identified] y.o. female who presents to the emergency room with chief complaint of fatigue and extremity weakness. Patient is a nursing home. She is a full code. And was brought in with her 2 daughters by her side with complaint of being weak. They got a call from the nursing home that she is more weak than usual.  Patient denies chest pain, no nausea vomiting, no headaches. No abdominal pain. She denies any extremity weakness. She does not normally walk. Denies any headache. No other complaints. No back pain or neck pain.       PastMedical/Surgical History:      Diagnosis Date    Allergic rhinitis     Anxiety     Arthritis     Asthma     Chronic kidney disease     Depression     Diverticulitis     GERD (gastroesophageal reflux disease)     Hyperlipidemia     Hypertension     Overactive bladder     PVD (peripheral vascular disease) (HCC)     Thyroid disease     Tinea corporis     Vitamin B12 deficiency          Procedure Laterality Date    NC OFFICE/OUTPT VISIT,PROCEDURE ONLY Right 11/8/2018    OPEN REDUCTION INTERNAL FIXATION RIGHT HIP GAMMA NAIL WITH C-ARM performed by Harpreet Duran MD at [address] GASTROINTESTINAL ENDOSCOPY N/A 9/14/2020    ESOPHAGOGASTRODUODENOSCOPY performed by Larry Casiano MD at [facility] ENDOSCOPY       Medications:  Current Discharge Medication List      CONTINUE these medications which have NOT CHANGED    Details   docusate sodium (COLACE) 100 MG capsule Take 100 mg by mouth 2 times daily      gabapentin (NEURONTIN) 100 MG capsule Take 100 mg by mouth 2 times daily. hydrALAZINE (APRESOLINE) 10 MG tablet Take 10 mg by mouth every 8 hours      potassium chloride (MICRO-K) 10 MEQ extended release capsule Take 20 mEq by mouth daily      latanoprost (XALATAN) 0.005 % ophthalmic solution Place 1 drop into both eyes nightly      levothyroxine (SYNTHROID) 150 MCG tablet Take 150 mcg by mouth Daily      mirtazapine (REMERON) 15 MG tablet Take 7.5 mg by mouth nightly      linaCLOtide (LINZESS) 72 MCG CAPS capsule Take 1 capsule by mouth every morning (before breakfast)  Qty: 90 capsule, Refills: 5    Associated Diagnoses: Chronic constipation      diclofenac sodium (VOLTAREN) 1 % GEL Apply 2 g topically 2 times daily  Qty: 120 g, Refills: 0      metoclopramide (REGLAN) 5 MG tablet Take 1 tablet by mouth 3 times daily  Qty: 120 tablet, Refills: 3      pantoprazole (PROTONIX) 40 MG tablet Take 1 tablet by mouth 2 times daily  Qty: 180 tablet, Refills: 3    Associated Diagnoses: Gastroesophageal reflux disease, esophagitis presence not specified      FLUoxetine (PROZAC) 10 MG capsule Take 30 mg by mouth daily     Associated Diagnoses:  Moderate episode of recurrent major depressive disorder (HCC); REJI (generalized anxiety disorder)      aspirin 81 MG tablet Take 81 mg by mouth daily       loratadine (CLARITIN) 10 MG tablet Take 10 mg by mouth daily       NIFEdipine (ADALAT CC) 60 MG extended release tablet Take 60 mg by mouth daily       psyllium (KONSYL) 28.3 % PACK Take 1 packet by mouth daily      predniSONE (DELTASONE) 5 MG tablet Take 5 mg by mouth daily      Multiple Vitamins-Minerals (MULTIVITAMIN WITH MINERALS) tablet Take 1 tablet by mouth daily      melatonin 5 MG TABS tablet Take 5 mg by mouth nightly       busPIRone (BUSPAR) 10 MG tablet Take 10 mg by mouth 4 times daily      fluticasone (FLONASE) 50 MCG/ACT nasal spray 2 sprays by Nasal route nightly       folic acid (FOLVITE) 1 MG tablet Take 1 mg by mouth daily       hydroxychloroquine (PLAQUENIL) 200 MG tablet Take by mouth 2 times daily       lisinopril (PRINIVIL;ZESTRIL) 40 MG tablet Take 40 mg by mouth daily       oxybutynin (DITROPAN-XL) 5 MG extended release tablet Take 5 mg by mouth nightly       pravastatin (PRAVACHOL) 10 MG tablet Take 10 mg by mouth nightly       sucralfate (CARAFATE) 1 GM tablet Take 1 g by mouth 3 times daily (before meals)       loperamide (IMODIUM) 2 MG capsule Take 2 mg by mouth 4 times daily as needed for Diarrhea      aluminum & magnesium hydroxide-simethicone (MYLANTA) 400-400-40 MG/5ML SUSP Take 30 mLs by mouth every 6 hours as needed      HYDROcodone-acetaminophen (NORCO) 5-325 MG per tablet Take 1 tablet by mouth every 4 hours as needed for Pain.       Dextromethorphan-guaiFENesin  MG/5ML SYRP Take 10 mLs by mouth every 4 hours as needed for Cough      calcium carbonate (TUMS) 500 MG chewable tablet Take 1 tablet by mouth every 8 hours as needed       polyethylene glycol (GLYCOLAX) 17 GM/SCOOP powder Take 17 g by mouth daily as needed      Nutritional Supplements (ENSURE CLEAR) LIQD Take 1 Bottle by mouth 2 times daily  Qty: 60 Bottle, Refills: 5      ondansetron (ZOFRAN) 4 MG tablet Take 1 tablet by mouth daily as needed for Nausea or Vomiting  Qty: 30 tablet, Refills: 0      hydrOXYzine (ATARAX) 25 MG tablet Take 25 mg by mouth 3 times daily as needed    Associated Diagnoses: Chronic pruritus      cyclobenzaprine (FLEXERIL) 5 MG tablet Take 5-10 mg by mouth 3 times daily as needed       nystatin (MYCOSTATIN) 916080 UNIT/GM powder by Intratympanic route 2 times daily as needed       albuterol sulfate  (90 Base) MCG/ACT inhaler Inhale 2 puffs into the lungs every 6 hours as needed for Wheezing       magnesium hydroxide (MILK OF MAGNESIA) 400 MG/5ML suspension Take 30 mLs by mouth daily as needed       acetaminophen (TYLENOL) 325 MG tablet Take 650 mg by mouth every 6 hours as needed                Review of Systems:  (2-9 systems needed)  Review of Systems   Constitutional: Positive for fatigue. Negative for chills and fever. HENT: Negative for congestion, facial swelling and sore throat. Eyes: Negative for discharge and redness. Respiratory: Negative for apnea, choking and shortness of breath. Cardiovascular: Negative for chest pain. Gastrointestinal: Negative for abdominal pain, nausea and vomiting. Genitourinary: Negative for dysuria. Musculoskeletal: Negative for back pain, neck pain and neck stiffness. Neurological: Positive for weakness. Negative for dizziness, tremors, seizures and headaches. All other systems reviewed and are negative. \"Positives and Pertinent negatives as per HPI\"    Physical Exam:  Physical Exam  Vitals and nursing note reviewed. Constitutional:       Appearance: She is well-developed. She is not diaphoretic. HENT:      Head: Normocephalic and atraumatic. Nose: Nose normal.      Mouth/Throat:      Mouth: Mucous membranes are moist.      Pharynx: Oropharynx is clear. No oropharyngeal exudate or posterior oropharyngeal erythema. Eyes:      General: No scleral icterus. Right eye: No discharge. Left eye: No discharge. Conjunctiva/sclera: Conjunctivae normal.      Pupils: Pupils are equal, round, and reactive to light. Neck:      Comments: No nuchal rigidity  Cardiovascular:      Rate and Rhythm: Normal rate and regular rhythm. Heart sounds: Normal heart sounds. No murmur heard. No friction rub. No gallop. Pulmonary:      Effort: Pulmonary effort is normal. No respiratory distress.       Breath sounds: Normal breath sounds. No wheezing or rales. Chest:      Chest wall: No tenderness. Abdominal:      General: Abdomen is flat. Bowel sounds are normal. There is no distension. Palpations: Abdomen is soft. There is no mass. Tenderness: There is no abdominal tenderness. There is no guarding or rebound. Musculoskeletal:         General: Normal range of motion. Cervical back: Normal range of motion and neck supple. Skin:     General: Skin is warm and dry. Neurological:      Mental Status: She is alert and oriented to person, place, and time. She is not disoriented. GCS: GCS eye subscore is 4. GCS verbal subscore is 5. GCS motor subscore is 6. Cranial Nerves: Cranial nerves are intact. No cranial nerve deficit. Sensory: Sensation is intact. No sensory deficit. Motor: Weakness present. No tremor, abnormal muscle tone or seizure activity.       Coordination: Coordination normal.      Comments: Finger to nose normal   Psychiatric:         Behavior: Behavior normal.         MEDICAL DECISION MAKING    Vitals:    Vitals:    08/18/21 0606 08/18/21 0919 08/18/21 1030 08/18/21 1036   BP: (!) 115/42   (!) 150/61   Pulse: 61   61   Resp: 14   14   Temp:    98.1 °F (36.7 °C)   TempSrc:    Oral   SpO2: 96% 96%  95%   Weight: 122 lb 2.2 oz (55.4 kg)  123 lb 7.3 oz (56 kg)    Height:   5' 1\" (1.549 m)        LABS:  Labs Reviewed   CULTURE, URINE - Abnormal; Notable for the following components:       Result Value    Organism Escherichia coli (*)     All other components within normal limits    Narrative:     ORDER#: [id number]                          ORDERED BY: Meghna Pedraza  SOURCE: Urine Clean Catch                  COLLECTED:  08/16/21 00:59  ANTIBIOTICS AT EDUARDO.:                      RECEIVED :  08/16/21 14:49  Performed at:  [facility]  [address] [place], [place] [address]   Phone [phone]   CBC WITH AUTO DIFFERENTIAL - Abnormal; Notable for the following components:    RBC 3.59 (*)     Hemoglobin 10.4 (*)     Hematocrit 30.4 (*)     Lymphocytes Absolute 0.5 (*)     All other components within normal limits    Narrative:     Performed at:  [facility]  [address] [address]   Phone [phone]   COMPREHENSIVE METABOLIC PANEL - Abnormal; Notable for the following components:    Sodium 110 (*)     Potassium 5.5 (*)     Chloride 79 (*)     CO2 16 (*)     Glucose 114 (*)     CREATININE 1.3 (*)     GFR Non- 39 (*)     GFR  48 (*)     All other components within normal limits    Narrative:     Norm Pardon tel. [phone],  Chemistry results called to and read back by Dr. Sophia Renee, 08/14/2021 14:22, by  [facility]  Performed at:  [facility]  [address] [facility] [address]   Phone [phone]   BRAIN NATRIURETIC PEPTIDE - Abnormal; Notable for the following components:    Pro-BNP 3,112 (*)     All other components within normal limits    Narrative:     Norm Pardon tel. [phone],  Chemistry results called to and read back by Dr. Sophia Renee, 08/14/2021 14:22, by  [facility]  Performed at:  [facility]  [address] [facility] [address]   Phone [phone]   TROPONIN - Abnormal; Notable for the following components:    Troponin 0.07 (*)     All other components within normal limits    Narrative:     Norm Pardon tel. [phone],  Chemistry results called to and read back by Dr. Sophia Renee, 08/14/2021 14:22, by  [facility]  Performed at:  [facility]  [address] [facility] [address]   Phone [phone]   OSMOLALITY, URINE - Abnormal; Notable for the following components:    Osmolality, Ur 293 (*)     All other components within normal limits    Narrative:     Performed at:  [facility]  [address] [place] [address] [place] [address]   Phone [phone]   URIC ACID - Abnormal; Notable for the following components:    Uric Acid, Serum 6.1 (*)     All other components within normal limits    Narrative:     Performed at:  [address] [address]   Phone [phone]   OSMOLALITY - Abnormal; Notable for the following components:    Osmolality 240 (*)     All other components within normal limits    Narrative:     Performed at:  [address] [address]   Phone [phone]   BASIC METABOLIC PANEL - Abnormal; Notable for the following components:    Sodium 110 (*)     Potassium 5.9 (*)     Chloride 80 (*)     CO2 12 (*)     Anion Gap 18 (*)     Glucose 103 (*)     CREATININE 1.4 (*)     GFR Non- 36 (*)     GFR  44 (*)     Calcium 8.2 (*)     All other components within normal limits    Narrative:     Jerome Jazmín  SKK2W tel. [phone],  Chemistry results called to and read back by Abdias Guzman RN, 08/14/2021  19:50, by [facility]  Chemistry results called to and read back by Abdias Guzman RN, 08/14/2021  19:49, by [facility]  Collection has been rescheduled by [facility] ([facility]) at 08/14/2021 18:47 Reason: Add   on? Performed at:  [facility]  [address] [facility] [address]   Phone [phone]   BASIC METABOLIC PANEL W/ REFLEX TO MG FOR LOW K - Abnormal; Notable for the following components:    Potassium reflex Magnesium 5.9 (*)     All other components within normal limits    Narrative:     Emely Cancer tel. [phone],  Chemistry results called to and read back by Abdias Guzman RN, 08/14/2021  19:50, by [facility]  Chemistry results called to and read back by Abdias Guzman RN, 08/14/2021  19:49, by [facility]  Collection has been rescheduled by [facility] ([facility]) at 08/14/2021 18:47 Reason: Add   on?   Performed at:  [facility]  [facility] [address] [facility] [place], [place] [facility] [place] [address]   Phone [phone]   BLOOD GAS, VENOUS - Abnormal; Notable for the following components:    pCO2, Daryl 30.0 (*)     HCO3, Venous 18 (*)     All other components within normal limits    Narrative:     Performed at:  [address] [place] [address]   Phone [phone]   URINALYSIS - Abnormal; Notable for the following components:    Clarity, UA TURBID (*)     Blood, Urine MODERATE (*)     Protein, UA TRACE (*)     Leukocyte Esterase, Urine LARGE (*)     All other components within normal limits    Narrative:     Performed at:  [facility]  [address], [place] [facility] [place] [address]   Phone [phone]   BASIC METABOLIC PANEL - Abnormal; Notable for the following components:    Sodium 116 (*)     Chloride 84 (*)     CO2 19 (*)     GFR Non- 48 (*)     GFR  58 (*)     Calcium 7.8 (*)     All other components within normal limits    Narrative:     Didier Wright  [employer]CreditEaseW tel. [phone],  Chemistry results called to and read back by Angela Wagner RN, 08/15/2021  08:43, by [facility]  Performed at:  [address] [facility] [place] [address]   Phone [phone]   BASIC METABOLIC PANEL - Abnormal; Notable for the following components:    Sodium 112 (*)     Potassium 5.2 (*)     Chloride 84 (*)     CO2 16 (*)     Glucose 112 (*)     GFR Non- 48 (*)     GFR  58 (*)     Calcium 7.9 (*)     All other components within normal limits    Narrative:     Didier LEYCreditEaseW tel. [phone],  Chemistry results called to and read back by Kyle Yoon, 08/15/2021 01:43,  by [facility]  Performed at:  [address] [facility] [place] [address]   Phone [phone]   MICROSCOPIC URINALYSIS - Abnormal; Notable for the following components:    Bacteria, UA 1+ (*)     WBC, UA 835 (*)     RBC, UA 6 (*)     All other components within normal limits    Narrative:     Performed at:  [address] [employer]   Phone [phone]   BASIC METABOLIC PANEL - Abnormal; Notable for the following components:    Sodium 117 (*)     Chloride 85 (*)     CO2 19 (*)     GFR Non- 48 (*)     GFR  58 (*)     Calcium 7.9 (*)     All other components within normal limits    Narrative:     Riky LEYK2ARYA tel. [phone],  Chemistry results called to and read back by Tylor Yousif, 08/15/2021 03:44,  by [facility]  Specimen cancelled by [facility], at 02:10 08/15/2021.  not needed per rn sukh  Performed at:  [address] [employer]   Phone [phone]   SODIUM - Abnormal; Notable for the following components:    Sodium 116 (*)     All other components within normal limits    Narrative:     Riky LEYK2ARYA tel. [phone],  Chemistry results called to and read back by Efraín Celis RN, 08/15/2021  08:42, by [facility]  Performed at:  [address] [employer]   Phone [phone]   CBC WITH AUTO DIFFERENTIAL - Abnormal; Notable for the following components:    RBC 3.05 (*)     Hemoglobin 8.9 (*)     Hematocrit 26.0 (*)     Lymphocytes Absolute 0.8 (*)     All other components within normal limits    Narrative:     Performed at:  [address] [employer]   Phone [phone]   BASIC METABOLIC PANEL - Abnormal; Notable for the following components:    Sodium 118 (*)     Chloride 83 (*)     CO2 20 (*)     Glucose 115 (*)     CREATININE 1.3 (*)     GFR Non- 39 (*)     GFR  48 (*)     Calcium 8.0 (*)     All other components within normal limits    Narrative:     Kristopher Sorensen tel. [phone],  Chemistry Potassium reflex Magnesium 3.3 (*)     Chloride 89 (*)     Glucose 120 (*)     BUN 24 (*)     CREATININE 1.5 (*)     GFR Non- 33 (*)     GFR African American 40 (*)     Calcium 7.8 (*)     All other components within normal limits    Narrative:     Performed at:  [facility]  [address] [place], [place] [facility] [place] [address]   Phone [phone]   BASIC METABOLIC PANEL W/ REFLEX TO MG FOR LOW K - Abnormal; Notable for the following components:    Sodium 125 (*)     Potassium reflex Magnesium 3.4 (*)     Chloride 88 (*)     BUN 23 (*)     CREATININE 1.5 (*)     GFR Non- 33 (*)     GFR  40 (*)     All other components within normal limits    Narrative:     Performed at:  [facility]  [address] [place], [place] [employer] [place] [address]   Phone [phone]   MAGNESIUM - Abnormal; Notable for the following components:    Magnesium 1.70 (*)     All other components within normal limits    Narrative:     Performed at:  [facility]  [address] [place], [place] [employer] [address]   Phone [phone]   BASIC METABOLIC PANEL W/ REFLEX TO MG FOR LOW K - Abnormal; Notable for the following components:    Sodium 119 (*)     Chloride 84 (*)     CO2 20 (*)     Glucose 106 (*)     BUN 25 (*)     CREATININE 1.3 (*)     GFR Non- 39 (*)     GFR  48 (*)     Calcium 8.2 (*)     All other components within normal limits    Narrative:     Liane Hartman  SKK2W tel. [phone],  Chemistry results called to and read back by Little Smith RN, 08/17/2021  19:05, by [facility]  Performed at:  [facility]  [address] [place], [place] [employer] [address]   Phone [phone]   MAGNESIUM - Abnormal; Notable for the following components:    Magnesium 1.70 (*)     All other components within normal limits    Narrative:     Performed at:  [facility]  [address] [facility] [employer]    Phone [phone]   BASIC METABOLIC PANEL W/ REFLEX TO MG FOR LOW K - Abnormal; Notable for the following components:    Sodium 123 (*)     Chloride 88 (*)     CO2 20 (*)     BUN 26 (*)     GFR Non- 43 (*)     GFR  52 (*)     Calcium 7.7 (*)     All other components within normal limits    Narrative:     Performed at:  [facility]  [address] [facility] [employer]    Phone [phone]   MAGNESIUM - Abnormal; Notable for the following components:    Magnesium 1.70 (*)     All other components within normal limits    Narrative:     Performed at:  [facility]  [address] [facility] [employer]    Phone [phone]   BASIC METABOLIC PANEL W/ REFLEX TO MG FOR LOW K - Abnormal; Notable for the following components:    Sodium 125 (*)     Chloride 89 (*)     CO2 20 (*)     BUN 28 (*)     CREATININE 1.7 (*)     GFR Non- 29 (*)     GFR  35 (*)     All other components within normal limits    Narrative:     Performed at:  [facility]  [address] [facility] [employer]    Phone [phone]   CULTURE, BLOOD 2    Narrative:     ORDER#: [id number]                          ORDERED BY: Meghna Pedraza  SOURCE: Blood                              COLLECTED:  08/16/21 19:33  ANTIBIOTICS AT EDUARDO.:                      RECEIVED :  08/16/21 20:06  If child <=2 yrs old please draw pediatric bottle. ~Blood Culture #2  Performed at:  [facility]  [address] [place], [facility] [employer]    Phone [phone]   CULTURE, BLOOD 1    Narrative:     ORDER#: [id number]                          ORDERED BY: Meghna Pedraza  SOURCE: Blood                              COLLECTED:  08/16/21 19:26  ANTIBIOTICS AT EDUARDO.:                      RECEIVED :  08/16/21 20:06  If child <=2 yrs old please draw pediatric bottle. ~Blood Culture 1  Performed at:  [facility]  [address] [place], [place] [address]   Phone [phone]   [facility] WITH REFLEX    Narrative:     Harshal Ang tel. [phone],  Chemistry results called to and read back by Dr. Bryan Carvajal, 08/14/2021 14:22, by  [facility]  Performed at:  [facility]  [address] [facility] [place] [address]   Phone [phone]   MAGNESIUM    Narrative:     Performed at:  [facility]  [address] [facility] [place] [address]   Phone [phone]   SODIUM, URINE, RANDOM    Narrative:     Performed at:  [facility]  [address] [facility] [place] [address]   Phone [phone]   CREATININE, RANDOM URINE    Narrative:     Performed at:  [facility]  [address] [facility] [place] [address]   Phone [phone]   CORTISOL AM, TOTAL    Narrative:     Performed at:  [facility]  [address] [facility] [place] [address]   Phone [phone]   LACTIC ACID, PLASMA    Narrative:     Performed at:  [facility]  [address] [facility] [place] [address]   Phone [phone]   SPECIMEN REJECTION    Narrative:     Performed at:  [facility]  [address] [facility] [place] [address]   Phone [phone]   LACTIC ACID, PLASMA    Narrative:     Performed at:  [facility]  [address] [facility] [place] [address]   Phone [phone]   PROCALCITONIN    Narrative:     Performed at:  [facility]  [address] [facility] [place] [address]   Phone [phone]   URINE RT REFLEX TO CULTURE   BASIC METABOLIC PANEL W/ REFLEX TO MG FOR LOW K        Remainder of labs reviewed and were negative at this time or not returned at the time of this note. RADIOLOGY:   Non-plain film images such as CT, Ultrasound and MRI are read by the radiologist. Sj Hawkins PA-C have directly visualized the radiologic plain film image(s) with the below findings:      Interpretation per the Radiologist below, if available at the time of this note:    XR CHEST PORTABLE   Final Result   Mild cardiomegaly was noted with mild central pulmonary vascular congestion   with trace right and small left pleural effusions. The findings are   compatible with early-mild congestive heart failure. Small to moderate fixed hiatal hernia. Marked degenerative osteo arthritic changes of each glenohumeral joint was   noted with chronic bilateral anterior dislocations. No results found.        MEDICAL DECISION MAKING / ED COURSE:      PROCEDURES:   Procedures    None    Patient was given:  Medications   albuterol (PROVENTIL) nebulizer solution 2.5 mg (has no administration in time range)   aspirin chewable tablet 81 mg (81 mg Oral Given 8/18/21 0828)   busPIRone (BUSPAR) tablet 10 mg (10 mg Oral Given 8/18/21 0828)   fluticasone (FLONASE) 50 MCG/ACT nasal spray 2 spray (has no administration in time range)   folic acid (FOLVITE) tablet 1 mg (1 mg Oral Given 8/18/21 0828)   pantoprazole (PROTONIX) tablet 40 mg (40 mg Oral Given 8/18/21 0828)   predniSONE (DELTASONE) tablet 5 mg (5 mg Oral Given 8/18/21 0828)   sodium chloride flush 0.9 % injection 5-40 mL (10 mLs Intravenous Given 8/18/21 0829)   sodium chloride flush 0.9 % injection 5-40 mL (has no administration in time range)   0.9 % sodium chloride infusion (has no administration in time range)   enoxaparin (LOVENOX) injection 30 mg (30 mg Subcutaneous Given 8/17/21 1958)   ondansetron (ZOFRAN-ODT) disintegrating tablet 4 mg (has no administration in time range)     Or   ondansetron (ZOFRAN) injection 4 mg (has no administration in time range)   polyethylene glycol (GLYCOLAX) packet 17 g (has no administration in time range)   acetaminophen (TYLENOL) tablet 650 mg (650 mg Oral Given 8/18/21 0123)     Or   acetaminophen (TYLENOL) suppository 650 mg ( Rectal See Alternative 8/18/21 0123)   hydroxychloroquine (PLAQUENIL) tablet 200 mg (200 mg Oral Given 8/18/21 0828)   levothyroxine (SYNTHROID) tablet 150 mcg (150 mcg Oral Given 8/18/21 0832)   latanoprost (XALATAN) 0.005 % ophthalmic solution 1 drop (1 drop Both Eyes Given 8/17/21 1959)   gabapentin (NEURONTIN) capsule 100 mg (100 mg Oral Given 8/18/21 0828)   melatonin tablet 3 mg (3 mg Oral Given 8/17/21 2202)   cefTRIAXone (ROCEPHIN) 1000 mg IVPB in 50 mL D5W minibag (0 mg Intravenous Stopped 8/17/21 1743)   hydrALAZINE (APRESOLINE) tablet 25 mg (25 mg Oral Not Given 8/18/21 0627)   furosemide (LASIX) tablet 40 mg (40 mg Oral Given 8/18/21 0828)   potassium chloride (KLOR-CON) extended release tablet 20 mEq (20 mEq Oral Given 8/18/21 0828)   polyethylene glycol (GLYCOLAX) packet 17 g (17 g Oral Given 8/18/21 1101)   atropine injection 0.5 mg (0.5 mg Intravenous Given 8/14/21 1354)   atropine injection 0.5 mg (0.5 mg Intravenous Given 8/14/21 1443)   furosemide (LASIX) injection 20 mg (20 mg Intravenous Given 8/14/21 1727)   atropine injection 0.5 mg (0.5 mg Intravenous Given 8/14/21 1801)   sodium zirconium cyclosilicate (LOKELMA) oral suspension 10 g (10 g Oral Given 8/14/21 2147)   sodium bicarbonate 8.4 % injection 25 mEq (25 mEq Intravenous Given 8/14/21 2142)   calcium gluconate 1000 mg in sodium chloride 50 mL (0 mg Intravenous Stopped 8/14/21 2241)   furosemide (LASIX) injection 40 mg (40 mg Intravenous Given 8/15/21 1127)     Emergency room course: Patient on exam pupils are equal round and reactive to light extraocular movement is intact. Throat is clear. Neck is supple full range of motion without midline tenderness. No midline tender cervical, thoracic and lumbar spine.   Cardiovascular bradycardic rate at around 36 bpm.  Regular rhythm. No rub murmur gallop noted. Chest wall show no tenderness. Abdomen is soft nontender. Normal bowel sounds all 4 quadrant. Pelvic stable anterior lateral compression. Bilateral lower extremities show no pitting edema. She is unable to raise legs off the bed. Weakened strength against plantar dorsiflexion 2+ bilaterally.  strength 2+ bilaterally. No facial drooping no slurred speech noted. Tongue and smile does not deviate. Alert oriented. Does not appear to be confused. Lab results from today shows:  CBC White count 7.5, RBC 3.59, hemoglobin 10.4 hematocrit 30.4. CMP shows sodium 110, potassium 5.5, chloride 79 BUN 19 creatinine 1.3. Normal transaminases. BNP 3112,     troponin 0 0.07    TSH 2.69    Magnesium 2.00    Chest x-ray shows mild cardiomegaly with noted mild central pulmonary vascular congestion with trace right and small left pleural effusions. Findings are compatible with early mild congestive heart failure. Did discuss patient case with my attending Dr. Frantz Jaimes. After examining her and looking at her heart rate he thought that she may end up needing a a pacer. He did consult with cardiology and the hospitalist and nephrology. See his ED note for remaining of patient ER course and final disposition. The patient tolerated their visit well. I evaluated the patient. The physician was available for consultation as needed. The patient and / or the family were informed of the results of any tests, a time was given to answer questions, a plan was proposed and they agreed with plan. CLINICAL IMPRESSION:  1. Symptomatic bradycardia    2. Hyponatremia    3.  Acute congestive heart failure, unspecified heart failure type [facility])        DISPOSITION Admitted 08/14/2021 02:58:48 PM      PATIENT REFERRED TO:  HENOK Nieto Dr.  [address] [phone]          DISCHARGE MEDICATIONS:  Current Discharge Medication List          DISCONTINUED MEDICATIONS:  Current Discharge Medication List      STOP taking these medications       magnesium citrate solution Comments:   Reason for Stopping:         ketoconazole (NIZORAL) 2 % cream Comments:   Reason for Stopping:         betamethasone valerate (VALISONE) 0.1 % cream Comments:   Reason for Stopping:         econazole nitrate 1 % cream Comments:   Reason for Stopping:         albuterol (PROVENTIL) (2.5 MG/3ML) 0.083% nebulizer solution Comments:   Reason for Stopping:                      (Please note the MDM and HPI sections of this note were completed with a voice recognition program.  Efforts were made to edit the dictations but occasionally words are mis-transcribed.)    Electronically signed, Jocelin Manning PA-C,          Jocelin Manning PA-C  08/18/21 1076

## 2021-08-15 LAB
ANION GAP SERPL CALCULATED.3IONS-SCNC: 12 MMOL/L (ref 3–16)
ANION GAP SERPL CALCULATED.3IONS-SCNC: 13 MMOL/L (ref 3–16)
ANION GAP SERPL CALCULATED.3IONS-SCNC: 13 MMOL/L (ref 3–16)
ANION GAP SERPL CALCULATED.3IONS-SCNC: 15 MMOL/L (ref 3–16)
BACTERIA: ABNORMAL /HPF
BASOPHILS ABSOLUTE: 0 K/UL (ref 0–0.2)
BASOPHILS RELATIVE PERCENT: 0.6 %
BILIRUBIN URINE: NEGATIVE
BLOOD, URINE: ABNORMAL
BUN BLDV-MCNC: 17 MG/DL (ref 7–20)
BUN BLDV-MCNC: 17 MG/DL (ref 7–20)
BUN BLDV-MCNC: 18 MG/DL (ref 7–20)
BUN BLDV-MCNC: 19 MG/DL (ref 7–20)
CALCIUM SERPL-MCNC: 7.8 MG/DL (ref 8.3–10.6)
CALCIUM SERPL-MCNC: 7.9 MG/DL (ref 8.3–10.6)
CALCIUM SERPL-MCNC: 7.9 MG/DL (ref 8.3–10.6)
CALCIUM SERPL-MCNC: 8 MG/DL (ref 8.3–10.6)
CHLORIDE BLD-SCNC: 83 MMOL/L (ref 99–110)
CHLORIDE BLD-SCNC: 84 MMOL/L (ref 99–110)
CHLORIDE BLD-SCNC: 84 MMOL/L (ref 99–110)
CHLORIDE BLD-SCNC: 85 MMOL/L (ref 99–110)
CLARITY: ABNORMAL
CO2: 16 MMOL/L (ref 21–32)
CO2: 19 MMOL/L (ref 21–32)
CO2: 19 MMOL/L (ref 21–32)
CO2: 20 MMOL/L (ref 21–32)
COLOR: YELLOW
CREAT SERPL-MCNC: 1.1 MG/DL (ref 0.6–1.2)
CREAT SERPL-MCNC: 1.3 MG/DL (ref 0.6–1.2)
EKG ATRIAL RATE: 36 BPM
EKG DIAGNOSIS: NORMAL
EKG Q-T INTERVAL: 578 MS
EKG QRS DURATION: 136 MS
EKG QTC CALCULATION (BAZETT): 446 MS
EKG R AXIS: -35 DEGREES
EKG T AXIS: 3 DEGREES
EKG VENTRICULAR RATE: 36 BPM
EOSINOPHILS ABSOLUTE: 0 K/UL (ref 0–0.6)
EOSINOPHILS RELATIVE PERCENT: 0.4 %
EPITHELIAL CELLS, UA: 1 /HPF (ref 0–5)
GFR AFRICAN AMERICAN: 48
GFR AFRICAN AMERICAN: 58
GFR NON-AFRICAN AMERICAN: 39
GFR NON-AFRICAN AMERICAN: 48
GLUCOSE BLD-MCNC: 112 MG/DL (ref 70–99)
GLUCOSE BLD-MCNC: 115 MG/DL (ref 70–99)
GLUCOSE BLD-MCNC: 80 MG/DL (ref 70–99)
GLUCOSE BLD-MCNC: 81 MG/DL (ref 70–99)
GLUCOSE URINE: NEGATIVE MG/DL
HCT VFR BLD CALC: 26 % (ref 36–48)
HEMOGLOBIN: 8.9 G/DL (ref 12–16)
HYALINE CASTS: 3 /LPF (ref 0–8)
KETONES, URINE: NEGATIVE MG/DL
LEUKOCYTE ESTERASE, URINE: ABNORMAL
LYMPHOCYTES ABSOLUTE: 0.8 K/UL (ref 1–5.1)
LYMPHOCYTES RELATIVE PERCENT: 12.9 %
MCH RBC QN AUTO: 29.3 PG (ref 26–34)
MCHC RBC AUTO-ENTMCNC: 34.4 G/DL (ref 31–36)
MCV RBC AUTO: 85.1 FL (ref 80–100)
MICROSCOPIC EXAMINATION: YES
MONOCYTES ABSOLUTE: 0.7 K/UL (ref 0–1.3)
MONOCYTES RELATIVE PERCENT: 11.2 %
NEUTROPHILS ABSOLUTE: 4.6 K/UL (ref 1.7–7.7)
NEUTROPHILS RELATIVE PERCENT: 74.9 %
NITRITE, URINE: NEGATIVE
PDW BLD-RTO: 15.2 % (ref 12.4–15.4)
PH UA: 6 (ref 5–8)
PLATELET # BLD: 210 K/UL (ref 135–450)
PMV BLD AUTO: 8.3 FL (ref 5–10.5)
POTASSIUM SERPL-SCNC: 4.1 MMOL/L (ref 3.5–5.1)
POTASSIUM SERPL-SCNC: 4.1 MMOL/L (ref 3.5–5.1)
POTASSIUM SERPL-SCNC: 4.3 MMOL/L (ref 3.5–5.1)
POTASSIUM SERPL-SCNC: 5.2 MMOL/L (ref 3.5–5.1)
PROTEIN UA: ABNORMAL MG/DL
RBC # BLD: 3.05 M/UL (ref 4–5.2)
RBC UA: 6 /HPF (ref 0–4)
REASON FOR REJECTION: NORMAL
REJECTED TEST: NORMAL
SODIUM BLD-SCNC: 112 MMOL/L (ref 136–145)
SODIUM BLD-SCNC: 116 MMOL/L (ref 136–145)
SODIUM BLD-SCNC: 116 MMOL/L (ref 136–145)
SODIUM BLD-SCNC: 117 MMOL/L (ref 136–145)
SODIUM BLD-SCNC: 118 MMOL/L (ref 136–145)
SPECIFIC GRAVITY UA: 1.01 (ref 1–1.03)
URINE TYPE: ABNORMAL
UROBILINOGEN, URINE: 0.2 E.U./DL
WBC # BLD: 6.1 K/UL (ref 4–11)
WBC UA: 835 /HPF (ref 0–5)

## 2021-08-15 PROCEDURE — P9047 ALBUMIN (HUMAN), 25%, 50ML: HCPCS | Performed by: INTERNAL MEDICINE

## 2021-08-15 PROCEDURE — 6370000000 HC RX 637 (ALT 250 FOR IP): Performed by: INTERNAL MEDICINE

## 2021-08-15 PROCEDURE — 2000000000 HC ICU R&B

## 2021-08-15 PROCEDURE — 36415 COLL VENOUS BLD VENIPUNCTURE: CPT

## 2021-08-15 PROCEDURE — 99233 SBSQ HOSP IP/OBS HIGH 50: CPT | Performed by: NURSE PRACTITIONER

## 2021-08-15 PROCEDURE — 81001 URINALYSIS AUTO W/SCOPE: CPT

## 2021-08-15 PROCEDURE — 80048 BASIC METABOLIC PNL TOTAL CA: CPT

## 2021-08-15 PROCEDURE — 2580000003 HC RX 258: Performed by: INTERNAL MEDICINE

## 2021-08-15 PROCEDURE — 94760 N-INVAS EAR/PLS OXIMETRY 1: CPT

## 2021-08-15 PROCEDURE — 6360000002 HC RX W HCPCS: Performed by: INTERNAL MEDICINE

## 2021-08-15 PROCEDURE — 6360000002 HC RX W HCPCS: Performed by: NURSE PRACTITIONER

## 2021-08-15 PROCEDURE — 85025 COMPLETE CBC W/AUTO DIFF WBC: CPT

## 2021-08-15 PROCEDURE — 84295 ASSAY OF SERUM SODIUM: CPT

## 2021-08-15 PROCEDURE — 93010 ELECTROCARDIOGRAM REPORT: CPT | Performed by: INTERNAL MEDICINE

## 2021-08-15 RX ORDER — ALBUMIN (HUMAN) 12.5 G/50ML
25 SOLUTION INTRAVENOUS EVERY 12 HOURS
Status: DISCONTINUED | OUTPATIENT
Start: 2021-08-16 | End: 2021-08-17

## 2021-08-15 RX ORDER — FUROSEMIDE 10 MG/ML
40 INJECTION INTRAMUSCULAR; INTRAVENOUS ONCE
Status: COMPLETED | OUTPATIENT
Start: 2021-08-15 | End: 2021-08-15

## 2021-08-15 RX ORDER — LANOLIN ALCOHOL/MO/W.PET/CERES
3 CREAM (GRAM) TOPICAL NIGHTLY PRN
Status: DISCONTINUED | OUTPATIENT
Start: 2021-08-15 | End: 2021-08-23 | Stop reason: HOSPADM

## 2021-08-15 RX ORDER — FUROSEMIDE 10 MG/ML
20 INJECTION INTRAMUSCULAR; INTRAVENOUS 2 TIMES DAILY
Status: DISCONTINUED | OUTPATIENT
Start: 2021-08-16 | End: 2021-08-17

## 2021-08-15 RX ADMIN — GABAPENTIN 100 MG: 100 CAPSULE ORAL at 20:59

## 2021-08-15 RX ADMIN — ALBUMIN (HUMAN) 25 G: 0.25 INJECTION, SOLUTION INTRAVENOUS at 23:57

## 2021-08-15 RX ADMIN — ASPIRIN 81 MG: 81 TABLET, CHEWABLE ORAL at 09:44

## 2021-08-15 RX ADMIN — PANTOPRAZOLE SODIUM 40 MG: 40 TABLET, DELAYED RELEASE ORAL at 20:59

## 2021-08-15 RX ADMIN — FUROSEMIDE 40 MG: 10 INJECTION, SOLUTION INTRAMUSCULAR; INTRAVENOUS at 11:27

## 2021-08-15 RX ADMIN — SODIUM CHLORIDE, PRESERVATIVE FREE 10 ML: 5 INJECTION INTRAVENOUS at 20:59

## 2021-08-15 RX ADMIN — HYDRALAZINE HYDROCHLORIDE 10 MG: 10 TABLET, FILM COATED ORAL at 06:08

## 2021-08-15 RX ADMIN — GABAPENTIN 100 MG: 100 CAPSULE ORAL at 09:44

## 2021-08-15 RX ADMIN — PANTOPRAZOLE SODIUM 40 MG: 40 TABLET, DELAYED RELEASE ORAL at 09:44

## 2021-08-15 RX ADMIN — HYDRALAZINE HYDROCHLORIDE 10 MG: 10 TABLET, FILM COATED ORAL at 13:02

## 2021-08-15 RX ADMIN — LATANOPROST 1 DROP: 50 SOLUTION OPHTHALMIC at 20:59

## 2021-08-15 RX ADMIN — HYDROXYCHLOROQUINE SULFATE 200 MG: 200 TABLET ORAL at 09:44

## 2021-08-15 RX ADMIN — BUSPIRONE HYDROCHLORIDE 10 MG: 10 TABLET ORAL at 20:59

## 2021-08-15 RX ADMIN — ENOXAPARIN SODIUM 30 MG: 30 INJECTION SUBCUTANEOUS at 20:58

## 2021-08-15 RX ADMIN — BUSPIRONE HYDROCHLORIDE 10 MG: 10 TABLET ORAL at 13:02

## 2021-08-15 RX ADMIN — BUSPIRONE HYDROCHLORIDE 10 MG: 10 TABLET ORAL at 17:32

## 2021-08-15 RX ADMIN — HYDROXYCHLOROQUINE SULFATE 200 MG: 200 TABLET ORAL at 20:58

## 2021-08-15 RX ADMIN — HYDRALAZINE HYDROCHLORIDE 10 MG: 10 TABLET, FILM COATED ORAL at 22:02

## 2021-08-15 RX ADMIN — SODIUM CHLORIDE, PRESERVATIVE FREE 10 ML: 5 INJECTION INTRAVENOUS at 09:45

## 2021-08-15 RX ADMIN — FOLIC ACID 1 MG: 1 TABLET ORAL at 09:44

## 2021-08-15 RX ADMIN — BUSPIRONE HYDROCHLORIDE 10 MG: 10 TABLET ORAL at 09:44

## 2021-08-15 RX ADMIN — PREDNISONE 5 MG: 5 TABLET ORAL at 09:44

## 2021-08-15 RX ADMIN — LEVOTHYROXINE SODIUM 150 MCG: 0.07 TABLET ORAL at 06:09

## 2021-08-15 ASSESSMENT — PAIN SCALES - GENERAL
PAINLEVEL_OUTOF10: 0

## 2021-08-15 NOTE — PROGRESS NOTES
0350: call placed to [facility], pt NA went from 112 to 117.waiting for call back from Dr Elicia Esparza   [phone]: on phone w/ MD [place]. .. decision to stop fluids and recheck NA in 4 hours

## 2021-08-15 NOTE — PROGRESS NOTES
Cardiology Progress Note     Admit Date: 2021     Reason for follow up: bradycardia, HF    HPI and Interval History:   The patient is an [de-identified]year old female with past medical history significant for HTN, HLD, PVD, thyroid disease, asthma and CKD who presented to the ED for weakness. She had multiple electrolyte abnormalities, was fluid overloaded and bradycardic but BP was normal/hypertensive. She was started on Isuprel overnight which is now off with heart rates in the 70s. She currently denies any cardiac complaints, no CP, SOB, syncope or near syncope. She does have swelling in her legs, unclear if this is new or not. Physical Examination:  Vitals:    08/15/21 0700   BP: (!) 179/56   Pulse: 78   Resp: 15   Temp:    SpO2: 100%        Intake/Output Summary (Last 24 hours) at 8/15/2021 0811  Last data filed at 8/15/2021 0615  Gross per 24 hour   Intake 493.02 ml   Output 1875 ml   Net -1381.98 ml     In: 493 [P.O.:120; I.V.:326.5]  Out: 1875    Wt Readings from Last 3 Encounters:   21 120 lb 9.5 oz (54.7 kg)   21 106 lb (48.1 kg)   21 106 lb (48.1 kg)     Temp  Av.9 °F (36.6 °C)  Min: 97.1 °F (36.2 °C)  Max: 98.5 °F (36.9 °C)  Pulse  Av.2  Min: 36  Max: 78  BP  Min: 99/43  Max: 179/56  SpO2  Av %  Min: 92 %  Max: 100 %    · Telemetry: Sinus rhythm in the 60s. · Constitutional: Alert, in no acute distress. Appears stated age. · Head: Normocephalic and atraumatic. · Eyes: Conjunctivae normal. EOM are normal.   · Neck: Neck supple. No lymphadenopathy. No rigidity. JVD present. · Cardiovascular: Normal rate, regular rhythm. No murmurs, rubs or gallops. No S3 or S4.  · Pulmonary/Chest: Clear breath sounds in upper lung fields, diminished in bases bilaterally. No crackles, wheezes or rhonchi. No respiratory accessory muscle use. · Abdominal: Soft. Normal bowel sounds present. No distension, No tenderness. · Musculoskeletal: No tenderness.  2+ BLE edema setting of multiple electrolyte abnormalities   - improved, off Isuprel with rates in the 60s   - never caused her to become hypotensive so was likely not causing symptoms   - optimize electrolytes and monitor, no immediate need for pacing    Acute heart failure   - unclear if systolic or diastolic   - net -6.6Y   - 40mg IV Lasix today, monitor Na with this    Hyponatremia   - likely due to hypervolemia   - continue diuresis    Essential HTN   - BP has been mildly elevated, should improve with some diuresis    BRAULIO Selby  [facility], [address] [place] [address], [address] [place] [address]  Phone: [phone]  Fax: [phone]    Electronically signed by BRAULIO Romo - CNP on 8/15/2021 at 8:11 AM

## 2021-08-15 NOTE — PROGRESS NOTES
Hospitalist Progress Note      PCP: Dorene Ocasio MD    Date of Admission: 8/14/2021    Chief Complaint:   Gen weakness    Hospital Course:   [de-identified] y.o. female who presented to [address] [place] [address] for worsening weakness, no obvious alleviating or aggravating factors, in the context of hypothyroidism, depression, and worsening generalized edema, gradual onset, denies chest pain shortness of breath cough fever or chills she is having abdominal discomfort but denies nausea vomiting or diarrhea.   She is on chronic steroids, low-dose though, she is on antidepressant, she is on HCTZ, in emergency department found to be bradycardic also given atropine and cardiology has been consulted by EDMD.  Patient found to have critical sodium of 110, patient will be admitted for further management and treatment    Subjective:   Feeling better  Denies shortness of breath not lightheaded  No chest pain        Medications:  Reviewed    Infusion Medications    sodium chloride      isoproterenol (ISUPREL) infusion Stopped (08/15/21 0713)     Scheduled Medications    aspirin  81 mg Oral Daily    busPIRone  10 mg Oral 4x Daily    folic acid  1 mg Oral Daily    pantoprazole  40 mg Oral BID    predniSONE  5 mg Oral Daily    sodium chloride flush  5-40 mL Intravenous 2 times per day    enoxaparin  30 mg Subcutaneous Nightly    hydroxychloroquine  200 mg Oral BID    levothyroxine  150 mcg Oral Daily    mirtazapine  7.5 mg Oral Nightly    hydrALAZINE  10 mg Oral 3 times per day    latanoprost  1 drop Both Eyes Nightly    gabapentin  100 mg Oral BID     PRN Meds: albuterol, fluticasone, sodium chloride flush, sodium chloride, ondansetron **OR** ondansetron, polyethylene glycol, acetaminophen **OR** acetaminophen      Intake/Output Summary (Last 24 hours) at 8/15/2021 0930  Last data filed at 8/15/2021 0615  Gross per 24 hour   Intake 493.02 ml   Output 1875 ml   Net -1381.98 ml       Physical Exam Performed:    BP (!) 179/56   Pulse 78   Temp 97.8 °F (36.6 °C) (Axillary)   Resp 12   Ht 5' 1\" (1.549 m)   Wt 120 lb 9.5 oz (54.7 kg)   SpO2 99%   BMI 22.79 kg/m²     General appearance: No apparent distress, appears stated age and cooperative. HEENT: Pupils equal, round, and reactive to light. Conjunctivae/corneas clear. Neck: Supple, with full range of motion. No jugular venous distention. Trachea midline. Respiratory:  Normal respiratory effort. Bibasilar crackles , not in respiratory distress . Cardiovascular: Regular rate and rhythm with normal S1/S2 without murmurs, rubs or gallops. Abdomen: Soft, non-tender, non-distended with normal bowel sounds. Musculoskeletal: No clubbing, cyanosis or edema bilaterally. Full range of motion without deformity. Skin: Skin color, texture, turgor normal.  No rashes or lesions. Neurologic:  Neurovascularly intact without any focal sensory/motor deficits. Cranial nerves: II-XII intact, grossly non-focal.  Psychiatric: Alert and oriented, thought content appropriate, normal insight  Capillary Refill: Brisk,3 seconds, normal   Peripheral Pulses: +2 palpable, equal bilaterally       Labs:   Recent Labs     08/14/21  1330 08/15/21  0745   WBC 7.5 6.1   HGB 10.4* 8.9*   HCT 30.4* 26.0*    210     Recent Labs     08/14/21  2143 08/15/21  0230 08/15/21  0745   * 117* 116*  116*   K 5.2* 4.3 4.1   CL 84* 85* 84*   CO2 16* 19* 19*   BUN 19 18 17   CREATININE 1.1 1.1 1.1   CALCIUM 7.9* 7.9* 7.8*     Recent Labs     08/14/21  1330   AST 20   ALT 13   BILITOT 0.3   ALKPHOS 96     No results for input(s): INR in the last 72 hours.   Recent Labs     08/14/21  1330   TROPONINI 0.07*       Urinalysis:      Lab Results   Component Value Date    NITRU Negative 08/15/2021    WBCUA 835 08/15/2021    BACTERIA 1+ 08/15/2021    RBCUA 6 08/15/2021    BLOODU MODERATE 08/15/2021    SPECGRAV 1.006 08/15/2021    GLUCOSEU Negative 08/15/2021       Radiology:  XR CHEST PORTABLE   Final Result Mild cardiomegaly was noted with mild central pulmonary vascular congestion   with trace right and small left pleural effusions. The findings are   compatible with early-mild congestive heart failure. Small to moderate fixed hiatal hernia. Marked degenerative osteo arthritic changes of each glenohumeral joint was   noted with chronic bilateral anterior dislocations. Assessment/Plan:  1. Hyponatremia- likely sec to secondary hyperaldo state/ SIADH frm drugs/ diuretic effect  -will check FENA  -check am cortisol/ TSH  -Serum osmo/ urine osmo  -Urine Na  -Appreciate Nephro input  -Slow measured saline repleition    2. Symptomatic bradycardia without tachycardia. Likely from hyperkalemia of 5.5- no obvious drug cause. - monitor  -Manage hyperkalemia as per protocol  -Keep Atropine 0.5mg at bedside  -Check TSH  -Appreciate cardiology advice    3. Acute/ decompensated Congestive heart failure with features of fluid overload- unknown type yet.  -may be arrhythmogenic or from CAD  -Activate CHF pathway- I/O chart, ow salt diet, daily weights, Diuresis, electrolyte repletion  4. Essential hypertension-good BP profile-continue current medications  5. Rheumatoid arthritis stable for now  6. Hypothyroidism, TSH within normal limits, will continue levothyroxine  7. Acute renal failure, nephrology consulted, possible mild acute component of kidney injury. 8.  Depression, patient on Prozac, will hold for now even though she is on other medications that can cause hyponatremia will resume other medication pending further nephrology recommendations  9. Generalized weakness, due to CHF/ hyponatremia/ bradycardia. Will need PT/OT  10. Minimally elevated troponin, no chest pain, likely due to bradycardia and heart failure, cardiology will follow  11.   Hyperkalemia-resolved, mild, will monitor     Critical care time including but not limited to physical exam, ordering and following on critical labs, ordering critical medications on a patient with potential life-threatening complications and not including any procedure time 42 minutes        DVT Prophylaxis: Lovenox  Diet: No diet orders on file  Code Status: Prior     PT/OT Eval Status: Ordered     Dispo -ICU       Ovi Lacy MD

## 2021-08-15 NOTE — PROGRESS NOTES
Physician Progress Note      Leena DAVIES #:                  [id number]  :                       1940  ADMIT DATE:       2021 12:58 PM  [address] [place] DATE:  RESPONDING  PROVIDER #:        Casey Roldan MD        QUERY TEXT:    Stage of Chronic Kidney Disease: Please provide further specificity, if known. Clinical indicators include: bun, creatinine, ckd  Options provided:  -- Chronic kidney disease stage 1  -- Chronic kidney disease stage 2  -- Chronic kidney disease stage 3  -- Chronic kidney disease stage 3a  -- Chronic kidney disease stage 3b  -- Chronic kidney disease stage 4  -- Chronic kidney disease stage 5  -- Chronic kidney disease stage 5, requiring dialysis  -- End stage renal disease  -- Other - I will add my own diagnosis  -- Disagree - Not applicable / Not valid  -- Disagree - Clinically Unable to determine / Unknown        PROVIDER RESPONSE TEXT:    Provider dismissed this query because it was not applicable to the patient or   not a valid query.   Its acute renal failure      Electronically signed by:  Casey Roldan MD 8/15/2021 7:29 PM

## 2021-08-15 NOTE — CONSULTS
History and Physical  [facility]   Cardiology    Chief Complaint: sinus bradycardiac and hyperkalemia    HPI:     Patient is a [de-identified] y.o. female presented with severe weakness. She presented with sinus bradycardia, marked hyponatremia and hyperkalemia. She also has wheezing and fluid retention. She is hard of hearing but does have some wheezing and left leg jumping as most concerning sx. Cards consult. Past Medical History:   Diagnosis Date    Allergic rhinitis     Anxiety     Arthritis     Asthma     Chronic kidney disease     Depression     Diverticulitis     GERD (gastroesophageal reflux disease)     Hyperlipidemia     Hypertension     Overactive bladder     PVD (peripheral vascular disease) (HCC)     Thyroid disease     Tinea corporis     Vitamin B12 deficiency       Past Surgical History:   Procedure Laterality Date    NV OFFICE/OUTPT VISIT,PROCEDURE ONLY Right 11/8/2018    OPEN REDUCTION INTERNAL FIXATION RIGHT HIP GAMMA NAIL WITH C-ARM performed by Merlinda Goring, MD at [address]      UPPER GASTROINTESTINAL ENDOSCOPY N/A 9/14/2020    ESOPHAGOGASTRODUODENOSCOPY performed by Alfredito Diamond MD at [address]        Medications Prior to Admission: docusate sodium (COLACE) 100 MG capsule, Take 100 mg by mouth 2 times daily  gabapentin (NEURONTIN) 100 MG capsule, Take 100 mg by mouth 2 times daily.   hydrALAZINE (APRESOLINE) 10 MG tablet, Take 10 mg by mouth every 8 hours  potassium chloride (MICRO-K) 10 MEQ extended release capsule, Take 20 mEq by mouth daily  latanoprost (XALATAN) 0.005 % ophthalmic solution, Place 1 drop into both eyes nightly  levothyroxine (SYNTHROID) 150 MCG tablet, Take 150 mcg by mouth Daily  mirtazapine (REMERON) 15 MG tablet, Take 7.5 mg by mouth nightly  linaCLOtide (LINZESS) 72 MCG CAPS capsule, Take 1 capsule by mouth every morning (before breakfast)  diclofenac sodium (VOLTAREN) 1 % GEL, Apply 2 g topically 2 times daily  metoclopramide (REGLAN) 5 MG tablet, Take 1 tablet by mouth 3 times daily (Patient taking differently: Take 5 mg by mouth 3 times daily (with meals) Hold for loose stools)  pantoprazole (PROTONIX) 40 MG tablet, Take 1 tablet by mouth 2 times daily  FLUoxetine (PROZAC) 10 MG capsule, Take 30 mg by mouth daily   aspirin 81 MG tablet, Take 81 mg by mouth daily   loratadine (CLARITIN) 10 MG tablet, Take 10 mg by mouth daily   NIFEdipine (ADALAT CC) 60 MG extended release tablet, Take 60 mg by mouth daily   psyllium (KONSYL) 28.3 % PACK, Take 1 packet by mouth daily  predniSONE (DELTASONE) 5 MG tablet, Take 5 mg by mouth daily  Multiple Vitamins-Minerals (MULTIVITAMIN WITH MINERALS) tablet, Take 1 tablet by mouth daily  melatonin 5 MG TABS tablet, Take 5 mg by mouth nightly   busPIRone (BUSPAR) 10 MG tablet, Take 10 mg by mouth 4 times daily  fluticasone (FLONASE) 50 MCG/ACT nasal spray, 2 sprays by Nasal route nightly   folic acid (FOLVITE) 1 MG tablet, Take 1 mg by mouth daily   hydroxychloroquine (PLAQUENIL) 200 MG tablet, Take by mouth 2 times daily   lisinopril (PRINIVIL;ZESTRIL) 40 MG tablet, Take 40 mg by mouth daily   oxybutynin (DITROPAN-XL) 5 MG extended release tablet, Take 5 mg by mouth nightly   pravastatin (PRAVACHOL) 10 MG tablet, Take 10 mg by mouth nightly   sucralfate (CARAFATE) 1 GM tablet, Take 1 g by mouth 3 times daily (before meals)   loperamide (IMODIUM) 2 MG capsule, Take 2 mg by mouth 4 times daily as needed for Diarrhea  aluminum & magnesium hydroxide-simethicone (MYLANTA) 400-400-40 MG/5ML SUSP, Take 30 mLs by mouth every 6 hours as needed  HYDROcodone-acetaminophen (NORCO) 5-325 MG per tablet, Take 1 tablet by mouth every 4 hours as needed for Pain.   Dextromethorphan-guaiFENesin  MG/5ML SYRP, Take 10 mLs by mouth every 4 hours as needed for Cough  calcium carbonate (TUMS) 500 MG chewable tablet, Take 1 tablet by mouth every 8 hours as needed   polyethylene glycol (GLYCOLAX) 17 GM/SCOOP powder, Take 17 g by mouth daily as needed  Nutritional Supplements (ENSURE CLEAR) LIQD, Take 1 Bottle by mouth 2 times daily  ondansetron (ZOFRAN) 4 MG tablet, Take 1 tablet by mouth daily as needed for Nausea or Vomiting (Patient taking differently: Take 4 mg by mouth every 8 hours as needed for Nausea or Vomiting )  hydrOXYzine (ATARAX) 25 MG tablet, Take 25 mg by mouth 3 times daily as needed  cyclobenzaprine (FLEXERIL) 5 MG tablet, Take 5-10 mg by mouth 3 times daily as needed   nystatin (MYCOSTATIN) 462156 UNIT/GM powder, by Intratympanic route 2 times daily as needed   albuterol sulfate  (90 Base) MCG/ACT inhaler, Inhale 2 puffs into the lungs every 6 hours as needed for Wheezing   magnesium hydroxide (MILK OF MAGNESIA) 400 MG/5ML suspension, Take 30 mLs by mouth daily as needed   acetaminophen (TYLENOL) 325 MG tablet, Take 650 mg by mouth every 6 hours as needed     Allergies   Allergen Reactions    Alendronate Sodium     Benadryl [Diphenhydramine]      insomnia    Ciprofloxacin     Crestor [Rosuvastatin Calcium]     Hydroxychloroquine Sulfate     Minocycline     Naprosyn [Naproxen]     Niaspan [Niacin]     Red Dye     Risedronate       Social History     Tobacco Use    Smoking status: Never Smoker    Smokeless tobacco: Never Used   Substance Use Topics    Alcohol use: No      History reviewed. No pertinent family history.      Review of Systems:   Very [place]  · Constitutional: No Fever or Weight Loss  · Eyes: No decreased vision  · ENT: Hearing Loss   · Cardiovascular:  dyspnea   · Respiratory:  wheezing    · Gastrointestinal: No abdominal pain, appetite loss, blood in stools, constipation, diarrhea or heartburn  · Genitourinary: No dysuria, trouble voiding, or hematuria  · Musculoskeletal:  No gait disturbance, weakness or joint complaints  · Integumentary: No rash or pruritis  · Neurological: No TIA or stroke symptoms  Left leg twitches   · Psychiatric: No anxiety or depression  · Endocrine: No malaise, fatigue or temperature intolerance  · Hematologic/Lymphatic: No bleeding problems, blood clots or swollen lymph nodes  · Allergic/Immunologic: No nasal congestion or hives      Objective Data:     BP (!) 131/47   Pulse (!) 43   Temp 98.5 °F (36.9 °C)   Resp 12   Ht 5' 1\" (1.549 m)   Wt 120 lb 9.5 oz (54.7 kg)   SpO2 100%   BMI 22.79 kg/m²     General appearance: alert, appears stated age and cooperative  Alert, awake, oriented ? Eyes:  No erythema  Head: atraumatic  Neck:  some JVD  Lungs: wheezes scattered  Heart: regular rate and rhythm, S1, S2 normal, no murmur, click, rub or gallop  Abdomen: soft, non-tender; bowel sounds normal; no masses,  no organomegaly  Extremities: scabs legs, no edema  Skin: see above  Hematologic: no remarkable bruising       ECG: sinus bradycardia, rate=36 and RBBB  No st changes     Data Review    Recent Labs     08/14/21  1330 08/14/21  1811   * 110*   K 5.5* 5.9*  5.9*   CL 79* 80*   CO2 16* 12*   BUN 19 20   CREATININE 1.3* 1.4*     Recent Labs     08/14/21  1330   WBC 7.5   HGB 10.4*   HCT 30.4*   MCV 84.8        Lab Results   Component Value Date    TROPONINI 0.07 08/14/2021         Assessment:     Principal Problem:    Hyponatremia  Active Problems:    Essential hypertension    Rheumatoid arthritis (HCC)    Acquired hypothyroidism    Bradycardia    Chronic kidney disease    Chronic mesenteric ischemia (HCC)    Acute heart failure (HCC)    Elevated troponin    Hyperkalemia  Resolved Problems:    * No resolved hospital problems. *      Plan:     1. The weakness is likely from natremia and hyperkalemia. Since the potassium increased after treatment, decided to use low dose isoproterenol to over come electrolyte effects on rate. Taper Isoproterenol  when k normal and sodium better. 2. Echo pending   Reviewed with RN  2. Exam, cxr, and bnp support fluid overload thus diuresis encouraged.

## 2021-08-16 LAB
ANION GAP SERPL CALCULATED.3IONS-SCNC: 11 MMOL/L (ref 3–16)
ANION GAP SERPL CALCULATED.3IONS-SCNC: 14 MMOL/L (ref 3–16)
ANION GAP SERPL CALCULATED.3IONS-SCNC: 14 MMOL/L (ref 3–16)
BUN BLDV-MCNC: 18 MG/DL (ref 7–20)
BUN BLDV-MCNC: 21 MG/DL (ref 7–20)
BUN BLDV-MCNC: 22 MG/DL (ref 7–20)
CALCIUM SERPL-MCNC: 7.3 MG/DL (ref 8.3–10.6)
CALCIUM SERPL-MCNC: 7.7 MG/DL (ref 8.3–10.6)
CALCIUM SERPL-MCNC: 8.1 MG/DL (ref 8.3–10.6)
CHLORIDE BLD-SCNC: 86 MMOL/L (ref 99–110)
CHLORIDE BLD-SCNC: 87 MMOL/L (ref 99–110)
CHLORIDE BLD-SCNC: 89 MMOL/L (ref 99–110)
CO2: 19 MMOL/L (ref 21–32)
CO2: 21 MMOL/L (ref 21–32)
CO2: 22 MMOL/L (ref 21–32)
CREAT SERPL-MCNC: 1.2 MG/DL (ref 0.6–1.2)
CREAT SERPL-MCNC: 1.2 MG/DL (ref 0.6–1.2)
CREAT SERPL-MCNC: 1.3 MG/DL (ref 0.6–1.2)
GFR AFRICAN AMERICAN: 48
GFR AFRICAN AMERICAN: 52
GFR AFRICAN AMERICAN: 52
GFR NON-AFRICAN AMERICAN: 39
GFR NON-AFRICAN AMERICAN: 43
GFR NON-AFRICAN AMERICAN: 43
GLUCOSE BLD-MCNC: 120 MG/DL (ref 70–99)
GLUCOSE BLD-MCNC: 83 MG/DL (ref 70–99)
GLUCOSE BLD-MCNC: 92 MG/DL (ref 70–99)
LACTIC ACID: 1.3 MMOL/L (ref 0.4–2)
LV EF: 68 %
LVEF MODALITY: NORMAL
MAGNESIUM: 1.7 MG/DL (ref 1.8–2.4)
POTASSIUM REFLEX MAGNESIUM: 3.5 MMOL/L (ref 3.5–5.1)
POTASSIUM REFLEX MAGNESIUM: 3.6 MMOL/L (ref 3.5–5.1)
POTASSIUM REFLEX MAGNESIUM: 4 MMOL/L (ref 3.5–5.1)
PROCALCITONIN: 0.13 NG/ML (ref 0–0.15)
SODIUM BLD-SCNC: 120 MMOL/L (ref 136–145)
SODIUM BLD-SCNC: 121 MMOL/L (ref 136–145)
SODIUM BLD-SCNC: 122 MMOL/L (ref 136–145)

## 2021-08-16 PROCEDURE — 6360000002 HC RX W HCPCS: Performed by: INTERNAL MEDICINE

## 2021-08-16 PROCEDURE — 6370000000 HC RX 637 (ALT 250 FOR IP): Performed by: HOSPITALIST

## 2021-08-16 PROCEDURE — 2060000000 HC ICU INTERMEDIATE R&B

## 2021-08-16 PROCEDURE — 87040 BLOOD CULTURE FOR BACTERIA: CPT

## 2021-08-16 PROCEDURE — 2580000003 HC RX 258: Performed by: INTERNAL MEDICINE

## 2021-08-16 PROCEDURE — 93306 TTE W/DOPPLER COMPLETE: CPT

## 2021-08-16 PROCEDURE — 80048 BASIC METABOLIC PNL TOTAL CA: CPT

## 2021-08-16 PROCEDURE — 97162 PT EVAL MOD COMPLEX 30 MIN: CPT

## 2021-08-16 PROCEDURE — 97530 THERAPEUTIC ACTIVITIES: CPT

## 2021-08-16 PROCEDURE — 83735 ASSAY OF MAGNESIUM: CPT

## 2021-08-16 PROCEDURE — 36415 COLL VENOUS BLD VENIPUNCTURE: CPT

## 2021-08-16 PROCEDURE — 97166 OT EVAL MOD COMPLEX 45 MIN: CPT

## 2021-08-16 PROCEDURE — 87077 CULTURE AEROBIC IDENTIFY: CPT

## 2021-08-16 PROCEDURE — 83605 ASSAY OF LACTIC ACID: CPT

## 2021-08-16 PROCEDURE — P9047 ALBUMIN (HUMAN), 25%, 50ML: HCPCS | Performed by: INTERNAL MEDICINE

## 2021-08-16 PROCEDURE — 94761 N-INVAS EAR/PLS OXIMETRY MLT: CPT

## 2021-08-16 PROCEDURE — 99233 SBSQ HOSP IP/OBS HIGH 50: CPT | Performed by: INTERNAL MEDICINE

## 2021-08-16 PROCEDURE — 6370000000 HC RX 637 (ALT 250 FOR IP): Performed by: INTERNAL MEDICINE

## 2021-08-16 PROCEDURE — 84145 PROCALCITONIN (PCT): CPT

## 2021-08-16 PROCEDURE — 87086 URINE CULTURE/COLONY COUNT: CPT

## 2021-08-16 PROCEDURE — 87186 SC STD MICRODIL/AGAR DIL: CPT

## 2021-08-16 RX ADMIN — CEFTRIAXONE 1000 MG: 1 INJECTION, POWDER, FOR SOLUTION INTRAMUSCULAR; INTRAVENOUS at 17:25

## 2021-08-16 RX ADMIN — HYDRALAZINE HYDROCHLORIDE 10 MG: 10 TABLET, FILM COATED ORAL at 06:23

## 2021-08-16 RX ADMIN — GABAPENTIN 100 MG: 100 CAPSULE ORAL at 09:29

## 2021-08-16 RX ADMIN — FOLIC ACID 1 MG: 1 TABLET ORAL at 09:29

## 2021-08-16 RX ADMIN — PANTOPRAZOLE SODIUM 40 MG: 40 TABLET, DELAYED RELEASE ORAL at 22:12

## 2021-08-16 RX ADMIN — PREDNISONE 5 MG: 5 TABLET ORAL at 09:29

## 2021-08-16 RX ADMIN — Medication 3 MG: at 22:12

## 2021-08-16 RX ADMIN — GABAPENTIN 100 MG: 100 CAPSULE ORAL at 22:11

## 2021-08-16 RX ADMIN — SODIUM CHLORIDE, PRESERVATIVE FREE 10 ML: 5 INJECTION INTRAVENOUS at 09:30

## 2021-08-16 RX ADMIN — LATANOPROST 1 DROP: 50 SOLUTION OPHTHALMIC at 22:13

## 2021-08-16 RX ADMIN — HYDRALAZINE HYDROCHLORIDE 10 MG: 10 TABLET, FILM COATED ORAL at 13:31

## 2021-08-16 RX ADMIN — SODIUM CHLORIDE, PRESERVATIVE FREE 10 ML: 5 INJECTION INTRAVENOUS at 22:13

## 2021-08-16 RX ADMIN — BUSPIRONE HYDROCHLORIDE 10 MG: 10 TABLET ORAL at 17:22

## 2021-08-16 RX ADMIN — ALBUMIN (HUMAN) 25 G: 0.25 INJECTION, SOLUTION INTRAVENOUS at 23:54

## 2021-08-16 RX ADMIN — HYDROXYCHLOROQUINE SULFATE 200 MG: 200 TABLET ORAL at 22:12

## 2021-08-16 RX ADMIN — BUSPIRONE HYDROCHLORIDE 10 MG: 10 TABLET ORAL at 22:12

## 2021-08-16 RX ADMIN — HYDROXYCHLOROQUINE SULFATE 200 MG: 200 TABLET ORAL at 09:29

## 2021-08-16 RX ADMIN — ENOXAPARIN SODIUM 30 MG: 30 INJECTION SUBCUTANEOUS at 22:12

## 2021-08-16 RX ADMIN — HYDRALAZINE HYDROCHLORIDE 10 MG: 10 TABLET, FILM COATED ORAL at 22:12

## 2021-08-16 RX ADMIN — PANTOPRAZOLE SODIUM 40 MG: 40 TABLET, DELAYED RELEASE ORAL at 09:29

## 2021-08-16 RX ADMIN — BUSPIRONE HYDROCHLORIDE 10 MG: 10 TABLET ORAL at 13:31

## 2021-08-16 RX ADMIN — LEVOTHYROXINE SODIUM 150 MCG: 0.07 TABLET ORAL at 06:23

## 2021-08-16 RX ADMIN — BUSPIRONE HYDROCHLORIDE 10 MG: 10 TABLET ORAL at 09:29

## 2021-08-16 RX ADMIN — FUROSEMIDE 20 MG: 10 INJECTION, SOLUTION INTRAMUSCULAR; INTRAVENOUS at 01:53

## 2021-08-16 RX ADMIN — ASPIRIN 81 MG: 81 TABLET, CHEWABLE ORAL at 09:29

## 2021-08-16 RX ADMIN — ALBUMIN (HUMAN) 25 G: 0.25 INJECTION, SOLUTION INTRAVENOUS at 13:30

## 2021-08-16 RX ADMIN — FUROSEMIDE 20 MG: 10 INJECTION, SOLUTION INTRAMUSCULAR; INTRAVENOUS at 14:27

## 2021-08-16 ASSESSMENT — PAIN SCALES - GENERAL
PAINLEVEL_OUTOF10: 0

## 2021-08-16 NOTE — DISCHARGE INSTR - COC
Continuity of Care Form    Patient Name: Viry Sprague   :  1940  MRN:  [id number]    Admit date:  2021  Discharge date:  2021    Code Status Order: Full Code   Advance Directives:      Admitting Physician:  Orlene Hatchet, MD  PCP: Jose Luis Nielsen MD    Discharging Nurse: CARLOS BRIZUELA [facility] Unit/Room#: N1W-7474/2105-01  Discharging Unit Phone Number: [phone]    Emergency Contact:   Extended Emergency Contact Information  Primary Emergency Contact: Lorenza Richardson [address] Phone: [phone]  Mobile Phone: [phone]  Relation: Child  Secondary Emergency Contact: Roderick Grady [address] Phone: [phone]  Mobile Phone: [phone]  Relation: Child    Past Surgical History:  Past Surgical History:   Procedure Laterality Date    ID OFFICE/OUTPT VISIT,PROCEDURE ONLY Right 2018    OPEN REDUCTION INTERNAL FIXATION RIGHT HIP GAMMA NAIL WITH C-ARM performed by Shanika Pacheco MD at [address] [place] [address]      UPPER GASTROINTESTINAL ENDOSCOPY N/A 2020    ESOPHAGOGASTRODUODENOSCOPY performed by Frank Hernandez MD at [address] [place] [address]       Immunization History:   Immunization History   Administered Date(s) Administered    COVID-19, Pfizer, PF, 30mcg/0.3mL 2021    Meningococcal B, OMV (Bexsero) 10/29/2013    Pneumococcal Conjugate 13-valent D[place]) 2015    Tdap (Boostrix, Adacel) 2018       Active Problems:  Patient Active Problem List   Diagnosis Code    Essential hypertension I10    Rheumatoid arthritis ([facility] [address] M06.9    Major depressive disorder F32.9    Acquired hypothyroidism E03.9    REJI (generalized anxiety disorder) F41.1    Mild intermittent asthma J45.20    PVD (peripheral vascular disease) ([facility]) I73.9    Anemia D64.9    Symptomatic bradycardia R00.1    Chronic constipation K59.09    Chronic kidney disease N18.9    Chronic rhinitis J31.0    Encounter for Dependent  Bathing  Assisted  Dressing  Assisted  Toileting  Assisted  Feeding  Assisted  Med Admin  Assisted  Med Delivery   whole    Wound Care Documentation and Therapy:  Incision 11/08/18 Hip Right (Active)   Number of days: 7677        Elimination:  Continence:   · Bowel: No  · Bladder: No  Urinary Catheter: None   Colostomy/Ileostomy/Ileal Conduit: No       Date of Last BM: 8/21/21    Intake/Output Summary (Last 24 hours) at 8/16/2021 1731  Last data filed at 8/16/2021 1401  Gross per 24 hour   Intake 695.29 ml   Output 1320 ml   Net -624.71 ml     I/O last 3 completed shifts: In: 695.3 [P.O.:600; I.V.:7.5; IV Piggyback:87.8]  Out: 1770 [Urine:1770]          Heart Failure Instructions for Daily Management  Patient was treated for acute diastolic heart failure. she  will require the following:     Please weigh daily on the same scale and approximately the same time of day. Report weight gain of 3 pounds/day or 5 pounds/week to : [facility] [phone].  Please use hospital discharge weight as baseline reference.  Please monitor for signs and symptoms of and report to MD:  o Worsening Heart Failure: sudden weight gain, shortness of breath, lower extremity or general edema/swelling, abdominal bloating/swelling, inability to lie flat, intolerance to usual activity, or cough (especially at night). Report these finding even if no increase in weight.  o Dehydration:  having difficulty or a decrease in urination, dizziness, worsening fatigue, or new onset/worsening of generalized weakness.  Please continue a LOW SODIUM diet and LIMIT fluid intake to 48 - 64 ounces ( 1.5 - 2 liters) per day. Cookie Hutton Call [facility] ([place] [phone] and/or Alfonso Magdaleno @ [phone] with any questions or concerns.  Please continue heart failure education to patient and family/support system.  See After Visit Summary for hospital follow up appointment details.    Consider spiritual care referral for support and/or completion of advance directives [phone].  Consider: having the facility MD complete required 7 day follow up. Safety Concerns:     None    Impairments/Disabilities:      Vision and Hearing    Nutrition Therapy:  Current Nutrition Therapy:   - Oral Diet:  pureed    Routes of Feeding: Oral  Liquids: Thin Liquids  Daily Fluid Restriction: yes - amount 1200  Last Modified Barium Swallow with Video (Video Swallowing Test): not done    Treatments at the Time of Hospital Discharge:   Respiratory Treatments: as needed  Oxygen Therapy:  is not on home oxygen therapy. Ventilator:    - No ventilator support    Rehab Therapies: Physical Therapy, Occupational Therapy and Speech  Weight Bearing Status/Restrictions: No weight bearing restirctions  Other Medical Equipment (for information only, NOT a DME order):  hospital bed  Other Treatments: N/A    Patient's personal belongings (please select all that are sent with patient):  Glasses, Hearing Aides bilateral    RN SIGNATURE:  Electronically signed by Radha Turner RN on 8/23/21 at 2:28 PM EDT    CASE MANAGEMENT/SOCIAL WORK SECTION    Inpatient Status Date: 8/14/2021    Readmission Risk Assessment Score:  Readmission Risk              Risk of Unplanned Readmission:  33           Discharging to Facility/ Agency   · Name: [facility]  · Address:  [address] [place] [address]   · Phone:  [phone]  · Fax:  [phone]    / signature: Electronically signed by Geovanna Patel on 8/20/21 at 1:50 PM EDT    PHYSICIAN SECTION    Prognosis: Fair    Condition at Discharge: Stable    Rehab Potential (if transferring to Rehab):  Fair    Recommended Labs or Other Treatments After Discharge: PT/OT/RN consult  follow up with your PCP, Cardiologist in 1 week  Check BMP in 1 week     Physician Certification: I certify the above information and transfer of Miller Taylor  is necessary for the

## 2021-08-16 NOTE — PLAN OF CARE
Problem: Falls - Risk of:  Goal: Will remain free from falls  Description: Will remain free from falls  8/16/2021 0321 by Omar Gorman RN  Outcome: Ongoing  8/15/2021 1649 by Crys Leach RN  Outcome: Ongoing  Goal: Absence of physical injury  Description: Absence of physical injury  8/16/2021 0321 by Omar Gorman RN  Outcome: Ongoing  8/15/2021 1649 by Crys Leach RN  Outcome: Ongoing     Problem: Skin Integrity:  Goal: Will show no infection signs and symptoms  Description: Will show no infection signs and symptoms  8/16/2021 0321 by Omar Gorman RN  Outcome: Ongoing  8/15/2021 1649 by Crys Leach RN  Outcome: Ongoing  Goal: Absence of new skin breakdown  Description: Absence of new skin breakdown  8/16/2021 0321 by Omar Gorman RN  Outcome: Ongoing  8/15/2021 1649 by Crys Leach RN  Outcome: Ongoing

## 2021-08-16 NOTE — PROGRESS NOTES
[facility]   Daily Progress Note      Admit Date:  8/14/2021    CC: \"  The patient is an [de-identified]year old female with past medical history significant for HTN, HLD, PVD, thyroid disease, asthma and CKD who presented to the ED for weakness. She had multiple electrolyte abnormalities, was fluid overloaded and bradycardic but BP was normal/hypertensive. She was started on Isuprel overnight which is now off with heart rates in the 70s.      Subjective:  Pt with no acute overnight events. Denies chest pain, palpitations, and dyspnea. Heart rate has improved and she is in sinus rhythm at rate of 68 bpm    Objective:   BP (!) 149/62   Pulse 68   Temp 100.9 °F (38.3 °C) (Axillary)   Resp 13   Ht 5' 1\" (1.549 m)   Wt 119 lb 4.3 oz (54.1 kg)   SpO2 97%   BMI 22.54 kg/m²     Intake/Output Summary (Last 24 hours) at 8/16/2021 1115  Last data filed at 8/16/2021 1045  Gross per 24 hour   Intake 455.29 ml   Output 2320 ml   Net -1864.71 ml     Wt Readings from Last 3 Encounters:   08/16/21 119 lb 4.3 oz (54.1 kg)   03/18/21 106 lb (48.1 kg)   03/08/21 106 lb (48.1 kg)     Telemetry:NSR    Physical Exam:  General:  NAD, Awake, alert and oriented X4  Skin:  Warm and dry  Neck:  Supple, ? JVP appreciated, no bruit  Chest:  Clear to auscultation, no wheezes/rhonchi/rales  Cardiovascular:  Regular rate.  S1S2  Abdomen:  Soft, nontender, +bowel sounds  Extremities:  No LE edema    Cardiac Diagnosis:  hypertension, hyperlipidemia and CHF    Medications:    albumin human  25 g Intravenous Q12H    furosemide  20 mg Intravenous BID    aspirin  81 mg Oral Daily    busPIRone  10 mg Oral 4x Daily    folic acid  1 mg Oral Daily    pantoprazole  40 mg Oral BID    predniSONE  5 mg Oral Daily    sodium chloride flush  5-40 mL Intravenous 2 times per day    enoxaparin  30 mg Subcutaneous Nightly    hydroxychloroquine  200 mg Oral BID    levothyroxine  150 mcg Oral Daily    hydrALAZINE  10 mg Oral 3 times per day    latanoprost  1 drop Both Eyes Nightly    gabapentin  100 mg Oral BID      sodium chloride      isoproterenol (ISUPREL) infusion Stopped (08/15/21 0712)     melatonin, albuterol, fluticasone, sodium chloride flush, sodium chloride, ondansetron **OR** ondansetron, polyethylene glycol, acetaminophen **OR** acetaminophen    Lab Data:  CBC:   Recent Labs     08/14/21  1330 08/15/21  0745   WBC 7.5 6.1   HGB 10.4* 8.9*    210     BMP:    Recent Labs     08/15/21  0745 08/15/21  1537 08/16/21  0057   *  116* 118* 120*   K 4.1 4.1 4.0   CO2 19* 20* 19*   BUN 17 17 18   CREATININE 1.1 1.3* 1.2     LIVR:   Recent Labs     08/14/21  1330   AST 20   ALT 13     INR:  No results for input(s): INR in the last 72 hours. APTT: No results for input(s): APTT in the last 72 hours. BNP:  No results for input(s): BNP in the last 72 hours. Imaging: Echocardiogram- pending    Assessment:Plan:  Junctional bradycardia                      - in the setting of multiple electrolyte abnormalities              - i back in sinus rhythm off Isuprel with rates in the 60s              - never caused her to become hypotensive so was likely not causing                 symptoms              - optimize electrolytes and monitor, no immediate need for pacing     Acute heart failure.   NYHA class III on admission              - unclear if systolic or diastolic              -Echocardiogram is still pending              - Now on 20mg IV Lasix bid, monitor Na with this             -We will make further recommendations after  reviewing                              echocardiogram  Hyponatremia              - likely due to hypervolemia              - continue diuresis              -Nephrology is following     Essential HTN              - BP has been mildly elevated, should improve with some diuresis              -Currently on hydralazine 10 mg every 8 hours             -We will increase the dose if  blood pressure continues to run elevated    Complexity of medical decision making-high    Electronically signed by Chantale Shoemaker MD on 8/16/2021 at 11:15 AM

## 2021-08-16 NOTE — PROGRESS NOTES
0700: Report received from [facility]: Dr. Nader Wilson, cardiology, at bedside. 1132: Patient downgraded to PCU per Dr. Qing Gilliam.

## 2021-08-16 NOTE — PROGRESS NOTES
Occupational Therapy   Occupational Therapy Initial Assessment  Date: 2021   Patient Name: Jose Guzman  MRN: [id number]     : 1940    Date of Service: 2021    Discharge Recommendations:  ECF without OT  OT Equipment Recommendations  Equipment Needed: No    Assessment   Performance deficits / Impairments: Decreased functional mobility ; Decreased balance;Decreased safe awareness;Decreased ADL status; Decreased high-level IADLs;Decreased endurance;Decreased cognition;Decreased strength;Decreased sensation;Decreased fine motor control;Decreased ROM  Assessment: [de-identified] y/o female admitted 2021 with generalized weakness. Pt being treated for sinus bradycardia, hyponatremia, hyperkalemia, and acute heart failure. PTA pt is a long term [facility] at [facility]. Per pt- she rarely gets OOB and is dependent for ADLs, including feeding. Today, pt required max/dependent x 2 for bed mobility. Pt with decreased sitting balance and required use of maxi lesly lift to transfer to chair. Pt has ROM to bring hand to mouth, however decreased  strength. Anticipate pt will require max A for ADLs. Pt appears close to baseline and anticipate will return to LTC. No further OT is warranted. Prognosis: Fair  Decision Making: Medium Complexity  OT Education: OT Role;Plan of Care;Transfer Training;Orientation  Barriers to Learning: [place], appears self limiting at times,  REQUIRES OT FOLLOW UP: No  Activity Tolerance  Activity Tolerance: somewhat self limiting, decreased cognition  Safety Devices  Safety Devices in place: Yes  Type of devices: Nurse notified;Call light within reach           Patient Diagnosis(es): The primary encounter diagnosis was Symptomatic bradycardia. Diagnoses of Hyponatremia and Acute congestive heart failure, unspecified heart failure type [facility]) were also pertinent to this visit.      has a past medical history of Allergic rhinitis, Anxiety, Arthritis, Asthma, Chronic kidney disease, Depression, Diverticulitis, GERD (gastroesophageal reflux disease), Hyperlipidemia, Hypertension, Overactive bladder, PVD (peripheral vascular disease) ([facility] [address] Thyroid disease, Tinea corporis, and Vitamin B12 deficiency. has a past surgical history that includes Thyroidectomy; Spine surgery; pr office/outpt visit,procedure only (Right, 11/8/2018); and Upper gastrointestinal endoscopy (N/A, 9/14/2020). Restrictions  Restrictions/Precautions  Restrictions/Precautions: Fall Risk    Subjective   General  Additional Pertinent Hx: [de-identified] y/o female admitted 8/14/2021 with generalized weakness. Pt being treated for sinus bradycardia, hyponatremia, hyperkalemia, and acute heart failure. Family / Caregiver Present: No  Referring Practitioner: Swathi Chew MD  Subjective  Subjective: Pt seen bedside and agreeable to therapy. General Comment  Comments: Per RN ok for therapy. Social/Functional History  Social/Functional History  Type of Home: Facility (Pt long term resident at [facility])  Additional Comments: Pt reports she voids via depends, requires assist for feeding d/t numb/tingling hands, and sometimes gets OOB with staff to w/c. If transfers OOB, requires lift for 2 assist pivot.        Objective   Vision: Within Functional Limits  Hearing: Exceptions to [facility]  Hearing Exceptions: Hard of hearing/hearing concerns;Bilateral hearing aid    Orientation  Overall Orientation Status: Impaired (somewhat confused to recent events/situation)  Orientation Level: Oriented to place;Oriented to person;Disoriented to time     Balance  Sitting Balance:  (briefly EOB with posterior lean and minimal effort to correct)  Functional Mobility  Functional Mobility Comments: bed > chair via petey stedy     ADL  Feeding:  (has ability to bring hand to mouth, however anticipate difficulty holding utensil)  Toileting: Dependent/Total ([place])  Additional Comments: Anticipate pt will be max/dependent for all ADLs based on balance and pt effort. Bed mobility  Rolling to Left: Moderate assistance  Rolling to Right: Moderate assistance  Supine to Sit: Dependent/Total;2 Person assistance  Sit to Supine: Dependent/Total;2 Person assistance   Pt rolled R/L to straighten bed linens prior to transfer OOB with maxi lesly lift     Cognition  Overall Cognitive Status: Exceptions  Arousal/Alertness: Inconsistent responses to stimuli  Following Commands: Follows one step commands with repetition; Follows one step commands with increased time (d/t [place])  Attention Span: Attends with cues to redirect  Memory: Decreased short term memory;Decreased recall of recent events  Safety Judgement: Decreased awareness of need for safety  Problem Solving: Decreased awareness of errors  Insights: Decreased awareness of deficits  Initiation: Requires cues for all  Sequencing: Requires cues for all  Cognition Comment: self limiting at times        Sensation  Overall Sensation Status:  (reporting numb/tingling in edmar hands)        LUE AROM (degrees)  LUE General AROM: decreased overhead shoulder ROM  Left Hand AROM (degrees)  Left Hand General AROM: weak grasp  RUE AROM (degrees)  RUE General AROM: decreased overhead shoulder ROM  Right Hand AROM (degrees)  Right Hand General AROM: weak grasp     Plan   Plan  Times per week: DC acute OT    AM-PAC Score  AM-PAC Inpatient Daily Activity Raw Score: 9 (08/16/21 0918)  AM-PAC Inpatient ADL T-Scale Score : 25.33 (08/16/21 0918)  ADL Inpatient CMS 0-100% Score: 79.59 (08/16/21 5098)  ADL Inpatient CMS G-Code Modifier : CL (08/16/21 6566)    Goals  Short term goals  Time Frame for Short term goals: no acute OT goals identified.   Patient Goals   Patient goals : no goal stated       Therapy Time   Individual Concurrent Group Co-treatment   Time In 0835         Time Out 0913         Minutes 38         Timed Code Treatment Minutes: 23 Minutes     This note to serve as OT d/c summary if pt is d/c-ed prior to next therapy session.     Demetrice Harrell, OTR/L

## 2021-08-16 NOTE — ACP (ADVANCE CARE PLANNING)
Advance Care Planning     Advance Care Planning Activator (Inpatient)  Conversation Note      Date of ACP Conversation: 8/16/2021     Conversation Conducted with:  Healthcare Decision Maker: Named in Advance Directive or Healthcare Power of  (name) Hill Frankel, daughter    ACP Activator: Nick Patel RN    Health Care Decision Maker:     Current Designated Health Care Decision Maker:     Primary Decision Maker: Mirian Gray - [place] - [phone]  Click here to complete Healthcare Decision Makers including section of the Healthcare Decision Maker Relationship (ie \"Primary\")  Today we documented Decision Maker(s) consistent with ACP documents on file. Care Preferences    Ventilation: \"If you were in your present state of health and suddenly became very ill and were unable to breathe on your own, what would your preference be about the use of a ventilator (breathing machine) if it were available to you? \"      Would the patient desire the use of ventilator (breathing machine)?: yes    \"If your health worsens and it becomes clear that your chance of recovery is unlikely, what would your preference be about the use of a ventilator (breathing machine) if it were available to you? \"     Would the patient desire the use of ventilator (breathing machine)?: Yes      Resuscitation  \"CPR works best to restart the heart when there is a sudden event, like a heart attack, in someone who is otherwise healthy. Unfortunately, CPR does not typically restart the heart for people who have serious health conditions or who are very sick. \"    \"In the event your heart stopped as a result of an underlying serious health condition, would you want attempts to be made to restart your heart (answer \"yes\" for attempt to resuscitate) or would you prefer a natural death (answer \"no\" for do not attempt to resuscitate)? \" yes       [] Yes   [x] No   Educated Patient / Lynn Spangler regarding differences between Advance Directives and portable DNR orders.     Length of ACP Conversation in minutes:  4    Conversation Outcomes:  [x] ACP discussion completed  [] Existing advance directive reviewed with patient; no changes to patient's previously recorded wishes  [] New Advance Directive completed  [] Portable Do Not Rescitate prepared for Provider review and signature  [] POLST/POST/MOLST/MOST prepared for Provider review and signature      Follow-up plan:    [] Schedule follow-up conversation to continue planning  [] Referred individual to Provider for additional questions/concerns   [] Advised patient/agent/surrogate to review completed ACP document and update if needed with changes in condition, patient preferences or care setting    [x] This note routed to one or more involved healthcare providers               Omkar Carlson RN, BSN, Case Management  Phone: [phone]  Electronically signed by Omkar Carlson RN on 8/16/2021 at 5:37 PM

## 2021-08-16 NOTE — CARE COORDINATION
Patent is from [facility]. Call to Balbina at [facility], left message requesting a call back to check her status at the facility and also check for Covid vaccinations and if testing is needed on return. Geovanni Shah RN, BSN, Case Management  Phone: [phone]  Electronically signed by Geovanni Shah RN on 8/16/2021 at 10:18 AM     Received a call back from Swapna Wilson at [facility] ([phone]), she advised the patient is LTC and able to return. She is fully vaccinated (1/5/2021 and 1/26/2021 Pfizer). She may need a test to return as they are implementing testing again in the facility. Geovanni Shah RN, BSN, Case Management  Phone: [phone]  Electronically signed by Geovanni Shah RN on 8/16/2021 at 10:31 AM     Spoke with the patient's daughter, [facility], in the room. She advised they are planning for her to return to [facility] at D/C, but was not able to answer questions regarding advanced care planning documents and advised to call her sister, Jovanni Steward who handles all of this for the patient. Left a HIPPA compliant message for Jovanni Steward requesting a call back. Geovanni Shah RN, BSN, Case Management  Phone: [phone]  Electronically signed by Geovanni Shah RN on 8/16/2021 at 5:30 PM     Spoke with Jovanni Steward, patient's daughter, she agrees the plan is to return to [facility]. She would like to see if she can go skilled when she goes back due to not being able to feed herself and has become very note. Will call [facility] in AM to discuss.   Geovanni Shah RN, BSN, Case Management  Phone: [phone]  Electronically signed by Geovanni Shah RN on 8/16/2021 at 5:36 PM

## 2021-08-16 NOTE — PROGRESS NOTES
Nephrology Progress Note   [facility]      This patient is a [de-identified]year old female whom we are following for hyponatremia. Subjective: The patient was seen and examined. BP high, HR is better. Off of Isuprel  Feels betetr No HA dizziness nausea Ate BF this AM    Family History: No family at bedside and questions were answered      Vitals:  BP (!) 149/62   Pulse 68   Temp 100.9 °F (38.3 °C) (Axillary)   Resp 13   Ht 5' 1\" (1.549 m)   Wt 119 lb 4.3 oz (54.1 kg)   SpO2 97%   BMI 22.54 kg/m²   I/O last 3 completed shifts: In: 215.3 [P.O.:120; I.V.:7.5; IV Piggyback:87.8]  Out: 2370 [Urine:2370]  I/O this shift:  In: 240 [P.O.:240]  Out: 150 [Urine:150]    Physical Exam:  Physical Exam  Vitals reviewed. Constitutional:       General: She is not in acute distress. Appearance: Normal appearance. HENT:      Head: Normocephalic and atraumatic. Eyes:      General: No scleral icterus. Conjunctiva/sclera: Conjunctivae normal.   Cardiovascular:      Rate and Rhythm: Normal rate. Heart sounds: No friction rub. Pulmonary:      Effort: Pulmonary effort is normal. No respiratory distress. Breath sounds: Rales present. Abdominal:      General: Bowel sounds are normal. There is no distension. Tenderness: There is no abdominal tenderness. Musculoskeletal:      Right lower leg: Edema present. Left lower leg: No edema. Neurological:      Mental Status: She is alert.            Medications:   albumin human  25 g Intravenous Q12H    furosemide  20 mg Intravenous BID    aspirin  81 mg Oral Daily    busPIRone  10 mg Oral 4x Daily    folic acid  1 mg Oral Daily    pantoprazole  40 mg Oral BID    predniSONE  5 mg Oral Daily    sodium chloride flush  5-40 mL Intravenous 2 times per day    enoxaparin  30 mg Subcutaneous Nightly    hydroxychloroquine  200 mg Oral BID    levothyroxine  150 mcg Oral Daily    hydrALAZINE  10 mg Oral 3 times per day    latanoprost  1 drop Both Eyes Nightly    gabapentin  100 mg Oral BID         Labs:  Recent Labs     08/14/21  1330 08/15/21  0745   WBC 7.5 6.1   HGB 10.4* 8.9*   HCT 30.4* 26.0*   MCV 84.8 85.1    210     Recent Labs     08/14/21  1330 08/14/21  1811 08/15/21  0745 08/15/21  1537 08/16/21  0057   *   < > 116*  116* 118* 120*   K 5.5*   < > 4.1 4.1 4.0   CL 79*   < > 84* 83* 87*   CO2 16*   < > 19* 20* 19*   GLUCOSE 114*   < > 81 115* 83   MG 2.00  --   --   --   --    BUN 19   < > 17 17 18   CREATININE 1.3*   < > 1.1 1.3* 1.2   LABGLOM 39*   < > 48* 39* 43*   GFRAA 48*   < > 58* 48* 52*    < > = values in this interval not displayed. Assessment/Plan:    Hyponatremia.  - Has history of chronic hyponatremia with baseline in the low 130smmol/L. TSH was elevated in 05-07/2021 but Levothyroxine was adjusted and level this admission was 2.69mmol/L.  - Clinically hypervolemic. Na+ improving to 120 meq this AM Will continue with Lasix 20mg IV 2x/day with IV albumin.  - BMP  q8H.   - Goal correction of not more than 6-8mmol/L in 24H. Goal of around  124mmol/L by this afternoon. .     Hyperkalemia.  - In the setting of SHANELL. Also was on KCl and Lisinopril prior to admission.  - D/C'd potassium supplements and ACEI. - Improved with medical management. - Low potassium diet.     Metabolic Acidosis. - From SHANELL +/- hypoperfusion. Lactic acid within normal.  - Improving, received some bicarb last night. Monitor     Acute Kidney Injury.  - Suspect pre-renal in etiology in the setting of bradycardia/hypotension.  - Kidney function improving. Cr 1.2 mg today   - Avoid hypotension and nephrotoxic medications. - Akhtar catheter for accurate Is and Os.     Bradycardia. . Improved. Cardiology following        Ruby Rodríguez MD  [facility]. [facility]  8/16/2021

## 2021-08-16 NOTE — PROGRESS NOTES
Physical Therapy    Facility/Department: [address] ICU  Initial Assessment/Discharge Summary    NAME: Boogie Klein  : 1940  MRN: [id number]    Date of Service: 2021    Discharge Recommendations:  Continue to assess pending progress   PT Equipment Recommendations  Other: Defer to next level of care. Assessment   Body structures, Functions, Activity limitations: Decreased functional mobility ; Decreased strength;Decreased safe awareness;Decreased cognition;Decreased endurance  Assessment: [de-identified] y/o female admit 2021 with Bradycardia, CHF, Hyponatremia. PMH as noted including CKD, PVD, R Hip Fx/Gamma Nail. Pt admit from nursing facility (LTC); appears as if rarely oob and dependent daily care. Attempts at eob dependent assist x 2. Anticipate return to LTC setting; no further PT indicated at this time. Prognosis: Fair  Decision Making: Medium Complexity  History: [de-identified] y/o female admit 2021 with Bradycardia, CHF, Hyponatremia. PMH as noted including CKD, PVD, R Hip Fx/Gamma Nail. Exam: See above. Clinical Presentation: See above. Patient Education: Role of PT, POC, Need to call for assist.  Barriers to Learning: Cognitive, [place]. REQUIRES PT FOLLOW UP: No  Activity Tolerance  Activity Tolerance: Patient limited by cognitive status; Patient limited by endurance       Patient Diagnosis(es): The primary encounter diagnosis was Symptomatic bradycardia. Diagnoses of Hyponatremia and Acute congestive heart failure, unspecified heart failure type [facility]) were also pertinent to this visit. has a past medical history of Allergic rhinitis, Anxiety, Arthritis, Asthma, Chronic kidney disease, Depression, Diverticulitis, GERD (gastroesophageal reflux disease), Hyperlipidemia, Hypertension, Overactive bladder, PVD (peripheral vascular disease) ([facility] [address] Thyroid disease, Tinea corporis, and Vitamin B12 deficiency. has a past surgical history that includes Thyroidectomy;  Spine surgery; pr office/outpt visit,procedure only (Right, 11/8/2018); and Upper gastrointestinal endoscopy (N/A, 9/14/2020). Restrictions  Restrictions/Precautions  Restrictions/Precautions: Fall Risk  Vision/Hearing  Vision: Within Functional Limits  Hearing Exceptions: Hard of hearing/hearing concerns;Bilateral hearing aid     Subjective  General  Chart Reviewed: Yes  Patient assessed for rehabilitation services?: Yes  Additional Pertinent Hx: [de-identified] y/o female admit 8/14/2021 with Bradycardia, CHF, Hyponatremia. PMH as noted including CKD, PVD, R Hip Fx/Gamma Nail. Family / Caregiver Present: No  Referring Practitioner: Dr. Jaron Lambert  Diagnosis: Bradycardia, CHF, Hyponatremia. Other (Comment): Pt follows simple commands, delayed and somewhat self-limiting at times. Subjective  Subjective: Pt agreeable to PT Eval/Rx. Pain Screening  Patient Currently in Pain: Denies          Orientation  Orientation  Overall Orientation Status: Impaired (Pt confused to recent events/situation.)  Orientation Level: Disoriented to person;Oriented to place  Social/Functional History  Social/Functional History  Type of Home: Facility (Pt long term resident at [facility])  Additional Comments: Pt reports she voids via depends, requires assist for feeding d/t numb/tingling hands, and sometimes gets OOB with staff to w/c. If transfers OOB, requires lift for 2 assist pivot. Cognition   Cognition  Overall Cognitive Status: Exceptions  Arousal/Alertness: Inconsistent responses to stimuli  Following Commands: Follows one step commands with repetition; Follows one step commands with increased time (d/t [place])  Attention Span: Attends with cues to redirect  Memory: Decreased short term memory;Decreased recall of recent events  Safety Judgement: Decreased awareness of need for safety  Problem Solving: Decreased awareness of errors  Insights: Decreased awareness of deficits  Initiation: Requires cues for all  Sequencing: Requires cues for all  Cognition Comment: self limiting at times    Objective          AROM RLE (degrees)  RLE General AROM: Limited with functional activities. AROM LLE (degrees)  LLE General AROM: Limited with functional activities (diminished knee ext). AROM RUE (degrees)  RUE General AROM: Diminished overhead shld. AROM LUE (degrees)  LUE General AROM: Diminished overhead shld. Strength Other  Other: Unable to assess due to limited pt participation. Sensation  Overall Sensation Status:  (Pt reports numb/tingling B UEs/Hands.)  Bed mobility  Rolling to Left: Moderate assistance  Rolling to Right: Moderate assistance  Supine to Sit: Dependent/Total;2 Person assistance  Sit to Supine: Dependent/Total;2 Person assistance  Comment: Mod assist Rolling R/L for repositioning linens prior oob via [place]. Transfers  Bed to Chair: Dependent/Total (Via Maxi-Parrish.)        Balance  Comments: Brief attempts at EOB although ongoing post lean; min pt attempts to assist.        Plan   Plan  Times per week: D/C Acute Care PT Services. Safety Devices  Type of devices: Call light within reach, Left in chair, Nurse notified         AM-PAC Score  AM-PAC Inpatient Mobility Raw Score : 8 (08/16/21 0945)  -PAC Inpatient T-Scale Score : 28.52 (08/16/21 0945)  Mobility Inpatient CMS 0-100% Score: 86.62 (08/16/21 0945)  Mobility Inpatient CMS G-Code Modifier : CM (08/16/21 0945)          Goals  Short term goals  Time Frame for Short term goals: No Acute Care PT Goals Identified. Patient Goals   Patient goals : None stated.        Therapy Time   Individual Concurrent Group Co-treatment   Time In [facility]         Time Out 0915         Minutes [address] [facility] Evita Oh

## 2021-08-16 NOTE — PROGRESS NOTES
Hospitalist Progress Note      PCP: Bebo Cohen MD    Chief Complaint. Patient is a 41-year-old female who presented to [facility] [place] for generalized weakness. Patient has past medical history of hypothyroidism depression, hypertension. Date of Admission: 8/14/2021    Subjective:   denies chest pain, nausea, vomiting, shortness of breath, fever or chills. mention feels overall better    Medications:  Reviewed    Infusion Medications    sodium chloride       Scheduled Medications    cefTRIAXone (ROCEPHIN) IV  1,000 mg Intravenous Q24H    albumin human  25 g Intravenous Q12H    furosemide  20 mg Intravenous BID    aspirin  81 mg Oral Daily    busPIRone  10 mg Oral 4x Daily    folic acid  1 mg Oral Daily    pantoprazole  40 mg Oral BID    predniSONE  5 mg Oral Daily    sodium chloride flush  5-40 mL Intravenous 2 times per day    enoxaparin  30 mg Subcutaneous Nightly    hydroxychloroquine  200 mg Oral BID    levothyroxine  150 mcg Oral Daily    hydrALAZINE  10 mg Oral 3 times per day    latanoprost  1 drop Both Eyes Nightly    gabapentin  100 mg Oral BID     PRN Meds: melatonin, albuterol, fluticasone, sodium chloride flush, sodium chloride, ondansetron **OR** ondansetron, polyethylene glycol, acetaminophen **OR** acetaminophen      Intake/Output Summary (Last 24 hours) at 8/16/2021 1447  Last data filed at 8/16/2021 1401  Gross per 24 hour   Intake 695.29 ml   Output 1770 ml   Net -1074.71 ml       Physical Exam Performed:    BP (!) 158/56   Pulse 65   Temp 99.9 °F (37.7 °C) (Axillary)   Resp 13   Ht 5' 1\" (1.549 m)   Wt 119 lb 4.3 oz (54.1 kg)   SpO2 95%   BMI 22.54 kg/m²     General appearance: No apparent distress,   HEENT:  Conjunctivae/corneas clear. Neck: Supple, with full range of motion. Respiratory:  Normal respiratory effort. Clear to auscultation, bilaterally without Rales/Wheezes/Rhonchi.   Cardiovascular: Regular rate and rhythm with normal S1/S2 without murmurs or rubs  Abdomen: Soft, non-tender, non-distended, normal bowel sounds. Musculoskeletal: No cyanosis or edema bilaterally  Neurologic:  without any focal sensory/motor deficits. grossly non-focal.  Psychiatric: Alert and oriented, Normal mood  Peripheral Pulses: +2 palpable, equal bilaterally       Labs:   Recent Labs     08/14/21  1330 08/15/21  0745   WBC 7.5 6.1   HGB 10.4* 8.9*   HCT 30.4* 26.0*    210     Recent Labs     08/15/21  1537 08/16/21  0057 08/16/21  1005   * 120* 121*   K 4.1 4.0 3.5   CL 83* 87* 86*   CO2 20* 19* 21   BUN 17 18 21*   CREATININE 1.3* 1.2 1.3*   CALCIUM 8.0* 7.3* 7.7*     Recent Labs     08/14/21  1330   AST 20   ALT 13   BILITOT 0.3   ALKPHOS 96     No results for input(s): INR in the last 72 hours. Recent Labs     08/14/21  1330   TROPONINI 0.07*       Urinalysis:      Lab Results   Component Value Date    NITRU Negative 08/15/2021    WBCUA 835 08/15/2021    BACTERIA 1+ 08/15/2021    RBCUA 6 08/15/2021    BLOODU MODERATE 08/15/2021    SPECGRAV 1.006 08/15/2021    GLUCOSEU Negative 08/15/2021       Radiology:  XR CHEST PORTABLE   Final Result   Mild cardiomegaly was noted with mild central pulmonary vascular congestion   with trace right and small left pleural effusions. The findings are   compatible with early-mild congestive heart failure. Small to moderate fixed hiatal hernia. Marked degenerative osteo arthritic changes of each glenohumeral joint was   noted with chronic bilateral anterior dislocations.                Assessment/Plan:    Active Hospital Problems    Diagnosis     Hyponatremia [E87.1]     Acute heart failure (HCC) [I50.9]     Elevated troponin [R77.8]     Hyperkalemia [E87.5]     Chronic mesenteric ischemia (HCC) [K55.1]     Symptomatic bradycardia [R00.1]     Essential hypertension [I10]     Rheumatoid arthritis (HCC) [M06.9]     Acquired hypothyroidism [E03.9]     Chronic kidney disease [N18.9]      Patient is a 67-year-old female who presented to [facility] for generalized weakness. Patient has past medical history of hypothyroidism depression, hypertension. Assessment  Hyponatremia  Symptomatic bradycardia  Acute decompensated CHF  Urinary tract infection  Hypertension  Rheumatoid arthritis  Hypothyroidism  SHANELL  Depression  Generalized weakness  Hyperkalemia-resolved    Plan  Continue to monitor BMP, nephrology is following recommends continuing Lasix with IV albumin  Cardiology was consulted recommends monitoring and replacing electrolytes, echocardiogram, continue IV Lasix  Continue home aspirin, hydralazine, Plaquenil, levothyroxine, prednisone, Protonix  Started on Rocephin, follow-up on urine culture  DVT prophylaxis-Lovenox  Diet: ADULT DIET;  Regular; 1500 ml  Code Status: Full Code    PT/OT Eval Status: ordered    Dispo -okay to transfer out of ICU, monitor on cardiac telemetry, likely discharge in 1 to 2 days    Verl Klinefelter, MD

## 2021-08-17 LAB
ANION GAP SERPL CALCULATED.3IONS-SCNC: 11 MMOL/L (ref 3–16)
ANION GAP SERPL CALCULATED.3IONS-SCNC: 15 MMOL/L (ref 3–16)
ANION GAP SERPL CALCULATED.3IONS-SCNC: 16 MMOL/L (ref 3–16)
BUN BLDV-MCNC: 23 MG/DL (ref 7–20)
BUN BLDV-MCNC: 24 MG/DL (ref 7–20)
BUN BLDV-MCNC: 25 MG/DL (ref 7–20)
CALCIUM SERPL-MCNC: 7.8 MG/DL (ref 8.3–10.6)
CALCIUM SERPL-MCNC: 8.2 MG/DL (ref 8.3–10.6)
CALCIUM SERPL-MCNC: 8.3 MG/DL (ref 8.3–10.6)
CHLORIDE BLD-SCNC: 84 MMOL/L (ref 99–110)
CHLORIDE BLD-SCNC: 88 MMOL/L (ref 99–110)
CHLORIDE BLD-SCNC: 89 MMOL/L (ref 99–110)
CO2: 20 MMOL/L (ref 21–32)
CO2: 21 MMOL/L (ref 21–32)
CO2: 21 MMOL/L (ref 21–32)
CREAT SERPL-MCNC: 1.3 MG/DL (ref 0.6–1.2)
CREAT SERPL-MCNC: 1.5 MG/DL (ref 0.6–1.2)
CREAT SERPL-MCNC: 1.5 MG/DL (ref 0.6–1.2)
GFR AFRICAN AMERICAN: 40
GFR AFRICAN AMERICAN: 40
GFR AFRICAN AMERICAN: 48
GFR NON-AFRICAN AMERICAN: 33
GFR NON-AFRICAN AMERICAN: 33
GFR NON-AFRICAN AMERICAN: 39
GLUCOSE BLD-MCNC: 106 MG/DL (ref 70–99)
GLUCOSE BLD-MCNC: 120 MG/DL (ref 70–99)
GLUCOSE BLD-MCNC: 96 MG/DL (ref 70–99)
MAGNESIUM: 1.7 MG/DL (ref 1.8–2.4)
MAGNESIUM: 1.7 MG/DL (ref 1.8–2.4)
POTASSIUM REFLEX MAGNESIUM: 3.3 MMOL/L (ref 3.5–5.1)
POTASSIUM REFLEX MAGNESIUM: 3.4 MMOL/L (ref 3.5–5.1)
POTASSIUM REFLEX MAGNESIUM: 3.7 MMOL/L (ref 3.5–5.1)
SODIUM BLD-SCNC: 119 MMOL/L (ref 136–145)
SODIUM BLD-SCNC: 121 MMOL/L (ref 136–145)
SODIUM BLD-SCNC: 125 MMOL/L (ref 136–145)

## 2021-08-17 PROCEDURE — 94761 N-INVAS EAR/PLS OXIMETRY MLT: CPT

## 2021-08-17 PROCEDURE — 2580000003 HC RX 258: Performed by: INTERNAL MEDICINE

## 2021-08-17 PROCEDURE — 6370000000 HC RX 637 (ALT 250 FOR IP): Performed by: INTERNAL MEDICINE

## 2021-08-17 PROCEDURE — P9047 ALBUMIN (HUMAN), 25%, 50ML: HCPCS | Performed by: INTERNAL MEDICINE

## 2021-08-17 PROCEDURE — 6360000002 HC RX W HCPCS: Performed by: INTERNAL MEDICINE

## 2021-08-17 PROCEDURE — 6370000000 HC RX 637 (ALT 250 FOR IP): Performed by: HOSPITALIST

## 2021-08-17 PROCEDURE — 2060000000 HC ICU INTERMEDIATE R&B

## 2021-08-17 PROCEDURE — 80048 BASIC METABOLIC PNL TOTAL CA: CPT

## 2021-08-17 PROCEDURE — 36415 COLL VENOUS BLD VENIPUNCTURE: CPT

## 2021-08-17 PROCEDURE — 83735 ASSAY OF MAGNESIUM: CPT

## 2021-08-17 PROCEDURE — 99233 SBSQ HOSP IP/OBS HIGH 50: CPT | Performed by: INTERNAL MEDICINE

## 2021-08-17 RX ORDER — POTASSIUM CHLORIDE 750 MG/1
20 TABLET, FILM COATED, EXTENDED RELEASE ORAL 2 TIMES DAILY
Status: DISCONTINUED | OUTPATIENT
Start: 2021-08-17 | End: 2021-08-21

## 2021-08-17 RX ORDER — FUROSEMIDE 40 MG/1
40 TABLET ORAL DAILY
Status: DISCONTINUED | OUTPATIENT
Start: 2021-08-17 | End: 2021-08-23

## 2021-08-17 RX ORDER — HYDRALAZINE HYDROCHLORIDE 25 MG/1
25 TABLET, FILM COATED ORAL EVERY 8 HOURS SCHEDULED
Status: DISCONTINUED | OUTPATIENT
Start: 2021-08-17 | End: 2021-08-23 | Stop reason: HOSPADM

## 2021-08-17 RX ADMIN — Medication 3 MG: at 22:02

## 2021-08-17 RX ADMIN — HYDRALAZINE HYDROCHLORIDE 10 MG: 10 TABLET, FILM COATED ORAL at 06:27

## 2021-08-17 RX ADMIN — HYDRALAZINE HYDROCHLORIDE 25 MG: 25 TABLET, FILM COATED ORAL at 22:02

## 2021-08-17 RX ADMIN — PREDNISONE 5 MG: 5 TABLET ORAL at 09:17

## 2021-08-17 RX ADMIN — BUSPIRONE HYDROCHLORIDE 10 MG: 10 TABLET ORAL at 11:26

## 2021-08-17 RX ADMIN — POTASSIUM CHLORIDE 20 MEQ: 750 TABLET, FILM COATED, EXTENDED RELEASE ORAL at 13:32

## 2021-08-17 RX ADMIN — FOLIC ACID 1 MG: 1 TABLET ORAL at 09:17

## 2021-08-17 RX ADMIN — HYDROXYCHLOROQUINE SULFATE 200 MG: 200 TABLET ORAL at 09:17

## 2021-08-17 RX ADMIN — GABAPENTIN 100 MG: 100 CAPSULE ORAL at 09:17

## 2021-08-17 RX ADMIN — CEFTRIAXONE 1000 MG: 1 INJECTION, POWDER, FOR SOLUTION INTRAMUSCULAR; INTRAVENOUS at 17:13

## 2021-08-17 RX ADMIN — HYDRALAZINE HYDROCHLORIDE 25 MG: 25 TABLET, FILM COATED ORAL at 13:32

## 2021-08-17 RX ADMIN — BUSPIRONE HYDROCHLORIDE 10 MG: 10 TABLET ORAL at 09:17

## 2021-08-17 RX ADMIN — GABAPENTIN 100 MG: 100 CAPSULE ORAL at 19:58

## 2021-08-17 RX ADMIN — ASPIRIN 81 MG: 81 TABLET, CHEWABLE ORAL at 09:17

## 2021-08-17 RX ADMIN — PANTOPRAZOLE SODIUM 40 MG: 40 TABLET, DELAYED RELEASE ORAL at 09:17

## 2021-08-17 RX ADMIN — BUSPIRONE HYDROCHLORIDE 10 MG: 10 TABLET ORAL at 17:12

## 2021-08-17 RX ADMIN — ALBUMIN (HUMAN) 25 G: 0.25 INJECTION, SOLUTION INTRAVENOUS at 11:28

## 2021-08-17 RX ADMIN — HYDROXYCHLOROQUINE SULFATE 200 MG: 200 TABLET ORAL at 19:57

## 2021-08-17 RX ADMIN — SODIUM CHLORIDE, PRESERVATIVE FREE 10 ML: 5 INJECTION INTRAVENOUS at 09:18

## 2021-08-17 RX ADMIN — LEVOTHYROXINE SODIUM 150 MCG: 0.07 TABLET ORAL at 06:27

## 2021-08-17 RX ADMIN — SODIUM CHLORIDE, PRESERVATIVE FREE 10 ML: 5 INJECTION INTRAVENOUS at 19:59

## 2021-08-17 RX ADMIN — BUSPIRONE HYDROCHLORIDE 10 MG: 10 TABLET ORAL at 19:58

## 2021-08-17 RX ADMIN — FUROSEMIDE 40 MG: 40 TABLET ORAL at 13:32

## 2021-08-17 RX ADMIN — LATANOPROST 1 DROP: 50 SOLUTION OPHTHALMIC at 19:59

## 2021-08-17 RX ADMIN — FUROSEMIDE 20 MG: 10 INJECTION, SOLUTION INTRAMUSCULAR; INTRAVENOUS at 00:43

## 2021-08-17 RX ADMIN — ENOXAPARIN SODIUM 30 MG: 30 INJECTION SUBCUTANEOUS at 19:58

## 2021-08-17 RX ADMIN — PANTOPRAZOLE SODIUM 40 MG: 40 TABLET, DELAYED RELEASE ORAL at 19:58

## 2021-08-17 RX ADMIN — POTASSIUM CHLORIDE 20 MEQ: 750 TABLET, FILM COATED, EXTENDED RELEASE ORAL at 19:58

## 2021-08-17 ASSESSMENT — PAIN SCALES - GENERAL
PAINLEVEL_OUTOF10: 0

## 2021-08-17 NOTE — PROGRESS NOTES
1916: Nephrology MD Ronny butt served to notify of sodium drop of 125 to 119. Night shift RN phone number given to MD for further orders.      Electronically signed by Teresita Benitez RN on 8/17/2021 at 7:17 PM

## 2021-08-17 NOTE — PROGRESS NOTES
Hospitalist Progress Note      PCP: Martha Abdul MD    Chief Complaint. Patient is a 71-year-old female who presented to [facility] [place] for generalized weakness. Patient has past medical history of hypothyroidism depression, hypertension. Date of Admission: 8/14/2021    Subjective:   No acute events overnight, denies chest pain, nausea, vomiting, shortness of breath, fever or chills. mention feels overall better    Medications:  Reviewed    Infusion Medications    sodium chloride       Scheduled Medications    hydrALAZINE  25 mg Oral 3 times per day    furosemide  40 mg Oral Daily    potassium chloride  20 mEq Oral BID    cefTRIAXone (ROCEPHIN) IV  1,000 mg Intravenous Q24H    aspirin  81 mg Oral Daily    busPIRone  10 mg Oral 4x Daily    folic acid  1 mg Oral Daily    pantoprazole  40 mg Oral BID    predniSONE  5 mg Oral Daily    sodium chloride flush  5-40 mL Intravenous 2 times per day    enoxaparin  30 mg Subcutaneous Nightly    hydroxychloroquine  200 mg Oral BID    levothyroxine  150 mcg Oral Daily    latanoprost  1 drop Both Eyes Nightly    gabapentin  100 mg Oral BID     PRN Meds: melatonin, albuterol, fluticasone, sodium chloride flush, sodium chloride, ondansetron **OR** ondansetron, polyethylene glycol, acetaminophen **OR** acetaminophen      Intake/Output Summary (Last 24 hours) at 8/17/2021 1653  Last data filed at 8/17/2021 1334  Gross per 24 hour   Intake 583.17 ml   Output 645 ml   Net -61.83 ml       Physical Exam Performed:    BP (!) 153/73   Pulse 67   Temp 99.9 °F (37.7 °C) (Axillary)   Resp 17   Ht 5' 1\" (1.549 m)   Wt 119 lb 4.3 oz (54.1 kg)   SpO2 95%   BMI 22.54 kg/m²     General appearance: No apparent distress  HEENT:  Conjunctivae/corneas clear. Neck: Supple, with full range of motion. Respiratory:  Normal respiratory effort. Clear to auscultation, bilaterally without Rales/Wheezes/Rhonchi.   Cardiovascular: Regular rate and rhythm with normal S1/S2 without murmurs or rubs  Abdomen: Soft, non-tender, non-distended, normal bowel sounds. Musculoskeletal: No cyanosis or edema bilaterally  Neurologic:  without any focal sensory/motor deficits. grossly non-focal.  Psychiatric: Alert and oriented, Normal mood  Peripheral Pulses: +2 palpable, equal bilaterally       Labs:   Recent Labs     08/15/21  0745   WBC 6.1   HGB 8.9*   HCT 26.0*        Recent Labs     08/16/21  1641 08/17/21  0029 08/17/21  0923   * 121* 125*   K 3.6 3.3* 3.4*   CL 89* 89* 88*   CO2 22 21 21   BUN 22* 24* 23*   CREATININE 1.2 1.5* 1.5*   CALCIUM 8.1* 7.8* 8.3     No results for input(s): AST, ALT, BILIDIR, BILITOT, ALKPHOS in the last 72 hours. No results for input(s): INR in the last 72 hours. No results for input(s): [place] in the last 72 hours. Urinalysis:      Lab Results   Component Value Date    NITRU Negative 08/15/2021    WBCUA 835 08/15/2021    BACTERIA 1+ 08/15/2021    RBCUA 6 08/15/2021    BLOODU MODERATE 08/15/2021    SPECGRAV 1.006 08/15/2021    GLUCOSEU Negative 08/15/2021       Radiology:  XR CHEST PORTABLE   Final Result   Mild cardiomegaly was noted with mild central pulmonary vascular congestion   with trace right and small left pleural effusions. The findings are   compatible with early-mild congestive heart failure. Small to moderate fixed hiatal hernia. Marked degenerative osteo arthritic changes of each glenohumeral joint was   noted with chronic bilateral anterior dislocations.                Assessment/Plan:    Active Hospital Problems    Diagnosis     Hyponatremia [E87.1]     Acute heart failure (HCC) [I50.9]     Elevated troponin [R77.8]     Hyperkalemia [E87.5]     Chronic mesenteric ischemia (HCC) [K55.1]     Symptomatic bradycardia [R00.1]     Essential hypertension [I10]     Rheumatoid arthritis (HCC) [M06.9]     Acquired hypothyroidism [E03.9]     Chronic kidney disease [N18.9]      Patient is a 71-year-old female who presented to [facility] [place] for generalized weakness. Patient has past medical history of hypothyroidism depression, hypertension. Assessment  Hyponatremia  Symptomatic bradycardia  Acute decompensated CHF  Urinary tract infection  Hypertension  Rheumatoid arthritis  Hypothyroidism  SHANELL  Depression  Generalized weakness  Hyperkalemia-resolved    Plan  Continue to monitor BMP, nephrology is following recommends continuing Lasix with IV albumin  Cardiology was consulted recommends monitoring and replacing electrolytes, echocardiogram, continue IV Lasix  Continue home aspirin, hydralazine, Plaquenil, levothyroxine, prednisone, Protonix  Started on Rocephin, follow-up on urine culture  DVT prophylaxis-Lovenox  Diet: ADULT DIET; Regular;  Low Sodium (2 gm); 1200 ml  Code Status: Full Code    PT/OT Eval Status: ordered    Dispo -okay to transfer out of ICU, awaiting Na correction, monitor on cardiac telemetry, likely discharge in 1 to 2 days    Madelaine Echevarria MD

## 2021-08-17 NOTE — PROGRESS NOTES
Labs:  Recent Labs     08/14/21  1330 08/15/21  0745   WBC 7.5 6.1   HGB 10.4* 8.9*   HCT 30.4* 26.0*   MCV 84.8 85.1    210     Recent Labs     08/16/21  1005 08/16/21  1005 08/16/21  1641 08/17/21  0029 08/17/21  0923   *   < > 122* 121* 125*   K 3.5   < > 3.6 3.3* 3.4*   CL 86*   < > 89* 89* 88*   CO2 21   < > 22 21 21   GLUCOSE 92   < > 120* 120* 96   MG 1.70*  --   --  1.70* 1.70*   BUN 21*   < > 22* 24* 23*   CREATININE 1.3*   < > 1.2 1.5* 1.5*   LABGLOM 39*   < > 43* 33* 33*   GFRAA 48*   < > 52* 40* 40*    < > = values in this interval not displayed. Assessment/Plan:    Hyponatremia.  - Has history of chronic hyponatremia with baseline in the low 130smmol/L. TSH was elevated in 05-07/2021 but Levothyroxine was adjusted and level this admission was 2.69mmol/L.  - Clinically hypervolemic. Na+ improving to 125 meq this AM Will continue with Lasix 20mg IV 2x/day  Stop IV albumin. FR 1200 ml/d  - BMP  q8H.   - Goal correction of not more than 6-8mmol/L in 24H.     Hyperkalemia.  - In the setting of SHANELL. Also was on KCl and Lisinopril prior to admission.  - D/C'd potassium supplements and ACEI. - Improved with medical management. K+ low now restart 20 meq BID Check Mg      Metabolic Acidosis. - From SHANELL +/- hypoperfusion. Lactic acid within normal.  - stable     Acute Kidney Injury.  - Suspect pre-renal in etiology in the setting of bradycardia/hypotension.  -Cr 1.5  mg today Monitor with on going diuresis No ACE-I ARB's  - Avoid hypotension and nephrotoxic medications. - Akhtar catheter for accurate Is and Os.     Bradycardia. . Improved. Cardiology following    HTN BP elevated Hydralazine added stop IV Albumin        Roberto Rogers MD  [email or web address]  8/17/2021

## 2021-08-17 NOTE — PROGRESS NOTES
[facility] 82   Daily Progress Note      Admit Date:  8/14/2021    CC: \"  The patient is an [de-identified]year old female with past medical history significant for HTN, HLD, PVD, thyroid disease, asthma and CKD who presented to the ED for weakness. She had multiple electrolyte abnormalities, was fluid overloaded and bradycardic but BP was normal/hypertensive. She was started on Isuprel overnight which is now off with heart rates in the 70s.      Subjective:  Pt with no acute overnight events. Denies chest pain, palpitations, and dyspnea. Heart rate has improved and she is in sinus rhythm at rate of 68 bpm. She is very hard of hearing    Objective:   BP (!) 153/73   Pulse 67   Temp 99.9 °F (37.7 °C) (Axillary)   Resp 17   Ht 5' 1\" (1.549 m)   Wt 119 lb 4.3 oz (54.1 kg)   SpO2 95%   BMI 22.54 kg/m²       Intake/Output Summary (Last 24 hours) at 8/17/2021 1102  Last data filed at 8/17/2021 5738  Gross per 24 hour   Intake 823.17 ml   Output 845 ml   Net -21.83 ml     Wt Readings from Last 3 Encounters:   08/16/21 119 lb 4.3 oz (54.1 kg)   03/18/21 106 lb (48.1 kg)   03/08/21 106 lb (48.1 kg)     Telemetry:NSR    Physical Exam:  General:  NAD, Awake, alert and oriented X4  Skin:  Warm and dry  Neck:  Supple, ? JVP appreciated, no bruit  Chest:  Clear to auscultation, no wheezes/rhonchi/rales  Cardiovascular:  Regular rate.  S1S2  Abdomen:  Soft, nontender, +bowel sounds  Extremities:  No LE edema    Cardiac Diagnosis:  hypertension, hyperlipidemia and CHF    Medications:    cefTRIAXone (ROCEPHIN) IV  1,000 mg Intravenous Q24H    albumin human  25 g Intravenous Q12H    furosemide  20 mg Intravenous BID    aspirin  81 mg Oral Daily    busPIRone  10 mg Oral 4x Daily    folic acid  1 mg Oral Daily    pantoprazole  40 mg Oral BID    predniSONE  5 mg Oral Daily    sodium chloride flush  5-40 mL Intravenous 2 times per day    enoxaparin  30 mg Subcutaneous Nightly    hydroxychloroquine  200 mg Oral BID    Showed normal LVEF              -Currently  on 20mg IV Lasix bid, monitor Na with this             -Will switch her to PO lasix              -Will not place her on beta-blocker therapy since she had junctional bradycardia on admission  Hyponatremia              - likely due to hypervolemia              - continue diuresis              -Nephrology is following     Essential HTN              - BP has been mildly elevated,               -will increase  hydralazine 25  mg every 8 hours.     Okay to transfer her to PCU             Complexity of medical decision making-high    Electronically signed by Ирина Cisneros MD on 8/17/2021 at 11:02 AM

## 2021-08-18 LAB
ANION GAP SERPL CALCULATED.3IONS-SCNC: 15 MMOL/L (ref 3–16)
ANION GAP SERPL CALCULATED.3IONS-SCNC: 15 MMOL/L (ref 3–16)
ANION GAP SERPL CALCULATED.3IONS-SCNC: 16 MMOL/L (ref 3–16)
BUN BLDV-MCNC: 26 MG/DL (ref 7–20)
BUN BLDV-MCNC: 28 MG/DL (ref 7–20)
BUN BLDV-MCNC: 30 MG/DL (ref 7–20)
CALCIUM SERPL-MCNC: 7.7 MG/DL (ref 8.3–10.6)
CALCIUM SERPL-MCNC: 8.2 MG/DL (ref 8.3–10.6)
CALCIUM SERPL-MCNC: 8.5 MG/DL (ref 8.3–10.6)
CHLORIDE BLD-SCNC: 88 MMOL/L (ref 99–110)
CHLORIDE BLD-SCNC: 89 MMOL/L (ref 99–110)
CHLORIDE BLD-SCNC: 90 MMOL/L (ref 99–110)
CO2: 19 MMOL/L (ref 21–32)
CO2: 20 MMOL/L (ref 21–32)
CO2: 20 MMOL/L (ref 21–32)
CREAT SERPL-MCNC: 1.2 MG/DL (ref 0.6–1.2)
CREAT SERPL-MCNC: 1.7 MG/DL (ref 0.6–1.2)
CREAT SERPL-MCNC: 1.7 MG/DL (ref 0.6–1.2)
GFR AFRICAN AMERICAN: 35
GFR AFRICAN AMERICAN: 35
GFR AFRICAN AMERICAN: 52
GFR NON-AFRICAN AMERICAN: 29
GFR NON-AFRICAN AMERICAN: 29
GFR NON-AFRICAN AMERICAN: 43
GLUCOSE BLD-MCNC: 90 MG/DL (ref 70–99)
GLUCOSE BLD-MCNC: 91 MG/DL (ref 70–99)
GLUCOSE BLD-MCNC: 95 MG/DL (ref 70–99)
MAGNESIUM: 1.7 MG/DL (ref 1.8–2.4)
ORGANISM: ABNORMAL
POTASSIUM REFLEX MAGNESIUM: 3.9 MMOL/L (ref 3.5–5.1)
POTASSIUM REFLEX MAGNESIUM: 3.9 MMOL/L (ref 3.5–5.1)
POTASSIUM REFLEX MAGNESIUM: 4.3 MMOL/L (ref 3.5–5.1)
SODIUM BLD-SCNC: 123 MMOL/L (ref 136–145)
SODIUM BLD-SCNC: 124 MMOL/L (ref 136–145)
SODIUM BLD-SCNC: 125 MMOL/L (ref 136–145)
URINE CULTURE, ROUTINE: ABNORMAL

## 2021-08-18 PROCEDURE — 6370000000 HC RX 637 (ALT 250 FOR IP): Performed by: INTERNAL MEDICINE

## 2021-08-18 PROCEDURE — 83735 ASSAY OF MAGNESIUM: CPT

## 2021-08-18 PROCEDURE — 80048 BASIC METABOLIC PNL TOTAL CA: CPT

## 2021-08-18 PROCEDURE — 6360000002 HC RX W HCPCS: Performed by: INTERNAL MEDICINE

## 2021-08-18 PROCEDURE — 2060000000 HC ICU INTERMEDIATE R&B

## 2021-08-18 PROCEDURE — 36415 COLL VENOUS BLD VENIPUNCTURE: CPT

## 2021-08-18 PROCEDURE — 2580000003 HC RX 258: Performed by: INTERNAL MEDICINE

## 2021-08-18 PROCEDURE — 6370000000 HC RX 637 (ALT 250 FOR IP): Performed by: HOSPITALIST

## 2021-08-18 PROCEDURE — 99233 SBSQ HOSP IP/OBS HIGH 50: CPT | Performed by: INTERNAL MEDICINE

## 2021-08-18 PROCEDURE — 94761 N-INVAS EAR/PLS OXIMETRY MLT: CPT

## 2021-08-18 RX ORDER — POLYETHYLENE GLYCOL 3350 17 G/17G
17 POWDER, FOR SOLUTION ORAL DAILY
Status: DISCONTINUED | OUTPATIENT
Start: 2021-08-18 | End: 2021-08-23 | Stop reason: HOSPADM

## 2021-08-18 RX ADMIN — POTASSIUM CHLORIDE 20 MEQ: 750 TABLET, FILM COATED, EXTENDED RELEASE ORAL at 20:52

## 2021-08-18 RX ADMIN — BUSPIRONE HYDROCHLORIDE 10 MG: 10 TABLET ORAL at 13:14

## 2021-08-18 RX ADMIN — HYDROXYCHLOROQUINE SULFATE 200 MG: 200 TABLET ORAL at 08:28

## 2021-08-18 RX ADMIN — Medication 3 MG: at 20:51

## 2021-08-18 RX ADMIN — ENOXAPARIN SODIUM 30 MG: 30 INJECTION SUBCUTANEOUS at 20:52

## 2021-08-18 RX ADMIN — SODIUM CHLORIDE, PRESERVATIVE FREE 10 ML: 5 INJECTION INTRAVENOUS at 08:29

## 2021-08-18 RX ADMIN — FOLIC ACID 1 MG: 1 TABLET ORAL at 08:28

## 2021-08-18 RX ADMIN — HYDRALAZINE HYDROCHLORIDE 25 MG: 25 TABLET, FILM COATED ORAL at 13:14

## 2021-08-18 RX ADMIN — LATANOPROST 1 DROP: 50 SOLUTION OPHTHALMIC at 20:53

## 2021-08-18 RX ADMIN — CEFTRIAXONE 1000 MG: 1 INJECTION, POWDER, FOR SOLUTION INTRAMUSCULAR; INTRAVENOUS at 16:58

## 2021-08-18 RX ADMIN — GABAPENTIN 100 MG: 100 CAPSULE ORAL at 20:52

## 2021-08-18 RX ADMIN — PANTOPRAZOLE SODIUM 40 MG: 40 TABLET, DELAYED RELEASE ORAL at 08:28

## 2021-08-18 RX ADMIN — GABAPENTIN 100 MG: 100 CAPSULE ORAL at 08:28

## 2021-08-18 RX ADMIN — FUROSEMIDE 40 MG: 40 TABLET ORAL at 08:28

## 2021-08-18 RX ADMIN — ACETAMINOPHEN 650 MG: 325 TABLET ORAL at 01:23

## 2021-08-18 RX ADMIN — SODIUM CHLORIDE, PRESERVATIVE FREE 10 ML: 5 INJECTION INTRAVENOUS at 20:52

## 2021-08-18 RX ADMIN — HYDRALAZINE HYDROCHLORIDE 25 MG: 25 TABLET, FILM COATED ORAL at 20:51

## 2021-08-18 RX ADMIN — BUSPIRONE HYDROCHLORIDE 10 MG: 10 TABLET ORAL at 16:58

## 2021-08-18 RX ADMIN — PREDNISONE 5 MG: 5 TABLET ORAL at 08:28

## 2021-08-18 RX ADMIN — ASPIRIN 81 MG: 81 TABLET, CHEWABLE ORAL at 08:28

## 2021-08-18 RX ADMIN — HYDROXYCHLOROQUINE SULFATE 200 MG: 200 TABLET ORAL at 20:51

## 2021-08-18 RX ADMIN — POLYETHYLENE GLYCOL 3350 17 G: 17 POWDER, FOR SOLUTION ORAL at 11:01

## 2021-08-18 RX ADMIN — BUSPIRONE HYDROCHLORIDE 10 MG: 10 TABLET ORAL at 20:51

## 2021-08-18 RX ADMIN — BUSPIRONE HYDROCHLORIDE 10 MG: 10 TABLET ORAL at 08:28

## 2021-08-18 RX ADMIN — PANTOPRAZOLE SODIUM 40 MG: 40 TABLET, DELAYED RELEASE ORAL at 20:52

## 2021-08-18 RX ADMIN — POTASSIUM CHLORIDE 20 MEQ: 750 TABLET, FILM COATED, EXTENDED RELEASE ORAL at 08:28

## 2021-08-18 RX ADMIN — LEVOTHYROXINE SODIUM 150 MCG: 0.07 TABLET ORAL at 08:32

## 2021-08-18 ASSESSMENT — PAIN SCALES - GENERAL
PAINLEVEL_OUTOF10: 0

## 2021-08-18 NOTE — PROGRESS NOTES
[facility] 82   Daily Progress Note      Admit Date:  8/14/2021    CC: \"  The patient is an [de-identified]year old female with past medical history significant for HTN, HLD, PVD, thyroid disease, asthma and CKD who presented to the ED for weakness. She had multiple electrolyte abnormalities, was fluid overloaded and bradycardic but BP was normal/hypertensive. She was started on Isuprel overnight which is now off with heart rates in the 70s.      Subjective:  Pt with no acute overnight events. Denies chest pain, palpitations, and dyspnea. Heart rate has improved and she is in sinus rhythm at rate of 68 bpm. She is very hard of hearing    Interval history  -Patient denies any cardiac symptoms complains of significant constipation. She remains on room air    Objective:   BP (!) 115/42   Pulse 61   Temp 98.9 °F (37.2 °C) (Axillary)   Resp 14   Ht 5' 1\" (1.549 m)   Wt 122 lb 2.2 oz (55.4 kg)   SpO2 96%   BMI 23.08 kg/m²       Intake/Output Summary (Last 24 hours) at 8/18/2021 0934  Last data filed at 8/18/2021 0606  Gross per 24 hour   Intake 120 ml   Output 400 ml   Net -280 ml     Wt Readings from Last 3 Encounters:   08/18/21 122 lb 2.2 oz (55.4 kg)   03/18/21 106 lb (48.1 kg)   03/08/21 106 lb (48.1 kg)     Telemetry:NSR    Physical Exam:  General:  NAD, Awake? \"  Slightly confused  Skin:  Warm and dry  Neck:  Supple, ? JVP appreciated, no bruit  Chest:  Clear to auscultation, no wheezes/rhonchi/rales  Cardiovascular:  Regular rate.  S1S2  Abdomen:  Soft, nontender, +bowel sounds  Extremities:  No LE edema    Cardiac Diagnosis:  hypertension, hyperlipidemia and CHF    Medications:    hydrALAZINE  25 mg Oral 3 times per day    furosemide  40 mg Oral Daily    potassium chloride  20 mEq Oral BID    cefTRIAXone (ROCEPHIN) IV  1,000 mg Intravenous Q24H    aspirin  81 mg Oral Daily    busPIRone  10 mg Oral 4x Daily    folic acid  1 mg Oral Daily    pantoprazole  40 mg Oral BID    predniSONE  5 mg Oral Daily    sodium chloride flush  5-40 mL Intravenous 2 times per day    enoxaparin  30 mg Subcutaneous Nightly    hydroxychloroquine  200 mg Oral BID    levothyroxine  150 mcg Oral Daily    latanoprost  1 drop Both Eyes Nightly    gabapentin  100 mg Oral BID      sodium chloride       melatonin, albuterol, fluticasone, sodium chloride flush, sodium chloride, ondansetron **OR** ondansetron, polyethylene glycol, acetaminophen **OR** acetaminophen    Lab Data:  CBC:   No results for input(s): WBC, HGB, PLT in the last 72 hours. BMP:    Recent Labs     08/17/21  0923 08/17/21  1727 08/18/21  0111   * 119* 123*   K 3.4* 3.7 3.9   CO2 21 20* 20*   BUN 23* 25* 26*   CREATININE 1.5* 1.3* 1.2     LIVR:   No results for input(s): AST, ALT in the last 72 hours. INR:  No results for input(s): INR in the last 72 hours. APTT: No results for input(s): APTT in the last 72 hours. BNP:  No results for input(s): BNP in the last 72 hours. Imaging: Echocardiogram-8/16/21Left ventricular cavity size is normal with mild concentric left ventricular   hypertrophy. Normal systolic function. Ejection fraction is visually estimated to be 65-70%. No regional wall motion abnormalities are noted. Grade II diastolic dysfunction with elevated LV filling pressures. The right ventricle is normal in size and function. The left atrium is severely dilated. Mild mitral regurgitation. Moderate tricuspid regurgitation. Estimated pulmonary artery systolic pressure is mildly elevated at 45 mmHg   assuming a right atrial pressure of 8 mmHg. Assessment:Plan:  Junctional bradycardia                      - in the setting of multiple electrolyte abnormalities              - back in sinus rhythm               - never caused her to become hypotensive so was likely not causing                 symptoms              - optimize electrolytes and monitor, no immediate need for pacing     Acute heart failure.   NYHA class III on admission              - Acute on chronic diastolic              -Echocardiogram Showed normal LVEF              -Currently  on 20mg IV Lasix bid, monitor Na with this             -Will switch her to PO lasix              -Will not place her on beta-blocker therapy since she had junctional bradycardia on admission  Hyponatremia              - likely due to hypervolemia              - continue diuresis              -Nephrology is following     Essential HTN              - BP is better    SHANELL  - worse probably related to diuretic therapy  .   Will repeat renal profile in the morning and hold diuretics till the blood test is back    Okay to transfer her to PCU             Complexity of medical decision making-high    Electronically signed by Joey Herrera MD on 8/18/2021 at 9:34 AM

## 2021-08-18 NOTE — PROGRESS NOTES
Physician Progress Note      Nicolle Thompson  CSN #:                  [id number]  :                       1940  ADMIT DATE:       2021 12:58 PM  [address] [place] DATE:  RESPONDING  PROVIDER #:        Gris Angeles MD          QUERY TEXT:    Pt admitted with Bradycardia and has acute decompensated CHF documented. If   possible, please document in progress notes and discharge summary further   specificity regarding the type and acuity of CHF:    The medical record reflects the following:  Risk Factors: HTN Bradycardia  Clinical Indicators: Pro BNP 3112, CXR \"Mild cardiomegaly was noted with mild   central pulmonary vascular congestion  with trace right and small left pleural effusions. ?The findings are   compatible with early-mild congestive heart failure. \" ECHO \" EF 65-70% Grade   II diastolic dysfunction with elevated LV filling pressures. The right   ventricle is normal in size and function. The left atrium is severely dilated. Mild mitral regurgitation. Moderate   tricuspid regurgitation. \"  Treatment: IV Lasix, ECHO, Cardiology consult , I&O and daily weights  Options provided:  -- Acute on Chronic Diastolic CHF/HFpEF  -- Acute on Chronic Systolic and Diastolic CHF  -- Acute Diastolic CHF/HFpEF  -- Other - I will add my own diagnosis  -- Disagree - Not applicable / Not valid  -- Disagree - Clinically unable to determine / Unknown  -- Refer to Clinical Documentation Reviewer    PROVIDER RESPONSE TEXT:    Chronic diastolic chf    Query created by: Dionisio Butler on 2021 9:14 AM      Electronically signed by:  Gris Angeles MD 2021 9:59 AM

## 2021-08-18 NOTE — PROGRESS NOTES
Hospitalist Progress Note      PCP: Mindi Donis MD    Chief Complaint. Patient is a 67-year-old female who presented to [facility] for generalized weakness. Patient has past medical history of hypothyroidism depression, hypertension. Date of Admission: 8/14/2021    Subjective:  No acute events overnight, denied chest pain, nausea, vomiting, shortness of breath, fever or chills      Medications:  Reviewed    Infusion Medications    sodium chloride       Scheduled Medications    polyethylene glycol  17 g Oral Daily    hydrALAZINE  25 mg Oral 3 times per day    furosemide  40 mg Oral Daily    potassium chloride  20 mEq Oral BID    cefTRIAXone (ROCEPHIN) IV  1,000 mg Intravenous Q24H    aspirin  81 mg Oral Daily    busPIRone  10 mg Oral 4x Daily    folic acid  1 mg Oral Daily    pantoprazole  40 mg Oral BID    predniSONE  5 mg Oral Daily    sodium chloride flush  5-40 mL Intravenous 2 times per day    enoxaparin  30 mg Subcutaneous Nightly    hydroxychloroquine  200 mg Oral BID    levothyroxine  150 mcg Oral Daily    latanoprost  1 drop Both Eyes Nightly    gabapentin  100 mg Oral BID     PRN Meds: melatonin, albuterol, fluticasone, sodium chloride flush, sodium chloride, ondansetron **OR** ondansetron, polyethylene glycol, acetaminophen **OR** acetaminophen      Intake/Output Summary (Last 24 hours) at 8/18/2021 1753  Last data filed at 8/18/2021 1536  Gross per 24 hour   Intake 740 ml   Output 500 ml   Net 240 ml       Physical Exam Performed:    /61   Pulse 63   Temp 98.5 °F (36.9 °C) (Oral)   Resp 19   Ht 5' 1\" (1.549 m)   Wt 123 lb 7.3 oz (56 kg)   SpO2 95%   BMI 23.33 kg/m²     General appearance: NAD, lying in bed  HEENT:  Conjunctivae/corneas clear. Neck: Supple, with full range of motion. Respiratory:  Normal respiratory effort. Clear to auscultation, bilaterally without Rales/Wheezes/Rhonchi.   Cardiovascular: Regular rate and rhythm with normal S1/S2 without murmurs or rubs  Abdomen: Soft, non-tender, non-distended, normal bowel sounds. Musculoskeletal: No cyanosis or edema bilaterally  Neurologic:  without any focal sensory/motor deficits. grossly non-focal.  Psychiatric: Alert and oriented, Normal mood  Peripheral Pulses: +2 palpable, equal bilaterally       Labs:   No results for input(s): WBC, HGB, HCT, PLT in the last 72 hours. Recent Labs     08/17/21  1727 08/18/21  0111 08/18/21  0916   * 123* 125*   K 3.7 3.9 3.9   CL 84* 88* 89*   CO2 20* 20* 20*   BUN 25* 26* 28*   CREATININE 1.3* 1.2 1.7*   CALCIUM 8.2* 7.7* 8.5     No results for input(s): AST, ALT, BILIDIR, BILITOT, ALKPHOS in the last 72 hours. No results for input(s): INR in the last 72 hours. No results for input(s): Les Abigail in the last 72 hours. Urinalysis:      Lab Results   Component Value Date    NITRU Negative 08/15/2021    WBCUA 835 08/15/2021    BACTERIA 1+ 08/15/2021    RBCUA 6 08/15/2021    BLOODU MODERATE 08/15/2021    SPECGRAV 1.006 08/15/2021    GLUCOSEU Negative 08/15/2021       Radiology:  XR CHEST PORTABLE   Final Result   Mild cardiomegaly was noted with mild central pulmonary vascular congestion   with trace right and small left pleural effusions. The findings are   compatible with early-mild congestive heart failure. Small to moderate fixed hiatal hernia. Marked degenerative osteo arthritic changes of each glenohumeral joint was   noted with chronic bilateral anterior dislocations.                Assessment/Plan:    Active Hospital Problems    Diagnosis     Hyponatremia [E87.1]     Acute heart failure (HCC) [I50.9]     Elevated troponin [R77.8]     Hyperkalemia [E87.5]     Chronic mesenteric ischemia (HCC) [K55.1]     Symptomatic bradycardia [R00.1]     Essential hypertension [I10]     Rheumatoid arthritis (HCC) [M06.9]     Acquired hypothyroidism [E03.9]     Chronic kidney disease [N18.9]      Patient is a 56-year-old female who presented to

## 2021-08-18 NOTE — PROGRESS NOTES
Nephrology Progress Note   [facility]. [place]      This patient is a [de-identified]year old female whom we are following for hyponatremia. Subjective: The patient was seen and examined. UOP low overnight  Out of ICU    Feels OK No HA dizziness nausea  HR stable    Family History: No family at bedside      Vitals:  /61   Pulse 63   Temp 98.5 °F (36.9 °C) (Oral)   Resp 19   Ht 5' 1\" (1.549 m)   Wt 123 lb 7.3 oz (56 kg)   SpO2 95%   BMI 23.33 kg/m²   I/O last 3 completed shifts: In: 620 [P.O.:620]  Out: 650 [Urine:650]  No intake/output data recorded. Physical Exam:  Physical Exam  Vitals reviewed. Constitutional:       General: She is not in acute distress. Appearance: Normal appearance. HENT:      Head: Normocephalic and atraumatic. Eyes:      General: No scleral icterus. Conjunctiva/sclera: Conjunctivae normal.   Cardiovascular:      Rate and Rhythm: Normal rate. Heart sounds: No friction rub. Pulmonary:      Effort: Pulmonary effort is normal. No respiratory distress. Breath sounds: Rales present. Abdominal:      General: Bowel sounds are normal. There is no distension. Tenderness: There is no abdominal tenderness. Musculoskeletal:      Right lower leg: Edema present. Left lower leg: No edema. Neurological:      Mental Status: She is alert.            Medications:   polyethylene glycol  17 g Oral Daily    hydrALAZINE  25 mg Oral 3 times per day    furosemide  40 mg Oral Daily    potassium chloride  20 mEq Oral BID    cefTRIAXone (ROCEPHIN) IV  1,000 mg Intravenous Q24H    aspirin  81 mg Oral Daily    busPIRone  10 mg Oral 4x Daily    folic acid  1 mg Oral Daily    pantoprazole  40 mg Oral BID    predniSONE  5 mg Oral Daily    sodium chloride flush  5-40 mL Intravenous 2 times per day    enoxaparin  30 mg Subcutaneous Nightly    hydroxychloroquine  200 mg Oral BID    levothyroxine  150 mcg Oral Daily    latanoprost  1 drop Both Eyes Nightly    Haidre Abdalla. com  8/18/2021

## 2021-08-18 NOTE — PROGRESS NOTES
0700: Report received from [facility].     0800: Assessment completed. See charting. 0930: Report called to 5W RN. All patient belongings packed up and transport called to take patient up.     1040: Patient transported with all belongings including hearing aids and dentures to room 5133. Patient tolerated transfer well. Patients daughter, Nitin Bentley, called and made aware of transfer to floor.      Electronically signed by Zonia French RN on 8/18/2021 at 11:43 AM

## 2021-08-18 NOTE — PROGRESS NOTES
0515- Perfect[employer] message sent to Dr. Celina Mccormack with nephrology to notify of sodium of 123 and low urine output. No new orders at this time.

## 2021-08-18 NOTE — PROGRESS NOTES
4 Eyes Skin Assessment     NAME:  Hanh Roldan  YOB: 1940  MEDICAL RECORD NUMBER:  [id number]    The patient is being assess for  Transfer to New Unit    I agree that 2 RN's have performed a thorough Head to Toe Skin Assessment on the patient. ALL assessment sites listed below have been assessed. Areas assessed by both nurses:    Head, Face, Ears, Shoulders, Back, Chest, Arms, Elbows, Hands, Sacrum. Buttock, Coccyx, Ischium and Legs. Feet and Heels        Does the Patient have a Wound?  Other patient transfered has questionable area at rectum, fragile slough like tissue surrounding, counsult wound care       Ryley Prevention initiated:  Yes   Wound Care Orders initiated:  Yes    Pressure Injury (Stage 3,4, Unstageable, DTI, NWPT, and Complex wounds) if present place consult order under [de-identified] NA    New and Established Ostomies if present place consult order under : No      Nurse 1 eSignature: Electronically signed by Etta Florentino RN on 8/18/21 at 11:39 AM EDT    **SHARE this note so that the co-signing nurse is able to place an eSignature**    Nurse 2 eSignature: Electronically signed by Robert Alcaraz RN on 8/18/21 at 11:41 AM EDT

## 2021-08-19 ENCOUNTER — APPOINTMENT (OUTPATIENT)
Dept: GENERAL RADIOLOGY | Age: 81
DRG: 644 | End: 2021-08-19
Payer: COMMERCIAL

## 2021-08-19 LAB
ANION GAP SERPL CALCULATED.3IONS-SCNC: 12 MMOL/L (ref 3–16)
ANION GAP SERPL CALCULATED.3IONS-SCNC: 15 MMOL/L (ref 3–16)
ANION GAP SERPL CALCULATED.3IONS-SCNC: 18 MMOL/L (ref 3–16)
BUN BLDV-MCNC: 33 MG/DL (ref 7–20)
BUN BLDV-MCNC: 33 MG/DL (ref 7–20)
BUN BLDV-MCNC: 36 MG/DL (ref 7–20)
CALCIUM SERPL-MCNC: 8.3 MG/DL (ref 8.3–10.6)
CALCIUM SERPL-MCNC: 8.6 MG/DL (ref 8.3–10.6)
CALCIUM SERPL-MCNC: 8.7 MG/DL (ref 8.3–10.6)
CHLORIDE BLD-SCNC: 87 MMOL/L (ref 99–110)
CHLORIDE BLD-SCNC: 89 MMOL/L (ref 99–110)
CHLORIDE BLD-SCNC: 92 MMOL/L (ref 99–110)
CO2: 19 MMOL/L (ref 21–32)
CO2: 20 MMOL/L (ref 21–32)
CO2: 21 MMOL/L (ref 21–32)
CREAT SERPL-MCNC: 1.2 MG/DL (ref 0.6–1.2)
CREAT SERPL-MCNC: 1.3 MG/DL (ref 0.6–1.2)
CREAT SERPL-MCNC: 1.5 MG/DL (ref 0.6–1.2)
GFR AFRICAN AMERICAN: 40
GFR AFRICAN AMERICAN: 48
GFR AFRICAN AMERICAN: 52
GFR NON-AFRICAN AMERICAN: 33
GFR NON-AFRICAN AMERICAN: 39
GFR NON-AFRICAN AMERICAN: 43
GLUCOSE BLD-MCNC: 120 MG/DL (ref 70–99)
GLUCOSE BLD-MCNC: 81 MG/DL (ref 70–99)
GLUCOSE BLD-MCNC: 89 MG/DL (ref 70–99)
GLUCOSE BLD-MCNC: 94 MG/DL (ref 70–99)
OSMOLALITY URINE: 251 MOSM/KG (ref 390–1070)
PERFORMED ON: NORMAL
POTASSIUM REFLEX MAGNESIUM: 4.5 MMOL/L (ref 3.5–5.1)
POTASSIUM REFLEX MAGNESIUM: 4.6 MMOL/L (ref 3.5–5.1)
POTASSIUM REFLEX MAGNESIUM: 4.8 MMOL/L (ref 3.5–5.1)
SODIUM BLD-SCNC: 124 MMOL/L (ref 136–145)
SODIUM BLD-SCNC: 124 MMOL/L (ref 136–145)
SODIUM BLD-SCNC: 125 MMOL/L (ref 136–145)
SODIUM URINE: 84 MMOL/L

## 2021-08-19 PROCEDURE — 84300 ASSAY OF URINE SODIUM: CPT

## 2021-08-19 PROCEDURE — 6370000000 HC RX 637 (ALT 250 FOR IP): Performed by: INTERNAL MEDICINE

## 2021-08-19 PROCEDURE — 6360000002 HC RX W HCPCS: Performed by: INTERNAL MEDICINE

## 2021-08-19 PROCEDURE — 94761 N-INVAS EAR/PLS OXIMETRY MLT: CPT

## 2021-08-19 PROCEDURE — 2580000003 HC RX 258: Performed by: INTERNAL MEDICINE

## 2021-08-19 PROCEDURE — 2060000000 HC ICU INTERMEDIATE R&B

## 2021-08-19 PROCEDURE — 83935 ASSAY OF URINE OSMOLALITY: CPT

## 2021-08-19 PROCEDURE — 97530 THERAPEUTIC ACTIVITIES: CPT | Performed by: PHYSICAL THERAPIST

## 2021-08-19 PROCEDURE — 74018 RADEX ABDOMEN 1 VIEW: CPT

## 2021-08-19 PROCEDURE — 80048 BASIC METABOLIC PNL TOTAL CA: CPT

## 2021-08-19 PROCEDURE — 36415 COLL VENOUS BLD VENIPUNCTURE: CPT

## 2021-08-19 PROCEDURE — 97530 THERAPEUTIC ACTIVITIES: CPT

## 2021-08-19 PROCEDURE — 99233 SBSQ HOSP IP/OBS HIGH 50: CPT | Performed by: INTERNAL MEDICINE

## 2021-08-19 RX ORDER — CEPHALEXIN 250 MG/1
250 CAPSULE ORAL EVERY 12 HOURS SCHEDULED
Status: COMPLETED | OUTPATIENT
Start: 2021-08-19 | End: 2021-08-20

## 2021-08-19 RX ORDER — FUROSEMIDE 10 MG/ML
40 INJECTION INTRAMUSCULAR; INTRAVENOUS ONCE
Status: COMPLETED | OUTPATIENT
Start: 2021-08-19 | End: 2021-08-19

## 2021-08-19 RX ADMIN — CEFTRIAXONE 1000 MG: 1 INJECTION, POWDER, FOR SOLUTION INTRAMUSCULAR; INTRAVENOUS at 15:14

## 2021-08-19 RX ADMIN — HYDRALAZINE HYDROCHLORIDE 25 MG: 25 TABLET, FILM COATED ORAL at 20:26

## 2021-08-19 RX ADMIN — HYDRALAZINE HYDROCHLORIDE 25 MG: 25 TABLET, FILM COATED ORAL at 14:09

## 2021-08-19 RX ADMIN — GABAPENTIN 100 MG: 100 CAPSULE ORAL at 09:53

## 2021-08-19 RX ADMIN — HYDROXYCHLOROQUINE SULFATE 200 MG: 200 TABLET ORAL at 20:25

## 2021-08-19 RX ADMIN — ENOXAPARIN SODIUM 30 MG: 30 INJECTION SUBCUTANEOUS at 20:25

## 2021-08-19 RX ADMIN — LEVOTHYROXINE SODIUM 150 MCG: 0.07 TABLET ORAL at 06:04

## 2021-08-19 RX ADMIN — FOLIC ACID 1 MG: 1 TABLET ORAL at 09:54

## 2021-08-19 RX ADMIN — SODIUM CHLORIDE, PRESERVATIVE FREE 10 ML: 5 INJECTION INTRAVENOUS at 10:49

## 2021-08-19 RX ADMIN — FUROSEMIDE 40 MG: 10 INJECTION, SOLUTION INTRAMUSCULAR; INTRAVENOUS at 12:03

## 2021-08-19 RX ADMIN — GABAPENTIN 100 MG: 100 CAPSULE ORAL at 20:26

## 2021-08-19 RX ADMIN — POTASSIUM CHLORIDE 20 MEQ: 750 TABLET, FILM COATED, EXTENDED RELEASE ORAL at 09:54

## 2021-08-19 RX ADMIN — PREDNISONE 5 MG: 5 TABLET ORAL at 09:54

## 2021-08-19 RX ADMIN — BUSPIRONE HYDROCHLORIDE 10 MG: 10 TABLET ORAL at 09:53

## 2021-08-19 RX ADMIN — LATANOPROST 1 DROP: 50 SOLUTION OPHTHALMIC at 21:05

## 2021-08-19 RX ADMIN — HYDRALAZINE HYDROCHLORIDE 25 MG: 25 TABLET, FILM COATED ORAL at 06:04

## 2021-08-19 RX ADMIN — FUROSEMIDE 40 MG: 40 TABLET ORAL at 09:54

## 2021-08-19 RX ADMIN — SODIUM CHLORIDE, PRESERVATIVE FREE 10 ML: 5 INJECTION INTRAVENOUS at 20:26

## 2021-08-19 RX ADMIN — PANTOPRAZOLE SODIUM 40 MG: 40 TABLET, DELAYED RELEASE ORAL at 20:25

## 2021-08-19 RX ADMIN — BUSPIRONE HYDROCHLORIDE 10 MG: 10 TABLET ORAL at 14:09

## 2021-08-19 RX ADMIN — BUSPIRONE HYDROCHLORIDE 10 MG: 10 TABLET ORAL at 20:25

## 2021-08-19 RX ADMIN — ASPIRIN 81 MG: 81 TABLET, CHEWABLE ORAL at 09:54

## 2021-08-19 RX ADMIN — CEPHALEXIN 250 MG: 250 CAPSULE ORAL at 20:25

## 2021-08-19 RX ADMIN — ACETAMINOPHEN 650 MG: 325 TABLET ORAL at 15:34

## 2021-08-19 RX ADMIN — BUSPIRONE HYDROCHLORIDE 10 MG: 10 TABLET ORAL at 17:38

## 2021-08-19 RX ADMIN — PANTOPRAZOLE SODIUM 40 MG: 40 TABLET, DELAYED RELEASE ORAL at 09:54

## 2021-08-19 RX ADMIN — HYDROXYCHLOROQUINE SULFATE 200 MG: 200 TABLET ORAL at 09:53

## 2021-08-19 RX ADMIN — POTASSIUM CHLORIDE 20 MEQ: 750 TABLET, FILM COATED, EXTENDED RELEASE ORAL at 20:26

## 2021-08-19 ASSESSMENT — PAIN SCALES - GENERAL
PAINLEVEL_OUTOF10: 0
PAINLEVEL_OUTOF10: 3
PAINLEVEL_OUTOF10: 0
PAINLEVEL_OUTOF10: 0

## 2021-08-19 NOTE — PROGRESS NOTES
[facility]   Daily Progress Note      Admit Date:  8/14/2021    CC: \"  The patient is an [de-identified]year old female with past medical history significant for HTN, HLD, PVD, thyroid disease, asthma and CKD who presented to the ED for weakness. She had multiple electrolyte abnormalities, was fluid overloaded and bradycardic but BP was normal/hypertensive. She was started on Isuprel overnight which is now off with heart rates in the 70s.      Subjective:  Pt with no acute overnight events. Denies chest pain, palpitations, and dyspnea. Heart rate has improved and she is in sinus rhythm at rate of 68 bpm. She is very hard of hearing    Interval history  -Patient denies any cardiac symptoms complains of significant constipation. She remains on room air    Objective:   BP (!) 165/78   Pulse 74   Temp 98.2 °F (36.8 °C) (Oral)   Resp 16   Ht 5' 1\" (1.549 m)   Wt 120 lb 5.9 oz (54.6 kg)   SpO2 96%   BMI 22.74 kg/m²       Intake/Output Summary (Last 24 hours) at 8/19/2021 1033  Last data filed at 8/19/2021 1005  Gross per 24 hour   Intake 1328.64 ml   Output 725 ml   Net 603.64 ml     Wt Readings from Last 3 Encounters:   08/19/21 120 lb 5.9 oz (54.6 kg)   03/18/21 106 lb (48.1 kg)   03/08/21 106 lb (48.1 kg)     Telemetry:NSR    Physical Exam:  General:  NAD, Awake? \"  Slightly confused  Skin:  Warm and dry  Neck:  Supple, ? JVP appreciated, no bruit  Chest:  Clear to auscultation, no wheezes/rhonchi/rales  Cardiovascular:  Regular rate.  S1S2  Abdomen:  Soft, nontender, +bowel sounds  Extremities:  No LE edema    Cardiac Diagnosis:  hypertension, hyperlipidemia and CHF    Medications:    polyethylene glycol  17 g Oral Daily    hydrALAZINE  25 mg Oral 3 times per day    furosemide  40 mg Oral Daily    potassium chloride  20 mEq Oral BID    cefTRIAXone (ROCEPHIN) IV  1,000 mg Intravenous Q24H    aspirin  81 mg Oral Daily    busPIRone  10 mg Oral 4x Daily    folic acid  1 mg Oral Daily    pantoprazole 40 mg Oral BID    predniSONE  5 mg Oral Daily    sodium chloride flush  5-40 mL Intravenous 2 times per day    enoxaparin  30 mg Subcutaneous Nightly    hydroxychloroquine  200 mg Oral BID    levothyroxine  150 mcg Oral Daily    latanoprost  1 drop Both Eyes Nightly    gabapentin  100 mg Oral BID      sodium chloride       melatonin, albuterol, fluticasone, sodium chloride flush, sodium chloride, ondansetron **OR** ondansetron, polyethylene glycol, acetaminophen **OR** acetaminophen    Lab Data:  CBC:   No results for input(s): WBC, HGB, PLT in the last 72 hours. BMP:    Recent Labs     08/18/21  1654 08/19/21  0027 08/19/21  0901   * 124* 124*   K 4.3 4.8 4.6   CO2 19* 20* 19*   BUN 30* 36* 33*   CREATININE 1.7* 1.5* 1.3*     LIVR:   No results for input(s): AST, ALT in the last 72 hours. INR:  No results for input(s): INR in the last 72 hours. APTT: No results for input(s): APTT in the last 72 hours. BNP:  No results for input(s): BNP in the last 72 hours. Imaging: Echocardiogram-8/16/21Left ventricular cavity size is normal with mild concentric left ventricular   hypertrophy. Normal systolic function. Ejection fraction is visually estimated to be 65-70%. No regional wall motion abnormalities are noted. Grade II diastolic dysfunction with elevated LV filling pressures. The right ventricle is normal in size and function. The left atrium is severely dilated. Mild mitral regurgitation. Moderate tricuspid regurgitation. Estimated pulmonary artery systolic pressure is mildly elevated at 45 mmHg   assuming a right atrial pressure of 8 mmHg.          Assessment:Plan:  Junctional bradycardia                      - in the setting of multiple electrolyte abnormalities              - back in sinus rhythm               - never caused her to become hypotensive so was likely not causing                 symptoms              - optimize electrolytes and monitor, no immediate need for pacing     Acute heart failure. NYHA class III on admission              - Acute on chronic diastolic              -Echocardiogram Showed normal LVEF              -will give her one dose of I/v lasix                             - will check portable CXR, BNP             -  PO lasix from tomorrow if heart failure improves further              -Will not place her on beta-blocker therapy since she had junctional bradycardia on admission  Hyponatremia              - likely due to hypervolemia              - continue diuresis              -Nephrology is following     Essential HTN              - BP is elevated today. - will resume her adalat cc( was on it at home)    SHANELL  - renal function improving  - will give  her one dose of I/v lasix & start po lasix in am  -      Of she stays stable, will sign off  In next 24-48  Hours.           Complexity of medical decision making-high    Electronically signed by Kelsie Velasquez MD on 8/19/2021 at 10:33 AM

## 2021-08-19 NOTE — PROGRESS NOTES
Hospitalist Progress Note      PCP: Amaris Houser MD    Chief Complaint. Patient is a 81-year-old female who presented to [facility] [place] for generalized weakness. Patient has past medical history of hypothyroidism depression, hypertension. Date of Admission: 8/14/2021    Subjective: Patient seen and evaluated at the bedside, mentions mild abdominal discomfort, no acute events overnight, denied chest pain, nausea, vomiting, shortness of breath, fever or chills      Medications:  Reviewed    Infusion Medications    sodium chloride       Scheduled Medications    polyethylene glycol  17 g Oral Daily    hydrALAZINE  25 mg Oral 3 times per day    furosemide  40 mg Oral Daily    potassium chloride  20 mEq Oral BID    cefTRIAXone (ROCEPHIN) IV  1,000 mg Intravenous Q24H    aspirin  81 mg Oral Daily    busPIRone  10 mg Oral 4x Daily    folic acid  1 mg Oral Daily    pantoprazole  40 mg Oral BID    predniSONE  5 mg Oral Daily    sodium chloride flush  5-40 mL Intravenous 2 times per day    enoxaparin  30 mg Subcutaneous Nightly    hydroxychloroquine  200 mg Oral BID    levothyroxine  150 mcg Oral Daily    latanoprost  1 drop Both Eyes Nightly    gabapentin  100 mg Oral BID     PRN Meds: melatonin, albuterol, fluticasone, sodium chloride flush, sodium chloride, ondansetron **OR** ondansetron, polyethylene glycol, acetaminophen **OR** acetaminophen      Intake/Output Summary (Last 24 hours) at 8/19/2021 1540  Last data filed at 8/19/2021 1247  Gross per 24 hour   Intake 948.64 ml   Output 975 ml   Net -26.36 ml       Physical Exam Performed:    BP (!) 185/73   Pulse 61   Temp 98.1 °F (36.7 °C) (Oral)   Resp 20   Ht 5' 1\" (1.549 m)   Wt 120 lb 5.9 oz (54.6 kg)   SpO2 97%   BMI 22.74 kg/m²     General appearance: NAD, lying in bed comfortably  HEENT:  Conjunctivae/corneas clear. Neck: Supple, with full range of motion. Respiratory:  Normal respiratory effort.  Clear to auscultation, bilaterally without Rales/Wheezes/Rhonchi. Cardiovascular: Regular rate and rhythm with normal S1/S2 without murmurs or rubs  Abdomen: Soft, non-tender, non-distended, normal bowel sounds. Musculoskeletal: No cyanosis or edema bilaterally  Neurologic:  without any focal sensory/motor deficits. grossly non-focal.  Psychiatric: Alert and oriented, Normal mood  Peripheral Pulses: +2 palpable, equal bilaterally       Labs:   No results for input(s): WBC, HGB, HCT, PLT in the last 72 hours. Recent Labs     08/18/21  1654 08/19/21  0027 08/19/21  0901   * 124* 124*   K 4.3 4.8 4.6   CL 90* 92* 87*   CO2 19* 20* 19*   BUN 30* 36* 33*   CREATININE 1.7* 1.5* 1.3*   CALCIUM 8.2* 8.3 8.6     No results for input(s): AST, ALT, BILIDIR, BILITOT, ALKPHOS in the last 72 hours. No results for input(s): INR in the last 72 hours. No results for input(s): Jose Maria Sharan in the last 72 hours. Urinalysis:      Lab Results   Component Value Date    NITRU Negative 08/15/2021    WBCUA 835 08/15/2021    BACTERIA 1+ 08/15/2021    RBCUA 6 08/15/2021    BLOODU MODERATE 08/15/2021    SPECGRAV 1.006 08/15/2021    GLUCOSEU Negative 08/15/2021       Radiology:  XR ABDOMEN (KUB) (SINGLE AP VIEW)   Final Result   Nonspecific bowel gas pattern without evidence for obstruction. XR CHEST PORTABLE   Final Result   Mild cardiomegaly was noted with mild central pulmonary vascular congestion   with trace right and small left pleural effusions. The findings are   compatible with early-mild congestive heart failure. Small to moderate fixed hiatal hernia. Marked degenerative osteo arthritic changes of each glenohumeral joint was   noted with chronic bilateral anterior dislocations.                Assessment/Plan:    Active Hospital Problems    Diagnosis     Hyponatremia [E87.1]     Acute heart failure (HCC) [I50.9]     Elevated troponin [R77.8]     Hyperkalemia [E87.5]     Chronic mesenteric ischemia (HCC) [K55.1]     Symptomatic bradycardia [R00.1]     Essential hypertension [I10]     Rheumatoid arthritis (HCC) [M06.9]     Acquired hypothyroidism [E03.9]     Chronic kidney disease [N18.9]      Patient is a 73-year-old female who presented to [facility] [place] for generalized weakness. Patient has past medical history of hypothyroidism depression, hypertension. Assessment  Hyponatremia  Symptomatic bradycardia  Acute decompensated CHF  Urinary tract infection  Hypertension  Rheumatoid arthritis  Hypothyroidism  SHANELL  Depression  Generalized weakness  Hyperkalemia-resolved    Plan  Continue to monitor BMP, nephrology is following, switched to PO lasix  Cardiology was consulted recommends monitoring and replacing electrolytes, echocardiogram, continue PO Lasix  Continue home aspirin, hydralazine, Plaquenil, levothyroxine, prednisone, Protonix  Urine culture shows pansensitive E. coli, will switch to oral antibiotics   DVT prophylaxis-Lovenox  Diet: ADULT DIET; Regular;  Low Sodium (2 gm); 1200 ml  Code Status: Full Code    PT/OT Eval Status: ordered    Dispo - awaiting Na correction, switch to oral antibiotics, monitor on cardiac telemetry, likely discharge in 1 to 2 days    Opal Muhammad MD

## 2021-08-19 NOTE — PROGRESS NOTES
Occupational Therapy   Occupational Therapy Re-Assessment  Date: 2021   Patient Name: Stevie Stroud  MRN: [id number]     : 1940    Date of Service: 2021    Discharge Recommendations:  ECF without OT, ECF with OT (up to facility discretion if pt not at baseline)  OT Equipment Recommendations  Equipment Needed: No    Stevie Stroud scored a 9/24 on the AM-PAC ADL Inpatient form. Current research shows that an AM-PAC score of 17 or less is typically not associated with a discharge to the patient's home setting. Based on the patient's AM-PAC score and their current ADL deficits, it is recommended that the patient have 3-5 sessions per week of Occupational Therapy at d/c to increase the patient's independence. Please see assessment section for further patient specific details. If patient discharges prior to next session this note will serve as a discharge summary. Please see below for the latest assessment towards goals. Assessment   Performance deficits / Impairments: Decreased functional mobility ; Decreased balance;Decreased safe awareness;Decreased ADL status; Decreased high-level IADLs;Decreased endurance;Decreased cognition;Decreased strength;Decreased sensation;Decreased fine motor control;Decreased ROM  Assessment: [de-identified] y/o female admitted 2021 with generalized weakness. Pt being treated for sinus bradycardia, hyponatremia, hyperkalemia, and acute heart failure. PTA pt is a long term resident at [facility]. Per pt- she can pivot to w/c with assist of 1-2 and is dependent for ADLs, including feeding. Today, pt required mod/max A for bed mobility. Pt required CGA-mod A to maintain static sitting balance. Pt was able to briefly stand to petey stedy with mod A x 2 and exhibited limited standing tolerance. Pt has ROM to bring hand to mouth, however decreased  strength. Anticipate pt will require max A for ADLs. Pt appears close to baseline and anticipate will return to LTC. Recommend return to [facility] with or without OT if facility deems not at [facility]. Prognosis: Fair  OT Education: OT Role;Plan of Care;Transfer Training;Orientation  Barriers to Learning: [place]  REQUIRES OT FOLLOW UP: Yes  Activity Tolerance  Activity Tolerance: Patient limited by fatigue  Safety Devices  Safety Devices in place: Yes  Type of devices: Nurse notified;Gait belt;Bed alarm in place;Call light within reach; Left in bed           Patient Diagnosis(es): The primary encounter diagnosis was Symptomatic bradycardia. Diagnoses of Hyponatremia and Acute congestive heart failure, unspecified heart failure type [facility]) were also pertinent to this visit. has a past medical history of Allergic rhinitis, Anxiety, Arthritis, Asthma, Chronic kidney disease, Depression, Diverticulitis, GERD (gastroesophageal reflux disease), Hyperlipidemia, Hypertension, Overactive bladder, PVD (peripheral vascular disease) ([facility] [address] Thyroid disease, Tinea corporis, and Vitamin B12 deficiency. has a past surgical history that includes Thyroidectomy; Spine surgery; pr office/outpt visit,procedure only (Right, 11/8/2018); and Upper gastrointestinal endoscopy (N/A, 9/14/2020). Restrictions  Restrictions/Precautions  Restrictions/Precautions: Fall Risk  Position Activity Restriction  Other position/activity restrictions: [place]    Subjective   General  Chart Reviewed: Yes  Patient assessed for rehabilitation services?: Yes  Additional Pertinent Hx: [de-identified] y/o female admitted 8/14/2021 with generalized weakness. Pt being treated for sinus bradycardia, hyponatremia, hyperkalemia, and acute heart failure. Family / Caregiver Present: No  Referring Practitioner: Olivia Jones MD  Subjective  Subjective: Pt seen bedside and agreeable to therapy. General Comment  Comments: Per RN ok for therapy.     Social/Functional History  Social/Functional History  Type of Home: Facility (Pt long term resident at [facility])  ADL Assistance:  (needs assist for feeding and all ADLs)  Ambulation Assistance:  (non-ambulatory but able to propel her w/c short distances)  Transfer Assistance: Needs assistance (2 person SPT)  Additional Comments: Pt reports she voids via depends, requires assist for feeding d/t numb/tingling hands, and sometimes gets OOB with staff to w/c. If transfers OOB, requires lift for 2 assist pivot. Objective   Vision: Within Functional Limits  Hearing: Exceptions to [facility]  Hearing Exceptions: Hard of hearing/hearing concerns;Bilateral hearing aid    Orientation  Overall Orientation Status:  (hospital setting, confused to recent events/situation)  Orientation Level: Oriented to place;Oriented to person;Disoriented to time     Balance  Sitting Balance:  (EOb ~ 5 min in prep for standing, CGA for static sitting with RUE holding onto bed rail, up to mod A when not holding onto rail)  Standing Balance  Time: stood briefly with petey stedy support  Functional Mobility  Functional Mobility Comments: use of petey stedy to move pt up towards [facility]     ADL  Additional Comments: Anticipate pt will be max/dependent for all ADLs based on balance and limited UE ROM. Bed mobility  Supine to Sit: Moderate assistance;Maximum assistance (HOB elevated)  Sit to Supine: Moderate assistance;2 Person assistance  Transfers  Sit to stand: Moderate assistance;2 Person assistance;Maximum assistance  Stand to sit: 2 Person assistance; Moderate assistance;Maximum assistance  Transfer Comments: mod/max A x 2 to stand from bed > petey stedy. UE placed on side rails of stedy d/t decreased shoulder ROM     Cognition  Overall Cognitive Status: Exceptions  Arousal/Alertness: Inconsistent responses to stimuli  Following Commands: Follows one step commands with repetition; Follows one step commands with increased time (d/t [place])  Attention Span: Attends with cues to redirect  Memory: Decreased short term memory;Decreased recall of recent events  Safety Judgement: Decreased awareness of need for safety  Insights: Decreased awareness of deficits  Initiation: Requires cues for all  Sequencing: Requires cues for all  Cognition Comment: .        Sensation  Overall Sensation Status: Impaired (numbness/tingling edmar hands)        LUE AROM (degrees)  LUE General AROM: decreased overhead shoulder ROM  Left Hand AROM (degrees)  Left Hand General AROM: weak grasp  RUE AROM (degrees)  RUE General AROM: decreased overhead shoulder ROM  Right Hand AROM (degrees)  Right Hand General AROM: weak grasp     Plan   Plan  Times per week: 3-5  Current Treatment Recommendations: Strengthening, Endurance Training, Balance Training, Gait Training, Functional Mobility Training, Self-Care / ADL    AM-PAC Score  AM-PAC Inpatient Daily Activity Raw Score: 9 (08/16/21 0918)  AM-PAC Inpatient ADL T-Scale Score : 25.33 (08/16/21 0918)  ADL Inpatient CMS 0-100% Score: 79.59 (08/16/21 1278)  ADL Inpatient CMS G-Code Modifier : CL (08/16/21 8660)    Goals  Short term goals  Time Frame for Short term goals: Prior to DC: Short term goal 1: Pt will complete ADL pivot transfer with max A  Short term goal 2: Pt will tolerate sitting EOB > 2 min in prep for transfer with SBA  Patient Goals   Patient goals : no goal stated       Therapy Time   Individual Concurrent Group Co-treatment   Time In 1125         Time Out 1148         Minutes 23         Timed Code Treatment Minutes: 23 Minutes     This note to serve as OT d/c summary if pt is d/c-ed prior to next therapy session.     Lurdes Spann, OTR/L

## 2021-08-19 NOTE — CARE COORDINATION
Spoke with Zaid Batista ([phone]) at [facility]. Patient can return with or without precert. Discussed family goal for patient to return with therapy. Confirmed patient will need new PT/OT to submit for SNF precert.    SHAYY Tamayo, BRIAN, Social Work/Case Management   [phone]  Electronically signed by SHAYY Tamayo, BRIAN on 8/19/2021 at 10:54 AM

## 2021-08-19 NOTE — PROGRESS NOTES
Physical Therapy  Facility/Department: [facility] 5W PROGRESSIVE CARE  Daily Treatment Note  NAME: Tanner Reed  : 1940  MRN: [id number]    Date of Service: 2021    Discharge Recommendations:  3-5 sessions per week   PT Equipment Recommendations  Equipment Needed: No  Tanner Reed scored a  on the AM-PAC short mobility form. Current research shows that an AM-PAC score of 17 or less is typically not associated with a discharge to the patient's home setting. Based on the patient's AM-PAC score and their current functional mobility deficits, it is recommended that the patient have 3-5 sessions per week of Physical Therapy at d/c to increase the patient's independence. Please see assessment section for further patient specific details. If patient discharges prior to next session this note will serve as a discharge summary. Please see below for the latest assessment towards goals. Assessment   Body structures, Functions, Activity limitations: Decreased functional mobility   Assessment: [de-identified] y/o female admit 2021 with Bradycardia, CHF, Hyponatremia. PMH as noted including CKD, PVD, R Hip Fx/Gamma Nail. Pt admit from nursing facility (LTC); Pt at baseline is assist for SPT to her w/c and able to propel her w/c short distance; she currently is below her usual baseline for mobility tasks; pt is an elevated fall risk and will benefit from continued therapy 3-5x/wk to address deficits to increase her Ind and lessen her fall risk  Prognosis: Fair  History: [de-identified] y/o female admit 2021 with Bradycardia, CHF, Hyponatremia. PMH as noted including CKD, PVD, R Hip Fx/Gamma Nail. PT Education: General Safety  Patient Education: reviewed call light and not getting up without assist  Barriers to Learnin W Madhavi Magdaleno. REQUIRES PT FOLLOW UP: Yes  Activity Tolerance  Activity Tolerance: Patient limited by endurance     Patient Diagnosis(es): The primary encounter diagnosis was Symptomatic bradycardia.  Diagnoses of Cognition  Overall Cognitive Status: Exceptions  Arousal/Alertness: Inconsistent responses to stimuli;Delayed responses to stimuli  Following Commands: Follows one step commands with repetition; Follows one step commands with increased time (d/t [place])  Attention Span: Attends with cues to redirect  Memory: Decreased short term memory;Decreased recall of recent events  Safety Judgement: Decreased awareness of need for safety  Insights: Decreased awareness of deficits  Initiation: Requires cues for all  Sequencing: Requires cues for all  Cognition Comment: . Objective   Bed mobility  Supine to Sit: Moderate assistance;Maximum assistance (HOB elevated)  Sit to Supine: Moderate assistance;2 Person assistance  Transfers  Sit to Stand: Moderate Assistance;Maximum Assistance;2 Person Assistance (with stedy)  Stand to sit: Moderate Assistance;Maximum Assistance (from stedy)  Bed to Chair: Dependent/Total (with stedy)        Balance  Comments: varied sitting balance at EOB from close SBA to mod A            Comment: assisted with positioning in bed for comfort with all needs in reach              G-Code     OutComes Score                                                     AM-PAC Score  -PAC Inpatient Mobility Raw Score : 8 (08/19/21 1227)  AM-PAC Inpatient T-Scale Score : 28.52 (08/19/21 1227)  Mobility Inpatient CMS 0-100% Score: 86.62 (08/19/21 1227)  Mobility Inpatient CMS G-Code Modifier : CM (08/19/21 1227)          Goals  Short term goals  Time Frame for Short term goals: by discharge  Short term goal 1: bed mob mod A  Short term goal 2: sit<->stand mod A with stedy  Patient Goals   Patient goals : None stated.     Plan    Plan  Times per week: 3-5  Current Treatment Recommendations: Functional Mobility Training  Safety Devices  Type of devices: Call light within reach, Bed alarm in place, Left in bed, Nurse notified, All fall risk precautions in place     Therapy Time   Individual Concurrent Group Co-treatment Time In 1125         Time Out 1150         Minutes 25                 JONATHAN LUO, PT    Electronically signed by JONATHAN LUO PT on 8/19/2021 at 12:28 PM

## 2021-08-19 NOTE — CARE COORDINATION
Case Loaded with Eve Form for Pre-cert. Awaiting insurance Approval.  [facility] Liaison following.   Dionte Foley [phone]  Electronically signed by Ricco Phillips on 8/19/2021 at 2:32 PM

## 2021-08-19 NOTE — CARE COORDINATION
[facility] Wound Ostomy Continence Nurse  Consult Note       NAME:  Ray Rios  MEDICAL RECORD NUMBER:  [id number]  AGE: [de-identified] y.o. GENDER: female  : 1940  TODAY'S DATE:  2021    Subjective   Reason for WOCN Evaluation and Assessment: \"questionable area on rectum with sloughlike tissue. \" no wound or slough noted. Pt has hemorrhoids. Ray Rios is a [de-identified] y.o. female referred by:   [] Physician  [x] Nursing  [] Other:     Patient Goal of Care:  [] Wound Healing  [] Odor Control  [] Palliative Care  [] Pain Control   [] Other:         PAST MEDICAL HISTORY        Diagnosis Date    Allergic rhinitis     Anxiety     Arthritis     Asthma     Chronic kidney disease     Depression     Diverticulitis     GERD (gastroesophageal reflux disease)     Hyperlipidemia     Hypertension     Overactive bladder     PVD [facility]peripheral vascular disease) ([facility] [address]     Thyroid disease     Tinea corporis     Vitamin B12 deficiency        PAST SURGICAL HISTORY    Past Surgical History:   Procedure Laterality Date    SD OFFICE/OUTPT VISIT,PROCEDURE ONLY Right 2018    OPEN REDUCTION INTERNAL FIXATION RIGHT HIP GAMMA NAIL WITH C-ARM performed by Nicole Jimenez MD at [facility] ENDOSCOPY N/A 2020    ESOPHAGOGASTRODUODENOSCOPY performed by Kris Mayberry MD at [address] [facility]    History reviewed. No pertinent family history.     SOCIAL HISTORY    Social History     Tobacco Use    Smoking status: Never Smoker    Smokeless tobacco: Never Used   Substance Use Topics    Alcohol use: No    Drug use: No       ALLERGIES    Allergies   Allergen Reactions    Alendronate Sodium     Benadryl [Diphenhydramine]      insomnia    Ciprofloxacin     Crestor [Rosuvastatin Calcium]     Hydroxychloroquine Sulfate     Minocycline     Naprosyn [Naproxen]     Niaspan [Niacin]     Red Dye     Risedronate MEDICATIONS    No current facility-administered medications on file prior to encounter. Current Outpatient Medications on File Prior to Encounter   Medication Sig Dispense Refill    docusate sodium (COLACE) 100 MG capsule Take 100 mg by mouth 2 times daily      gabapentin (NEURONTIN) 100 MG capsule Take 100 mg by mouth 2 times daily.       hydrALAZINE (APRESOLINE) 10 MG tablet Take 10 mg by mouth every 8 hours      potassium chloride (MICRO-K) 10 MEQ extended release capsule Take 20 mEq by mouth daily      latanoprost (XALATAN) 0.005 % ophthalmic solution Place 1 drop into both eyes nightly      levothyroxine (SYNTHROID) 150 MCG tablet Take 150 mcg by mouth Daily      mirtazapine (REMERON) 15 MG tablet Take 7.5 mg by mouth nightly      linaCLOtide (LINZESS) 72 MCG CAPS capsule Take 1 capsule by mouth every morning (before breakfast) 90 capsule 5    diclofenac sodium (VOLTAREN) 1 % GEL Apply 2 g topically 2 times daily 120 g 0    metoclopramide (REGLAN) 5 MG tablet Take 1 tablet by mouth 3 times daily (Patient taking differently: Take 5 mg by mouth 3 times daily (with meals) Hold for loose stools) 120 tablet 3    pantoprazole (PROTONIX) 40 MG tablet Take 1 tablet by mouth 2 times daily 180 tablet 3    FLUoxetine (PROZAC) 10 MG capsule Take 30 mg by mouth daily       aspirin 81 MG tablet Take 81 mg by mouth daily       loratadine (CLARITIN) 10 MG tablet Take 10 mg by mouth daily       NIFEdipine (ADALAT CC) 60 MG extended release tablet Take 60 mg by mouth daily       psyllium (KONSYL) 28.3 % PACK Take 1 packet by mouth daily      predniSONE (DELTASONE) 5 MG tablet Take 5 mg by mouth daily      Multiple Vitamins-Minerals (MULTIVITAMIN WITH MINERALS) tablet Take 1 tablet by mouth daily      melatonin 5 MG TABS tablet Take 5 mg by mouth nightly       busPIRone (BUSPAR) 10 MG tablet Take 10 mg by mouth 4 times daily      fluticasone (FLONASE) 50 MCG/ACT nasal spray 2 sprays by Nasal route nightly       folic acid (FOLVITE) 1 MG tablet Take 1 mg by mouth daily       hydroxychloroquine (PLAQUENIL) 200 MG tablet Take by mouth 2 times daily       lisinopril (PRINIVIL;ZESTRIL) 40 MG tablet Take 40 mg by mouth daily       oxybutynin (DITROPAN-XL) 5 MG extended release tablet Take 5 mg by mouth nightly       pravastatin (PRAVACHOL) 10 MG tablet Take 10 mg by mouth nightly       sucralfate (CARAFATE) 1 GM tablet Take 1 g by mouth 3 times daily (before meals)       loperamide (IMODIUM) 2 MG capsule Take 2 mg by mouth 4 times daily as needed for Diarrhea      aluminum & magnesium hydroxide-simethicone (MYLANTA) 400-400-40 MG/5ML SUSP Take 30 mLs by mouth every 6 hours as needed      HYDROcodone-acetaminophen (NORCO) 5-325 MG per tablet Take 1 tablet by mouth every 4 hours as needed for Pain.       Dextromethorphan-guaiFENesin  MG/5ML SYRP Take 10 mLs by mouth every 4 hours as needed for Cough      calcium carbonate (TUMS) 500 MG chewable tablet Take 1 tablet by mouth every 8 hours as needed       polyethylene glycol (GLYCOLAX) 17 GM/SCOOP powder Take 17 g by mouth daily as needed      Nutritional Supplements (ENSURE CLEAR) LIQD Take 1 Bottle by mouth 2 times daily 60 Bottle 5    ondansetron (ZOFRAN) 4 MG tablet Take 1 tablet by mouth daily as needed for Nausea or Vomiting (Patient taking differently: Take 4 mg by mouth every 8 hours as needed for Nausea or Vomiting ) 30 tablet 0    hydrOXYzine (ATARAX) 25 MG tablet Take 25 mg by mouth 3 times daily as needed      cyclobenzaprine (FLEXERIL) 5 MG tablet Take 5-10 mg by mouth 3 times daily as needed       nystatin (MYCOSTATIN) 634771 UNIT/GM powder by Intratympanic route 2 times daily as needed       albuterol sulfate  (90 Base) MCG/ACT inhaler Inhale 2 puffs into the lungs every 6 hours as needed for Wheezing       magnesium hydroxide (MILK OF MAGNESIA) 400 MG/5ML suspension Take 30 mLs by mouth daily as needed       acetaminophen (TYLENOL) 325 MG tablet Take 650 mg by mouth every 6 hours as needed          Objective    BP (!) 165/78   Pulse 74   Temp 98.2 °F (36.8 °C) (Oral)   Resp 16   Ht 5' 1\" (1.549 m)   Wt 120 lb 5.9 oz (54.6 kg)   SpO2 96%   BMI 22.74 kg/m²     LABS:  WBC:    Lab Results   Component Value Date    WBC 6.1 08/15/2021     H/H:    Lab Results   Component Value Date    HGB 8.9 08/15/2021    HCT 26.0 08/15/2021     PTT:    Lab Results   Component Value Date    APTT 31.8 12/22/2017   [APTT}  PT/INR:    Lab Results   Component Value Date    PROTIME 11.9 11/07/2018    INR 1.04 11/07/2018     HgBA1c:  No results found for: LABA1C    Assessment   Ryley Risk Score: Ryley Scale Score: 13    Patient Active Problem List   Diagnosis Code    Essential hypertension I10    Rheumatoid arthritis ([facility] [address] M06.9    Major depressive disorder F32.9    Acquired hypothyroidism E03.9    REJI (generalized anxiety disorder) F41.1    Mild intermittent asthma J45.20    PVD (peripheral vascular disease) ([facility] I73.9    Anemia D64.9    Symptomatic bradycardia R00.1    Chronic constipation K59.09    Chronic kidney disease N18.9    Chronic rhinitis J31.0    Encounter for long-term (current) use of other medications Z79.899    Hiatal hernia with gastroesophageal reflux disease without esophagitis K44.9, K21.9    Hyperlipidemia E78.5    Hypertensive kidney disease with chronic kidney disease stage II I12.9, N18.2    MDD (major depressive disorder), recurrent episode, mild ([facility]) F33.0    Myalgia and myositis [id number]    Numbness and tingling of both feet R20.0, R20.2    Osteoporosis M81.0    Peripheral vascular disease of lower extremity ([facility]) I73.9    Polymyalgia rheumatica ([facility]) M35.3    Polypharmacy Z79.899    Primary insomnia F51.01    Pure hypercholesterolemia E78.00    Sensorineural hearing loss (SNHL) of both ears H90.3    Spinal stenosis of lumbar region M48.061    Spondylosis of lumbosacral spine without myelopathy M47.817    Urge incontinence N39.41    Vitamin B12 deficiency E53.8    Vitamin D deficiency E55.9    Weight loss R63.4    Chronic mesenteric ischemia (HCC) K55.1    Paresthesia and pain of both upper extremities R20.2, M79.601, M79.602    Hyponatremia E87.1    Acute heart failure (HCC) I50.9    Elevated troponin R77.8    Hyperkalemia E87.5       Measurements:  Pt seen. No slough noted to rectal area. Does have hemorrhoids. Denies c/o pain. Bilat heels pink to red, but caroline. Response to treatment:  Well tolerated by patient. Pain Assessment:  Severity:  0 / 10      Plan   Plan of Care:    -moisture barrier to buttocks  -turn and reposition every 2 hours  -elevate heels, apply liquid barrier film twice daily      Specialty Bed Required : Yes   [x] Low Air Loss   [] Pressure Redistribution  [] Fluid Immersion  [] Bariatric  [] Total Pressure Relief  [] Other:     Current Diet: ADULT DIET; Regular;  Low Sodium (2 gm); 1200 ml  Dietician consult:  No    Discharge Plan:  Placement for patient upon discharge: skilled nursing    Patient appropriate for Outpatient [address]: No    Referrals:  [x]   [] [address] [facility] [facility]  [] Supplies  [] Other    Patient/Caregiver Teaching:  Level of patient/caregiver understanding able to:   [] Indicates understanding       [x] Needs reinforcement  [] Unsuccessful      [] Verbal Understanding  [] Demonstrated understanding       [] No evidence of learning  [] Refused teaching         [] N/A       Electronically signed by Sid Nina on 8/19/2021 at 10:52 AM

## 2021-08-19 NOTE — PROGRESS NOTES
Os.     Bradycardia. . Improved. Cardiology following    HTN BP better controlled Monitor        Lexis Soriano MD  [facility]  8/19/2021

## 2021-08-20 LAB
ANION GAP SERPL CALCULATED.3IONS-SCNC: 13 MMOL/L (ref 3–16)
ANION GAP SERPL CALCULATED.3IONS-SCNC: 14 MMOL/L (ref 3–16)
ANION GAP SERPL CALCULATED.3IONS-SCNC: 14 MMOL/L (ref 3–16)
BLOOD CULTURE, ROUTINE: NORMAL
BUN BLDV-MCNC: 29 MG/DL (ref 7–20)
BUN BLDV-MCNC: 30 MG/DL (ref 7–20)
BUN BLDV-MCNC: 32 MG/DL (ref 7–20)
CALCIUM SERPL-MCNC: 8.4 MG/DL (ref 8.3–10.6)
CALCIUM SERPL-MCNC: 8.5 MG/DL (ref 8.3–10.6)
CALCIUM SERPL-MCNC: 8.9 MG/DL (ref 8.3–10.6)
CHLORIDE BLD-SCNC: 91 MMOL/L (ref 99–110)
CHLORIDE BLD-SCNC: 92 MMOL/L (ref 99–110)
CHLORIDE BLD-SCNC: 92 MMOL/L (ref 99–110)
CO2: 22 MMOL/L (ref 21–32)
CREAT SERPL-MCNC: 1 MG/DL (ref 0.6–1.2)
CREAT SERPL-MCNC: 1.1 MG/DL (ref 0.6–1.2)
CREAT SERPL-MCNC: 1.2 MG/DL (ref 0.6–1.2)
CULTURE, BLOOD 2: NORMAL
GFR AFRICAN AMERICAN: 52
GFR AFRICAN AMERICAN: 58
GFR AFRICAN AMERICAN: >60
GFR NON-AFRICAN AMERICAN: 43
GFR NON-AFRICAN AMERICAN: 48
GFR NON-AFRICAN AMERICAN: 53
GLUCOSE BLD-MCNC: 131 MG/DL (ref 70–99)
GLUCOSE BLD-MCNC: 94 MG/DL (ref 70–99)
GLUCOSE BLD-MCNC: 95 MG/DL (ref 70–99)
POTASSIUM REFLEX MAGNESIUM: 4.2 MMOL/L (ref 3.5–5.1)
POTASSIUM REFLEX MAGNESIUM: 4.3 MMOL/L (ref 3.5–5.1)
POTASSIUM REFLEX MAGNESIUM: 4.7 MMOL/L (ref 3.5–5.1)
PRO-BNP: 8633 PG/ML (ref 0–449)
SARS-COV-2: NOT DETECTED
SODIUM BLD-SCNC: 127 MMOL/L (ref 136–145)
SODIUM BLD-SCNC: 127 MMOL/L (ref 136–145)
SODIUM BLD-SCNC: 128 MMOL/L (ref 136–145)

## 2021-08-20 PROCEDURE — 6370000000 HC RX 637 (ALT 250 FOR IP): Performed by: INTERNAL MEDICINE

## 2021-08-20 PROCEDURE — 80048 BASIC METABOLIC PNL TOTAL CA: CPT

## 2021-08-20 PROCEDURE — 83880 ASSAY OF NATRIURETIC PEPTIDE: CPT

## 2021-08-20 PROCEDURE — 99232 SBSQ HOSP IP/OBS MODERATE 35: CPT | Performed by: NURSE PRACTITIONER

## 2021-08-20 PROCEDURE — 6360000002 HC RX W HCPCS: Performed by: INTERNAL MEDICINE

## 2021-08-20 PROCEDURE — 94760 N-INVAS EAR/PLS OXIMETRY 1: CPT

## 2021-08-20 PROCEDURE — U0005 INFEC AGEN DETEC AMPLI PROBE: HCPCS

## 2021-08-20 PROCEDURE — 6370000000 HC RX 637 (ALT 250 FOR IP): Performed by: HOSPITALIST

## 2021-08-20 PROCEDURE — 2060000000 HC ICU INTERMEDIATE R&B

## 2021-08-20 PROCEDURE — U0003 INFECTIOUS AGENT DETECTION BY NUCLEIC ACID (DNA OR RNA); SEVERE ACUTE RESPIRATORY SYNDROME CORONAVIRUS 2 (SARS-COV-2) (CORONAVIRUS DISEASE [COVID-19]), AMPLIFIED PROBE TECHNIQUE, MAKING USE OF HIGH THROUGHPUT TECHNOLOGIES AS DESCRIBED BY CMS-2020-01-R: HCPCS

## 2021-08-20 PROCEDURE — 36415 COLL VENOUS BLD VENIPUNCTURE: CPT

## 2021-08-20 PROCEDURE — 2580000003 HC RX 258: Performed by: INTERNAL MEDICINE

## 2021-08-20 PROCEDURE — 97530 THERAPEUTIC ACTIVITIES: CPT | Performed by: PHYSICAL THERAPIST

## 2021-08-20 RX ORDER — HYDROCODONE BITARTRATE AND ACETAMINOPHEN 5; 325 MG/1; MG/1
1 TABLET ORAL EVERY 8 HOURS PRN
Qty: 9 TABLET | Refills: 0 | Status: SHIPPED | OUTPATIENT
Start: 2021-08-20 | End: 2021-08-23

## 2021-08-20 RX ORDER — FUROSEMIDE 40 MG/1
40 TABLET ORAL DAILY
Qty: 60 TABLET | Refills: 3 | Status: SHIPPED | OUTPATIENT
Start: 2021-08-21 | End: 2021-08-23

## 2021-08-20 RX ORDER — LORAZEPAM 2 MG/ML
1 INJECTION INTRAMUSCULAR ONCE
Status: COMPLETED | OUTPATIENT
Start: 2021-08-20 | End: 2021-08-21

## 2021-08-20 RX ORDER — CEPHALEXIN 250 MG/1
250 CAPSULE ORAL EVERY 12 HOURS SCHEDULED
Qty: 1 CAPSULE | Refills: 0 | Status: SHIPPED | OUTPATIENT
Start: 2021-08-20 | End: 2021-08-21

## 2021-08-20 RX ADMIN — PANTOPRAZOLE SODIUM 40 MG: 40 TABLET, DELAYED RELEASE ORAL at 08:58

## 2021-08-20 RX ADMIN — POTASSIUM CHLORIDE 20 MEQ: 750 TABLET, FILM COATED, EXTENDED RELEASE ORAL at 08:57

## 2021-08-20 RX ADMIN — HYDRALAZINE HYDROCHLORIDE 25 MG: 25 TABLET, FILM COATED ORAL at 06:15

## 2021-08-20 RX ADMIN — ACETAMINOPHEN 650 MG: 325 TABLET ORAL at 20:29

## 2021-08-20 RX ADMIN — LEVOTHYROXINE SODIUM 150 MCG: 0.07 TABLET ORAL at 06:15

## 2021-08-20 RX ADMIN — LATANOPROST 1 DROP: 50 SOLUTION OPHTHALMIC at 21:57

## 2021-08-20 RX ADMIN — GABAPENTIN 100 MG: 100 CAPSULE ORAL at 20:29

## 2021-08-20 RX ADMIN — CEPHALEXIN 250 MG: 250 CAPSULE ORAL at 08:58

## 2021-08-20 RX ADMIN — POTASSIUM CHLORIDE 20 MEQ: 750 TABLET, FILM COATED, EXTENDED RELEASE ORAL at 20:29

## 2021-08-20 RX ADMIN — BUSPIRONE HYDROCHLORIDE 10 MG: 10 TABLET ORAL at 14:07

## 2021-08-20 RX ADMIN — HYDROXYCHLOROQUINE SULFATE 200 MG: 200 TABLET ORAL at 08:58

## 2021-08-20 RX ADMIN — GABAPENTIN 100 MG: 100 CAPSULE ORAL at 08:57

## 2021-08-20 RX ADMIN — PANTOPRAZOLE SODIUM 40 MG: 40 TABLET, DELAYED RELEASE ORAL at 20:29

## 2021-08-20 RX ADMIN — BUSPIRONE HYDROCHLORIDE 10 MG: 10 TABLET ORAL at 08:57

## 2021-08-20 RX ADMIN — SODIUM CHLORIDE, PRESERVATIVE FREE 10 ML: 5 INJECTION INTRAVENOUS at 09:02

## 2021-08-20 RX ADMIN — BUSPIRONE HYDROCHLORIDE 10 MG: 10 TABLET ORAL at 20:29

## 2021-08-20 RX ADMIN — HYDRALAZINE HYDROCHLORIDE 25 MG: 25 TABLET, FILM COATED ORAL at 14:07

## 2021-08-20 RX ADMIN — Medication 3 MG: at 21:57

## 2021-08-20 RX ADMIN — FUROSEMIDE 40 MG: 40 TABLET ORAL at 08:57

## 2021-08-20 RX ADMIN — CEPHALEXIN 250 MG: 250 CAPSULE ORAL at 20:29

## 2021-08-20 RX ADMIN — ENOXAPARIN SODIUM 30 MG: 30 INJECTION SUBCUTANEOUS at 20:30

## 2021-08-20 RX ADMIN — ASPIRIN 81 MG: 81 TABLET, CHEWABLE ORAL at 08:57

## 2021-08-20 RX ADMIN — HYDRALAZINE HYDROCHLORIDE 25 MG: 25 TABLET, FILM COATED ORAL at 20:29

## 2021-08-20 RX ADMIN — SODIUM CHLORIDE, PRESERVATIVE FREE 10 ML: 5 INJECTION INTRAVENOUS at 20:30

## 2021-08-20 RX ADMIN — HYDROXYCHLOROQUINE SULFATE 200 MG: 200 TABLET ORAL at 20:29

## 2021-08-20 RX ADMIN — PREDNISONE 5 MG: 5 TABLET ORAL at 09:01

## 2021-08-20 RX ADMIN — FOLIC ACID 1 MG: 1 TABLET ORAL at 08:57

## 2021-08-20 ASSESSMENT — PAIN DESCRIPTION - PROGRESSION
CLINICAL_PROGRESSION: GRADUALLY WORSENING
CLINICAL_PROGRESSION: GRADUALLY IMPROVING

## 2021-08-20 ASSESSMENT — PAIN SCALES - GENERAL
PAINLEVEL_OUTOF10: 0
PAINLEVEL_OUTOF10: 0
PAINLEVEL_OUTOF10: 3
PAINLEVEL_OUTOF10: 4

## 2021-08-20 ASSESSMENT — PAIN DESCRIPTION - PAIN TYPE
TYPE: ACUTE PAIN
TYPE: ACUTE PAIN

## 2021-08-20 ASSESSMENT — PAIN DESCRIPTION - ONSET
ONSET: GRADUAL
ONSET: ON-GOING

## 2021-08-20 ASSESSMENT — PAIN DESCRIPTION - ORIENTATION
ORIENTATION: MID;LOWER
ORIENTATION: MID;LOWER

## 2021-08-20 ASSESSMENT — PAIN DESCRIPTION - LOCATION
LOCATION: BACK
LOCATION: BACK

## 2021-08-20 ASSESSMENT — PAIN - FUNCTIONAL ASSESSMENT
PAIN_FUNCTIONAL_ASSESSMENT: ACTIVITIES ARE NOT PREVENTED
PAIN_FUNCTIONAL_ASSESSMENT: ACTIVITIES ARE NOT PREVENTED

## 2021-08-20 ASSESSMENT — PAIN DESCRIPTION - DESCRIPTORS
DESCRIPTORS: ACHING
DESCRIPTORS: ACHING

## 2021-08-20 ASSESSMENT — PAIN DESCRIPTION - FREQUENCY
FREQUENCY: CONTINUOUS
FREQUENCY: CONTINUOUS

## 2021-08-20 NOTE — PROGRESS NOTES
Pharmacy Heart Failure Medication Reconciliation Note    Pt discharged from [facility] today after admission for weakness. Chelsey Appiah has a diagnosis of diastolic heart failure (last EF = 65-70% on 8/16/21). Pertinent Labs:  BMP:   Lab Results   Component Value Date     08/20/2021    K 4.3 08/20/2021    CL 92 08/20/2021    CO2 22 08/20/2021    BUN 30 08/20/2021    CREATININE 1.2 08/20/2021     BNP:   Lab Results   Component Value Date    PROBNP 8,633 08/20/2021    PROBNP 3,112 08/14/2021       Patient taking an ACEI / ARB / Entresto:  Yes     Patient taking a BETA BLOCKER:  No: bradycardia  Patient taking a LOOP DIURETIC: Yes  Patient taking a ALDOSTERONE RECEPTOR ANTAGONIST: No: pEf    Patient has a diagnosis of Atrial Fibrillation: No: n/a. If yes, patient is on appropriate anticoagulation:     Patient has a diagnosis of type 2 diabetes:  No: n/a. If yes, patient prescribed the following anti-hyperglycemic medication at discharge:       Corrections to discharge medications include:  none    Discharge Medications:         Medication List      START taking these medications    cephALEXin 250 MG capsule  Commonly known as: KEFLEX  Take 1 capsule by mouth every 12 hours for 1 dose     furosemide 40 MG tablet  Commonly known as: LASIX  Take 1 tablet by mouth daily  Start taking on: August 21, 2021        CHANGE how you take these medications    HYDROcodone-acetaminophen 5-325 MG per tablet  Commonly known as: NORCO  Take 1 tablet by mouth every 8 hours as needed for Pain for up to 3 days.   What changed: when to take this     metoclopramide 5 MG tablet  Commonly known as: Reglan  Take 1 tablet by mouth 3 times daily  What changed:   · when to take this  · additional instructions     ondansetron 4 MG tablet  Commonly known as: ZOFRAN  Take 1 tablet by mouth daily as needed for Nausea or Vomiting  What changed: when to take this        CONTINUE taking these medications    albuterol sulfate  (90 Base) MCG/ACT inhaler     aluminum & magnesium hydroxide-simethicone 400-400-40 MG/5ML Susp  Commonly known as: MYLANTA     aspirin 81 MG tablet     busPIRone 10 MG tablet  Commonly known as: BUSPAR     calcium carbonate 500 MG chewable tablet  Commonly known as: TUMS     Carafate 1 GM tablet  Generic drug: sucralfate     cyclobenzaprine 5 MG tablet  Commonly known as: FLEXERIL     Dextromethorphan-guaiFENesin  MG/5ML Syrp     diclofenac sodium 1 % Gel  Commonly known as: VOLTAREN  Apply 2 g topically 2 times daily     docusate sodium 100 MG capsule  Commonly known as: COLACE     Ensure Clear Liqd  Take 1 Bottle by mouth 2 times daily     FLUoxetine 10 MG capsule  Commonly known as: PROZAC     fluticasone 50 MCG/ACT nasal spray  Commonly known as: FLONASE     folic acid 1 MG tablet  Commonly known as: FOLVITE     gabapentin 100 MG capsule  Commonly known as: NEURONTIN     hydrALAZINE 10 MG tablet  Commonly known as: APRESOLINE     hydroxychloroquine 200 MG tablet  Commonly known as: PLAQUENIL     hydrOXYzine 25 MG tablet  Commonly known as: ATARAX     latanoprost 0.005 % ophthalmic solution  Commonly known as: XALATAN     levothyroxine 150 MCG tablet  Commonly known as: SYNTHROID     linaCLOtide 72 MCG Caps capsule  Commonly known as: Linzess  Take 1 capsule by mouth every morning (before breakfast)     lisinopril 40 MG tablet  Commonly known as: PRINIVIL;ZESTRIL     loperamide 2 MG capsule  Commonly known as: IMODIUM     loratadine 10 MG tablet  Commonly known as: CLARITIN     magnesium hydroxide 400 MG/5ML suspension  Commonly known as: MILK OF MAGNESIA     melatonin 5 MG Tabs tablet     mirtazapine 15 MG tablet  Commonly known as: REMERON     multivitamin with minerals tablet     NIFEdipine 60 MG extended release tablet  Commonly known as: ADALAT CC     nystatin 592332 UNIT/GM powder  Commonly known as: MYCOSTATIN     oxybutynin 5 MG extended release tablet  Commonly known as: DITROPAN-XL     pantoprazole 40 MG tablet  Commonly known as: PROTONIX  Take 1 tablet by mouth 2 times daily     polyethylene glycol 17 GM/SCOOP powder  Commonly known as: GLYCOLAX     potassium chloride 10 MEQ extended release capsule  Commonly known as: MICRO-K     pravastatin 10 MG tablet  Commonly known as: PRAVACHOL     predniSONE 5 MG tablet  Commonly known as: DELTASONE     psyllium 28.3 % Pack  Commonly known as: KONSYL     Tylenol 325 MG tablet  Generic drug: acetaminophen           Where to Get Your Medications      These medications were sent to [address] [place] [address]    Phone: [phone]   · cephALEXin 250 MG capsule  · furosemide 40 MG tablet     You can get these medications from any pharmacy    Bring a paper prescription for each of these medications  · HYDROcodone-acetaminophen 5-325 MG per tablet         Daniel Hay, [facility] [facility]  Heart Failure Discharge Medication Reconciliation Program  [phone]

## 2021-08-20 NOTE — PROGRESS NOTES
Nephrology Progress Note   [facility]      This patient is a [de-identified]year old female whom we are following for hyponatremia. Subjective: The patient was seen and examined    Feels OK No HA dizziness nausea  HR stable  UOP 1600 ml/24 after Lasix yesterday     Family History: No family at bedside      Vitals:  /65   Pulse 64   Temp 98.1 °F (36.7 °C) (Oral)   Resp 16   Ht 5' 1\" (1.549 m)   Wt 119 lb 7.8 oz (54.2 kg)   SpO2 94%   BMI 22.58 kg/m²   I/O last 3 completed shifts: In: 480 [P.O.:480]  Out: 1625 [Urine:1625]  No intake/output data recorded. Physical Exam:  Physical Exam  Vitals reviewed. Constitutional:       General: She is not in acute distress. Appearance: Normal appearance. HENT:      Head: Normocephalic and atraumatic. Eyes:      General: No scleral icterus. Conjunctiva/sclera: Conjunctivae normal.   Cardiovascular:      Rate and Rhythm: Normal rate. Heart sounds: No friction rub. Pulmonary:      Effort: Pulmonary effort is normal. No respiratory distress. Breath sounds: Rales present. Abdominal:      General: Bowel sounds are normal. There is no distension. Tenderness: There is no abdominal tenderness. Musculoskeletal:      Right lower leg: Edema present. Left lower leg: No edema. Neurological:      Mental Status: She is alert.            Medications:   cephALEXin  250 mg Oral 2 times per day    polyethylene glycol  17 g Oral Daily    hydrALAZINE  25 mg Oral 3 times per day    furosemide  40 mg Oral Daily    potassium chloride  20 mEq Oral BID    aspirin  81 mg Oral Daily    busPIRone  10 mg Oral 4x Daily    folic acid  1 mg Oral Daily    pantoprazole  40 mg Oral BID    predniSONE  5 mg Oral Daily    sodium chloride flush  5-40 mL Intravenous 2 times per day    enoxaparin  30 mg Subcutaneous Nightly    hydroxychloroquine  200 mg Oral BID    levothyroxine  150 mcg Oral Daily    latanoprost  1 drop Both Eyes Nightly    gabapentin  100 mg Oral BID         Labs:  No results for input(s): WBC, HGB, HCT, MCV, PLT in the last 72 hours. Recent Labs     08/18/21  0443 08/18/21  0916 08/19/21  1737 08/20/21  0132 08/20/21  0854   NA  --    < > 125* 127* 127*   K  --    < > 4.5 4.2 4.3   CL  --    < > 89* 91* 92*   CO2  --    < > 21 22 22   GLUCOSE  --    < > 120* 95 94   MG 1.70*  --   --   --   --    BUN  --    < > 33* 32* 30*   CREATININE  --    < > 1.2 1.0 1.2   LABGLOM  --    < > 43* 53* 43*   GFRAA  --    < > 52* >60 52*    < > = values in this interval not displayed. Assessment/Plan:    Hyponatremia.  - Has history of chronic hyponatremia with baseline in the low 130smmol/L. TSH was elevated in 05-07/2021 but Levothyroxine was adjusted and level this admission was 2.69mmol/L.  - Clinically hypervolemic. Na+ improved to 127 meq today after sdditional Lasix yesterday Will continue diuresis and  Continue . FR 1200 ml/d Lasix swiched to PO          Hyperkalemia.  - In the setting of SHANELL. Also was on KCl and Lisinopril prior to admission.  - D/C'd potassium supplements and ACEI. - Improved with medical management. K+ low now restarted 20 meq BID K+ 4.3 meq today     Metabolic Acidosis. - From SHANELL +/- hypoperfusion. Lactic acid within normal.  - stable     Acute Kidney Injury.  - Suspect pre-renal in etiology in the setting of bradycardia/hypotension.  -Cr improved to 1.2 mg today Good UOP   Monitor with on going diuresis No ACE-I ARB's On oral Lasix now  - Avoid hypotension and nephrotoxic medications. - Discontinue Akhtar      Bradycardia. . Improved. Cardiology following    HTN BP better controlled Monitor        Jason Bhagat MD  [facility] [employer]  8/20/2021

## 2021-08-20 NOTE — CONSULTS
Neurosurgery Consult:    Patient Name: Deon Jeans YOB: 1940   Sex: Female Age: [de-identified] yrs     Medical Record Number: [id number] Acct Number: [de-identified]   Room Number: A0U-9210/7411-95 Hospital Day: Hospital Day: 7     Requesting physician: Tanja Lara MD    Reason for consultation: cervical spondylosis    History of present illness: Patient is a [de-identified] y.o. female w/ PMH of HTN, HLD, PVD, depression, hypothyroidism, bradycardia, and cervical spondylosis 2/2 cervical fusion who presented with progressive weakness over the past several months. She was found to be profoundly hyponatremic to 110 in ED and has been on chronic steroids. She lives in a nursing facility and has had difficulty feeding herself in recent months. She notes she has been wheelchair bound for years. Notably she had an MRI of the cervical spine last week which suggested adjacent level degeneration by the radiology read. Neurosurgery consulted for management. ROS:   Denies fever or recent illness. Denies chest pain  Denies shortness of breath  Denies changes in bowel or bladder function    VITAL SIGNS   BP (!) 158/89   Pulse 65   Temp 98.5 °F (36.9 °C) (Oral)   Resp 18   Ht 5' 1\" (1.549 m)   Wt 119 lb 7.8 oz (54.2 kg)   SpO2 97%   BMI 22.58 kg/m²    Height Height: 5' 1\" (154.9 cm)   Weight Weight: 119 lb 7.8 oz (54.2 kg)        Allergies Allergies   Allergen Reactions    Alendronate Sodium     Benadryl [Diphenhydramine]      insomnia    Ciprofloxacin     Crestor [Rosuvastatin Calcium]     Hydroxychloroquine Sulfate     Minocycline     Naprosyn [Naproxen]     Niaspan [Niacin]     Red Dye     Risedronate       NPO Status ADULT DIET; Regular;  Low Sodium (2 gm); 1200 ml   Isolation No active isolations     MEDICAL HISTORY   Past Medical History       Diagnosis Date    Allergic rhinitis     Anxiety     Arthritis     Asthma     Chronic kidney disease     Depression     Diverticulitis     GERD (gastroesophageal reflux disease)     Hyperlipidemia     Hypertension     Overactive bladder     PVD (peripheral vascular disease) (HCC)     Thyroid disease     Tinea corporis     Vitamin B12 deficiency       Surgical History    has a past surgical history that includes Thyroidectomy; Spine surgery; pr office/outpt visit,procedure only (Right, 11/8/2018); and Upper gastrointestinal endoscopy (N/A, 9/14/2020). Social History   Social History     Occupational History    Not on file   Tobacco Use    Smoking status: Never Smoker    Smokeless tobacco: Never Used   Substance and Sexual Activity    Alcohol use: No    Drug use: No    Sexual activity: Not Currently        The medical history was obtained from the patient & the medical records. The nursing notes, primary physician's notes, and old charts (if applicable) were reviewed. LABS   Basic Metabolic Profile Recent Labs     08/18/21  0443 08/18/21  0916 08/20/21  0132 08/20/21  0854 08/20/21  1541   NA  --    < > 127* 127* 128*   CL  --    < > 91* 92* 92*   CO2  --    < > 22 22 22   BUN  --    < > 32* 30* 29*   CREATININE  --    < > 1.0 1.2 1.1   GLUCOSE  --    < > 95 94 131*   MG 1.70*  --   --   --   --     < > = values in this interval not displayed. Complete Blood Count No results for input(s): WBC, RBC, HEMOGLOBIN in the last 72 hours. Invalid input(s): HEMATOCRIT   Coagulation Studies No results for input(s): PTT, INR in the last 72 hours.     Invalid input(s): PLATELETS, PROA, PT, PTTA     MEDICATIONS   Inpatient Medications     LORazepam, 1 mg, Intravenous, Once    cephALEXin, 250 mg, Oral, 2 times per day    polyethylene glycol, 17 g, Oral, Daily    hydrALAZINE, 25 mg, Oral, 3 times per day    furosemide, 40 mg, Oral, Daily    potassium chloride, 20 mEq, Oral, BID    aspirin, 81 mg, Oral, Daily    busPIRone, 10 mg, Oral, 4x Daily    folic acid, 1 mg, Oral, Daily    pantoprazole, 40 mg, Oral, BID    predniSONE, 5 mg, Oral,

## 2021-08-20 NOTE — PROGRESS NOTES
Physical Therapy  Facility/Department: [facility] PROGRESSIVE CARE  Daily Treatment Note  NAME: Ray Rios  : 1940  MRN: [id number]    Date of Service: 2021    Discharge Recommendations:  3-5 sessions per week     Ray Rios scored a 824 on the AM-PAC short mobility form. Current research shows that an AM-PAC score of 17 or less is typically not associated with a discharge to the patient's home setting. Based on the patient's AM-PAC score and their current functional mobility deficits, it is recommended that the patient have 3-5 sessions per week of Physical Therapy at d/c to increase the patient's independence. Please see assessment section for further patient specific details. If patient discharges prior to next session this note will serve as a discharge summary. Please see below for the latest assessment towards goals. Assessment   Body structures, Functions, Activity limitations: Decreased functional mobility   Assessment: [de-identified] y/o female admit 2021 with Bradycardia, CHF, Hyponatremia. PMH as noted including CKD, PVD, R Hip Fx/Gamma Nail. Pt admit from nursing facility (LTC); Pt at baseline is assist for SPT to her w/c and able to propel her w/c short distance; she currently is below her usual baseline for mobility tasks; pt is an elevated fall risk and will benefit from continued therapy 3-5x/wk to address deficits to increase her Ind and lessen her fall risk  Prognosis: Fair  History: [de-identified] y/o female admit 2021 with Bradycardia, CHF, Hyponatremia. PMH as noted including CKD, PVD, R Hip Fx/Gamma Nail. Patient Education: reviewed call light and not getting up without assist  Barriers to Learnin W Madhavi Magdaleno. REQUIRES PT FOLLOW UP: Yes  Activity Tolerance  Activity Tolerance: Patient limited by endurance     Patient Diagnosis(es): The primary encounter diagnosis was Symptomatic bradycardia.  Diagnoses of Hyponatremia, Acute congestive heart failure, unspecified heart failure type ([address] Hyperkalemia, Chronic mesenteric ischemia ([address] Spinal stenosis of lumbar region, unspecified whether neurogenic claudication present, Spondylosis of lumbosacral spine without myelopathy, and Peripheral vascular disease of lower extremity ([address] were also pertinent to this visit. has a past medical history of Allergic rhinitis, Anxiety, Arthritis, Asthma, Chronic kidney disease, Depression, Diverticulitis, GERD (gastroesophageal reflux disease), Hyperlipidemia, Hypertension, Overactive bladder, PVD (peripheral vascular disease) ([address] Thyroid disease, Tinea corporis, and Vitamin B12 deficiency. has a past surgical history that includes Thyroidectomy; Spine surgery; pr office/outpt visit,procedure only (Right, 11/8/2018); and Upper gastrointestinal endoscopy (N/A, 9/14/2020). Social/Functional History  Type of Home: Facility (Pt long term resident at [facility])  ADL Assistance:  (needs assist for feeding and all ADLs)  Ambulation Assistance:  (non-ambulatory but able to propel her w/c short distances)  Transfer Assistance: Needs assistance (2 person SPT)  Additional Comments: Pt reports she voids via depends, requires assist for feeding d/t numb/tingling hands, and sometimes gets OOB with staff to w/c. If transfers OOB, requires lift for 2 assist pivot. Restrictions  Restrictions/Precautions  Restrictions/Precautions: Fall Risk  Position Activity Restriction  Other position/activity restrictions: [place]  Subjective   General  Chart Reviewed: Yes  Additional Pertinent Hx: [de-identified] y/o female admit 8/14/2021 with Bradycardia, CHF, Hyponatremia. PMH as noted including CKD, PVD, R Hip Fx/Gamma Nail. Response To Previous Treatment: Patient with no complaints from previous session.   Family / Caregiver Present: No  Referring Practitioner: Dr. Tony Olmos  Subjective: pt very pleasant and agreeable to working with therapy but declines getting up [facility]          Orientation  Orientation  Orientation Level: Oriented to person;Oriented to place  Cognition   Cognition  Overall Cognitive Status: Exceptions  Arousal/Alertness: Inconsistent responses to stimuli;Delayed responses to stimuli  Following Commands: Follows one step commands with repetition; Follows one step commands with increased time (d/t [place])  Attention Span: Attends with cues to redirect  Memory: Decreased short term memory;Decreased recall of recent events  Safety Judgement: Decreased awareness of need for safety  Insights: Decreased awareness of deficits  Initiation: Requires cues for all  Sequencing: Requires cues for all  Cognition Comment: . Objective   Bed mobility  Supine to Sit: Moderate assistance;Maximum assistance (HOB elevated)  Sit to Supine: Moderate assistance;2 Person assistance  Scooting: Dependent/Total;2 Person assistance  Transfers  Sit to Stand: Moderate Assistance;Maximum Assistance;2 Person Assistance (with stedy)  Stand to sit: Moderate Assistance;Maximum Assistance (from stedy)  Comment: stood on stedy to change bed pad and don adult brief        Balance  Comments: CGA for sitting EOB; max A for standing on stedy to don adult brief and cleanse pt after being incontinent of urine            Comment: assisted with positioning in bed for comfort with all needs in reach              G-Code     OutComes Score                                                     AM-PAC Score  -PAC Inpatient Mobility Raw Score : 8 (08/19/21 1227)  AM-PAC Inpatient T-Scale Score : 28.52 (08/19/21 1227)  Mobility Inpatient CMS 0-100% Score: 86.62 (08/19/21 1227)  Mobility Inpatient CMS G-Code Modifier : CM (08/19/21 1227)          Goals  Short term goals  Time Frame for Short term goals: by discharge  Short term goal 1: bed mob mod A  Short term goal 2: sit<->stand mod A with stedy  Patient Goals   Patient goals : None stated.     Plan    Plan  Times per week: 3-5  Current Treatment Recommendations: Functional Mobility Training  Safety Devices  Type of devices: Call light within reach, Bed alarm in place, Left in bed, Nurse notified, All fall risk precautions in place     Therapy Time   Individual Concurrent Group Co-treatment   Time In 1300         Time Out 1340         Minutes 40                 JONATHAN LUO, PT    Electronically signed by JONATHAN LUO PT on 8/20/2021 at 2:18 PM

## 2021-08-20 NOTE — CARE COORDINATION
Pt will need Covid Test prior to Discharge. VM left for hospital SW.  Still waiting on Pre-cert approval from [facility] Sant[facility]. Pt lives at [facility] and can return even if we do not hear back from [facility] [employer] today.     PH# for Report to [facility]  [phone]  Fax# [phone]    Duane Chary [phone]  Electronically signed by Jordan King on 8/20/2021 at 9:58 AM

## 2021-08-20 NOTE — CARE COORDINATION
Called to [facility]. Requested COVID PCR be sent down to lab before noon. Provided PCR sticker to RN. Notified Inpatient lab. SHAYY Cardona LSW, Social Work/Case Management   [phone]  Electronically signed by SHAYY Cardona LSW on 8/20/2021 at 10:32 AM    Called to patient's daughter, GENARO ([phone]). Reviewed IM over the telephone. BODØ requested SW contact patient's daughter and Ezequiel Kauffman, to further discuss. Called to patient's daughter, Andres Funes ([phone]). Left HIPAA compliant voicemail requesting return phone call to discuss discharge plan. Await return phone call. Electronically signed by SHAYY Cardona LSW on 8/20/2021 at 12:26 PM    Spoke with daughter Andres Funes. Discussed discharge plan. Reviewed IM. Sent copy to daughter via e-mail ([email or web address]). Daughter expressed concerns with discharging patient today. Sent Dr. Alston Schilder notifying of daughter's concerns and requested he contact daughter to discuss.    Electronically signed by SHAYY Cardona LSW on 8/20/2021 at 12:37 PM

## 2021-08-20 NOTE — PROGRESS NOTES
[address]   Daily Progress Note      Admit Date:  8/14/2021    CC: bradycardia    HPI:   Stevie Stroud is a [de-identified] y.o. female with PMH HTN, HLP, PVD, thyroid disease, asthma, CKD. Polyneuropathy, mesenteric artery stenosis. Presented to ED with weakness. Found multiple electrolyte abn (Na 110, K 5.5, creat 1.3), fluid overload, SHANELL and bradycardia. Started on Isuprel gtt-now off. BP remained stable. K and lisinopril DC'd. Electrolyte imbalances and weakness improved. Now NSR HR 60s. No further bradycardia. Has been diuresed, now on lasix po. Patient denies SOB, CP, LH, dizziness, palpitations, syncope. Review of Systems:   General: Denies fever, chills  Skin: Denies skin changes, rash, itching, lesions.   HEENT: Denies headache, dizziness, vision changes, nosebleeds, sore throat, nasal drainage  RESP: Denies cough, sputum, dyspnea, wheeze, snoring  CARD: Denies palpitations,  murmur  GI:Denies nausea, vomiting, heartburn, loss of appetite, change in bowels  : Denies frequency, pain, incontinence, polyuria  VASC: Denies claudication, leg cramps, clots  MUSC/SKEL: Denies pain, stiffness, arthritis  PSYCH: Denies anxiety, depression, stress  NEURO: Denies numbness, tingling, weakness,change in mood or memory  HEME: Denies abn bruising, bleeding, anemia  ENDO: Denies intolerance to heat, cold, excessive thirst or hunger, hx thyroid disease    Objective:   BP (!) 175/46   Pulse 72   Temp 97.8 °F (36.6 °C) (Oral)   Resp 16   Ht 5' 1\" (1.549 m)   Wt 119 lb 7.8 oz (54.2 kg)   SpO2 93%   BMI 22.58 kg/m²         Intake/Output Summary (Last 24 hours) at 8/20/2021 0936  Last data filed at 8/20/2021 0511  Gross per 24 hour   Intake 480 ml   Output 1625 ml   Net -1145 ml     I/O since adm: Neg 4.6    WEIGHT:Admit Weight: 110 lb (49.9 kg)         Today  Weight: 119 lb 7.8 oz (54.2 kg)   DRY WEIGHT:  Wt Readings from Last 3 Encounters:   08/20/21 119 lb 7.8 oz (54.2 kg)   03/18/21 106 lb (48.1 kg) Value Date    AST 20 08/14/2021    ALT 13 08/14/2021    LIPASE 11.0 07/14/2020    AMYLASE 10 03/03/2019    LABALBU 3.8 08/14/2021    BILIDIR <0.2 12/22/2017    BILITOT 0.3 08/14/2021    ALKPHOS 96 08/14/2021     PT/INR:   Lab Results   Component Value Date    PROTIME 11.9 11/07/2018    INR 1.04 11/07/2018     APTT:   Lab Results   Component Value Date    APTT 31.8 12/22/2017     Pro-BNP:    Lab Results   Component Value Date    PROBNP 8,633 08/20/2021    PROBNP 3,112 08/14/2021     Parameters:   > 450 pg/mL under age 48  > 900 pg/mL ages 54-65  > 1800 pg/mL over age 76    ENZYMES:  Lab Results   Component Value Date    TROPONINI 0.07 08/14/2021     FASTING LIPID PANEL:  Lab Results   Component Value Date    CHOL 151 10/30/2020    HDL 72 10/30/2020    LDLCALC 65 10/30/2020    TRIG 72 10/30/2020       Diagnostics:    EKG: Poor data quality, interpretation may be adversely affectedSinus bradycardia or ectopic atrial rhythmLeft axis deviationRight bundle branch blockAbnormal ECGWhen compared with ECG of 12-JUL-2020 10:55,Vent. rate has decreased BY  29 BPMConfirmed by [facility] Opal COOLEY MD ([id number]) on 8/15/2021 11:50:50 AM    ECHO:  Echocardiogram-8/16/21Left ventricular cavity size is normal with mild concentric left ventricular   hypertrophy. Normal systolic function.   Ejection fraction is visually estimated to be 65-70%.   No regional wall motion abnormalities are noted.   Grade II diastolic dysfunction with elevated LV filling pressures.   The right ventricle is normal in size and function.   The left atrium is severely dilated.   Mild mitral regurgitation.   Moderate tricuspid regurgitation.   Estimated pulmonary artery systolic pressure is mildly elevated at 45 mmHg   assuming a right atrial pressure of 8 mmHg.     CXR 8/14/2021  Impression   Mild cardiomegaly was noted with mild central pulmonary vascular congestion   with trace right and small left pleural effusions.  The findings are   compatible with early-mild congestive heart failure.       Small to moderate fixed hiatal hernia.       Marked degenerative osteo arthritic changes of each glenohumeral joint was   noted with chronic bilateral anterior dislocations. Assessment/Plan:  1.) Junctional bradycardia: Occurred in setting of electrolyte imbalance. Now NSR HR 60s, no bradycardia or hypotension. 2.) Acute on chronic diastolic Grade II heart failure, EF 65-70%: Appears to be euvolemic. No edema, SOB, pulm edema, abd distention. However, BNP is elevated today- unclear etiology. Na improved but not resolved. Would continue lasix as is for now, consider increasing diuretics if she becomes sympotmatic. NYHA Class II   Stage C  Diuretic: lasix 40mg po daily  Beta Blocker: Not indicated for EF>35%  ACEi/ARB/ARNI: Not indicated for EF>35%  Aldosterone Antagonist:Not indicated for EF>35%  SGLT2 Inhibitor: Not indicated for EF>35%  2gm Na diet, daily weight, 64 oz fluid restriction  Avoid NSAIDS and other nephrotoxic meds  Cardiac Rehab: Not indicated for EF>35%  ICD:Not indicated for EF>35%    3. ) Mesenteric artery stenosis:   -asa     Patient is being discharged today. Neph is OK with Na 127. Patient will FU with [facility] within 7 days of DC.        Electronically signed by BRAULIO Dangelo CNP on 8/20/2021 at 9:36 AM

## 2021-08-20 NOTE — PROGRESS NOTES
auscultation, bilaterally without Rales/Wheezes/Rhonchi. Cardiovascular: Regular rate and rhythm with normal S1/S2 without murmurs or rubs  Abdomen: Soft, non-tender, non-distended, normal bowel sounds. Musculoskeletal: No cyanosis or edema bilaterally  Neurologic:  without any focal sensory/motor deficits. grossly non-focal.  Psychiatric: Alert and oriented, Normal mood  Peripheral Pulses: +2 palpable, equal bilaterally       Labs:   No results for input(s): WBC, HGB, HCT, PLT in the last 72 hours. Recent Labs     08/19/21  1737 08/20/21  0132 08/20/21  0854   * 127* 127*   K 4.5 4.2 4.3   CL 89* 91* 92*   CO2 21 22 22   BUN 33* 32* 30*   CREATININE 1.2 1.0 1.2   CALCIUM 8.7 8.5 8.9     No results for input(s): AST, ALT, BILIDIR, BILITOT, ALKPHOS in the last 72 hours. No results for input(s): INR in the last 72 hours. No results for input(s): [place] in the last 72 hours. Urinalysis:      Lab Results   Component Value Date    NITRU Negative 08/15/2021    WBCUA 835 08/15/2021    BACTERIA 1+ 08/15/2021    RBCUA 6 08/15/2021    BLOODU MODERATE 08/15/2021    SPECGRAV 1.006 08/15/2021    GLUCOSEU Negative 08/15/2021       Radiology:  XR ABDOMEN (KUB) (SINGLE AP VIEW)   Final Result   Nonspecific bowel gas pattern without evidence for obstruction. XR CHEST PORTABLE   Final Result   Mild cardiomegaly was noted with mild central pulmonary vascular congestion   with trace right and small left pleural effusions. The findings are   compatible with early-mild congestive heart failure. Small to moderate fixed hiatal hernia. Marked degenerative osteo arthritic changes of each glenohumeral joint was   noted with chronic bilateral anterior dislocations.                Assessment/Plan:    Active Hospital Problems    Diagnosis     Hyponatremia [E87.1]     Acute heart failure (HCC) [I50.9]     Elevated troponin [R77.8]     Hyperkalemia [E87.5]     Chronic mesenteric ischemia (HCC) [K55.1]     Symptomatic bradycardia [R00.1]     Essential hypertension [I10]     Rheumatoid arthritis (HCC) [M06.9]     Acquired hypothyroidism [E03.9]     Chronic kidney disease [N18.9]      Patient is a 81-year-old female who presented to [facility] for generalized weakness. Patient has past medical history of hypothyroidism depression, hypertension. Assessment  Hyponatremia-improved, has chronic hyponatremia  Symptomatic bradycardia-now resolved  Acute decompensated CHF-now compensated  Urinary tract infection  Hypertension  Rheumatoid arthritis  Hypothyroidism  SHANELL-improved  Depression  Generalized weakness  Hyperkalemia-resolved    Plan  Due to abnormal MRI performed at [facility] 8/13, will consult surgery  Continue to monitor BMP, nephrology is following, switched to PO lasix  Cardiology was consulted recommends monitoring and replacing electrolytes, echocardiogram, continue PO Lasix  Continue home aspirin, hydralazine, Plaquenil, levothyroxine, prednisone, Protonix  Urine culture shows pansensitive E. coli, will switch to oral antibiotics   DVT prophylaxis-Lovenox  Diet: ADULT DIET; Regular;  Low Sodium (2 gm); 1200 ml  Code Status: Full Code    PT/OT Eval Status: ordered    Dispo -sodium is almost back to the patient's baseline, await neurosurgery consult    Amaya Tellez MD

## 2021-08-20 NOTE — SIGNIFICANT EVENT
Called and discussed with patient daughter Yen Montanez, daughter had questions about abnormal MRI of cervical spine that was performed at [facility], Ms. Yen Montanez mentioned she was called by patient's neurologist today 8/20/2021 that patient should see a neurosurgeon before being discharged. MRI performed at [facility] 8/13 that showed the following impression    .  Severely limited study secondary to extensive patient motion. 2.  Severe central stenosis at C3-4 with severe cord compression secondary to 4-5 mm anterolisthesis, degenerative disc disease and facet arthrosis. 3.  Suspect mild signal abnormality in the spinal cord at C3-4 which may represent edema or contusion. In addition, myelomalacia is noted at the C5 level. 4.  Postoperative changes from C4-C7 ACDF. No cord compression is seen at these levels. The neural foramina cannot be adequately evaluated due to motion. 5.  Mild central stenosis without cord compression at C7-T1 associated with 4-5 mm anterolisthesis and facet arthrosis. 6.  Further evaluation with x-rays or CT scan may be helpful to better evaluate bony detail.     will consult neurosurgery, patient's daughter also requested that she would like to talk to the neurosurgeon or be at bedside of the patient when neurosurgeon come to the patient's side, she mentions she wants to be called and informed of neurosurgeon arrival timings so that she can also be there and can speak to them.   Daughter wants to be called at [phone]

## 2021-08-21 ENCOUNTER — APPOINTMENT (OUTPATIENT)
Dept: MRI IMAGING | Age: 81
DRG: 644 | End: 2021-08-21
Payer: COMMERCIAL

## 2021-08-21 LAB
ANION GAP SERPL CALCULATED.3IONS-SCNC: 12 MMOL/L (ref 3–16)
ANION GAP SERPL CALCULATED.3IONS-SCNC: 14 MMOL/L (ref 3–16)
BUN BLDV-MCNC: 30 MG/DL (ref 7–20)
BUN BLDV-MCNC: 32 MG/DL (ref 7–20)
CALCIUM SERPL-MCNC: 8.1 MG/DL (ref 8.3–10.6)
CALCIUM SERPL-MCNC: 8.2 MG/DL (ref 8.3–10.6)
CHLORIDE BLD-SCNC: 91 MMOL/L (ref 99–110)
CHLORIDE BLD-SCNC: 94 MMOL/L (ref 99–110)
CO2: 21 MMOL/L (ref 21–32)
CO2: 22 MMOL/L (ref 21–32)
CREAT SERPL-MCNC: 1.2 MG/DL (ref 0.6–1.2)
CREAT SERPL-MCNC: 1.2 MG/DL (ref 0.6–1.2)
GFR AFRICAN AMERICAN: 52
GFR AFRICAN AMERICAN: 52
GFR NON-AFRICAN AMERICAN: 43
GFR NON-AFRICAN AMERICAN: 43
GLUCOSE BLD-MCNC: 117 MG/DL (ref 70–99)
GLUCOSE BLD-MCNC: 90 MG/DL (ref 70–99)
POTASSIUM REFLEX MAGNESIUM: 4.4 MMOL/L (ref 3.5–5.1)
POTASSIUM REFLEX MAGNESIUM: 4.9 MMOL/L (ref 3.5–5.1)
SODIUM BLD-SCNC: 126 MMOL/L (ref 136–145)
SODIUM BLD-SCNC: 128 MMOL/L (ref 136–145)

## 2021-08-21 PROCEDURE — 6360000002 HC RX W HCPCS: Performed by: INTERNAL MEDICINE

## 2021-08-21 PROCEDURE — 6370000000 HC RX 637 (ALT 250 FOR IP): Performed by: INTERNAL MEDICINE

## 2021-08-21 PROCEDURE — 2060000000 HC ICU INTERMEDIATE R&B

## 2021-08-21 PROCEDURE — 80048 BASIC METABOLIC PNL TOTAL CA: CPT

## 2021-08-21 PROCEDURE — 2580000003 HC RX 258: Performed by: INTERNAL MEDICINE

## 2021-08-21 PROCEDURE — 72141 MRI NECK SPINE W/O DYE: CPT

## 2021-08-21 PROCEDURE — 94761 N-INVAS EAR/PLS OXIMETRY MLT: CPT

## 2021-08-21 PROCEDURE — 36415 COLL VENOUS BLD VENIPUNCTURE: CPT

## 2021-08-21 PROCEDURE — 6370000000 HC RX 637 (ALT 250 FOR IP): Performed by: HOSPITALIST

## 2021-08-21 RX ADMIN — ASPIRIN 81 MG: 81 TABLET, CHEWABLE ORAL at 12:37

## 2021-08-21 RX ADMIN — PANTOPRAZOLE SODIUM 40 MG: 40 TABLET, DELAYED RELEASE ORAL at 20:15

## 2021-08-21 RX ADMIN — BUSPIRONE HYDROCHLORIDE 10 MG: 10 TABLET ORAL at 20:15

## 2021-08-21 RX ADMIN — PREDNISONE 5 MG: 5 TABLET ORAL at 12:37

## 2021-08-21 RX ADMIN — FUROSEMIDE 40 MG: 40 TABLET ORAL at 12:39

## 2021-08-21 RX ADMIN — HYDRALAZINE HYDROCHLORIDE 25 MG: 25 TABLET, FILM COATED ORAL at 20:15

## 2021-08-21 RX ADMIN — LORAZEPAM 1 MG: 2 INJECTION INTRAMUSCULAR; INTRAVENOUS at 08:45

## 2021-08-21 RX ADMIN — ACETAMINOPHEN 650 MG: 325 TABLET ORAL at 18:48

## 2021-08-21 RX ADMIN — LEVOTHYROXINE SODIUM 150 MCG: 0.07 TABLET ORAL at 06:48

## 2021-08-21 RX ADMIN — Medication 3 MG: at 20:33

## 2021-08-21 RX ADMIN — ENOXAPARIN SODIUM 30 MG: 30 INJECTION SUBCUTANEOUS at 20:18

## 2021-08-21 RX ADMIN — FOLIC ACID 1 MG: 1 TABLET ORAL at 12:36

## 2021-08-21 RX ADMIN — BUSPIRONE HYDROCHLORIDE 10 MG: 10 TABLET ORAL at 12:37

## 2021-08-21 RX ADMIN — POLYETHYLENE GLYCOL 3350 17 G: 17 POWDER, FOR SOLUTION ORAL at 12:37

## 2021-08-21 RX ADMIN — GABAPENTIN 100 MG: 100 CAPSULE ORAL at 20:15

## 2021-08-21 RX ADMIN — HYDRALAZINE HYDROCHLORIDE 25 MG: 25 TABLET, FILM COATED ORAL at 06:48

## 2021-08-21 RX ADMIN — SODIUM CHLORIDE, PRESERVATIVE FREE 10 ML: 5 INJECTION INTRAVENOUS at 20:19

## 2021-08-21 RX ADMIN — BUSPIRONE HYDROCHLORIDE 10 MG: 10 TABLET ORAL at 17:28

## 2021-08-21 RX ADMIN — SODIUM CHLORIDE, PRESERVATIVE FREE 10 ML: 5 INJECTION INTRAVENOUS at 12:38

## 2021-08-21 RX ADMIN — HYDRALAZINE HYDROCHLORIDE 25 MG: 25 TABLET, FILM COATED ORAL at 12:37

## 2021-08-21 RX ADMIN — GABAPENTIN 100 MG: 100 CAPSULE ORAL at 12:37

## 2021-08-21 RX ADMIN — LATANOPROST 1 DROP: 50 SOLUTION OPHTHALMIC at 20:19

## 2021-08-21 RX ADMIN — POTASSIUM CHLORIDE 20 MEQ: 750 TABLET, FILM COATED, EXTENDED RELEASE ORAL at 12:36

## 2021-08-21 RX ADMIN — PANTOPRAZOLE SODIUM 40 MG: 40 TABLET, DELAYED RELEASE ORAL at 12:37

## 2021-08-21 RX ADMIN — HYDROXYCHLOROQUINE SULFATE 200 MG: 200 TABLET ORAL at 20:15

## 2021-08-21 ASSESSMENT — PAIN SCALES - GENERAL
PAINLEVEL_OUTOF10: 0
PAINLEVEL_OUTOF10: 2
PAINLEVEL_OUTOF10: 0

## 2021-08-21 ASSESSMENT — PAIN DESCRIPTION - LOCATION: LOCATION: BACK;GENERALIZED

## 2021-08-21 ASSESSMENT — PAIN DESCRIPTION - PROGRESSION: CLINICAL_PROGRESSION: NOT CHANGED

## 2021-08-21 ASSESSMENT — PAIN DESCRIPTION - PAIN TYPE: TYPE: ACUTE PAIN

## 2021-08-21 ASSESSMENT — PAIN DESCRIPTION - DESCRIPTORS: DESCRIPTORS: ACHING;DISCOMFORT

## 2021-08-21 ASSESSMENT — PAIN DESCRIPTION - ONSET: ONSET: ON-GOING

## 2021-08-21 ASSESSMENT — PAIN DESCRIPTION - FREQUENCY: FREQUENCY: CONTINUOUS

## 2021-08-21 ASSESSMENT — PAIN - FUNCTIONAL ASSESSMENT: PAIN_FUNCTIONAL_ASSESSMENT: ACTIVITIES ARE NOT PREVENTED

## 2021-08-21 NOTE — PROGRESS NOTES
Labs:  No results for input(s): WBC, HGB, HCT, MCV, PLT in the last 72 hours. Recent Labs     08/20/21  1541 08/21/21  0358 08/21/21  1536   * 126* 128*   K 4.7 4.4 4.9   CL 92* 91* 94*   CO2 22 21 22   GLUCOSE 131* 90 117*   BUN 29* 32* 30*   CREATININE 1.1 1.2 1.2   LABGLOM 48* 43* 43*   GFRAA 58* 52* 52*           Assessment/Plan:    Hyponatremia.  - Has history of chronic hyponatremia with baseline in the low 130smmol/L. TSH was elevated in 05-07/2021 but Levothyroxine was adjusted and level this admission was 2.69mmol/L.  - Clinically hypervolemic. Na+ improved to 126-128  meq range and stable  Will continue diuresis and  Continue . FR 1200 ml/d Lasix swiched to PO          Hyperkalemia.  - In the setting of SHANELL. Also was on KCl and Lisinopril prior to admission.  - D/C'd potassium supplements and ACEI. - Improved with medical management. Developed Hypokalemia and  restarted 20 meq BID K+ 4.9 meq today will stop and monitor     Metabolic Acidosis. - From SHANELL +/- hypoperfusion. Lactic acid within normal.  - stable     Acute Kidney Injury.  - Suspect pre-renal in etiology in the setting of bradycardia/hypotension.  -Cr remains stable at 1.2 mg No ACE-I ARB's On oral Lasix now  - Avoid hypotension and nephrotoxic medications.     Bradycardia. . Improved. Cardiology following    HTN BP better controlled Monitor        Arin Perez MD  [facility] [place]  8/21/2021

## 2021-08-21 NOTE — PLAN OF CARE
Problem: Falls - Risk of:  Goal: Will remain free from falls  8/21/2021 1002 by Masood Mason RN  Outcome: Ongoing     Problem: Falls - Risk of:  Goal: Absence of physical injury  8/21/2021 1002 by Masood Mason RN  Outcome: Ongoing     Problem: Skin Integrity:  Goal: Will show no infection signs and symptoms  8/21/2021 1002 by Masood Mason RN  Outcome: Ongoing     Problem: Skin Integrity:  Goal: Absence of new skin breakdown  8/21/2021 1002 by Masood Mason RN  Outcome: Ongoing     Problem: OXYGENATION/RESPIRATORY FUNCTION  Goal: Patient will maintain patent airway  8/21/2021 1002 by Masood Mason RN  Outcome: Ongoing     Problem: OXYGENATION/RESPIRATORY FUNCTION  Goal: Patient will achieve/maintain normal respiratory rate/effort  8/21/2021 1002 by Masood Mason RN  Outcome: Ongoing     Problem: HEMODYNAMIC STATUS  Goal: Patient has stable vital signs and fluid balance  8/21/2021 1002 by Masood Mason RN  Outcome: Ongoing     Problem: FLUID AND ELECTROLYTE IMBALANCE  Goal: Fluid and electrolyte balance are achieved/maintained  8/21/2021 1002 by Masood Mason RN  Outcome: Ongoing       Problem: ACTIVITY INTOLERANCE/IMPAIRED MOBILITY  Goal: Mobility/activity is maintained at optimum level for patient  8/21/2021 1002 by Masood Mason RN  Outcome: Ongoing     Problem: Fluid Volume:  Goal: Ability to achieve a balanced intake and output will improve  8/21/2021 1002 by Masood Mason RN  Outcome: Ongoing     Problem: Physical Regulation:  Goal: Ability to maintain clinical measurements within normal limits will improve  8/21/2021 1002 by Masood Mason RN  Outcome: Ongoing     Problem: Physical Regulation:  Goal: Will show no signs and symptoms of electrolyte imbalance  8/21/2021 1002 by Masood Mason RN  Outcome: Ongoing

## 2021-08-21 NOTE — PROGRESS NOTES
Hospitalist Progress Note      PCP: Loni Chavez MD    Chief Complaint. Patient is a 64-year-old female who presented to [facility] [place] for generalized weakness. Patient has past medical history of hypothyroidism depression, hypertension. Date of Admission: 8/14/2021    Subjective: Patient seen and evaluated at the bedside, patient appears not to be actively following commands or questions however no acute events overnight, patient is somnolent and sleepy, otherwise denies chest pain shortness of breath    Medications:  Reviewed    Infusion Medications    sodium chloride       Scheduled Medications    polyethylene glycol  17 g Oral Daily    hydrALAZINE  25 mg Oral 3 times per day    furosemide  40 mg Oral Daily    aspirin  81 mg Oral Daily    busPIRone  10 mg Oral 4x Daily    folic acid  1 mg Oral Daily    pantoprazole  40 mg Oral BID    predniSONE  5 mg Oral Daily    sodium chloride flush  5-40 mL Intravenous 2 times per day    enoxaparin  30 mg Subcutaneous Nightly    hydroxychloroquine  200 mg Oral BID    levothyroxine  150 mcg Oral Daily    latanoprost  1 drop Both Eyes Nightly    gabapentin  100 mg Oral BID     PRN Meds: melatonin, albuterol, fluticasone, sodium chloride flush, sodium chloride, ondansetron **OR** ondansetron, polyethylene glycol, acetaminophen **OR** acetaminophen      Intake/Output Summary (Last 24 hours) at 8/21/2021 1911  Last data filed at 8/21/2021 1857  Gross per 24 hour   Intake 520 ml   Output 900 ml   Net -380 ml       Physical Exam Performed:    BP (!) 144/64   Pulse 61   Temp 97.3 °F (36.3 °C) (Oral)   Resp 16   Ht 5' 1\" (1.549 m)   Wt 114 lb 13.8 oz (52.1 kg)   SpO2 96%   BMI 21.70 kg/m²     General appearance: Appears comfortable in bed, sleeping  HEENT:  Conjunctivae/corneas clear. Neck: Supple, with full range of motion. Respiratory:  Normal respiratory effort.  Clear to auscultation, bilaterally without Rales/Wheezes/Rhonchi. Cardiovascular: Regular rate and rhythm with normal S1/S2 without murmurs or rubs  Abdomen: Soft, non-tender, non-distended, normal bowel sounds. Musculoskeletal: No cyanosis or edema bilaterally  Neurologic:  without any focal sensory/motor deficits. grossly non-focal.  Psychiatric: Alert and oriented, Normal mood  Peripheral Pulses: +2 palpable, equal bilaterally       Labs:   No results for input(s): WBC, HGB, HCT, PLT in the last 72 hours. Recent Labs     08/20/21  1541 08/21/21  0358 08/21/21  1536   * 126* 128*   K 4.7 4.4 4.9   CL 92* 91* 94*   CO2 22 21 22   BUN 29* 32* 30*   CREATININE 1.1 1.2 1.2   CALCIUM 8.4 8.1* 8.2*     No results for input(s): AST, ALT, BILIDIR, BILITOT, ALKPHOS in the last 72 hours. No results for input(s): INR in the last 72 hours. No results for input(s): Scottie Favre in the last 72 hours. Urinalysis:      Lab Results   Component Value Date    NITRU Negative 08/15/2021    WBCUA 835 08/15/2021    BACTERIA 1+ 08/15/2021    RBCUA 6 08/15/2021    BLOODU MODERATE 08/15/2021    SPECGRAV 1.006 08/15/2021    GLUCOSEU Negative 08/15/2021       Radiology:  MRI CERVICAL SPINE WO CONTRAST   Preliminary Result   1. Severe spinal canal stenosis, severe left and moderate right neural   foraminal narrowing at C3-4 as described above. There is abnormal   intramedullary signal within the cervical spinal cord at this level   consistent with spondylotic myelopathy. 2. Moderate spinal canal stenosis and moderate to severe bilateral neural   foraminal narrowing at C7-T1, as described above. 3. Additional mild multilevel degenerative changes and postsurgical changes   as described above. XR ABDOMEN (KUB) (SINGLE AP VIEW)   Final Result   Nonspecific bowel gas pattern without evidence for obstruction.          XR CHEST PORTABLE   Final Result   Mild cardiomegaly was noted with mild central pulmonary vascular congestion   with trace right and small left pleural effusions. The findings are   compatible with early-mild congestive heart failure. Small to moderate fixed hiatal hernia. Marked degenerative osteo arthritic changes of each glenohumeral joint was   noted with chronic bilateral anterior dislocations. Assessment/Plan:    Active Hospital Problems    Diagnosis     Hyponatremia [E87.1]     Acute heart failure (HCC) [I50.9]     Elevated troponin [R77.8]     Hyperkalemia [E87.5]     Chronic mesenteric ischemia (HCC) [K55.1]     Symptomatic bradycardia [R00.1]     Essential hypertension [I10]     Rheumatoid arthritis (HCC) [M06.9]     Acquired hypothyroidism [E03.9]     Chronic kidney disease [N18.9]      Patient is a 50-year-old female who presented to [facility] [place] for generalized weakness. Patient has past medical history of hypothyroidism depression, hypertension. Assessment  Hyponatremia-improved, has chronic hyponatremia  Symptomatic bradycardia-now resolved  Acute decompensated CHF-now compensated  Urinary tract infection  Hypertension  Rheumatoid arthritis  Hypothyroidism  SHANELL-improved  Depression  Generalized weakness  Hyperkalemia-resolved    Plan  Due to abnormal MRI performed at [facility] 8/13, will consult neurosurgery-repeat MRI does show severe canal stenosis, neurosurgery to make a plan and communicate with family, will call and update family as well  Continue to monitor BMP, nephrology is following, switched to PO lasix  Cardiology was consulted recommends monitoring and replacing electrolytes, echocardiogram, continue PO Lasix  Continue home aspirin, hydralazine, Plaquenil, levothyroxine, prednisone, Protonix  Urine culture shows pansensitive E. coli, will switch to oral antibiotics   DVT prophylaxis-Lovenox  Diet: ADULT DIET; Regular;  Low Sodium (2 gm); 1200 ml  Code Status: Full Code    PT/OT Eval Status: ordered    Dispo -await neurosurgical recommendations, sodium is almost back to the patient's

## 2021-08-21 NOTE — PLAN OF CARE
Problem: Falls - Risk of:  Goal: Will remain free from falls  Description: Will remain free from falls  Outcome: Ongoing  Goal: Absence of physical injury  Description: Absence of physical injury  Outcome: Ongoing     Problem: Skin Integrity:  Goal: Will show no infection signs and symptoms  Description: Will show no infection signs and symptoms  Outcome: Ongoing  Goal: Absence of new skin breakdown  Description: Absence of new skin breakdown  Outcome: Ongoing     Problem: OXYGENATION/RESPIRATORY FUNCTION  Goal: Patient will maintain patent airway  Outcome: Ongoing  Goal: Patient will achieve/maintain normal respiratory rate/effort  Description: Respiratory rate and effort will be within normal limits for the patient  Outcome: Ongoing     Problem: HEMODYNAMIC STATUS  Goal: Patient has stable vital signs and fluid balance  Outcome: Ongoing     Problem: FLUID AND ELECTROLYTE IMBALANCE  Goal: Fluid and electrolyte balance are achieved/maintained  Outcome: Ongoing     Problem: ACTIVITY INTOLERANCE/IMPAIRED MOBILITY  Goal: Mobility/activity is maintained at optimum level for patient  Outcome: Ongoing     Problem: Fluid Volume:  Goal: Ability to achieve a balanced intake and output will improve  Description: Ability to achieve a balanced intake and output will improve  Outcome: Ongoing     Problem: Physical Regulation:  Goal: Ability to maintain clinical measurements within normal limits will improve  Description: Ability to maintain clinical measurements within normal limits will improve  Outcome: Ongoing  Goal: Will show no signs and symptoms of electrolyte imbalance  Description: Will show no signs and symptoms of electrolyte imbalance  Outcome: Ongoing

## 2021-08-21 NOTE — PROGRESS NOTES
Nutrition Assessment     Type and Reason for Visit: Initial    Nutrition Recommendations/Plan:   Low Na/1200 ml fl res   Will monitor nutritional adequacy, nutrition-related labs, weights, BMs, and clinical progress     Nutrition Assessment:  LOS. Pt presented with weakness. Hx includes hypothyroidism depression, hypertension. Currently on Low Na/1200 ml fl res (being treated for HF as well), eating more than 50% of meals. Tolerating diet. Did not note any wt loss per EMR. Plan to d/c to SNF. Malnutrition Assessment:  Malnutrition Status: No malnutrition    Nutrition Related Findings: Last BM 8/19      Current Nutrition Therapies:    ADULT DIET; Regular;  Low Sodium (2 gm); 1200 ml    Anthropometric Measures:  · Height: 5' 1\" (154.9 cm)  · Current Body Wt: 114 lb (51.7 kg)   · BMI: 21.6    Nutrition Diagnosis:   No nutrition diagnosis at this time     Nutrition Interventions:   Food and/or Nutrient Delivery:  Continue Current Diet  Nutrition Education/Counseling:  No recommendation at this time   Coordination of Nutrition Care:  Continue to monitor while inpatient    Goals:  Consume greater than 50% of meals       Nutrition Monitoring and Evaluation:   Behavioral-Environmental Outcomes:  None Identified   Food/Nutrient Intake Outcomes:  Food and Nutrient Intake  Physical Signs/Symptoms Outcomes:  Biochemical Data, Nutrition Focused Physical Findings, Skin, Weight     Discharge Planning:    No discharge needs at this time     Electronically signed by Radha Mcneal RD, [place] on 8/21/21 at 11:32 AM EDT    Contact: [phone]

## 2021-08-21 NOTE — SIGNIFICANT EVENT
Called and informed patient's daughter Ms. Talya Malik who is also POA on MRI results and neurosurgeons evaluation. Updated on other labs, medications and patient's overall clinical status. Neurosurgery to inform family about recommendations for a spinal canal stenosis, RN was informed to page neurosurgery about abnormal MRI results.

## 2021-08-22 ENCOUNTER — APPOINTMENT (OUTPATIENT)
Dept: GENERAL RADIOLOGY | Age: 81
DRG: 644 | End: 2021-08-22
Payer: COMMERCIAL

## 2021-08-22 ENCOUNTER — APPOINTMENT (OUTPATIENT)
Dept: CT IMAGING | Age: 81
DRG: 644 | End: 2021-08-22
Payer: COMMERCIAL

## 2021-08-22 LAB
ANION GAP SERPL CALCULATED.3IONS-SCNC: 12 MMOL/L (ref 3–16)
ANION GAP SERPL CALCULATED.3IONS-SCNC: 13 MMOL/L (ref 3–16)
BUN BLDV-MCNC: 27 MG/DL (ref 7–20)
BUN BLDV-MCNC: 29 MG/DL (ref 7–20)
CALCIUM SERPL-MCNC: 8 MG/DL (ref 8.3–10.6)
CALCIUM SERPL-MCNC: 8.4 MG/DL (ref 8.3–10.6)
CHLORIDE BLD-SCNC: 95 MMOL/L (ref 99–110)
CHLORIDE BLD-SCNC: 95 MMOL/L (ref 99–110)
CO2: 22 MMOL/L (ref 21–32)
CO2: 23 MMOL/L (ref 21–32)
CREAT SERPL-MCNC: 0.9 MG/DL (ref 0.6–1.2)
CREAT SERPL-MCNC: 1.2 MG/DL (ref 0.6–1.2)
GFR AFRICAN AMERICAN: 52
GFR AFRICAN AMERICAN: >60
GFR NON-AFRICAN AMERICAN: 43
GFR NON-AFRICAN AMERICAN: >60
GLUCOSE BLD-MCNC: 112 MG/DL (ref 70–99)
GLUCOSE BLD-MCNC: 81 MG/DL (ref 70–99)
POTASSIUM REFLEX MAGNESIUM: 4.3 MMOL/L (ref 3.5–5.1)
POTASSIUM REFLEX MAGNESIUM: 5.2 MMOL/L (ref 3.5–5.1)
PROCALCITONIN: 0.08 NG/ML (ref 0–0.15)
SODIUM BLD-SCNC: 129 MMOL/L (ref 136–145)
SODIUM BLD-SCNC: 131 MMOL/L (ref 136–145)

## 2021-08-22 PROCEDURE — 2580000003 HC RX 258: Performed by: INTERNAL MEDICINE

## 2021-08-22 PROCEDURE — 80048 BASIC METABOLIC PNL TOTAL CA: CPT

## 2021-08-22 PROCEDURE — 2060000000 HC ICU INTERMEDIATE R&B

## 2021-08-22 PROCEDURE — 36415 COLL VENOUS BLD VENIPUNCTURE: CPT

## 2021-08-22 PROCEDURE — U0003 INFECTIOUS AGENT DETECTION BY NUCLEIC ACID (DNA OR RNA); SEVERE ACUTE RESPIRATORY SYNDROME CORONAVIRUS 2 (SARS-COV-2) (CORONAVIRUS DISEASE [COVID-19]), AMPLIFIED PROBE TECHNIQUE, MAKING USE OF HIGH THROUGHPUT TECHNOLOGIES AS DESCRIBED BY CMS-2020-01-R: HCPCS

## 2021-08-22 PROCEDURE — 71045 X-RAY EXAM CHEST 1 VIEW: CPT

## 2021-08-22 PROCEDURE — U0005 INFEC AGEN DETEC AMPLI PROBE: HCPCS

## 2021-08-22 PROCEDURE — 72125 CT NECK SPINE W/O DYE: CPT

## 2021-08-22 PROCEDURE — 84145 PROCALCITONIN (PCT): CPT

## 2021-08-22 PROCEDURE — 6370000000 HC RX 637 (ALT 250 FOR IP): Performed by: INTERNAL MEDICINE

## 2021-08-22 PROCEDURE — 6370000000 HC RX 637 (ALT 250 FOR IP): Performed by: HOSPITALIST

## 2021-08-22 PROCEDURE — 6360000002 HC RX W HCPCS: Performed by: INTERNAL MEDICINE

## 2021-08-22 PROCEDURE — 94760 N-INVAS EAR/PLS OXIMETRY 1: CPT

## 2021-08-22 RX ORDER — CYCLOBENZAPRINE HCL 10 MG
5 TABLET ORAL 3 TIMES DAILY PRN
Status: DISCONTINUED | OUTPATIENT
Start: 2021-08-22 | End: 2021-08-23 | Stop reason: HOSPADM

## 2021-08-22 RX ADMIN — PANTOPRAZOLE SODIUM 40 MG: 40 TABLET, DELAYED RELEASE ORAL at 09:32

## 2021-08-22 RX ADMIN — LEVOTHYROXINE SODIUM 150 MCG: 0.07 TABLET ORAL at 05:33

## 2021-08-22 RX ADMIN — Medication 3 MG: at 21:20

## 2021-08-22 RX ADMIN — ACETAMINOPHEN 650 MG: 325 TABLET ORAL at 09:32

## 2021-08-22 RX ADMIN — PANTOPRAZOLE SODIUM 40 MG: 40 TABLET, DELAYED RELEASE ORAL at 20:34

## 2021-08-22 RX ADMIN — ACETAMINOPHEN 650 MG: 325 TABLET ORAL at 17:36

## 2021-08-22 RX ADMIN — POLYETHYLENE GLYCOL 3350 17 G: 17 POWDER, FOR SOLUTION ORAL at 09:32

## 2021-08-22 RX ADMIN — SODIUM CHLORIDE, PRESERVATIVE FREE 10 ML: 5 INJECTION INTRAVENOUS at 09:32

## 2021-08-22 RX ADMIN — FOLIC ACID 1 MG: 1 TABLET ORAL at 09:32

## 2021-08-22 RX ADMIN — GABAPENTIN 100 MG: 100 CAPSULE ORAL at 20:33

## 2021-08-22 RX ADMIN — GABAPENTIN 100 MG: 100 CAPSULE ORAL at 09:32

## 2021-08-22 RX ADMIN — ASPIRIN 81 MG: 81 TABLET, CHEWABLE ORAL at 09:32

## 2021-08-22 RX ADMIN — FUROSEMIDE 40 MG: 40 TABLET ORAL at 09:34

## 2021-08-22 RX ADMIN — CYCLOBENZAPRINE 5 MG: 10 TABLET, FILM COATED ORAL at 19:39

## 2021-08-22 RX ADMIN — HYDRALAZINE HYDROCHLORIDE 25 MG: 25 TABLET, FILM COATED ORAL at 20:33

## 2021-08-22 RX ADMIN — SODIUM CHLORIDE, PRESERVATIVE FREE 10 ML: 5 INJECTION INTRAVENOUS at 20:34

## 2021-08-22 RX ADMIN — BUSPIRONE HYDROCHLORIDE 10 MG: 10 TABLET ORAL at 20:34

## 2021-08-22 RX ADMIN — PREDNISONE 5 MG: 5 TABLET ORAL at 09:32

## 2021-08-22 RX ADMIN — BUSPIRONE HYDROCHLORIDE 10 MG: 10 TABLET ORAL at 14:19

## 2021-08-22 RX ADMIN — HYDRALAZINE HYDROCHLORIDE 25 MG: 25 TABLET, FILM COATED ORAL at 05:33

## 2021-08-22 RX ADMIN — ENOXAPARIN SODIUM 30 MG: 30 INJECTION SUBCUTANEOUS at 20:34

## 2021-08-22 RX ADMIN — HYDRALAZINE HYDROCHLORIDE 25 MG: 25 TABLET, FILM COATED ORAL at 15:49

## 2021-08-22 RX ADMIN — CEFEPIME HYDROCHLORIDE 2000 MG: 2 INJECTION, POWDER, FOR SOLUTION INTRAVENOUS at 21:23

## 2021-08-22 RX ADMIN — LATANOPROST 1 DROP: 50 SOLUTION OPHTHALMIC at 20:34

## 2021-08-22 RX ADMIN — BUSPIRONE HYDROCHLORIDE 10 MG: 10 TABLET ORAL at 09:32

## 2021-08-22 RX ADMIN — BUSPIRONE HYDROCHLORIDE 10 MG: 10 TABLET ORAL at 17:36

## 2021-08-22 ASSESSMENT — PAIN - FUNCTIONAL ASSESSMENT
PAIN_FUNCTIONAL_ASSESSMENT: PREVENTS OR INTERFERES SOME ACTIVE ACTIVITIES AND ADLS
PAIN_FUNCTIONAL_ASSESSMENT: PREVENTS OR INTERFERES SOME ACTIVE ACTIVITIES AND ADLS

## 2021-08-22 ASSESSMENT — PAIN DESCRIPTION - FREQUENCY
FREQUENCY: CONTINUOUS
FREQUENCY: CONTINUOUS

## 2021-08-22 ASSESSMENT — PAIN SCALES - GENERAL
PAINLEVEL_OUTOF10: 3
PAINLEVEL_OUTOF10: 0
PAINLEVEL_OUTOF10: 0
PAINLEVEL_OUTOF10: 2
PAINLEVEL_OUTOF10: 0

## 2021-08-22 ASSESSMENT — PAIN DESCRIPTION - PAIN TYPE
TYPE: ACUTE PAIN;CHRONIC PAIN
TYPE: ACUTE PAIN;CHRONIC PAIN

## 2021-08-22 ASSESSMENT — PAIN DESCRIPTION - PROGRESSION
CLINICAL_PROGRESSION: NOT CHANGED
CLINICAL_PROGRESSION: NOT CHANGED

## 2021-08-22 ASSESSMENT — PAIN DESCRIPTION - ORIENTATION
ORIENTATION: POSTERIOR
ORIENTATION: POSTERIOR

## 2021-08-22 ASSESSMENT — PAIN DESCRIPTION - DESCRIPTORS
DESCRIPTORS: ACHING;DISCOMFORT
DESCRIPTORS: ACHING;DISCOMFORT

## 2021-08-22 ASSESSMENT — PAIN DESCRIPTION - LOCATION
LOCATION: BACK
LOCATION: BACK

## 2021-08-22 ASSESSMENT — PAIN DESCRIPTION - ONSET
ONSET: ON-GOING
ONSET: ON-GOING

## 2021-08-22 NOTE — PROGRESS NOTES
Call placed to [address] [place] [address] at [phone] for patient's [place] J collar (per order). Order and face sheet faxed successfully as well.

## 2021-08-22 NOTE — PLAN OF CARE
Neurosurgery Plan of Care    I reviewed the patient's interval imaging which consists of an MRI brain dated 8/22/2021. This demonstrates grade I spondylolisthesis of C3-4 and C7-T1 with previous instrumentation at C4-7. There is severe centra stenosis and cord compression which likely accounts for the patient's progressive functional degeneration. Hyponateremia is persistent at 129. A/P: [de-identified] yo woman with several comorbidities including severe hyponatremia who presents with progressive weakness likely related to severe central stenosis and adjacent level degeneration    -Patient would likely benefit from surgical intervention, specifically C3-T2 decompression, fixation, and fusion. However, given that patient has not ambulated in several years and has demonstrated significant cognitive decline over the past year, she may not tolerate this type of intervention. Moreover, given her advanced age and use of plaquenil / chronic steroids, she may be osteoporotic and is at high risk of implant failure.   -Would need medical clearance, cardiac clearance, correction of hyponatremia, and complete treatment of UTI. -Would need ASA held for 1 week prior to surgery and 2 weeks postoperatively. If possible, would need Plaquenil held for 3-6 months. -CT cervical spine to evaluate previous fusion.  -Recommend cervical collar to be worn at all times except for hygiene given progressive weakness.   -Will discuss goals of care and surgery early this week with family.      Cynthia Aparicio MD, PhD  [address], [facility] [place], [place], [address]  [phone] (c), [phone] (o)

## 2021-08-22 NOTE — FLOWSHEET NOTE
08/22/21 1440   Provider Notification   Reason for Communication Evaluate; Review case  (Bedrest until patient's J collar is delivered?)   Provider Name Dr. Jorge Narayanan   Provider Notification Physician   Method of Communication Secure Message   Response See orders  (Nursing communication order- Bedrest until collar delivered)   Notification Time [phone]        08/22/21 1745   Provider Notification   Reason for Communication Evaluate; Review case  (CT concerning for COVID)   Provider Name Dr. Jorge Narayanan   Provider Notification Physician   Method of Communication Call   Response See orders  (PCR swab; Isolation; Chest XR; Diet downgraded-poss asp PN)   Notification Time [phone]        08/22/21 1800   Family/Significant Other Communication   Reason Update  ([facility]   Name [facility]   Relationship Child  (Daughter/POA)   Response Verbalized understanding; All questions answered   Method of Communication Phone        08/22/21 8770   Provider Notification   Reason for Communication Review case;Patient request  (Chest XR results;  Patient requesting home musc relaxer)   Provider Name Dr. Jorge Narayanan   Provider Notification Physician   Method of Communication Secure Message   Response See orders  (IV abx and PRN muscle relaxer;keep in isolation til PCR back)

## 2021-08-22 NOTE — PROGRESS NOTES
Hospitalist Progress Note      PCP: Bassam Santillan MD    Chief Complaint. Patient is a 80-year-old female who presented to [facility] [place] for generalized weakness. Patient has past medical history of hypothyroidism depression, hypertension. Date of Admission: 8/14/2021    Subjective: Patient seen and evaluated at the bedside, patient seems much more alert awake today, holding conversations, chest pain shortness of breath    Medications:  Reviewed    Infusion Medications    sodium chloride       Scheduled Medications    polyethylene glycol  17 g Oral Daily    hydrALAZINE  25 mg Oral 3 times per day    furosemide  40 mg Oral Daily    busPIRone  10 mg Oral 4x Daily    folic acid  1 mg Oral Daily    pantoprazole  40 mg Oral BID    predniSONE  5 mg Oral Daily    sodium chloride flush  5-40 mL Intravenous 2 times per day    enoxaparin  30 mg Subcutaneous Nightly    hydroxychloroquine  200 mg Oral BID    levothyroxine  150 mcg Oral Daily    latanoprost  1 drop Both Eyes Nightly    gabapentin  100 mg Oral BID     PRN Meds: melatonin, albuterol, fluticasone, sodium chloride flush, sodium chloride, ondansetron **OR** ondansetron, polyethylene glycol, acetaminophen **OR** acetaminophen      Intake/Output Summary (Last 24 hours) at 8/22/2021 1822  Last data filed at 8/22/2021 0935  Gross per 24 hour   Intake 190 ml   Output 200 ml   Net -10 ml       Physical Exam Performed:    /64   Pulse 66   Temp 98.1 °F (36.7 °C) (Axillary)   Resp 16   Ht 5' 1\" (1.549 m)   Wt 117 lb 8.1 oz (53.3 kg)   SpO2 95%   BMI 22.20 kg/m²     General appearance: Appears comfortable in bed, sleeping, NAD  HEENT:  Conjunctivae/corneas clear. Neck: Supple, with full range of motion. Respiratory:  Normal respiratory effort. Clear to auscultation, bilaterally without Rales/Wheezes/Rhonchi.   Cardiovascular: Regular rate and rhythm with normal S1/S2 without murmurs or rubs  Abdomen: Soft, non-tender, non-distended, normal bowel sounds. Musculoskeletal: No cyanosis or edema bilaterally  Neurologic:  without any focal sensory/motor deficits. grossly non-focal.  Overall difficult neurologic exam given patient is not actively following commands  Psychiatric: Alert and oriented, Normal mood  Peripheral Pulses: +2 palpable, equal bilaterally       Labs:   No results for input(s): WBC, HGB, HCT, PLT in the last 72 hours. Recent Labs     08/21/21  1536 08/22/21  0402 08/22/21  1533   * 129* 131*   K 4.9 5.2* 4.3   CL 94* 95* 95*   CO2 22 22 23   BUN 30* 29* 27*   CREATININE 1.2 0.9 1.2   CALCIUM 8.2* 8.0* 8.4     No results for input(s): AST, ALT, BILIDIR, BILITOT, ALKPHOS in the last 72 hours. No results for input(s): INR in the last 72 hours. No results for input(s): Yanet Basques in the last 72 hours. Urinalysis:      Lab Results   Component Value Date    NITRU Negative 08/15/2021    WBCUA 835 08/15/2021    BACTERIA 1+ 08/15/2021    RBCUA 6 08/15/2021    BLOODU MODERATE 08/15/2021    SPECGRAV 1.006 08/15/2021    GLUCOSEU Negative 08/15/2021       Radiology:  CT CERVICAL SPINE WO CONTRAST   Final Result   No acute abnormality of the cervical spine. Severe cervical spondylosis. Solid bony fusion between C4 and C7. Severe spinal stenosis at C3-C4 due to   degenerative spondylolisthesis of L3 on L4. Similar appearance to the MRI   study yesterday. Ground-glass opacities in each lung apex suggestive of acute COVID-19   pneumonia. MRI CERVICAL SPINE WO CONTRAST   Preliminary Result   1. Severe spinal canal stenosis, severe left and moderate right neural   foraminal narrowing at C3-4 as described above. There is abnormal   intramedullary signal within the cervical spinal cord at this level   consistent with spondylotic myelopathy. 2. Moderate spinal canal stenosis and moderate to severe bilateral neural   foraminal narrowing at C7-T1, as described above.    3. Additional mild multilevel degenerative

## 2021-08-22 NOTE — PROGRESS NOTES
Nephrology Progress Note   [facility]      This patient is a [de-identified]year old female whom we are following for hyponatremia. Subjective: The patient was seen and examined    Feels OK No HA dizziness nausea Swelling improved  Eating well  Na+ improved to 129 meq today  Cr stable    Family History:  family at bedside      Vitals:  /64   Pulse 66   Temp 98.1 °F (36.7 °C) (Axillary)   Resp 16   Ht 5' 1\" (1.549 m)   Wt 117 lb 8.1 oz (53.3 kg)   SpO2 95%   BMI 22.20 kg/m²   I/O last 3 completed shifts: In: 230 [P.O.:230]  Out: 800 [Urine:800]  No intake/output data recorded. Physical Exam:  Physical Exam  Vitals reviewed. Constitutional:       General: She is not in acute distress. Appearance: Normal appearance. HENT:      Head: Normocephalic and atraumatic. Eyes:      General: No scleral icterus. Conjunctiva/sclera: Conjunctivae normal.   Cardiovascular:      Rate and Rhythm: Normal rate. Heart sounds: No friction rub. Pulmonary:      Effort: Pulmonary effort is normal. No respiratory distress. Breath sounds: No rales. Abdominal:      General: Bowel sounds are normal. There is no distension. Tenderness: There is no abdominal tenderness. Musculoskeletal:      Right lower leg: No edema. Left lower leg: No edema. Neurological:      Mental Status: She is alert.            Medications:   polyethylene glycol  17 g Oral Daily    hydrALAZINE  25 mg Oral 3 times per day    furosemide  40 mg Oral Daily    busPIRone  10 mg Oral 4x Daily    folic acid  1 mg Oral Daily    pantoprazole  40 mg Oral BID    predniSONE  5 mg Oral Daily    sodium chloride flush  5-40 mL Intravenous 2 times per day    enoxaparin  30 mg Subcutaneous Nightly    hydroxychloroquine  200 mg Oral BID    levothyroxine  150 mcg Oral Daily    latanoprost  1 drop Both Eyes Nightly    gabapentin  100 mg Oral BID         Labs:  No results for input(s): WBC, HGB, HCT, MCV, PLT in the last 72

## 2021-08-22 NOTE — PLAN OF CARE
Problem: Falls - Risk of:  Goal: Will remain free from falls  8/22/2021 0856 by Aggie Elizalde RN  Outcome: Ongoing     Problem: Falls - Risk of:  Goal: Absence of physical injury  Outcome: Ongoing     Problem: Skin Integrity:  Goal: Will show no infection signs and symptoms  Outcome: Ongoing    Problem: Skin Integrity:  Goal: Absence of new skin breakdown  8/22/2021 0856 by Aggie Elizalde RN  Outcome: Ongoing     Problem: OXYGENATION/RESPIRATORY FUNCTION  Goal: Patient will maintain patent airway  Outcome: Ongoing  Goal: Patient will achieve/maintain normal respiratory rate/effort  Outcome: Ongoing     Problem: HEMODYNAMIC STATUS  Goal: Patient has stable vital signs and fluid balance  8/22/2021 0856 by Aggie Elizalde RN  Outcome: Ongoing     Problem: FLUID AND ELECTROLYTE IMBALANCE  Goal: Fluid and electrolyte balance are achieved/maintained  Outcome: Ongoing     Problem: ACTIVITY INTOLERANCE/IMPAIRED MOBILITY  Goal: Mobility/activity is maintained at optimum level for patient  Outcome: Ongoing     Problem: Fluid Volume:  Goal: Ability to achieve a balanced intake and output will improve  Outcome: Ongoing     Problem: Physical Regulation:  Goal: Ability to maintain clinical measurements within normal limits will improve  Outcome: Ongoing  Goal: Will show no signs and symptoms of electrolyte imbalance  Outcome: Ongoing

## 2021-08-22 NOTE — FLOWSHEET NOTE
Sent message to NP regarding elevated BP \"CC:Generalized weakness, admitted w/ hyponatremia, hx of CHF   Pt BP elevated 174/76, 177/86 rechecked 30mins later. Pt asymptomatic.  Did you want to treat BP prior to next hydralazine dose scheduled at 6am?

## 2021-08-23 VITALS
HEIGHT: 61 IN | BODY MASS INDEX: 22.27 KG/M2 | TEMPERATURE: 98.1 F | OXYGEN SATURATION: 98 % | HEART RATE: 72 BPM | RESPIRATION RATE: 20 BRPM | DIASTOLIC BLOOD PRESSURE: 54 MMHG | WEIGHT: 117.95 LBS | SYSTOLIC BLOOD PRESSURE: 128 MMHG

## 2021-08-23 LAB
ANION GAP SERPL CALCULATED.3IONS-SCNC: 12 MMOL/L (ref 3–16)
BUN BLDV-MCNC: 26 MG/DL (ref 7–20)
CALCIUM SERPL-MCNC: 8.6 MG/DL (ref 8.3–10.6)
CHLORIDE BLD-SCNC: 95 MMOL/L (ref 99–110)
CO2: 23 MMOL/L (ref 21–32)
CREAT SERPL-MCNC: 0.9 MG/DL (ref 0.6–1.2)
GFR AFRICAN AMERICAN: >60
GFR NON-AFRICAN AMERICAN: >60
GLUCOSE BLD-MCNC: 86 MG/DL (ref 70–99)
POTASSIUM REFLEX MAGNESIUM: 3.9 MMOL/L (ref 3.5–5.1)
SARS-COV-2: NOT DETECTED
SODIUM BLD-SCNC: 130 MMOL/L (ref 136–145)

## 2021-08-23 PROCEDURE — 2580000003 HC RX 258: Performed by: INTERNAL MEDICINE

## 2021-08-23 PROCEDURE — 94761 N-INVAS EAR/PLS OXIMETRY MLT: CPT

## 2021-08-23 PROCEDURE — 6370000000 HC RX 637 (ALT 250 FOR IP): Performed by: INTERNAL MEDICINE

## 2021-08-23 PROCEDURE — 80048 BASIC METABOLIC PNL TOTAL CA: CPT

## 2021-08-23 PROCEDURE — 92610 EVALUATE SWALLOWING FUNCTION: CPT

## 2021-08-23 PROCEDURE — 36415 COLL VENOUS BLD VENIPUNCTURE: CPT

## 2021-08-23 RX ORDER — FUROSEMIDE 20 MG/1
20 TABLET ORAL DAILY
Status: DISCONTINUED | OUTPATIENT
Start: 2021-08-24 | End: 2021-08-23 | Stop reason: HOSPADM

## 2021-08-23 RX ORDER — FUROSEMIDE 20 MG/1
20 TABLET ORAL DAILY
Qty: 30 TABLET | Refills: 0 | Status: SHIPPED | OUTPATIENT
Start: 2021-08-23 | End: 2022-05-11

## 2021-08-23 RX ADMIN — PANTOPRAZOLE SODIUM 40 MG: 40 TABLET, DELAYED RELEASE ORAL at 08:04

## 2021-08-23 RX ADMIN — LEVOTHYROXINE SODIUM 150 MCG: 0.07 TABLET ORAL at 06:17

## 2021-08-23 RX ADMIN — HYDRALAZINE HYDROCHLORIDE 25 MG: 25 TABLET, FILM COATED ORAL at 06:17

## 2021-08-23 RX ADMIN — FUROSEMIDE 40 MG: 40 TABLET ORAL at 08:05

## 2021-08-23 RX ADMIN — PREDNISONE 5 MG: 5 TABLET ORAL at 08:05

## 2021-08-23 RX ADMIN — BUSPIRONE HYDROCHLORIDE 10 MG: 10 TABLET ORAL at 13:44

## 2021-08-23 RX ADMIN — POLYETHYLENE GLYCOL 3350 17 G: 17 POWDER, FOR SOLUTION ORAL at 08:06

## 2021-08-23 RX ADMIN — ACETAMINOPHEN 650 MG: 325 TABLET ORAL at 08:05

## 2021-08-23 RX ADMIN — SODIUM CHLORIDE, PRESERVATIVE FREE 10 ML: 5 INJECTION INTRAVENOUS at 08:10

## 2021-08-23 RX ADMIN — FOLIC ACID 1 MG: 1 TABLET ORAL at 08:06

## 2021-08-23 RX ADMIN — BUSPIRONE HYDROCHLORIDE 10 MG: 10 TABLET ORAL at 08:06

## 2021-08-23 RX ADMIN — HYDRALAZINE HYDROCHLORIDE 25 MG: 25 TABLET, FILM COATED ORAL at 13:44

## 2021-08-23 RX ADMIN — GABAPENTIN 100 MG: 100 CAPSULE ORAL at 08:06

## 2021-08-23 ASSESSMENT — PAIN SCALES - GENERAL
PAINLEVEL_OUTOF10: 0
PAINLEVEL_OUTOF10: 8

## 2021-08-23 ASSESSMENT — PAIN - FUNCTIONAL ASSESSMENT: PAIN_FUNCTIONAL_ASSESSMENT: PREVENTS OR INTERFERES SOME ACTIVE ACTIVITIES AND ADLS

## 2021-08-23 ASSESSMENT — PAIN DESCRIPTION - DESCRIPTORS: DESCRIPTORS: ACHING;DISCOMFORT

## 2021-08-23 ASSESSMENT — PAIN DESCRIPTION - LOCATION: LOCATION: BACK

## 2021-08-23 ASSESSMENT — PAIN DESCRIPTION - ONSET: ONSET: ON-GOING

## 2021-08-23 ASSESSMENT — PAIN DESCRIPTION - ORIENTATION: ORIENTATION: POSTERIOR

## 2021-08-23 ASSESSMENT — PAIN DESCRIPTION - PAIN TYPE: TYPE: CHRONIC PAIN;ACUTE PAIN

## 2021-08-23 ASSESSMENT — PAIN DESCRIPTION - PROGRESSION: CLINICAL_PROGRESSION: NOT CHANGED

## 2021-08-23 ASSESSMENT — PAIN DESCRIPTION - FREQUENCY: FREQUENCY: CONTINUOUS

## 2021-08-23 NOTE — PROGRESS NOTES
Nephrology Progress Note   [facility]. [facility]      This patient is a [de-identified]year old female whom we are following for hyponatremia. Subjective: The patient was seen and examined  Has constipation    Family History:  No family at bedside      Vitals:  BP (!) 161/71   Pulse 65   Temp 98.4 °F (36.9 °C) (Oral)   Resp 20   Ht 5' 1\" (1.549 m)   Wt 117 lb 15.1 oz (53.5 kg)   SpO2 95%   BMI 22.29 kg/m²       Intake/Output Summary (Last 24 hours) at 8/23/2021 1254  Last data filed at 8/23/2021 1013  Gross per 24 hour   Intake 410 ml   Output 1650 ml   Net -1240 ml     Patient Vitals for the past 96 hrs (Last 3 readings):   Weight   08/23/21 0420 117 lb 15.1 oz (53.5 kg)   08/22/21 0337 117 lb 8.1 oz (53.3 kg)   08/21/21 0225 114 lb 13.8 oz (52.1 kg)     Physical Exam:  Physical Exam  Vitals reviewed. Constitutional:       General: She is not in acute distress. Appearance: Normal appearance. HENT:      Head: Normocephalic and atraumatic. Eyes:      General: No scleral icterus. Conjunctiva/sclera: Conjunctivae normal.   Cardiovascular:      Rate and Rhythm: Normal rate. Heart sounds: No friction rub. Pulmonary:      Effort: Pulmonary effort is normal. No respiratory distress. Breath sounds: No rales. Abdominal:      General: Bowel sounds are normal. There is no distension. Tenderness: There is no abdominal tenderness. Musculoskeletal:      Right lower leg: No edema. Left lower leg: No edema. Neurological:      Mental Status: She is alert.            Medications:   cefepime  2,000 mg Intravenous Q24H    polyethylene glycol  17 g Oral Daily    hydrALAZINE  25 mg Oral 3 times per day    furosemide  40 mg Oral Daily    busPIRone  10 mg Oral 4x Daily    folic acid  1 mg Oral Daily    pantoprazole  40 mg Oral BID    predniSONE  5 mg Oral Daily    sodium chloride flush  5-40 mL Intravenous 2 times per day    enoxaparin  30 mg Subcutaneous Nightly    hydroxychloroquine  200 mg Oral BID    levothyroxine  150 mcg Oral Daily    latanoprost  1 drop Both Eyes Nightly    gabapentin  100 mg Oral BID         Labs:  No results for input(s): WBC, HGB, HCT, MCV, PLT in the last 72 hours. Recent Labs     08/22/21  0402 08/22/21  1533 08/23/21  0424   * 131* 130*   K 5.2* 4.3 3.9   CL 95* 95* 95*   CO2 22 23 23   GLUCOSE 81 112* 86   BUN 29* 27* 26*   CREATININE 0.9 1.2 0.9   LABGLOM >60 43* >60   GFRAA >60 52* >60           Assessment/Plan:    1. Hyponatremia.  - Has history of chronic hyponatremia with baseline in the low 130smmol/L. TSH was elevated in 05-07/2021 but Levothyroxine was adjusted and level this admission was 2.69mmol/L.  - Clinically hypervolemic. Na+ improved with diuresis - reduce Lasix dose  - Continue FR 1200 ml/d      2. Hyperkalemia.  - In the setting of SHANELL. Also was on KCl and Lisinopril prior to admission.  - D/C'd potassium supplements and ACEI. - Improved with medical management. Developed Hypokalemia and was repleted and corrected     3. Metabolic Acidosis. - From SHANELL +/- hypoperfusion. Lactic acid within normal.  - resolved     4. Acute Kidney Injury.  - Suspect pre-renal in etiology in the setting of bradycardia/hypotension.  - Resolved - Avoid ACE-I / ARB's   - Avoid hypotension and nephrotoxic medications.     5. Bradycardia. . Improved. Cardiology following    6. HTN - continue current medications    7. Cervical central  stenosis NS following         Faustino Torrez MD  [facility]  [email or web address]  8/23/2021

## 2021-08-23 NOTE — CARE COORDINATION
D/C to [facility] today. [address] Transport to pick her up at 4:30pm. Nurse ans daughter are aware and in agreement with the discharge plan. Gera Chirinos Approved Skilled time. Ambulance Form faxed to [phone].     PH# for Report to [facility]  [phone]  Fax# [phone]    Alwin Riedel [phone]  Electronically signed by Jassi Durant on 8/23/2021 at 2:25 PM

## 2021-08-23 NOTE — DISCHARGE SUMMARY
Hospital Medicine Discharge Summary    Patient ID: Jose Guzman      Patient's PCP: Martha Abdul MD    Admit Date: 8/14/2021     Discharge Date:   8/23/2021    Admitting Physician: Rosa Beckman MD     Discharge Physician: Moises Cadena MD     Discharge Diagnoses: Active Hospital Problems    Diagnosis Date Noted    Hyponatremia [E87.1] 08/14/2021    Acute heart failure ([facility] [address] [I50.9] 08/14/2021    Elevated troponin [R77.8] 08/14/2021    Hyperkalemia [E87.5] 08/14/2021    Chronic mesenteric ischemia (HCC) [K55.1]     Symptomatic bradycardia [R00.1] 06/16/2019    Essential hypertension [I10] 11/08/2018    Rheumatoid arthritis ([facility] [address] [M06.9] 11/08/2018    Acquired hypothyroidism [E03.9] 11/08/2018    Chronic kidney disease [N18.9] 08/27/2010       The patient was seen and examined on day of discharge and this discharge summary is in conjunction with any daily progress note from day of discharge. Hospital Course: Patient is 71-year-old female with CKD, hyperlipidemia, hypertension came into ER with a complaint of worsening weakness. Patient was noted to be in SHANELL, hyperkalemia, hyponatremia with was noted to be bradycardic. Cardiology was consulted recommended that bradycardia is because of hyperkalemia. Nephrology was consulted for hyponatremia, hyper kalemia  Patient was complaining of back pain neurosurgery was consulted after imaging was concern for severe central stenosis and adjacent level degeneration. Discussion with neurosurgery patient remains to be high risk for surgery with her age and comorbid. Recommended conservative management and follow-up as an outpatient. Patient seen and examined on day of discharge denies any abdominal pain, back pain, nausea, vomiting, fever, chills. Medically stable to be discharged after clearance from nephrology and neurosurgery. At baseline patient is bedbound and wheelchair-bound.     Final diagnosis  #Generalized weakness multifactorial hyponatremia, bradycardia  #Bradycardia secondary to hyperkalemia resolved hold beta-blockers  #Hyperkalemia present on admission multifactorial SHANELL and on lisinopril. Will hold ACE inhibitor at time of discharge  #Hyponatremia resolved Lasix 20 mg daily. Follow-up with nephrology as an outpatient  #SHANELL likely prerenal resolved no ACE or ARB. Follow-up with nephrology  #Central canal stenosis  S/p breast which will be delivered at facility. Follow-up with neurosurgery as an outpatient. Did talk to patient's daughter on day of discharge and updated her plan of care. She verbalized understanding. Physical Exam Performed:     BP (!) 128/54   Pulse 72   Temp 98.1 °F (36.7 °C) (Oral)   Resp 20   Ht 5' 1\" (1.549 m)   Wt 117 lb 15.1 oz (53.5 kg)   SpO2 98%   BMI 22.29 kg/m²       General appearance:  No apparent distress, appears stated age and cooperative. HEENT:  Normal cephalic, atraumatic without obvious deformity. Pupils equal, round, and reactive to light. Extra ocular muscles intact. Conjunctivae/corneas clear. Neck: Supple, with full range of motion. No jugular venous distention. Trachea midline. Respiratory:  Normal respiratory effort. Clear to auscultation, bilaterally without Rales/Wheezes/Rhonchi. Cardiovascular:  Regular rate and rhythm with normal S1/S2 without murmurs, rubs or gallops. Abdomen: Soft, non-tender, non-distended with normal bowel sounds. Musculoskeletal: No lower extremity edema  Skin: Skin color, texture, turgor normal.    Neurologic:  Neurovascularly intact without any focal sensory/motor deficits. Cranial nerves: II-XII intact, grossly non-focal.  Psychiatric:  Alert and oriented,   Capillary Refill: Brisk,< 3 seconds   Peripheral Pulses: +2 palpable, equal bilaterally       Labs:  For convenience and continuity at follow-up the following most recent labs are provided:      CBC:    Lab Results   Component Value Date    WBC 6.1 08/15/2021    HGB 8.9 08/15/2021    HCT 26.0 08/15/2021     08/15/2021       Renal:    Lab Results   Component Value Date     08/23/2021    K 3.9 08/23/2021    CL 95 08/23/2021    CO2 23 08/23/2021    BUN 26 08/23/2021    CREATININE 0.9 08/23/2021    CALCIUM 8.6 08/23/2021         Significant Diagnostic Studies    Radiology:   XR CHEST PORTABLE   Final Result   Stable cardiomegaly      Persistent opacity left lung base could represent atelectasis or infection         CT CERVICAL SPINE WO CONTRAST   Final Result   No acute abnormality of the cervical spine. Severe cervical spondylosis. Solid bony fusion between C4 and C7. Severe spinal stenosis at C3-C4 due to   degenerative spondylolisthesis of L3 on L4. Similar appearance to the MRI   study yesterday. Ground-glass opacities in each lung apex suggestive of acute COVID-19   pneumonia. MRI CERVICAL SPINE WO CONTRAST   Preliminary Result   1. Severe spinal canal stenosis, severe left and moderate right neural   foraminal narrowing at C3-4 as described above. There is abnormal   intramedullary signal within the cervical spinal cord at this level   consistent with spondylotic myelopathy. 2. Moderate spinal canal stenosis and moderate to severe bilateral neural   foraminal narrowing at C7-T1, as described above. 3. Additional mild multilevel degenerative changes and postsurgical changes   as described above. XR ABDOMEN (KUB) (SINGLE AP VIEW)   Final Result   Nonspecific bowel gas pattern without evidence for obstruction. XR CHEST PORTABLE   Final Result   Mild cardiomegaly was noted with mild central pulmonary vascular congestion   with trace right and small left pleural effusions. The findings are   compatible with early-mild congestive heart failure. Small to moderate fixed hiatal hernia. Marked degenerative osteo arthritic changes of each glenohumeral joint was   noted with chronic bilateral anterior dislocations. Consults:     IP CONSULT TO CARDIOLOGY  IP CONSULT TO HOSPITALIST  IP CONSULT TO NEPHROLOGY  IP CONSULT TO NEUROSURGERY  INPATIENT CONSULT TO ORTHOTIST/PROSTHETIST    Disposition: Skilled nursing facility    Condition at Discharge: Stable    Discharge Instructions/Follow-up: PCP, nephrology, neurosurgery    Code Status:  Full Code     Activity: activity as tolerated    Diet: cardiac diet      Discharge Medications:     Current Discharge Medication List           Details   furosemide (LASIX) 20 MG tablet Take 1 tablet by mouth daily  Qty: 30 tablet, Refills: 0      cephALEXin (KEFLEX) 250 MG capsule Take 1 capsule by mouth every 12 hours for 1 dose  Qty: 1 capsule, Refills: 0              Details   HYDROcodone-acetaminophen (NORCO) 5-325 MG per tablet Take 1 tablet by mouth every 8 hours as needed for Pain for up to 3 days. 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       hydrALAZINE (APRESOLINE) 10 MG tablet Take 10 mg by mouth every 8 hours      latanoprost (XALATAN) 0.005 % ophthalmic solution Place 1 drop into both eyes nightly      levothyroxine (SYNTHROID) 150 MCG tablet Take 150 mcg by mouth Daily      mirtazapine (REMERON) 15 MG tablet Take 7.5 mg by mouth nightly      linaCLOtide (LINZESS) 72 MCG CAPS capsule Take 1 capsule by mouth every morning (before breakfast)  Qty: 90 capsule, Refills: 5    Associated Diagnoses: Chronic constipation      diclofenac sodium (VOLTAREN) 1 % GEL Apply 2 g topically 2 times daily  Qty: 120 g, Refills: 0 metoclopramide (REGLAN) 5 MG tablet Take 1 tablet by mouth 3 times daily  Qty: 120 tablet, Refills: 3      pantoprazole (PROTONIX) 40 MG tablet Take 1 tablet by mouth 2 times daily  Qty: 180 tablet, Refills: 3    Associated Diagnoses: Gastroesophageal reflux disease, esophagitis presence not specified      FLUoxetine (PROZAC) 10 MG capsule Take 30 mg by mouth daily     Associated Diagnoses:  Moderate episode of recurrent major depressive disorder (HCC); REJI (generalized anxiety disorder)      aspirin 81 MG tablet Take 81 mg by mouth daily       loratadine (CLARITIN) 10 MG tablet Take 10 mg by mouth daily       NIFEdipine (ADALAT CC) 60 MG extended release tablet Take 60 mg by mouth daily       psyllium (KONSYL) 28.3 % PACK Take 1 packet by mouth daily      predniSONE (DELTASONE) 5 MG tablet Take 5 mg by mouth daily      Multiple Vitamins-Minerals (MULTIVITAMIN WITH MINERALS) tablet Take 1 tablet by mouth daily      melatonin 5 MG TABS tablet Take 5 mg by mouth nightly       busPIRone (BUSPAR) 10 MG tablet Take 10 mg by mouth 4 times daily      fluticasone (FLONASE) 50 MCG/ACT nasal spray 2 sprays by Nasal route nightly       folic acid (FOLVITE) 1 MG tablet Take 1 mg by mouth daily       hydroxychloroquine (PLAQUENIL) 200 MG tablet Take by mouth 2 times daily       oxybutynin (DITROPAN-XL) 5 MG extended release tablet Take 5 mg by mouth nightly       pravastatin (PRAVACHOL) 10 MG tablet Take 10 mg by mouth nightly       sucralfate (CARAFATE) 1 GM tablet Take 1 g by mouth 3 times daily (before meals)       loperamide (IMODIUM) 2 MG capsule Take 2 mg by mouth 4 times daily as needed for Diarrhea      aluminum & magnesium hydroxide-simethicone (MYLANTA) 400-400-40 MG/5ML SUSP Take 30 mLs by mouth every 6 hours as needed      Dextromethorphan-guaiFENesin  MG/5ML SYRP Take 10 mLs by mouth every 4 hours as needed for Cough      calcium carbonate (TUMS) 500 MG chewable tablet Take 1 tablet by mouth every 8 hours as

## 2021-08-23 NOTE — PROGRESS NOTES
Transport is here to  pt. Pts IV is out and heart monitor was removed. Pts belongings are with transport. Pt denies any further needs at this time. Report has been called at a earlier time.      Electronically signed by Eugenia Duarte RN on 8/23/2021 at 5:14 PM

## 2021-08-23 NOTE — PROGRESS NOTES
RN received call from [facility] stating pts heart rate was in the 30s for 5 beats. Pt is resting comfortably in the bed and is asymptomatic. Will continue to monitor.      Electronically signed by Doc Davis RN on 8/23/2021 at 10:28 AM

## 2021-08-23 NOTE — PLAN OF CARE
Problem: Falls - Risk of:  Goal: Will remain free from falls  Description: Will remain free from falls  8/23/2021 1200 by Jhonny Rivera RN  Outcome: Ongoing     Problem: Skin Integrity:  Goal: Will show no infection signs and symptoms  Description: Will show no infection signs and symptoms  8/23/2021 1200 by Jhonny Rivera RN  Outcome: Ongoing     Problem: OXYGENATION/RESPIRATORY FUNCTION  Goal: Patient will maintain patent airway  8/23/2021 1200 by Jhonny Rivera RN  Outcome: Ongoing     Problem: ACTIVITY INTOLERANCE/IMPAIRED MOBILITY  Goal: Mobility/activity is maintained at optimum level for patient  8/23/2021 1200 by Jhonny Rivera RN  Outcome: Ongoing     Problem: Physical Regulation:  Goal: Will show no signs and symptoms of electrolyte imbalance  Description: Will show no signs and symptoms of electrolyte imbalance  8/23/2021 1200 by Jhonny Rivera RN  Outcome: Ongoing

## 2021-08-23 NOTE — PROGRESS NOTES
Speech Language Pathology  Facility/Department: [facility] 5W PROGRESSIVE CARE   CLINICAL BEDSIDE SWALLOW EVALUATION    NAME: Arsh Arias  : 1940  MRN: [id number]    ADMISSION DATE: 2021  ADMITTING DIAGNOSIS: The primary encounter diagnosis was Symptomatic bradycardia. 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 · covid 19 test: \"not detected\"  · has Essential hypertension; Rheumatoid arthritis ([address] Major depressive disorder; Acquired hypothyroidism; REJI (generalized anxiety disorder); Mild intermittent asthma; PVD (peripheral vascular disease) ([address] Anemia; Symptomatic bradycardia; Chronic constipation; Chronic kidney disease; Chronic rhinitis; Encounter for long-term (current) use of other medications; Hiatal hernia with gastroesophageal reflux disease without esophagitis; Hyperlipidemia; Hypertensive kidney disease with chronic kidney disease stage II; MDD (major depressive disorder), recurrent episode, mild ([address] Myalgia and myositis; Numbness and tingling of both feet; Osteoporosis; Peripheral vascular disease of lower extremity ([address] Polymyalgia rheumatica ([address] Polypharmacy; Primary insomnia; Pure hypercholesterolemia; Sensorineural hearing loss (SNHL) of both ears; Spinal stenosis of lumbar region; 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  ·  has a past medical history of Allergic Complaint  Referral for SLP evaluation for Swallowing due to recent chest XR. Diet was downgraded by MD    Reason for Referral  Mack Boas was referred for a bedside swallow evaluation to assess the efficiency of her swallow function, identify signs and symptoms of aspiration and make recommendations regarding safe dietary consistencies, effective compensatory strategies, and safe eating environment. Assessment Impression  1. Pt was in bed upon SLP arrival. Pt was oriented to self; place and partially to date and situation. Pt was able to follow one step commands but did require assist feed due to bilateral upper extremity weakness. Pt denies chewing or swallowing problems. Verbally responsive    2. Dysphagia Diagnosis: Mild Oropharyngeal dysphagia characterized by prolonged mastication of soft /regular solid foods; reduced bolus control with concern for premature loss of material to pharynx; delayed initiation of swallow and potential reduced laryngeal elevation. · Pt with one episode of cough during prolonged mastication of regular solid food with concern for premature loss. · Otherwise no overt clinical s/s of aspiration post swallow; no voice quality changes post swallow; no pt complaints post swallow. · pt with documented history of GERD. Pt unable to identify any GERD precautions. SLP reviewed. Plan to await MD order for diet upgrade to dysphagia soft/bite size food with thin liquids; meds with puree; SLP f/u x 1-3 for 4 diet toleration. Encourage GERD precautions. 3. If MD is concern for silent aspiration or complication due to CT cervical Spine findings regarding pharyngeal peristalsis or UES opening; would suggest more objective instrumental assessment. Pt does have GERD history; recommend GERD precautions for all meals and HOB 40 degrees when in bed etc.       Dysphagia Outcome Severity Scale: Level 5: Mild dysphagia- Distant supervision.  May need one diet consistency restricted Treatment Plan  Requires SLP Intervention: Yes  Duration/Frequency of Treatment: 3-5 times a day while on acute medical floor  D/C Recommendations: To be determined       Recommended Diet and Intervention: Await MD order  Diet Solids Recommendation: Dysphagia Soft and Bite-Sized (Dysphagia III)  Liquid Consistency Recommendation: Thin  Recommended Form of Meds: Crushed in puree as able    Compensatory Swallowing Strategies  Compensatory Swallowing Strategies: Upright as possible for all oral intake;Small bites/sips;Swallow 2 times per bite/sip; Remain upright for 30-45 minutes after meals    Treatment/Goals  Pt goal is to eat and drink  Dysphagia Goals: The patient will tolerate recommended diet without observed clinical signs of aspiration; The patient/caregiver will demonstrate understanding of compensatory strategies for improved swallowing safety. Pt will tolerated skilled therapy trials of regular solid food consistencies 10/10 without overt clinical s/s of aspiration and potential diet upgrade    General  Chart Reviewed: Yes  Behavior/Cognition: Alert; Cooperative;Pleasant mood; Requires cueing;Distractible (Oriented to self and to place. Partially oriented to date/situation)  Respiratory Status: Room air  Communication Observation: Functional (fro conveying needs/wants. [place])  Follows Directions: Simple (intermittent mild repetition and/or visual cues)  Dentition: Dentures top;Dentures bottom  Prior Dysphagia History: Pt denies chewing or swallowing problems. Pt does report sometimes needs extra time chewing. Pt reportedly in regular diet as baseline per documentation in soft chart    Patient Positioning: Upright in bed (upright in 80-90 degrees in bed)    Consistencies Administered: Reg solid; Dysphagia Soft and Bite-Sized (Dysphagia III); Dysphagia Minced and Moist (Dysphagia II); Dysphagia Pureed (Dysphagia I); Nectar - cup;Nectar - straw; Thin - cup; Thin - straw    Pain; denied    Vision/Hearing  Vision  Vision: Within Functional Limits  Hearing  Hearing: Exceptions to [facility]  Hearing Exceptions: Hard of hearing/hearing concerns;Bilateral hearing aid    Oral Motor Deficits  Oral/Motor  Oral Motor: Exceptions to [facility]  Labial ROM:  (fairly symmetrical volitional rom)  Lingual ROM:  (reduced lingual elevation; slight deviation on protrusion/elevation)  Lingual Coordination: Reduced  Velum:  (slight asym during phonation of /a/)  Gag:  (diminished)  Vocal Quality:  (unremarkable)  Volitional Cough: Congested  Volitional Swallow: Delayed    Oral Phase Dysfunction  Oral Phase  Oral Phase: Exceptions  Oral Phase Dysfunction  Impaired Mastication: Reg Solid; Soft Solid  Decreased Anterior to Posterior Transit:  (disorganized)  Suspected Premature Bolus Loss:  (suspect during prolonged mastication)     Indicators of Pharyngeal Phase Dysfunction   Pharyngeal Phase  Pharyngeal Phase: Exceptions  Indicators of Pharyngeal Phase Dysfunction  Delayed Swallow: All  Decreased Laryngeal Elevation:  (potential reduced laryngeal elevation)  Cough - Immediate:  (One episode of cough during mastication with concern for premature loss of material)  Pharyngeal Phase   Pharyngeal: Otherwise no overt clinical s/s of aspiration post swallow ; no voice quality changes post swallow; no pt complaints post swallow    Prognosis  Prognosis  Prognosis for safe diet advancement: good (guarded; medical DX; medical co-morbidities)  Individuals consulted  Consulted and agree with results and recommendations: Patient;RN    Education  Patient Education Response: Verbalizes understanding;Needs reinforcement  Safety Devices in place: Yes  Type of devices: All fall risk precautions in place       Therapy Time  SLP Individual Minutes  Time In: 1400  Time Out: [address]  Minutes: 39          Signed  Yaa HenaoMS,CCC,SLP 1229  Speech and Language Pathologist  8/23/2021 2:42 PM

## 2021-08-23 NOTE — PLAN OF CARE
Attempted to see patient today; however, there was concern for acute covid given CT results and test was pending. Therefore, visit deferred until results become available. Imaging reviewed. Pt. 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 However, given her age, medical comorbidities, cognitive status and likely poor bone quality due to chronic steroids and Plaquenil, she is not a good surgical candidate. Will discuss with patient's daugther. Addendum: discussed risks in detail with patient's daughter. She understands and will discuss with her siblings and mother in terms of their wishes and will follow up with my office as an outpatient to continue this conversation.       Karolina Tai MD

## 2021-08-23 NOTE — CARE COORDINATION
[facility] Approved Skilled stay at [facility]. Awaiting hospital discharge.   Maciej Parham [phone]  Electronically signed by Chantel Armstrong on 8/23/2021 at 9:27 AM

## 2021-08-23 NOTE — PROGRESS NOTES
Clinical Pharmacy Note  Renal Dose Adjustment    Hanh Roldan is receiving Maxipime. This medication is renally eliminated. Based on the patient's Estimated Creatinine Clearance: 28 mL/min (based on SCr of 1.2 mg/dL). and urine output, the dose has been adjusted to Maxipime 2 gram every 24 hours per protocol. Pharmacy will continue to monitor and adjust dose as needed for changes in renal function.       [address] [facility], [facility], [place] 8/22/2021  8:30 PM

## 2021-08-23 NOTE — PROGRESS NOTES
Pharmacy Heart Failure Medication Reconciliation Note    Pt discharged from [facility] today after admission for generalized weakness. Naz Garcia has a diagnosis of diastolic heart failure (last EF = 65-70% on 8/16/21). Pertinent Labs:  BMP:   Lab Results   Component Value Date     08/23/2021    K 3.9 08/23/2021    CL 95 08/23/2021    CO2 23 08/23/2021    BUN 26 08/23/2021    CREATININE 0.9 08/23/2021     BNP:   Lab Results   Component Value Date    PROBNP 8,633 08/20/2021    PROBNP 3,112 08/14/2021       Patient taking an ACEI / ARB / Entresto:  No: Not indicated for EF>35%     Patient taking a BETA BLOCKER:  No: Not indicated for EF>35%  Patient taking a LOOP DIURETIC: Yes  Patient taking a ALDOSTERONE RECEPTOR ANTAGONIST: No: Not indicated for EF>35%    Patient has a diagnosis of Atrial Fibrillation: No. If yes, patient is on appropriate anticoagulation: No: N/A. Patient has a diagnosis of type 2 diabetes:  No. If yes, patient prescribed the following anti-hyperglycemic medication at discharge: N/A      Corrections to discharge medications include:  None    Discharge Medications:         Medication List      START taking these medications    furosemide 20 MG tablet  Commonly known as: LASIX  Take 1 tablet by mouth daily        CHANGE how you take these medications    HYDROcodone-acetaminophen 5-325 MG per tablet  Commonly known as: NORCO  Take 1 tablet by mouth every 8 hours as needed for Pain for up to 3 days.   What changed: when to take this     metoclopramide 5 MG tablet  Commonly known as: Reglan  Take 1 tablet by mouth 3 times daily  What changed:   · when to take this  · additional instructions     ondansetron 4 MG tablet  Commonly known as: ZOFRAN  Take 1 tablet by mouth daily as needed for Nausea or Vomiting  What changed: when to take this        CONTINUE taking these medications    albuterol sulfate  (90 Base) MCG/ACT inhaler     aluminum & magnesium hydroxide-simethicone 400-400-40 MG/5ML Susp  Commonly known as: MYLANTA     aspirin 81 MG tablet     busPIRone 10 MG tablet  Commonly known as: BUSPAR     calcium carbonate 500 MG chewable tablet  Commonly known as: TUMS     Carafate 1 GM tablet  Generic drug: sucralfate     cyclobenzaprine 5 MG tablet  Commonly known as: FLEXERIL     Dextromethorphan-guaiFENesin  MG/5ML Syrp     diclofenac sodium 1 % Gel  Commonly known as: VOLTAREN  Apply 2 g topically 2 times daily     docusate sodium 100 MG capsule  Commonly known as: COLACE     Ensure Clear Liqd  Take 1 Bottle by mouth 2 times daily     FLUoxetine 10 MG capsule  Commonly known as: PROZAC     fluticasone 50 MCG/ACT nasal spray  Commonly known as: FLONASE     folic acid 1 MG tablet  Commonly known as: FOLVITE     gabapentin 100 MG capsule  Commonly known as: NEURONTIN     hydrALAZINE 10 MG tablet  Commonly known as: APRESOLINE     hydroxychloroquine 200 MG tablet  Commonly known as: PLAQUENIL     hydrOXYzine 25 MG tablet  Commonly known as: ATARAX     latanoprost 0.005 % ophthalmic solution  Commonly known as: XALATAN     levothyroxine 150 MCG tablet  Commonly known as: SYNTHROID     linaCLOtide 72 MCG Caps capsule  Commonly known as: Linzess  Take 1 capsule by mouth every morning (before breakfast)     loperamide 2 MG capsule  Commonly known as: IMODIUM     loratadine 10 MG tablet  Commonly known as: CLARITIN     magnesium hydroxide 400 MG/5ML suspension  Commonly known as: MILK OF MAGNESIA     melatonin 5 MG Tabs tablet     mirtazapine 15 MG tablet  Commonly known as: REMERON     multivitamin with minerals tablet     NIFEdipine 60 MG extended release tablet  Commonly known as: ADALAT CC     nystatin 228073 UNIT/GM powder  Commonly known as: MYCOSTATIN     oxybutynin 5 MG extended release tablet  Commonly known as: DITROPAN-XL     pantoprazole 40 MG tablet  Commonly known as: PROTONIX  Take 1 tablet by mouth 2 times daily     polyethylene glycol 17 GM/SCOOP powder  Commonly known as: GLYCOLAX pravastatin 10 MG tablet  Commonly known as: PRAVACHOL     predniSONE 5 MG tablet  Commonly known as: DELTASONE     psyllium 28.3 % Pack  Commonly known as: KONSYL     Tylenol 325 MG tablet  Generic drug: acetaminophen        STOP taking these medications    lisinopril 40 MG tablet  Commonly known as: PRINIVIL;ZESTRIL     potassium chloride 10 MEQ extended release capsule  Commonly known as: MICRO-K        ASK your doctor about these medications    cephALEXin 250 MG capsule  Commonly known as: KEFLEX  Take 1 capsule by mouth every 12 hours for 1 dose  Ask about: Should I take this medication?            Where to Get Your Medications      These medications were sent to [address] [facility] [address]  [address] [facility], [address] [place] [address]    Phone: [phone]   · cephALEXin 250 MG capsule  · furosemide 20 MG tablet     You can get these medications from any pharmacy    Bring a paper prescription for each of these medications  · HYDROcodone-acetaminophen 5-325 MG per tablet         Kenna Camacho [facility], PharmD  Heart Failure Discharge Medication Reconciliation Program  [phone]

## 2021-08-23 NOTE — PROGRESS NOTES
Report called to RN at [address] where pt will be discharged later today. RN was able to answer all questions at this time and update other RN about patients plan of care.      Electronically signed by Jeevan Aponte RN on 8/23/2021 at 3:52 PM

## 2021-08-27 NOTE — ADT AUTH CERT
Systemic or Infectious Condition GRG - Care Day 9 (8/22/2021) by Michele Rich RN       Review Status Review Entered   Completed 8/27/2021 08:28      Criteria Review      Care Day: 9 Care Date: 8/22/2021 Level of Care: Telemetry    Guideline Day 2    Level Of Care    (X) Floor    8/27/2021 8:28 AM EDT by Smith Gabriel      REMAINS ON INTERMEDIATE TELEMETRY UNIT    Clinical Status    ( ) * No ICU or intermediate care needs    8/27/2021 8:28 AM EDT by Smith Gabriel      REMAINS ON INTERMEDIATE TELEMETRY UNIT    Interventions    (X) Inpatient interventions continue    8/27/2021 8:28 AM EDT by Smith Gabriel      Cefepime 2000mg IV every 24 hours  Lovenox 30mg SQ nightly  Lasix 40mg po daily  Acetaminophen 650mg po every 6 hours prn - received x 2  Flexeril 5mg po TID prn - received x 1  Consult Cardiology, Nephrology, Neurosurgery, Orthodontist, PT, OT, SLP    * Milestone   Additional Notes   8/22/2021  Care Day 9      VS:  T. 36.8, b/p 179/85, HR 71, R 18, SpO2 93% RA   Weight 117 pounds   BMI 22.2         Labs:      Sodium: 129 (L)   Potassium: 5.2 (H)   Chloride: 95 (L)   BUN: 29 (H)   Creatinine: 0.9   GFR Non-: >60   GFR : >60   Calcium: 8.0 (L)         Sodium: 131 (L)   Potassium: 4.3   Chloride: 95 (L)   BUN: 27 (H)   Creatinine: 1.2   GFR Non-: 43 (A)   GFR : 52 (A)   Glucose: 112 (H)         8/21 MRI Cervical Spine   FINDINGS:    BONES/ALIGNMENT: Patient motion limits evaluation.  There is grade 1    anterolisthesis of C3 relative to C4 measuring 4 mm.  There is grade 2    anterolisthesis of C7 relative to T1 measuring 5 mm.  Postsurgical changes    status post cervical fusion from C4-C7 are noted.  There is no acute    fracture.  Bone marrow signal intensity is normal.       SPINAL CORD: There is abnormal increased T2 signal intensity within the    cervical spinal cord at the C3-4 level.  There is abnormal increased T2    signal intensity within the cervical spinal cord at the C5-6 level. SOFT TISSUES: No paraspinal mass is identified. C2-C3: There is no disc bulge or protrusion present.  There is no significant    spinal canal stenosis or neural foraminal narrowing present.  There is    left-sided facet arthropathy. C3-C4: There is severe spinal canal stenosis secondary to grade 1    anterolisthesis, disc bulge and thickening of the ligamentum flavum.  There    is severe left and moderate right neural foraminal narrowing.  Bilateral    facet arthropathy and uncovertebral overgrowth is present. C4-C5: There is a posterior disc osteophyte complex and facet arthropathy. No significant spinal canal stenosis or neural foraminal narrowing is present. C5-C6: There is no disc bulge or protrusion.  There is facet arthropathy    mildly narrowing both neural foramen.  No spinal canal stenosis is present. C6-C7: There is uncovertebral overgrowth and facet arthropathy resulting in    mild bilateral neural foraminal narrowing.  No significant spinal canal    stenosis is present. C7-T1: There is grade 1 anterolisthesis, disc bulge and posterior osteophyte    formation.  There is bilateral facet arthropathy.  There is moderate spinal    canal stenosis and moderate to severe bilateral neural foraminal narrowing. Impression   1. Severe spinal canal stenosis, severe left and moderate right neural foraminal narrowing at C3-4 as described above.  There is abnormal intramedullary signal within the cervical spinal cord at this level consistent with spondylotic myelopathy. 2. Moderate spinal canal stenosis and moderate to severe bilateral neural foraminal narrowing at C7-T1, as described above. 3. Additional mild multilevel degenerative changes and postsurgical changes as described above. CT Cervical Spine   Impression   No acute abnormality of the cervical spine.  Severe cervical spondylosis.     Solid bony fusion between C4 and C7.  Severe spinal stenosis at C3-C4 due to degenerative spondylolisthesis of L3 on L4.  Similar appearance to the MRI study yesterday. Ground-glass opacities in each lung apex suggestive of acute COVID-19 pneumonia. CXR   Impression   Stable cardiomegaly    Persistent opacity left lung base could represent atelectasis or infection          Additional Orders:   Dysphagia Minced and Moist Diet Low Sodium Diet   1200 ml fluid restriction   Daily Weight, I/O every shift   Up with assistance   VS routine         Neurosurgery Note   I reviewed the patient's interval imaging which consists of an MRI brain dated 8/22/2021. This demonstrates grade I spondylolisthesis of C3-4 and C7-T1 with previous instrumentation at C4-7. There is severe centra stenosis and cord compression which likely accounts for the patient's progressive functional degeneration. Hyponateremia is persistent at 129.       A/P: [de-identified] yo woman with several comorbidities including severe hyponatremia who presents with progressive weakness likely related to severe central stenosis and adjacent level degeneration       -Patient would likely benefit from surgical intervention, specifically C3-T2 decompression, fixation, and fusion. However, given that patient has not ambulated in several years and has demonstrated significant cognitive decline over the past year, she may not tolerate this type of intervention. Moreover, given her advanced age and use of plaquenil / chronic steroids, she may be osteoporotic and is at high risk of implant failure.    -Would need medical clearance, cardiac clearance, correction of hyponatremia, and complete treatment of UTI. -Would need ASA held for 1 week prior to surgery and 2 weeks postoperatively. If possible, would need Plaquenil held for 3-6 months. -CT cervical spine to evaluate previous fusion.   -Recommend cervical collar to be worn at all times except for hygiene given progressive weakness. -Will discuss goals of care and surgery early this week with family.              RN Note   Call placed to [address] [place] [address] at [phone] for patient's [place] collar (per order). Order and face sheet faxed successfully as well            Nephrology Note   Assessment/Plan:       Hyponatremia.   - Has history of chronic hyponatremia with baseline in the low 130smmol/L. TSH was elevated in 05-07/2021 but Levothyroxine was adjusted and level this admission was 2.69mmol/L.   - Clinically hypervolemic. Na+ improved to 131  meq  Will continue diuresis and  Continue . FR 1200 ml/d Lasix swiched to PO D/W daughter at bedside               Hyperkalemia.   - In the setting of SHANELL. Also was on KCl and Lisinopril prior to admission.   - D/C'd potassium supplements and ACEI. - Improved with medical management. Developed Hypokalemia and  restarted 20 meq BID K+ 4.9 yesterday and supplement stopped K+ 4.3 meq now Monitor       Metabolic Acidosis. - From SHANELL +/- hypoperfusion. Lactic acid within normal.   - stable       Acute Kidney Injury.   - Suspect pre-renal in etiology in the setting of bradycardia/hypotension.   -Cr remains stable at 1.2 mg No ACE-I ARB's On oral Lasix now   - Avoid hypotension and nephrotoxic medications.       Bradycardia. . Improved. Cardiology following       HTN BP better controlled Monitor       Cervical central  stenosis NS following             Internal Medicine Note   Assessment   Hyponatremia-improved, has chronic hyponatremia   Symptomatic bradycardia-now resolved   Acute decompensated CHF-now compensated   Urinary tract infection   Hypertension   Rheumatoid arthritis   Hypothyroidism   SHANELL-improved   Depression   Generalized weakness   Hyperkalemia-resolved       Plan   Due to abnormal MRI performed at [facility] 8/13, will consult neurosurgery-repeat MRI does show severe canal stenosis, neurosurgery to make a plan and communicate with family, will call and update family as well, neurosurgery recommends possible neurosurgical intervention in future, cervical collar at all times for now, if goes for surgical option patient will need cardiac clearance, medical clearance, aspirin held   Urine culture does show pansensitive E. coli, completed 4 days course of IV Rocephin IV rocephin   Continue to monitor BMP, nephrology is following, switched to PO lasix   Cardiology was consulted recommends monitoring and replacing electrolytes, echocardiogram - Ejection fraction is visually estimated to be 65-70%. , continue PO Lasix   Continue home aspirin, hydralazine, Plaquenil, levothyroxine, prednisone, Protonix   Urine culture shows pansensitive E. coli, will switch to oral antibiotics    DVT prophylaxis-Lovenox   Diet: ADULT DIET;  Dysphagia - Minced and Moist; Low Sodium (2 gm); 1200 ml   Code Status: Full Code       PT/OT Eval Status: ordered       Dispo -neurosurgery to communicate with family regarding plan for possible surgery, sodium is almost back to the patient's baseline, DC 1 to 2 days.        Addendum-called by RN regarding the opacity and findings concerning for COVID-19 on CT cervical spine, order chest x-ray, shows possible new opacity-ordered cefepime, check PCT.  Repeat COVID-19                     Systemic or Infectious Condition GRG - Care Day 7 (8/20/2021) by Michele Rich RN       Review Status Review Entered   Completed 8/27/2021 08:27      Criteria Review      Care Day: 7 Care Date: 8/20/2021 Level of Care: Telemetry    Guideline Day 2    Level Of Care    (X) Floor    8/27/2021 8:27 AM EDT by Smith Gabriel      INTERMEDIATE TELEMETRY UNIT    Clinical Status    ( ) * No ICU or intermediate care needs    8/27/2021 8:27 AM EDT by Smith Gabriel      INTERMEDIATE TELEMETRY UNIT    Interventions    (X) Inpatient interventions continue    8/27/2021 8:27 AM EDT by Smith Gabriel      Keflex 250mg po BID  Lovenox 30mg SQ nightly  Lasix 40mg po daily (received additional 40mg IV yesterday)  Acetaminophen 650mg po every 6 hours prn - received x 1  Consult Cardiology, Nephrology, Neurosurgery, Orthodontist, PT, OT  MRI Cervical Spine    * Milestone   Additional Notes   8/20/2021  Care Day 7      VS:  T. 36.9, b/p 158/89, HR 65, R 18, SpO2 97% RA   Weight 119 pounds   BMI 22.6         Labs:      Sodium: 127 (L)   Potassium: 4.2   Chloride: 91 (L)   BUN: 32 (H)   Creatinine: 1.0   GFR Non-: 53 (A)   GFR : >60         Sodium: 127 (L)   Potassium: 4.3   Chloride: 92 (L)   BUN: 30 (H)   Creatinine: 1.2   GFR Non-: 43 (A)   GFR : 52 (A)         Sodium: 128 (L)   Potassium: 4.7   Chloride: 92 (L)   BUN: 29 (H)   Creatinine: 1.1   GFR Non-: 48 (A)   GFR : 58 (A)   Glucose: 131 (H)         Additional Orders:   Low Sodium Diet   1200 ml fluid restriction   Daily Weight, I/O every shift   Up with assistance   VS routine               Cardiology Note   Assessment/Plan:   1.) Junctional bradycardia: Occurred in setting of electrolyte imbalance. Now NSR HR 60s, no bradycardia or hypotension.        2.) Acute on chronic diastolic Grade II heart failure, EF 65-70%: Appears to be euvolemic. No edema, SOB, pulm edema, abd distention. However, BNP is elevated today- unclear etiology. Na improved but not resolved. Would continue lasix as is for now, consider increasing diuretics if she becomes sympotmatic.     NYHA Class II    Stage C   Diuretic: lasix 40mg po daily   Beta Blocker: Not indicated for EF>35%   ACEi/ARB/ARNI: Not indicated for EF>35%   Aldosterone Antagonist:Not indicated for EF>35%   SGLT2 Inhibitor: Not indicated for EF>35%   2gm Na diet, daily weight, 64 oz fluid restriction   Avoid NSAIDS and other nephrotoxic meds   Cardiac Rehab: Not indicated for EF>35%   ICD:Not indicated for EF>35%       3. ) Mesenteric artery stenosis:    -asa         Nephrology Note   UOP 1600 ml/24 after Lasix yesterday       Assessment/Plan:     Hyponatremia.   - Has history of chronic hyponatremia with baseline in the low 130smmol/L. TSH was elevated in 05-07/2021 but Levothyroxine was adjusted and level this admission was 2.69mmol/L.   - Clinically hypervolemic. Na+ improved to 127 meq today after sdditional Lasix yesterday Will continue diuresis and  Continue . FR 1200 ml/d Lasix swiched to PO                Hyperkalemia.   - In the setting of SHANELL. Also was on KCl and Lisinopril prior to admission.   - D/C'd potassium supplements and ACEI. - Improved with medical management. K+ low now restarted 20 meq BID K+ 4.3 meq today       Metabolic Acidosis. - From SHANELL +/- hypoperfusion. Lactic acid within normal.   - stable       Acute Kidney Injury.   - Suspect pre-renal in etiology in the setting of bradycardia/hypotension.   -Cr improved to 1.2 mg today Good UOP   Monitor with on going diuresis No ACE-I ARB's On oral Lasix now   - Avoid hypotension and nephrotoxic medications. - Discontinue Akhtar        Bradycardia. . Improved. Cardiology following       HTN BP better controlled Monitor         Internal Medicine Note   Called and discussed with patient daughter Mandi Zaragoza, daughter had questions about abnormal MRI of cervical spine that was performed at [facility], Ms. Mandi Zaragoza mentioned she was called by patient's neurologist today 8/20/2021 that patient should see a neurosurgeon before being discharged.        MRI performed at [facility] 8/13 that showed the following impression       .  Severely limited study secondary to extensive patient motion. 2.  Severe central stenosis at C3-4 with severe cord compression secondary to 4-5 mm anterolisthesis, degenerative disc disease and facet arthrosis. 3.  Suspect mild signal abnormality in the spinal cord at C3-4 which may represent edema or contusion. In addition, myelomalacia is noted at the C5 level. 4.  Postoperative changes from C4-C7 ACDF. No cord compression is seen at these levels.  The neural foramina cannot be adequately evaluated due to motion. 5.  Mild central stenosis without cord compression at C7-T1 associated with 4-5 mm anterolisthesis and facet arthrosis. 6.  Further evaluation with x-rays or CT scan may be helpful to better evaluate bony detail.        will consult neurosurgery, patient's daughter also requested that she would like to talk to the neurosurgeon or be at bedside of the patient when neurosurgeon come to the patient's side, she mentions she wants to be called and informed of neurosurgeon arrival timings so that she can also be there and can speak to them. Carmenza Hutton wants to be called at [phone]          Assessment   Hyponatremia-improved, has chronic hyponatremia   Symptomatic bradycardia-now resolved   Acute decompensated CHF-now compensated   Urinary tract infection   Hypertension   Rheumatoid arthritis   Hypothyroidism   SHANELL-improved   Depression   Generalized weakness   Hyperkalemia-resolved       Plan   Due to abnormal MRI performed at [facility] 8/13, will consult surgery   Continue to monitor BMP, nephrology is following, switched to PO lasix   Cardiology was consulted recommends monitoring and replacing electrolytes, echocardiogram, continue PO Lasix   Continue home aspirin, hydralazine, Plaquenil, levothyroxine, prednisone, Protonix   Urine culture shows pansensitive E. coli, will switch to oral antibiotics    DVT prophylaxis-Lovenox   Diet: ADULT DIET; Regular; Low Sodium (2 gm); 1200 ml   Code Status: Full Code       PT/OT Eval Status: ordered       Dispo -sodium is almost back to the patient's baseline, await neurosurgery consult         PT Note    Pt admit from nursing facility (LTC);  Pt at baseline is assist for SPT to her w/c and able to propel her w/c short distance; she currently is below her usual baseline for mobility tasks; pt is an elevated fall risk and will benefit from continued therapy 3-5x/wk to address deficits to increase her Ind and lessen her fall risk Neurosurgery Note   Reason for consultation: cervical spondylosis       History of present illness: Patient is a [de-identified] y.o. female w/ PMH of HTN, HLD, PVD, depression, hypothyroidism, bradycardia, and cervical spondylosis 2/2 cervical fusion who presented with progressive weakness over the past several months. She was found to be profoundly hyponatremic to 110 in ED and has been on chronic steroids. She lives in a nursing facility and has had difficulty feeding herself in recent months. She notes she has been wheelchair bound for years. Notably she had an MRI of the cervical spine last week which suggested adjacent level degeneration by the radiology read. Neurosurgery consulted for management. ASSESSMENT & PLAN       [de-identified] yo woman with several comorbidities including severe hyponatremia who presents with continued weakness.        Plan:   -Repeat MRI cervical without contrast to reassess cervical disease.    -May require CT cervical spine to evaluate previous fusion   -Correct hyponatremia per Nephrology.   -Although she is not hyperreflexic on exam, progressive quadriparesis may be a sign of myelopathy though patient is not currently medically fit for consideration of surgical intervention. I spoke with the patient and her daughter.  Plan to discuss role for surgery pending findings on imaging.

## 2021-08-30 NOTE — PROGRESS NOTES
Physician Progress Note      Britton Billingsley  CSN #:                  [id number]  :                       1940  ADMIT DATE:       2021 12:58 PM  [address] [place] DATE:        2021 5:37 PM  RESPONDING  PROVIDER #:        Marv Simmons MD          QUERY TEXT:    Patient admitted with hyponatremia. Documentation reflects SIADH in Progress   note dated 8/15/21. If possible, please document in the progress notes and   discharge summary if SIADH was: The medical record reflects the following:  Risk Factors: Diuretics hyperaldosterone  Clinical Indicators: Na 110---118---121--130, Serum osmolality 240,Urine   osmolality 293, Nephrology consult \"Hyponatremia.  - Has history of chronic hyponatremia with baseline in the low 130smmol/L. TSH   was elevated in  but Levothyroxine was adjusted and level this   admission was 2.69mmol/L. Ivy Dye Ivy Dye Ivy Dye Clinically hypervolemic. Na+ improved with   diuresis \"  Treatment: Nephrology consult , lab monitoring  and Lasix  Options provided:  -- SIADH confirmed after study  -- SIADH treated and resolved  -- SIADH ruled out after study  -- Other - I will add my own diagnosis  -- Disagree - Not applicable / Not valid  -- Disagree - Clinically unable to determine / Unknown  -- Refer to Clinical Documentation Reviewer    PROVIDER RESPONSE TEXT:    SIADH treated and resolved.     Query created by: Hallie Puckett on 2021 10:00 AM      Electronically signed by:  Marv Simmons MD 2021 9:12 AM

## 2021-09-01 ENCOUNTER — OFFICE VISIT (OUTPATIENT)
Dept: CARDIOLOGY CLINIC | Age: 81
End: 2021-09-01
Payer: COMMERCIAL

## 2021-09-01 VITALS
OXYGEN SATURATION: 97 % | BODY MASS INDEX: 20.77 KG/M2 | WEIGHT: 110 LBS | SYSTOLIC BLOOD PRESSURE: 114 MMHG | DIASTOLIC BLOOD PRESSURE: 54 MMHG | HEIGHT: 61 IN | HEART RATE: 65 BPM

## 2021-09-01 DIAGNOSIS — R00.1 BRADYCARDIA: Primary | ICD-10-CM

## 2021-09-01 DIAGNOSIS — R00.1 SYMPTOMATIC BRADYCARDIA: ICD-10-CM

## 2021-09-01 DIAGNOSIS — I50.9 ACUTE HEART FAILURE, UNSPECIFIED HEART FAILURE TYPE (HCC): ICD-10-CM

## 2021-09-01 PROCEDURE — G8400 PT W/DXA NO RESULTS DOC: HCPCS | Performed by: NURSE PRACTITIONER

## 2021-09-01 PROCEDURE — 1123F ACP DISCUSS/DSCN MKR DOCD: CPT | Performed by: NURSE PRACTITIONER

## 2021-09-01 PROCEDURE — 1090F PRES/ABSN URINE INCON ASSESS: CPT | Performed by: NURSE PRACTITIONER

## 2021-09-01 PROCEDURE — 99214 OFFICE O/P EST MOD 30 MIN: CPT | Performed by: NURSE PRACTITIONER

## 2021-09-01 PROCEDURE — 93000 ELECTROCARDIOGRAM COMPLETE: CPT | Performed by: NURSE PRACTITIONER

## 2021-09-01 PROCEDURE — 1036F TOBACCO NON-USER: CPT | Performed by: NURSE PRACTITIONER

## 2021-09-01 PROCEDURE — 4040F PNEUMOC VAC/ADMIN/RCVD: CPT | Performed by: NURSE PRACTITIONER

## 2021-09-01 PROCEDURE — G8427 DOCREV CUR MEDS BY ELIG CLIN: HCPCS | Performed by: NURSE PRACTITIONER

## 2021-09-01 PROCEDURE — 1111F DSCHRG MED/CURRENT MED MERGE: CPT | Performed by: NURSE PRACTITIONER

## 2021-09-01 PROCEDURE — G8420 CALC BMI NORM PARAMETERS: HCPCS | Performed by: NURSE PRACTITIONER

## 2021-09-01 RX ORDER — CETIRIZINE HYDROCHLORIDE 10 MG/1
10 TABLET ORAL DAILY
COMMUNITY

## 2021-09-01 NOTE — PATIENT INSTRUCTIONS
Instructions:   1. Medications: No changes  2. Labs: BNP today  3. Follow up: 6 weeks Dr. Avila Lucero  4. OK to restart lisinopril if BP remains elevated.    5. 64 fl oz restriction, 2gm Na diet

## 2021-09-01 NOTE — PROGRESS NOTES
The [address] [place] [address], [address] [place] [address] [address] [address]  [phone]    PrimaryCare Doctor:  Mindi Donis MD  Primary Cardiologist: Dr. Yonas Allan    Chief Complaint   Patient presents with    Follow-up     no cc         History of Present Illness:  Roxana Alaniz is a [de-identified] y.o. female with PMH HTN, HLP, PVD, thyroid disease, asthma, CKD. Polyneuropathy, mesenteric artery stenosis. Presented to ED with weakness. Found multiple electrolyte abn (Na 110, K 5.5, creat 1.3), fluid overload, SHANELL and bradycardia. Started on Isuprel gtt-now off. BP remained stable. K and lisinopril DC'd. Electrolyte imbalances and weakness improved. Now NSR HR 60s. No further bradycardia. Has been diuresed, DC'd on lasix po. Dc wt 110 lbs. Diuresed 4.6L. Discharged to F. Patient presents to [facility] cardiology for follow up for bradycardia. Accompanied by her daughter. Today she mostly C/O feeling \"jittery\" and having difficulty sleeping. Has felt like this since discharge. Has has not tried anything for relief but thinks medication for anxiety and sleep would help her. She C/O generalized fatigue. She denies SOB, chest pain, LH, syncope, palpitations. F sends BP report with documented high BP. Yesterday 205/100 in AM. Unclear if it was prior to meds. Later in the day it was 145/69. No documented bradycardia. Her BP in office today is good. Review of Systems:   General: Denies fever, chills  Skin: Denies skin changes, rash, itching, lesions.   HEENT: Denies headache, dizziness, vision changes, nosebleeds, sore throat, nasal drainage  RESP: Denies cough, sputum, dyspnea, wheeze, snoring  CARD: Denies palpitations,  murmur  GI:Denies nausea, vomiting, heartburn, loss of appetite, change in bowels  : Denies frequency, pain, incontinence, polyuria  VASC: Denies claudication, leg cramps, clots  MUSC/SKEL: Denies pain, stiffness, arthritis  PSYCH: Denies anxiety, depression, stress  NEURO: Denies numbness, tingling, weakness,change in mood or memory  HEME: Denies abn bruising, bleeding, anemia  ENDO: Denies intolerance to heat, cold, excessive thirst or hunger, hx thyroid disease    BP (!) 114/54   Pulse 65   Ht 5' 1\" (1.549 m)   Wt 110 lb (49.9 kg)   SpO2 97%   BMI 20.78 kg/m²   Wt Readings from Last 3 Encounters:   09/01/21 110 lb (49.9 kg)   08/23/21 117 lb 15.1 oz (53.5 kg)   03/18/21 106 lb (48.1 kg)       Physical Exam:  GEN: Appears frail, no acute distress  SKIN: Pink, warm, dry. Nails without clubbing. HEENT: PERRLA. Normocephalic, atraumatic. Neck supple. No adenopathy. LUNG: AP diameter normal. Clear bilateral. No wheeze, rales, or ronchi. Respiratory effort normal.  HEART: S1S2 A/R. No JVD. No carotid bruit. No murmur, rub or gallop. ABD: Soft, nontender. +BS X 4 quads. No hepatomegaly. EXT: Radial and pedal pulses 2+ and symmetric. Without varicosities. Trace BLE edema. MUSCSKEL: Good ROM X4 extremities. No deformity. NEURO: A/O X3. Calm and cooperative. Past Medical History:   has a past medical history of Allergic rhinitis, Anxiety, Arthritis, Asthma, Chronic kidney disease, Depression, Diverticulitis, GERD (gastroesophageal reflux disease), Hyperlipidemia, Hypertension, Overactive bladder, PVD (peripheral vascular disease) ([address] Thyroid disease, Tinea corporis, and Vitamin B12 deficiency. Surgical History:   has a past surgical history that includes Thyroidectomy; Spine surgery; pr office/outpt visit,procedure only (Right, 11/8/2018); and Upper gastrointestinal endoscopy (N/A, 9/14/2020). Social History:   reports that she has never smoked. She has never used smokeless tobacco. She reports that she does not drink alcohol and does not use drugs. Family History:   History reviewed. No pertinent family history. HomeMedications:  Prior to Admission medications    Medication Sig Start Date End Date Taking?  Authorizing Provider   cetirizine (ZYRTEC) 10 MG tablet Take 10 mg by mouth daily   Yes Historical Provider, MD   furosemide (LASIX) 20 MG tablet Take 1 tablet by mouth daily 8/23/21 9/22/21 Yes Radha Albert MD   docusate sodium (COLACE) 100 MG capsule Take 100 mg by mouth 2 times daily   Yes Historical Provider, MD   gabapentin (NEURONTIN) 100 MG capsule Take 100 mg by mouth 2 times daily.    Yes Historical Provider, MD   hydrALAZINE (APRESOLINE) 10 MG tablet Take 10 mg by mouth every 8 hours   Yes Historical Provider, MD   latanoprost (XALATAN) 0.005 % ophthalmic solution Place 1 drop into both eyes nightly   Yes Historical Provider, MD   levothyroxine (SYNTHROID) 150 MCG tablet Take 150 mcg by mouth Daily   Yes Historical Provider, MD   loperamide (IMODIUM) 2 MG capsule Take 2 mg by mouth 4 times daily as needed for Diarrhea   Yes Historical Provider, MD   aluminum & magnesium hydroxide-simethicone (MYLANTA) 400-400-40 MG/5ML SUSP Take 30 mLs by mouth every 6 hours as needed   Yes Historical Provider, MD   mirtazapine (REMERON) 15 MG tablet Take 7.5 mg by mouth nightly   Yes Historical Provider, MD   Dextromethorphan-guaiFENesin  MG/5ML SYRP Take 10 mLs by mouth every 4 hours as needed for Cough   Yes Historical Provider, MD   linaCLOtide (LINZESS) 72 MCG CAPS capsule Take 1 capsule by mouth every morning (before breakfast) 3/4/21  Yes Kimberly Welch DO   diclofenac sodium (VOLTAREN) 1 % GEL Apply 2 g topically 2 times daily 2/1/21 9/11/21 Yes Kimberly Welch DO   metoclopramide (REGLAN) 5 MG tablet Take 1 tablet by mouth 3 times daily  Patient taking differently: Take 5 mg by mouth 3 times daily (with meals) Hold for loose stools 9/14/20  Yes Vania Ruiz MD   calcium carbonate (TUMS) 500 MG chewable tablet Take 1 tablet by mouth every 8 hours as needed    Yes Historical Provider, MD   polyethylene glycol (GLYCOLAX) 17 GM/SCOOP powder Take 17 g by mouth daily as needed   Yes Historical Provider, MD   pantoprazole (PROTONIX) 40 MG tablet Take 1 tablet by mouth 2 times daily 9/3/20 9/1/21 Yes Kimberly Welch DO   ondansetron (ZOFRAN) 4 MG tablet Take 1 tablet by mouth daily as needed for Nausea or Vomiting  Patient taking differently: Take 4 mg by mouth every 8 hours as needed for Nausea or Vomiting  7/23/20  Yes Kimberly Welch DO   hydrOXYzine (ATARAX) 25 MG tablet Take 25 mg by mouth 3 times daily as needed   Yes Historical Provider, MD   FLUoxetine (PROZAC) 10 MG capsule Take 30 mg by mouth daily    Yes Historical Provider, MD   aspirin 81 MG tablet Take 81 mg by mouth daily    Yes Historical Provider, MD   cyclobenzaprine (FLEXERIL) 5 MG tablet Take 5-10 mg by mouth 3 times daily as needed    Yes Historical Provider, MD   loratadine (CLARITIN) 10 MG tablet Take 10 mg by mouth daily    Yes Historical Provider, MD   nystatin (MYCOSTATIN) 335641 UNIT/GM powder by Intratympanic route 2 times daily as needed    Yes Historical Provider, MD   NIFEdipine (ADALAT CC) 60 MG extended release tablet Take 60 mg by mouth daily  6/19/19  Yes Historical Provider, MD   psyllium (KONSYL) 28.3 % PACK Take 1 packet by mouth daily   Yes Historical Provider, MD   predniSONE (DELTASONE) 5 MG tablet Take 5 mg by mouth daily   Yes Historical Provider, MD   Multiple Vitamins-Minerals (MULTIVITAMIN WITH MINERALS) tablet Take 1 tablet by mouth daily   Yes Historical Provider, MD   albuterol sulfate  (90 Base) MCG/ACT inhaler Inhale 2 puffs into the lungs every 6 hours as needed for Wheezing    Yes Historical Provider, MD   melatonin 5 MG TABS tablet Take 5 mg by mouth nightly    Yes Historical Provider, MD   busPIRone (BUSPAR) 10 MG tablet Take 10 mg by mouth 4 times daily   Yes Historical Provider, MD   fluticasone (FLONASE) 50 MCG/ACT nasal spray 2 sprays by Nasal route nightly    Yes Historical Provider, MD   folic acid (FOLVITE) 1 MG tablet Take 1 mg by mouth daily    Yes Historical Provider, MD   hydroxychloroquine (PLAQUENIL) 200 MG tablet Take by mouth 2 times daily    Yes Historical Provider, MD   magnesium hydroxide (MILK OF MAGNESIA) 400 MG/5ML suspension Take 30 mLs by mouth daily as needed    Yes Historical Provider, MD   oxybutynin (DITROPAN-XL) 5 MG extended release tablet Take 5 mg by mouth nightly    Yes Historical Provider, MD   pravastatin (PRAVACHOL) 10 MG tablet Take 10 mg by mouth nightly    Yes Historical Provider, MD   sucralfate (CARAFATE) 1 GM tablet Take 1 g by mouth 3 times daily (before meals)  9/12/16  Yes Historical Provider, MD   acetaminophen (TYLENOL) 325 MG tablet Take 650 mg by mouth every 6 hours as needed    Yes Historical Provider, MD   Nutritional Supplements (ENSURE CLEAR) LIQD Take 1 Bottle by mouth 2 times daily 8/7/20 10/15/20  Kimberly Welch DO        Allergies:  Alendronate sodium, Benadryl [diphenhydramine], Ciprofloxacin, Crestor [rosuvastatin calcium], Hydroxychloroquine sulfate, Minocycline, Naprosyn [naproxen], Niaspan [niacin], Red dye, and Risedronate       LABS: Results reviewed with patient today. Labs reviewed from [facility]- see media tab.   CBC:   Lab Results   Component Value Date    WBC 6.1 08/15/2021    WBC 7.5 08/14/2021    WBC 7.4 07/14/2020    RBC 3.05 08/15/2021    RBC 3.59 08/14/2021    RBC 4.59 07/14/2020    HGB 8.9 08/15/2021    HGB 10.4 08/14/2021    HGB 13.9 07/14/2020    HCT 26.0 08/15/2021    HCT 30.4 08/14/2021    HCT 41.8 07/14/2020    MCV 85.1 08/15/2021    MCV 84.8 08/14/2021    MCV 90.9 07/14/2020    RDW 15.2 08/15/2021    RDW 15.1 08/14/2021    RDW 15.2 07/14/2020     08/15/2021     08/14/2021     07/14/2020     BMP:  Lab Results   Component Value Date     08/23/2021     08/22/2021     08/22/2021    K 3.9 08/23/2021    K 4.3 08/22/2021    K 5.2 08/22/2021    CL 95 08/23/2021    CL 95 08/22/2021    CL 95 08/22/2021    CO2 23 08/23/2021    CO2 23 08/22/2021    CO2 22 08/22/2021    BUN 26 08/23/2021    BUN 27 08/22/2021    BUN 29 08/22/2021    CREATININE 0.9 08/23/2021    CREATININE 1.2 08/22/2021 CREATININE 0.9 08/22/2021     BNP:   Lab Results   Component Value Date    PROBNP 8,633 08/20/2021    PROBNP 3,112 08/14/2021       Parameters:   > 450 pg/mL under age 48  > 900 pg/mL ages 54-65  > 1800 pg/mL over age 76    Iron Studies:    Lab Results   Component Value Date    TIBC 282 03/11/2019    FERRITIN 30.3 03/11/2019     GLUCOSE: No results for input(s): GLUCOSE in the last 72 hours. LIVER PROFILE:   Lab Results   Component Value Date    AST 20 08/14/2021    ALT 13 08/14/2021    LIPASE 11.0 07/14/2020    AMYLASE 10 03/03/2019    LABALBU 3.8 08/14/2021    BILIDIR <0.2 12/22/2017    BILITOT 0.3 08/14/2021    ALKPHOS 96 08/14/2021     PT/INR:   Lab Results   Component Value Date    PROTIME 11.9 11/07/2018    INR 1.04 11/07/2018     Cardiac Enzymes:  Lab Results   Component Value Date    TROPONINI 0.07 08/14/2021     FASTING LIPID PANEL:  Lab Results   Component Value Date    CHOL 151 10/30/2020    HDL 72 10/30/2020    LDLCALC 65 10/30/2020    TRIG 72 10/30/2020       Cardiac Imaging: Reports reviewed with patient today. EKG: NSR HR 65 RBBB. ECHO:  Echocardiogram-8/16/21Left ventricular cavity size is normal with mild concentric left ventricular   hypertrophy.  Normal systolic function.   Ejection fraction is visually estimated to be 65-70%.   No regional wall motion abnormalities are noted.   Grade II diastolic dysfunction with elevated LV filling pressures.   The right ventricle is normal in size and function.   The left atrium is severely dilated.   Mild mitral regurgitation.   Moderate tricuspid regurgitation.   Estimated pulmonary artery systolic pressure is mildly elevated at 45 mmHg   assuming a right atrial pressure of 8 mmHg.     CXR 8/14/2021  Impression   Mild cardiomegaly was noted with mild central pulmonary vascular congestion   with trace right and small left pleural effusions.  The findings are   compatible with early-mild congestive heart failure.       Small to moderate fixed hiatal hernia.       Marked degenerative osteo arthritic changes of each glenohumeral joint was   noted with chronic bilateral anterior dislocations. Assessment/Plan:      1.) Junctional bradycardia: Occurred in setting of electrolyte imbalance. Now NSR HR 60s, no bradycardia or hypotension.      2.) Acute on chronic diastolic Grade II heart failure, EF 65-70%: Appears to be euvolemic. No edema, SOB, pulm edema, abd distention. Na imporved  - 133. Will repeat BNP. NYHA Class II   Stage C  Diuretic: lasix 20mg po daily  Beta Blocker: Not indicated for EF>35%  ACEi/ARB/ARNI: Not indicated for EF>35%  Aldosterone Antagonist:Not indicated for EF>35%  SGLT2 Inhibitor: Not indicated for EF>35%  2gm Na diet, daily weight, 64 oz fluid restriction  Avoid NSAIDS and other nephrotoxic meds  Cardiac Rehab: Not indicated for EF>35%  ICD:Not indicated for EF>35%     3. ) Mesenteric artery stenosis:   -asa    4.) Hypertension: Reported high BP at [facility]. BP OK today in office. OK to restart lisinopril at [facility] if continues with hypertension. Goal BP <130/80. Non pharmacologic interventions include:   -weight loss  -heart healthy low sodium and low fat diet that consist of mostly fruits, vegetables and grains (Dash diet)  -limited amount of alcohol (no more than 1 drink/day for women, 2 drinks/day for men)  -regular physical activity  -no smoking  -stress reduction    Instructions:   1. Medications: No changes  2. Labs: BNP today  3. Follow up: 6 weeks Dr. Pham Hall  4. OK to restart lisinopril if BP remains elevated.    5. 64 fl oz restriction, 2gm Na diet    I appreciate the opportunity of cooperating in the care of this individual.    JONATHAN Arauz, APRN - CNP, CNP, 9/1/2021,1:54 PM

## 2021-09-13 PROBLEM — R77.8 ELEVATED TROPONIN: Status: RESOLVED | Noted: 2021-08-14 | Resolved: 2021-09-13

## 2021-09-13 PROBLEM — R79.89 ELEVATED TROPONIN: Status: RESOLVED | Noted: 2021-08-14 | Resolved: 2021-09-13

## 2021-10-12 NOTE — PROGRESS NOTES
The [facility], [address] [place] [address] [address] [address]  [phone]    PrimaryCare Doctor:  Seamus Branch MD  Primary Cardiologist: Dr. Rigoberto Griffiths    Chief Complaint   Patient presents with    Follow-up     no cc       History of Present Illness:  Elodia Canales is a [de-identified] y.o. female with PMH HTN, HLP, PVD, thyroid disease, asthma, CKD. Polyneuropathy, mesenteric artery stenosis. Presented to ED with weakness. Found multiple electrolyte abn (Na 110, K 5.5, creat 1.3), fluid overload, SHANELL and bradycardia. Started on Isuprel gtt-now off. BP remained stable. K and lisinopril DC'd. Electrolyte imbalances and weakness improved. Now NSR HR 60s. No further bradycardia. Has been diuresed, DC'd on lasix po. Dc wt 110 lbs. Diuresed 4.6L. Discharged to F. Patient presents to [facility] cardiology for follow up for bradycardia. Accompanied by her daughter. Today she mostly C/O being on a fluid restriction. She has ongoing thirst. Her daughter states that she gets 2 ensures a day which keeps her from getting more water. She C/O generalized fatigue but improved. Therapy at [facility] is limited D/T staff shortage. She denies SOB, chest pain, LH, syncope, palpitations. Labs, VS, meds and wts reviewed per F report. Review of Systems:   General: Denies fever, chills  Skin: Denies skin changes, rash, itching, lesions.   HEENT: Denies headache, dizziness, vision changes, nosebleeds, sore throat, nasal drainage  RESP: Denies cough, sputum, dyspnea, wheeze, snoring  CARD: Denies palpitations,  murmur  GI:Denies nausea, vomiting, heartburn, loss of appetite, change in bowels  : Denies frequency, pain, incontinence, polyuria  VASC: Denies claudication, leg cramps, clots  MUSC/SKEL: Denies pain, stiffness, arthritis  PSYCH: Denies anxiety, depression, stress  NEURO: Denies numbness, tingling, weakness,change in mood or memory  HEME: Denies abn bruising, bleeding, anemia  ENDO: Denies intolerance topically 2 times daily. Yes Historical Provider, MD   cetirizine (ZYRTEC) 10 MG tablet Take 10 mg by mouth daily   Yes Historical Provider, MD   furosemide (LASIX) 20 MG tablet Take 1 tablet by mouth daily 8/23/21 10/13/21 Yes Fred Hurley MD   docusate sodium (COLACE) 100 MG capsule Take 100 mg by mouth 2 times daily   Yes Historical Provider, MD   gabapentin (NEURONTIN) 100 MG capsule Take 100 mg by mouth 2 times daily.    Yes Historical Provider, MD   hydrALAZINE (APRESOLINE) 10 MG tablet Take 10 mg by mouth every 8 hours   Yes Historical Provider, MD   latanoprost (XALATAN) 0.005 % ophthalmic solution Place 1 drop into both eyes nightly   Yes Historical Provider, MD   levothyroxine (SYNTHROID) 150 MCG tablet Take 150 mcg by mouth Daily   Yes Historical Provider, MD   loperamide (IMODIUM) 2 MG capsule Take 2 mg by mouth 4 times daily as needed for Diarrhea   Yes Historical Provider, MD   aluminum & magnesium hydroxide-simethicone (MYLANTA) 400-400-40 MG/5ML SUSP Take 30 mLs by mouth every 6 hours as needed   Yes Historical Provider, MD   mirtazapine (REMERON) 15 MG tablet Take 7.5 mg by mouth nightly   Yes Historical Provider, MD   Dextromethorphan-guaiFENesin  MG/5ML SYRP Take 10 mLs by mouth every 4 hours as needed for Cough   Yes Historical Provider, MD   linaCLOtide (LINZESS) 72 MCG CAPS capsule Take 1 capsule by mouth every morning (before breakfast) 3/4/21  Yes Kimberly Welch,    diclofenac sodium (VOLTAREN) 1 % GEL Apply 2 g topically 2 times daily 2/1/21 10/13/21 Yes Kimberly Welch,    metoclopramide (REGLAN) 5 MG tablet Take 1 tablet by mouth 3 times daily  Patient taking differently: Take 5 mg by mouth 3 times daily (with meals) Hold for loose stools 9/14/20  Yes Stefanie Rodriguez MD   polyethylene glycol [facility]) 17 GM/SCOOP powder Take 17 g by mouth daily as needed   Yes Historical Provider, MD   pantoprazole (PROTONIX) 40 MG tablet Take 1 tablet by mouth 2 times daily 9/3/20 10/13/21 Yes Kimberly Welch DO   Nutritional Supplements (ENSURE CLEAR) LIQD Take 1 Bottle by mouth 2 times daily 8/7/20 10/13/21 Yes Kimberly Welch DO   ondansetron (ZOFRAN) 4 MG tablet Take 1 tablet by mouth daily as needed for Nausea or Vomiting  Patient taking differently: Take 4 mg by mouth every 8 hours as needed for Nausea or Vomiting  7/23/20  Yes Kimberly Welch DO   FLUoxetine (PROZAC) 10 MG capsule Take 30 mg by mouth daily    Yes Historical Provider, MD   aspirin 81 MG tablet Take 81 mg by mouth daily    Yes Historical Provider, MD   cyclobenzaprine (FLEXERIL) 5 MG tablet Take 5-10 mg by mouth 3 times daily as needed    Yes Historical Provider, MD   loratadine (CLARITIN) 10 MG tablet Take 10 mg by mouth daily    Yes Historical Provider, MD   nystatin (MYCOSTATIN) 251794 UNIT/GM powder by Intratympanic route 2 times daily as needed    Yes Historical Provider, MD   NIFEdipine (ADALAT CC) 60 MG extended release tablet Take 60 mg by mouth daily  6/19/19  Yes Historical Provider, MD   predniSONE (DELTASONE) 5 MG tablet Take 5 mg by mouth daily   Yes Historical Provider, MD   Multiple Vitamins-Minerals (MULTIVITAMIN WITH MINERALS) tablet Take 1 tablet by mouth daily   Yes Historical Provider, MD   albuterol sulfate  (90 Base) MCG/ACT inhaler Inhale 2 puffs into the lungs every 6 hours as needed for Wheezing    Yes Historical Provider, MD   melatonin 5 MG TABS tablet Take 5 mg by mouth nightly    Yes Historical Provider, MD   busPIRone (BUSPAR) 10 MG tablet Take 10 mg by mouth 4 times daily   Yes Historical Provider, MD   fluticasone (FLONASE) 50 MCG/ACT nasal spray 2 sprays by Nasal route nightly    Yes Historical Provider, MD   folic acid (FOLVITE) 1 MG tablet Take 1 mg by mouth daily    Yes Historical Provider, MD   hydroxychloroquine (PLAQUENIL) 200 MG tablet Take by mouth 2 times daily    Yes Historical Provider, MD   magnesium hydroxide (MILK OF MAGNESIA) 400 MG/5ML suspension Take 30 mLs by mouth daily as needed    Yes Historical Provider, MD   oxybutynin (DITROPAN-XL) 5 MG extended release tablet Take 5 mg by mouth nightly    Yes Historical Provider, MD   pravastatin (PRAVACHOL) 10 MG tablet Take 10 mg by mouth nightly    Yes Historical Provider, MD   sucralfate (CARAFATE) 1 GM tablet Take 1 g by mouth 3 times daily (before meals)  9/12/16  Yes Historical Provider, MD   acetaminophen (TYLENOL) 325 MG tablet Take 650 mg by mouth every 6 hours as needed    Yes Historical Provider, MD        Allergies:  Alendronate sodium, Benadryl [diphenhydramine], Ciprofloxacin, Crestor [rosuvastatin calcium], Hydroxychloroquine sulfate, Minocycline, Naprosyn [naproxen], Niaspan [niacin], Red dye, and Risedronate       LABS: Results reviewed with patient today. Labs reviewed from [facility]- see media tab.   CBC:   Lab Results   Component Value Date    WBC 6.1 08/15/2021    WBC 7.5 08/14/2021    WBC 7.4 07/14/2020    RBC 3.05 08/15/2021    RBC 3.59 08/14/2021    RBC 4.59 07/14/2020    HGB 8.9 08/15/2021    HGB 10.4 08/14/2021    HGB 13.9 07/14/2020    HCT 26.0 08/15/2021    HCT 30.4 08/14/2021    HCT 41.8 07/14/2020    MCV 85.1 08/15/2021    MCV 84.8 08/14/2021    MCV 90.9 07/14/2020    RDW 15.2 08/15/2021    RDW 15.1 08/14/2021    RDW 15.2 07/14/2020     08/15/2021     08/14/2021     07/14/2020     BMP:  Lab Results   Component Value Date     08/23/2021     08/22/2021     08/22/2021    K 3.9 08/23/2021    K 4.3 08/22/2021    K 5.2 08/22/2021    CL 95 08/23/2021    CL 95 08/22/2021    CL 95 08/22/2021    CO2 23 08/23/2021    CO2 23 08/22/2021    CO2 22 08/22/2021    BUN 26 08/23/2021    BUN 27 08/22/2021    BUN 29 08/22/2021    CREATININE 0.9 08/23/2021    CREATININE 1.2 08/22/2021    CREATININE 0.9 08/22/2021     BNP:   Lab Results   Component Value Date    PROBNP 1,758 09/01/2021    PROBNP 8,633 08/20/2021    PROBNP 3,112 08/14/2021       Parameters:   > 450 pg/mL under age 48  > 900 pg/mL ages 54-65  > 1800 pg/mL over age 76    Iron Studies:    Lab Results   Component Value Date    TIBC 282 03/11/2019    FERRITIN 30.3 03/11/2019     GLUCOSE: No results for input(s): GLUCOSE in the last 72 hours. LIVER PROFILE:   Lab Results   Component Value Date    AST 20 08/14/2021    ALT 13 08/14/2021    LIPASE 11.0 07/14/2020    AMYLASE 10 03/03/2019    LABALBU 3.8 08/14/2021    BILIDIR <0.2 12/22/2017    BILITOT 0.3 08/14/2021    ALKPHOS 96 08/14/2021     PT/INR:   Lab Results   Component Value Date    PROTIME 11.9 11/07/2018    INR 1.04 11/07/2018     Cardiac Enzymes:  Lab Results   Component Value Date    TROPONINI 0.07 08/14/2021     FASTING LIPID PANEL:  Lab Results   Component Value Date    CHOL 151 10/30/2020    HDL 72 10/30/2020    LDLCALC 65 10/30/2020    TRIG 72 10/30/2020       Cardiac Imaging: Reports reviewed with patient today. EKG: Today, NSR HR 64 RBBB. ECHO:  Echocardiogram-8/16/21Left ventricular cavity size is normal with mild concentric left ventricular   hypertrophy. Normal systolic function.   Ejection fraction is visually estimated to be 65-70%.   No regional wall motion abnormalities are noted.   Grade II diastolic dysfunction with elevated LV filling pressures.   The right ventricle is normal in size and function.   The left atrium is severely dilated.   Mild mitral regurgitation.   Moderate tricuspid regurgitation.   Estimated pulmonary artery systolic pressure is mildly elevated at 45 mmHg   assuming a right atrial pressure of 8 mmHg.     CXR 8/14/2021  Impression   Mild cardiomegaly was noted with mild central pulmonary vascular congestion   with trace right and small left pleural effusions.  The findings are   compatible with early-mild congestive heart failure.       Small to moderate fixed hiatal hernia.       Marked degenerative osteo arthritic changes of each glenohumeral joint was   noted with chronic bilateral anterior dislocations.        Assessment/Plan:      1.) Junctional bradycardia: Occurred in setting of electrolyte imbalance. Now NSR HR 60s, no bradycardia or hypotension.      2.) Acute on chronic diastolic Grade II heart failure, EF 65-70%: Appears to be euvolemic. No edema, SOB, pulm edema, abd distention. OK to stop fluid restriction but would resume if she develops hypervolemia - SOB, edema, wt gain, etc.  NYHA Class II   Stage C  Diuretic: lasix 20mg po daily  Beta Blocker: Not indicated for EF>35%  ACEi/ARB/ARNI: Not indicated for EF>35%  Aldosterone Antagonist:Not indicated for EF>35%  SGLT2 Inhibitor: Not indicated for EF>35%  2gm Na diet, daily weight, 64 oz fluid restriction  Avoid NSAIDS and other nephrotoxic meds  Cardiac Rehab: Not indicated for EF>35%  ICD:Not indicated for EF>35%     3. ) Mesenteric artery stenosis:   -asa    4.) Hypertension:   Goal BP <130/80. Met.   Non pharmacologic interventions include:   -weight loss  -heart healthy low sodium and low fat diet that consist of mostly fruits, vegetables and grains (Dash diet)  -limited amount of alcohol (no more than 1 drink/day for women, 2 drinks/day for men)  -regular physical activity  -no smoking  -stress reduction    Instructions:   OK to stop fluid restriction at patient request. However, would resume if she develops S/S of HF - SOB, wt gain, edema, etc.     I appreciate the opportunity of cooperating in the care of this individual.    BRAULIO Christie - CNP, CNP, 10/13/2021,1:32 PM

## 2021-10-13 ENCOUNTER — OFFICE VISIT (OUTPATIENT)
Dept: CARDIOLOGY CLINIC | Age: 81
End: 2021-10-13
Payer: COMMERCIAL

## 2021-10-13 VITALS
BODY MASS INDEX: 20.77 KG/M2 | DIASTOLIC BLOOD PRESSURE: 64 MMHG | HEIGHT: 61 IN | OXYGEN SATURATION: 97 % | HEART RATE: 66 BPM | SYSTOLIC BLOOD PRESSURE: 120 MMHG | WEIGHT: 110 LBS

## 2021-10-13 DIAGNOSIS — R00.1 SYMPTOMATIC BRADYCARDIA: Primary | ICD-10-CM

## 2021-10-13 DIAGNOSIS — I50.31 ACUTE DIASTOLIC HEART FAILURE (HCC): ICD-10-CM

## 2021-10-13 PROCEDURE — 1036F TOBACCO NON-USER: CPT | Performed by: NURSE PRACTITIONER

## 2021-10-13 PROCEDURE — G8484 FLU IMMUNIZE NO ADMIN: HCPCS | Performed by: NURSE PRACTITIONER

## 2021-10-13 PROCEDURE — G8400 PT W/DXA NO RESULTS DOC: HCPCS | Performed by: NURSE PRACTITIONER

## 2021-10-13 PROCEDURE — 4040F PNEUMOC VAC/ADMIN/RCVD: CPT | Performed by: NURSE PRACTITIONER

## 2021-10-13 PROCEDURE — 1090F PRES/ABSN URINE INCON ASSESS: CPT | Performed by: NURSE PRACTITIONER

## 2021-10-13 PROCEDURE — G8420 CALC BMI NORM PARAMETERS: HCPCS | Performed by: NURSE PRACTITIONER

## 2021-10-13 PROCEDURE — G8427 DOCREV CUR MEDS BY ELIG CLIN: HCPCS | Performed by: NURSE PRACTITIONER

## 2021-10-13 PROCEDURE — 93000 ELECTROCARDIOGRAM COMPLETE: CPT | Performed by: NURSE PRACTITIONER

## 2021-10-13 PROCEDURE — 1123F ACP DISCUSS/DSCN MKR DOCD: CPT | Performed by: NURSE PRACTITIONER

## 2021-10-13 PROCEDURE — 99214 OFFICE O/P EST MOD 30 MIN: CPT | Performed by: NURSE PRACTITIONER

## 2021-10-13 RX ORDER — PHENOL 1.4 %
1 AEROSOL, SPRAY (ML) MUCOUS MEMBRANE DAILY
COMMUNITY

## 2021-10-13 RX ORDER — DIAPER,BRIEF,INFANT-TODD,DISP
EACH MISCELLANEOUS 2 TIMES DAILY
COMMUNITY
End: 2022-05-11

## 2022-04-12 ENCOUNTER — OFFICE VISIT (OUTPATIENT)
Dept: CARDIOLOGY CLINIC | Age: 82
End: 2022-04-12
Payer: COMMERCIAL

## 2022-04-12 VITALS
SYSTOLIC BLOOD PRESSURE: 110 MMHG | HEIGHT: 61 IN | HEART RATE: 64 BPM | WEIGHT: 125 LBS | BODY MASS INDEX: 23.6 KG/M2 | OXYGEN SATURATION: 98 % | DIASTOLIC BLOOD PRESSURE: 62 MMHG

## 2022-04-12 DIAGNOSIS — I10 PRIMARY HYPERTENSION: ICD-10-CM

## 2022-04-12 DIAGNOSIS — R00.1 JUNCTIONAL BRADYCARDIA: ICD-10-CM

## 2022-04-12 DIAGNOSIS — I50.32 CHRONIC DIASTOLIC HEART FAILURE (HCC): Primary | ICD-10-CM

## 2022-04-12 DIAGNOSIS — K55.1 MESENTERIC ARTERY STENOSIS (HCC): ICD-10-CM

## 2022-04-12 PROCEDURE — G8400 PT W/DXA NO RESULTS DOC: HCPCS | Performed by: INTERNAL MEDICINE

## 2022-04-12 PROCEDURE — G8420 CALC BMI NORM PARAMETERS: HCPCS | Performed by: INTERNAL MEDICINE

## 2022-04-12 PROCEDURE — 1036F TOBACCO NON-USER: CPT | Performed by: INTERNAL MEDICINE

## 2022-04-12 PROCEDURE — 99214 OFFICE O/P EST MOD 30 MIN: CPT | Performed by: INTERNAL MEDICINE

## 2022-04-12 PROCEDURE — G8427 DOCREV CUR MEDS BY ELIG CLIN: HCPCS | Performed by: INTERNAL MEDICINE

## 2022-04-12 PROCEDURE — 4040F PNEUMOC VAC/ADMIN/RCVD: CPT | Performed by: INTERNAL MEDICINE

## 2022-04-12 PROCEDURE — 1123F ACP DISCUSS/DSCN MKR DOCD: CPT | Performed by: INTERNAL MEDICINE

## 2022-04-12 PROCEDURE — 1090F PRES/ABSN URINE INCON ASSESS: CPT | Performed by: INTERNAL MEDICINE

## 2022-04-12 NOTE — PROGRESS NOTES
[facility]      Cardiology Progress Note    Alfredo Rock  1940    April 12, 2022    CC: \"I have no heart symptoms. \"     HPI:  The patient is 81 y.o. female with a past medical history significant for HTN, HLD, PVD, thyroid dx, asthma, CKD, polyneuropathy, mesenteric artery stenosis. Admitted 8/2021 electrolyte disturbance, fluid overload, SHANELL, bradycardic. Last f/u with DEANNE Bergeron 10/13/21. Today, she is accompanied by family today. She presents in a wheelchair as she is wheelchair bound. She did follow up with nephrology last month with [facility] completing labs. Patient denies exertional chest pain/pressure, dyspnea at rest, WINSTON, PND, orthopnea, palpitations, lightheadedness, weight changes, changes in LE edema, and syncope. Admits to medical therapy compliance and tolerating. Past Medical History:   Diagnosis Date    Allergic rhinitis     Anxiety     Arthritis     Asthma     Chronic kidney disease     Depression     Diverticulitis     GERD (gastroesophageal reflux disease)     Hyperlipidemia     Hypertension     Overactive bladder     PVD (peripheral vascular disease) (HCC)     Thyroid disease     Tinea corporis     Vitamin B12 deficiency      Past Surgical History:   Procedure Laterality Date    [place] OFFICE/OUTPT VISIT,PROCEDURE ONLY Right 11/8/2018    OPEN REDUCTION INTERNAL FIXATION RIGHT HIP GAMMA NAIL WITH C-ARM performed by Marcella Osorio MD at [address]      UPPER GASTROINTESTINAL ENDOSCOPY N/A 9/14/2020    ESOPHAGOGASTRODUODENOSCOPY performed by Rianna Polanco MD at [facility]     No family history on file.   Social History     Tobacco Use    Smoking status: Never Smoker    Smokeless tobacco: Never Used   Substance Use Topics    Alcohol use: No    Drug use: No       Allergies   Allergen Reactions    Alendronate Sodium     Benadryl [Diphenhydramine]      insomnia    Ciprofloxacin     Crestor [Rosuvastatin Calcium]  Hydroxychloroquine Sulfate     Minocycline     Naprosyn [Naproxen]     Niaspan [Niacin]     Red Dye Other (See Comments) and Swelling    Risedronate      Current Outpatient Medications   Medication Sig Dispense Refill    calcium carbonate 600 MG TABS tablet Take 1 tablet by mouth daily Two times a day      hydrocortisone 1 % cream Apply topically 2 times daily Apply topically 2 times daily.  cetirizine (ZYRTEC) 10 MG tablet Take 10 mg by mouth daily      furosemide (LASIX) 20 MG tablet Take 1 tablet by mouth daily 30 tablet 0    docusate sodium (COLACE) 100 MG capsule Take 100 mg by mouth 2 times daily      gabapentin (NEURONTIN) 100 MG capsule Take 100 mg by mouth 2 times daily.       hydrALAZINE (APRESOLINE) 10 MG tablet Take 10 mg by mouth every 8 hours      latanoprost (XALATAN) 0.005 % ophthalmic solution Place 1 drop into both eyes nightly      levothyroxine (SYNTHROID) 150 MCG tablet Take 150 mcg by mouth Daily      loperamide (IMODIUM) 2 MG capsule Take 2 mg by mouth 4 times daily as needed for Diarrhea      aluminum & magnesium hydroxide-simethicone (MYLANTA) 400-400-40 MG/5ML SUSP Take 30 mLs by mouth every 6 hours as needed      mirtazapine (REMERON) 15 MG tablet Take 15 mg by mouth nightly       Dextromethorphan-guaiFENesin  MG/5ML SYRP Take 10 mLs by mouth every 4 hours as needed for Cough      diclofenac sodium (VOLTAREN) 1 % GEL Apply 2 g topically 2 times daily 120 g 0    metoclopramide (REGLAN) 5 MG tablet Take 1 tablet by mouth 3 times daily (Patient taking differently: Take 5 mg by mouth 3 times daily (with meals) Hold for loose stools) 120 tablet 3    polyethylene glycol (GLYCOLAX) 17 GM/SCOOP powder Take 17 g by mouth daily as needed      pantoprazole (PROTONIX) 40 MG tablet Take 1 tablet by mouth 2 times daily 180 tablet 3    Nutritional Supplements (ENSURE CLEAR) LIQD Take 1 Bottle by mouth 2 times daily 60 Bottle 5    ondansetron (ZOFRAN) 4 MG tablet Take 1 tablet by mouth daily as needed for Nausea or Vomiting (Patient taking differently: Take 4 mg by mouth every 8 hours as needed for Nausea or Vomiting ) 30 tablet 0    FLUoxetine (PROZAC) 10 MG capsule Take 30 mg by mouth daily       aspirin 81 MG tablet Take 81 mg by mouth daily       cyclobenzaprine (FLEXERIL) 5 MG tablet Take 5-10 mg by mouth 3 times daily as needed       nystatin (MYCOSTATIN) 145543 UNIT/GM powder by Intratympanic route 2 times daily as needed       NIFEdipine (ADALAT CC) 60 MG extended release tablet Take 60 mg by mouth daily       predniSONE (DELTASONE) 5 MG tablet Take 5 mg by mouth daily      Multiple Vitamins-Minerals (MULTIVITAMIN WITH MINERALS) tablet Take 1 tablet by mouth daily      albuterol sulfate  (90 Base) MCG/ACT inhaler Inhale 2 puffs into the lungs every 6 hours as needed for Wheezing       melatonin 5 MG TABS tablet Take 5 mg by mouth nightly       busPIRone (BUSPAR) 10 MG tablet Take 10 mg by mouth 4 times daily      fluticasone (FLONASE) 50 MCG/ACT nasal spray 2 sprays by Nasal route nightly       folic acid (FOLVITE) 1 MG tablet Take 1 mg by mouth daily       hydroxychloroquine (PLAQUENIL) 200 MG tablet Take by mouth 2 times daily       magnesium hydroxide (MILK OF MAGNESIA) 400 MG/5ML suspension Take 30 mLs by mouth daily as needed       oxybutynin (DITROPAN-XL) 5 MG extended release tablet Take 5 mg by mouth nightly       pravastatin (PRAVACHOL) 10 MG tablet Take 10 mg by mouth nightly       sucralfate (CARAFATE) 1 GM tablet Take 1 g by mouth 3 times daily (before meals)       acetaminophen (TYLENOL) 325 MG tablet Take 650 mg by mouth every 6 hours as needed        No current facility-administered medications for this visit. Review of Systems:  · Constitutional: no unanticipated weight loss. There's been no change in energy level, sleep pattern, or activity level. No fevers, chills. · Eyes: No visual changes or diplopia.  No scleral icterus. · ENT: No Headaches, hearing loss or vertigo. No mouth sores or sore throat. · Cardiovascular: as reviewed in HPI  · Respiratory: No cough or wheezing, no sputum production. No hematemesis. · Gastrointestinal: No abdominal pain, appetite loss, blood in stools. No change in bowel or bladder habits. · Genitourinary: No dysuria, trouble voiding, or hematuria. · Musculoskeletal:  No gait disturbance, no joint complaints. · Integumentary: No rash or pruritis. · Neurological: No headache, diplopia, change in muscle strength, numbness or tingling. · Psychiatric: No anxiety or depression. · Endocrine: No temperature intolerance. No excessive thirst, fluid intake, or urination. No tremor. · Hematologic/Lymphatic: No abnormal bruising or bleeding, blood clots or swollen lymph nodes. · Allergic/Immunologic: No nasal congestion or hives. Physical Exam:   /62   Pulse 64   Ht 5' 1\" (1.549 m)   Wt 125 lb (56.7 kg) Comment: per patient, can not get on scale  SpO2 98%   BMI 23.62 kg/m²   Wt Readings from Last 3 Encounters:   04/12/22 125 lb (56.7 kg)   10/13/21 110 lb (49.9 kg)   09/01/21 110 lb (49.9 kg)     Constitutional: She is oriented to person, place, and time. She appears well-developed and well-nourished. In no acute distress. Head: Normocephalic and atraumatic. Pupils equal and round. Neck: Neck supple. No JVP or carotid bruit appreciated. No mass and no thyromegaly present. No lymphadenopathy present. Cardiovascular: Normal rate. Normal heart sounds. Exam reveals no gallop and no friction rub. No murmur heard. Pulmonary/Chest: Effort normal and breath sounds normal. No respiratory distress. She has no wheezes, rhonchi or rales. Abdominal: Soft, non-tender. Bowel sounds are normal. She exhibits no organomegaly, mass or bruit. Extremities: No edema, cyanosis, or clubbing. Pulses are 2+ radial/dorsalis pedis/posterior tibial/carotid bilaterally.   Neurological: No gross cranial nerve deficit. Coordination normal.   Skin: Skin is warm and dry. There is no rash or diaphoresis. Psychiatric: She has a normal mood and affect. Her speech is normal and behavior is normal.     Lab Review:   Lab Results   Component Value Date    TRIG 72 10/30/2020    HDL 72 10/30/2020    LDLCALC 65 10/30/2020    LABVLDL 14 10/30/2020      Lab Results   Component Value Date    BUN 26 08/23/2021    CREATININE 0.9 08/23/2021       EKG Interpretation: 4/12/22: not completed     Image Review:     Echo: 8/16/21   Left ventricular cavity size is normal with mild concentric left ventricular   hypertrophy. Normal systolic function. Ejection fraction is visually estimated to be 65-70%. No regional wall motion abnormalities are noted. Grade II diastolic dysfunction with elevated LV filling pressures. The right ventricle is normal in size and function. The left atrium is severely dilated. Mild mitral regurgitation. Moderate tricuspid regurgitation. Estimated pulmonary artery systolic pressure is mildly elevated at 45 mmHg   assuming a right atrial pressure of 8 mmHg. Assessment/Plan:     Chronic diastolic heart failure   She denies any s/s of CHF today and has remained stable since her last visit. Lasix 20 mg daily  She reports labs completed last month prior to Nephrology visit-we will obtain a copy for review. [facility] prior to next follow up. Junctional bradycardia  In the presence of multiple electrolyte disturbance 8/2021. Currently denies any symptoms suggestive of bradycardia or electrolyte disturbance. Hypertension  BP is stable today for an octogenarian. Denies s/s of hypotension. On medical therapy     Hyperlipidemia  On pravastatin therapy   No myalgias reported    Mesenteric artery stenosis   On statin and antiplatelet therapy     We will plan to follow up in 6 months with labs prior. Thank you very much for allowing me to participate in the care of your patient.  Please do not hesitate to contact me if you have any questions. Sincerely,  Ivania Walters MD      [address] [place] [address]  Ph: [phone]  Fax: [phone]    This note was scribed in the presence of Dr. Jewell Gtz MD by Sadie Key RN.

## 2022-05-11 ENCOUNTER — APPOINTMENT (OUTPATIENT)
Dept: CT IMAGING | Age: 82
DRG: 871 | End: 2022-05-11
Payer: COMMERCIAL

## 2022-05-11 ENCOUNTER — APPOINTMENT (OUTPATIENT)
Dept: GENERAL RADIOLOGY | Age: 82
DRG: 871 | End: 2022-05-11
Payer: COMMERCIAL

## 2022-05-11 ENCOUNTER — HOSPITAL ENCOUNTER (INPATIENT)
Age: 82
LOS: 5 days | Discharge: NURSING, REHAB OR LONG-TERM CARE | DRG: 871 | End: 2022-05-16
Attending: EMERGENCY MEDICINE | Admitting: HOSPITALIST
Payer: COMMERCIAL

## 2022-05-11 DIAGNOSIS — J69.0 ASPIRATION PNEUMONIA DUE TO VOMIT, UNSPECIFIED LATERALITY, UNSPECIFIED PART OF LUNG (HCC): Primary | ICD-10-CM

## 2022-05-11 PROBLEM — J18.9 PNEUMONIA: Status: ACTIVE | Noted: 2022-05-11

## 2022-05-11 LAB
A/G RATIO: 1.2 (ref 1.1–2.2)
ALBUMIN SERPL-MCNC: 3.7 G/DL (ref 3.4–5)
ALP BLD-CCNC: 62 U/L (ref 40–129)
ALT SERPL-CCNC: 13 U/L (ref 10–40)
ANION GAP SERPL CALCULATED.3IONS-SCNC: 16 MMOL/L (ref 3–16)
AST SERPL-CCNC: 17 U/L (ref 15–37)
BASOPHILS ABSOLUTE: 0 K/UL (ref 0–0.2)
BASOPHILS RELATIVE PERCENT: 0.2 %
BILIRUB SERPL-MCNC: <0.2 MG/DL (ref 0–1)
BUN BLDV-MCNC: 25 MG/DL (ref 7–20)
CALCIUM SERPL-MCNC: 8.6 MG/DL (ref 8.3–10.6)
CHLORIDE BLD-SCNC: 101 MMOL/L (ref 99–110)
CO2: 21 MMOL/L (ref 21–32)
CREAT SERPL-MCNC: 1 MG/DL (ref 0.6–1.2)
EKG ATRIAL RATE: 87 BPM
EKG DIAGNOSIS: NORMAL
EKG P AXIS: 91 DEGREES
EKG P-R INTERVAL: 164 MS
EKG Q-T INTERVAL: 424 MS
EKG QRS DURATION: 142 MS
EKG QTC CALCULATION (BAZETT): 510 MS
EKG R AXIS: -49 DEGREES
EKG T AXIS: 31 DEGREES
EKG VENTRICULAR RATE: 87 BPM
EOSINOPHILS ABSOLUTE: 0.1 K/UL (ref 0–0.6)
EOSINOPHILS RELATIVE PERCENT: 0.5 %
GFR AFRICAN AMERICAN: >60
GFR NON-AFRICAN AMERICAN: 53
GLUCOSE BLD-MCNC: 99 MG/DL (ref 70–99)
HCT VFR BLD CALC: 37 % (ref 36–48)
HEMOGLOBIN: 12.2 G/DL (ref 12–16)
LACTIC ACID, SEPSIS: 3.4 MMOL/L (ref 0.4–1.9)
LACTIC ACID, SEPSIS: 3.8 MMOL/L (ref 0.4–1.9)
LYMPHOCYTES ABSOLUTE: 1 K/UL (ref 1–5.1)
LYMPHOCYTES RELATIVE PERCENT: 7.7 %
MCH RBC QN AUTO: 31.2 PG (ref 26–34)
MCHC RBC AUTO-ENTMCNC: 33 G/DL (ref 31–36)
MCV RBC AUTO: 94.5 FL (ref 80–100)
MONOCYTES ABSOLUTE: 0.4 K/UL (ref 0–1.3)
MONOCYTES RELATIVE PERCENT: 3.6 %
NEUTROPHILS ABSOLUTE: 10.9 K/UL (ref 1.7–7.7)
NEUTROPHILS RELATIVE PERCENT: 88 %
PDW BLD-RTO: 16.2 % (ref 12.4–15.4)
PLATELET # BLD: 222 K/UL (ref 135–450)
PMV BLD AUTO: 8.3 FL (ref 5–10.5)
POTASSIUM REFLEX MAGNESIUM: 3.8 MMOL/L (ref 3.5–5.1)
PRO-BNP: 377 PG/ML (ref 0–449)
RBC # BLD: 3.91 M/UL (ref 4–5.2)
SODIUM BLD-SCNC: 138 MMOL/L (ref 136–145)
TOTAL PROTEIN: 6.8 G/DL (ref 6.4–8.2)
TROPONIN: 0.04 NG/ML
WBC # BLD: 12.4 K/UL (ref 4–11)

## 2022-05-11 PROCEDURE — 6370000000 HC RX 637 (ALT 250 FOR IP): Performed by: HOSPITALIST

## 2022-05-11 PROCEDURE — 36415 COLL VENOUS BLD VENIPUNCTURE: CPT

## 2022-05-11 PROCEDURE — 6360000002 HC RX W HCPCS: Performed by: HOSPITALIST

## 2022-05-11 PROCEDURE — 83605 ASSAY OF LACTIC ACID: CPT

## 2022-05-11 PROCEDURE — 84484 ASSAY OF TROPONIN QUANT: CPT

## 2022-05-11 PROCEDURE — 87086 URINE CULTURE/COLONY COUNT: CPT

## 2022-05-11 PROCEDURE — 93005 ELECTROCARDIOGRAM TRACING: CPT | Performed by: EMERGENCY MEDICINE

## 2022-05-11 PROCEDURE — 2580000003 HC RX 258: Performed by: EMERGENCY MEDICINE

## 2022-05-11 PROCEDURE — 74176 CT ABD & PELVIS W/O CONTRAST: CPT

## 2022-05-11 PROCEDURE — 99285 EMERGENCY DEPT VISIT HI MDM: CPT

## 2022-05-11 PROCEDURE — 85025 COMPLETE CBC W/AUTO DIFF WBC: CPT

## 2022-05-11 PROCEDURE — 93010 ELECTROCARDIOGRAM REPORT: CPT | Performed by: INTERNAL MEDICINE

## 2022-05-11 PROCEDURE — 81001 URINALYSIS AUTO W/SCOPE: CPT

## 2022-05-11 PROCEDURE — 71045 X-RAY EXAM CHEST 1 VIEW: CPT

## 2022-05-11 PROCEDURE — 2700000000 HC OXYGEN THERAPY PER DAY

## 2022-05-11 PROCEDURE — 83880 ASSAY OF NATRIURETIC PEPTIDE: CPT

## 2022-05-11 PROCEDURE — 87040 BLOOD CULTURE FOR BACTERIA: CPT

## 2022-05-11 PROCEDURE — 2580000003 HC RX 258: Performed by: HOSPITALIST

## 2022-05-11 PROCEDURE — 87186 SC STD MICRODIL/AGAR DIL: CPT

## 2022-05-11 PROCEDURE — 87088 URINE BACTERIA CULTURE: CPT

## 2022-05-11 PROCEDURE — 80053 COMPREHEN METABOLIC PANEL: CPT

## 2022-05-11 PROCEDURE — 1200000000 HC SEMI PRIVATE

## 2022-05-11 PROCEDURE — 94640 AIRWAY INHALATION TREATMENT: CPT

## 2022-05-11 PROCEDURE — 94761 N-INVAS EAR/PLS OXIMETRY MLT: CPT

## 2022-05-11 PROCEDURE — 96365 THER/PROPH/DIAG IV INF INIT: CPT

## 2022-05-11 PROCEDURE — 87449 NOS EACH ORGANISM AG IA: CPT

## 2022-05-11 PROCEDURE — 6360000002 HC RX W HCPCS: Performed by: EMERGENCY MEDICINE

## 2022-05-11 RX ORDER — PREDNISONE 1 MG/1
5 TABLET ORAL DAILY
Status: DISCONTINUED | OUTPATIENT
Start: 2022-05-11 | End: 2022-05-16 | Stop reason: HOSPADM

## 2022-05-11 RX ORDER — ALBUTEROL SULFATE 2.5 MG/3ML
2.5 SOLUTION RESPIRATORY (INHALATION)
Status: DISCONTINUED | OUTPATIENT
Start: 2022-05-11 | End: 2022-05-16 | Stop reason: HOSPADM

## 2022-05-11 RX ORDER — LEVOTHYROXINE SODIUM 0.07 MG/1
150 TABLET ORAL DAILY
Status: DISCONTINUED | OUTPATIENT
Start: 2022-05-11 | End: 2022-05-16 | Stop reason: HOSPADM

## 2022-05-11 RX ORDER — POTASSIUM CHLORIDE 20 MEQ/1
20 TABLET, EXTENDED RELEASE ORAL 2 TIMES DAILY
COMMUNITY

## 2022-05-11 RX ORDER — ENOXAPARIN SODIUM 100 MG/ML
40 INJECTION SUBCUTANEOUS DAILY
Status: DISCONTINUED | OUTPATIENT
Start: 2022-05-11 | End: 2022-05-12

## 2022-05-11 RX ORDER — FUROSEMIDE 20 MG/1
20 TABLET ORAL DAILY
Status: DISCONTINUED | OUTPATIENT
Start: 2022-05-11 | End: 2022-05-13

## 2022-05-11 RX ORDER — METOCLOPRAMIDE 10 MG/1
5 TABLET ORAL
Status: DISCONTINUED | OUTPATIENT
Start: 2022-05-11 | End: 2022-05-16 | Stop reason: HOSPADM

## 2022-05-11 RX ORDER — FUROSEMIDE 20 MG/1
20 TABLET ORAL DAILY
COMMUNITY

## 2022-05-11 RX ORDER — POTASSIUM CHLORIDE 20 MEQ/1
20 TABLET, EXTENDED RELEASE ORAL 2 TIMES DAILY
Status: DISCONTINUED | OUTPATIENT
Start: 2022-05-11 | End: 2022-05-16 | Stop reason: HOSPADM

## 2022-05-11 RX ORDER — ACETAMINOPHEN 325 MG/1
650 TABLET ORAL EVERY 6 HOURS PRN
Status: DISCONTINUED | OUTPATIENT
Start: 2022-05-11 | End: 2022-05-16 | Stop reason: HOSPADM

## 2022-05-11 RX ORDER — AZITHROMYCIN 500 MG/1
500 TABLET, FILM COATED ORAL EVERY 24 HOURS
Status: COMPLETED | OUTPATIENT
Start: 2022-05-11 | End: 2022-05-13

## 2022-05-11 RX ORDER — ACETAMINOPHEN 325 MG/1
650 TABLET ORAL EVERY 6 HOURS PRN
Status: DISCONTINUED | OUTPATIENT
Start: 2022-05-11 | End: 2022-05-11 | Stop reason: SDUPTHER

## 2022-05-11 RX ORDER — ONDANSETRON 4 MG/1
4 TABLET, ORALLY DISINTEGRATING ORAL EVERY 8 HOURS PRN
Status: DISCONTINUED | OUTPATIENT
Start: 2022-05-11 | End: 2022-05-16 | Stop reason: HOSPADM

## 2022-05-11 RX ORDER — OXYBUTYNIN CHLORIDE 5 MG/1
5 TABLET, EXTENDED RELEASE ORAL NIGHTLY
Status: DISCONTINUED | OUTPATIENT
Start: 2022-05-11 | End: 2022-05-16 | Stop reason: HOSPADM

## 2022-05-11 RX ORDER — GABAPENTIN 100 MG/1
100 CAPSULE ORAL 2 TIMES DAILY
Status: DISCONTINUED | OUTPATIENT
Start: 2022-05-11 | End: 2022-05-16 | Stop reason: HOSPADM

## 2022-05-11 RX ORDER — BUSPIRONE HYDROCHLORIDE 10 MG/1
10 TABLET ORAL 4 TIMES DAILY
Status: DISCONTINUED | OUTPATIENT
Start: 2022-05-11 | End: 2022-05-16 | Stop reason: HOSPADM

## 2022-05-11 RX ORDER — CALCIUM CARBONATE 200(500)MG
500 TABLET,CHEWABLE ORAL DAILY
Status: DISCONTINUED | OUTPATIENT
Start: 2022-05-11 | End: 2022-05-16 | Stop reason: HOSPADM

## 2022-05-11 RX ORDER — 0.9 % SODIUM CHLORIDE 0.9 %
30 INTRAVENOUS SOLUTION INTRAVENOUS ONCE
Status: COMPLETED | OUTPATIENT
Start: 2022-05-11 | End: 2022-05-11

## 2022-05-11 RX ORDER — LANOLIN ALCOHOL/MO/W.PET/CERES
325 CREAM (GRAM) TOPICAL
COMMUNITY

## 2022-05-11 RX ORDER — SUCRALFATE 1 G/1
1 TABLET ORAL
Status: DISCONTINUED | OUTPATIENT
Start: 2022-05-11 | End: 2022-05-16 | Stop reason: HOSPADM

## 2022-05-11 RX ORDER — FERROUS SULFATE TAB EC 324 MG (65 MG FE EQUIVALENT) 324 (65 FE) MG
325 TABLET DELAYED RESPONSE ORAL
Status: DISCONTINUED | OUTPATIENT
Start: 2022-05-11 | End: 2022-05-16 | Stop reason: HOSPADM

## 2022-05-11 RX ORDER — LATANOPROST 50 UG/ML
1 SOLUTION/ DROPS OPHTHALMIC NIGHTLY
Status: DISCONTINUED | OUTPATIENT
Start: 2022-05-11 | End: 2022-05-16 | Stop reason: HOSPADM

## 2022-05-11 RX ORDER — CETIRIZINE HYDROCHLORIDE 10 MG/1
10 TABLET ORAL DAILY
Status: DISCONTINUED | OUTPATIENT
Start: 2022-05-11 | End: 2022-05-16 | Stop reason: HOSPADM

## 2022-05-11 RX ORDER — ALBUTEROL SULFATE 90 UG/1
2 AEROSOL, METERED RESPIRATORY (INHALATION) EVERY 6 HOURS PRN
Status: DISCONTINUED | OUTPATIENT
Start: 2022-05-11 | End: 2022-05-16 | Stop reason: HOSPADM

## 2022-05-11 RX ORDER — M-VIT,TX,IRON,MINS/CALC/FOLIC 27MG-0.4MG
1 TABLET ORAL DAILY
Status: DISCONTINUED | OUTPATIENT
Start: 2022-05-11 | End: 2022-05-16 | Stop reason: HOSPADM

## 2022-05-11 RX ORDER — ASPIRIN 81 MG/1
81 TABLET ORAL DAILY
Status: DISCONTINUED | OUTPATIENT
Start: 2022-05-11 | End: 2022-05-16 | Stop reason: HOSPADM

## 2022-05-11 RX ORDER — ACETAMINOPHEN 650 MG/1
650 SUPPOSITORY RECTAL EVERY 6 HOURS PRN
Status: DISCONTINUED | OUTPATIENT
Start: 2022-05-11 | End: 2022-05-16 | Stop reason: HOSPADM

## 2022-05-11 RX ORDER — SODIUM CHLORIDE 0.9 % (FLUSH) 0.9 %
10 SYRINGE (ML) INJECTION PRN
Status: DISCONTINUED | OUTPATIENT
Start: 2022-05-11 | End: 2022-05-16 | Stop reason: HOSPADM

## 2022-05-11 RX ORDER — SODIUM CHLORIDE 9 MG/ML
INJECTION, SOLUTION INTRAVENOUS CONTINUOUS
Status: DISCONTINUED | OUTPATIENT
Start: 2022-05-11 | End: 2022-05-13

## 2022-05-11 RX ORDER — LOPERAMIDE HYDROCHLORIDE 2 MG/1
2 CAPSULE ORAL 4 TIMES DAILY PRN
Status: DISCONTINUED | OUTPATIENT
Start: 2022-05-11 | End: 2022-05-16 | Stop reason: HOSPADM

## 2022-05-11 RX ORDER — CYCLOBENZAPRINE HCL 10 MG
5 TABLET ORAL 3 TIMES DAILY PRN
Status: DISCONTINUED | OUTPATIENT
Start: 2022-05-11 | End: 2022-05-16 | Stop reason: HOSPADM

## 2022-05-11 RX ORDER — POLYETHYLENE GLYCOL 3350 17 G/17G
17 POWDER, FOR SOLUTION ORAL DAILY PRN
Status: DISCONTINUED | OUTPATIENT
Start: 2022-05-11 | End: 2022-05-11 | Stop reason: SDUPTHER

## 2022-05-11 RX ORDER — SODIUM CHLORIDE 0.9 % (FLUSH) 0.9 %
10 SYRINGE (ML) INJECTION EVERY 12 HOURS SCHEDULED
Status: DISCONTINUED | OUTPATIENT
Start: 2022-05-11 | End: 2022-05-16 | Stop reason: HOSPADM

## 2022-05-11 RX ORDER — ONDANSETRON 2 MG/ML
4 INJECTION INTRAMUSCULAR; INTRAVENOUS EVERY 6 HOURS PRN
Status: DISCONTINUED | OUTPATIENT
Start: 2022-05-11 | End: 2022-05-16 | Stop reason: HOSPADM

## 2022-05-11 RX ORDER — PRAVASTATIN SODIUM 10 MG
10 TABLET ORAL NIGHTLY
Status: DISCONTINUED | OUTPATIENT
Start: 2022-05-11 | End: 2022-05-16 | Stop reason: HOSPADM

## 2022-05-11 RX ORDER — LANOLIN ALCOHOL/MO/W.PET/CERES
18 CREAM (GRAM) TOPICAL NIGHTLY
Status: DISCONTINUED | OUTPATIENT
Start: 2022-05-11 | End: 2022-05-16 | Stop reason: HOSPADM

## 2022-05-11 RX ORDER — HYDRALAZINE HYDROCHLORIDE 10 MG/1
10 TABLET, FILM COATED ORAL EVERY 8 HOURS
Status: DISCONTINUED | OUTPATIENT
Start: 2022-05-11 | End: 2022-05-16 | Stop reason: HOSPADM

## 2022-05-11 RX ORDER — POLYETHYLENE GLYCOL 3350 17 G/17G
17 POWDER, FOR SOLUTION ORAL DAILY PRN
Status: DISCONTINUED | OUTPATIENT
Start: 2022-05-11 | End: 2022-05-11

## 2022-05-11 RX ORDER — SODIUM CHLORIDE 9 MG/ML
INJECTION, SOLUTION INTRAVENOUS PRN
Status: DISCONTINUED | OUTPATIENT
Start: 2022-05-11 | End: 2022-05-16 | Stop reason: HOSPADM

## 2022-05-11 RX ORDER — POLYETHYLENE GLYCOL 3350 17 G/17G
17 POWDER, FOR SOLUTION ORAL DAILY PRN
Status: DISCONTINUED | OUTPATIENT
Start: 2022-05-11 | End: 2022-05-12

## 2022-05-11 RX ORDER — PANTOPRAZOLE SODIUM 40 MG/1
40 TABLET, DELAYED RELEASE ORAL 2 TIMES DAILY
Status: DISCONTINUED | OUTPATIENT
Start: 2022-05-11 | End: 2022-05-16 | Stop reason: HOSPADM

## 2022-05-11 RX ORDER — FLUTICASONE PROPIONATE 50 MCG
2 SPRAY, SUSPENSION (ML) NASAL DAILY
Status: DISCONTINUED | OUTPATIENT
Start: 2022-05-11 | End: 2022-05-16 | Stop reason: HOSPADM

## 2022-05-11 RX ORDER — HYDROXYCHLOROQUINE SULFATE 200 MG/1
200 TABLET, FILM COATED ORAL 2 TIMES DAILY
Status: DISCONTINUED | OUTPATIENT
Start: 2022-05-11 | End: 2022-05-16 | Stop reason: HOSPADM

## 2022-05-11 RX ORDER — MIRTAZAPINE 15 MG/1
15 TABLET, FILM COATED ORAL NIGHTLY
Status: DISCONTINUED | OUTPATIENT
Start: 2022-05-11 | End: 2022-05-16 | Stop reason: HOSPADM

## 2022-05-11 RX ORDER — NIFEDIPINE 60 MG/1
60 TABLET, EXTENDED RELEASE ORAL DAILY
Status: DISCONTINUED | OUTPATIENT
Start: 2022-05-11 | End: 2022-05-16 | Stop reason: HOSPADM

## 2022-05-11 RX ORDER — ONDANSETRON 4 MG/1
4 TABLET, FILM COATED ORAL DAILY PRN
Status: DISCONTINUED | OUTPATIENT
Start: 2022-05-11 | End: 2022-05-16 | Stop reason: HOSPADM

## 2022-05-11 RX ORDER — FOLIC ACID 1 MG/1
1 TABLET ORAL DAILY
Status: DISCONTINUED | OUTPATIENT
Start: 2022-05-11 | End: 2022-05-16 | Stop reason: HOSPADM

## 2022-05-11 RX ADMIN — SODIUM CHLORIDE 1863 ML: 9 INJECTION, SOLUTION INTRAVENOUS at 12:15

## 2022-05-11 RX ADMIN — ASPIRIN 81 MG: 81 TABLET, COATED ORAL at 19:05

## 2022-05-11 RX ADMIN — Medication 18 MG: at 21:41

## 2022-05-11 RX ADMIN — POTASSIUM CHLORIDE 20 MEQ: 20 TABLET, EXTENDED RELEASE ORAL at 21:41

## 2022-05-11 RX ADMIN — PIPERACILLIN AND TAZOBACTAM 4500 MG: 4; .5 INJECTION, POWDER, LYOPHILIZED, FOR SOLUTION INTRAVENOUS at 19:07

## 2022-05-11 RX ADMIN — FUROSEMIDE 20 MG: 20 TABLET ORAL at 18:47

## 2022-05-11 RX ADMIN — SUCRALFATE 1 G: 1 TABLET ORAL at 18:51

## 2022-05-11 RX ADMIN — HYDRALAZINE HYDROCHLORIDE 10 MG: 10 TABLET, FILM COATED ORAL at 18:48

## 2022-05-11 RX ADMIN — ALBUTEROL SULFATE 2.5 MG: 2.5 SOLUTION RESPIRATORY (INHALATION) at 20:47

## 2022-05-11 RX ADMIN — FLUTICASONE PROPIONATE 2 SPRAY: 50 SPRAY, METERED NASAL at 19:05

## 2022-05-11 RX ADMIN — AZITHROMYCIN MONOHYDRATE 500 MG: 500 TABLET ORAL at 18:24

## 2022-05-11 RX ADMIN — ENOXAPARIN SODIUM 40 MG: 100 INJECTION SUBCUTANEOUS at 19:04

## 2022-05-11 RX ADMIN — Medication 1 TABLET: at 19:10

## 2022-05-11 RX ADMIN — MIRTAZAPINE 15 MG: 15 TABLET, FILM COATED ORAL at 21:41

## 2022-05-11 RX ADMIN — FERROUS SULFATE TAB EC 324 MG (65 MG FE EQUIVALENT) 324 MG: 324 (65 FE) TABLET DELAYED RESPONSE at 18:49

## 2022-05-11 RX ADMIN — BUSPIRONE HYDROCHLORIDE 10 MG: 10 TABLET ORAL at 21:42

## 2022-05-11 RX ADMIN — LATANOPROST 1 DROP: 50 SOLUTION OPHTHALMIC at 21:43

## 2022-05-11 RX ADMIN — PANTOPRAZOLE SODIUM 40 MG: 40 TABLET, DELAYED RELEASE ORAL at 21:41

## 2022-05-11 RX ADMIN — FLUOXETINE 30 MG: 20 CAPSULE ORAL at 18:47

## 2022-05-11 RX ADMIN — LEVOTHYROXINE SODIUM 150 MCG: 0.07 TABLET ORAL at 18:51

## 2022-05-11 RX ADMIN — NIFEDIPINE 60 MG: 60 TABLET, EXTENDED RELEASE ORAL at 18:51

## 2022-05-11 RX ADMIN — OXYBUTYNIN CHLORIDE 5 MG: 5 TABLET, EXTENDED RELEASE ORAL at 21:42

## 2022-05-11 RX ADMIN — CETIRIZINE HYDROCHLORIDE 10 MG: 10 TABLET, FILM COATED ORAL at 18:47

## 2022-05-11 RX ADMIN — FOLIC ACID 1 MG: 1 TABLET ORAL at 18:51

## 2022-05-11 RX ADMIN — PREDNISONE 10 MG: 5 TABLET ORAL at 18:49

## 2022-05-11 RX ADMIN — ANTACID TABLETS 500 MG: 500 TABLET, CHEWABLE ORAL at 18:47

## 2022-05-11 RX ADMIN — HYDROXYCHLOROQUINE SULFATE 200 MG: 200 TABLET ORAL at 21:42

## 2022-05-11 RX ADMIN — PRAVASTATIN SODIUM 10 MG: 10 TABLET ORAL at 21:42

## 2022-05-11 RX ADMIN — CEFTRIAXONE 1000 MG: 1 INJECTION, POWDER, FOR SOLUTION INTRAMUSCULAR; INTRAVENOUS at 12:48

## 2022-05-11 RX ADMIN — METOCLOPRAMIDE 5 MG: 10 TABLET ORAL at 18:51

## 2022-05-11 RX ADMIN — SODIUM CHLORIDE: 9 INJECTION, SOLUTION INTRAVENOUS at 18:23

## 2022-05-11 RX ADMIN — GABAPENTIN 100 MG: 100 CAPSULE ORAL at 21:41

## 2022-05-11 ASSESSMENT — ENCOUNTER SYMPTOMS
EYE DISCHARGE: 0
DIARRHEA: 0
SORE THROAT: 0
VOMITING: 0
SHORTNESS OF BREATH: 0
WHEEZING: 0
NAUSEA: 1
COUGH: 1
RHINORRHEA: 0
ABDOMINAL PAIN: 1
EYE PAIN: 0
BACK PAIN: 0

## 2022-05-11 ASSESSMENT — PAIN - FUNCTIONAL ASSESSMENT: PAIN_FUNCTIONAL_ASSESSMENT: NONE - DENIES PAIN

## 2022-05-11 NOTE — PROGRESS NOTES
Patient admitted this shift with family at her bedside. Patient is A/O x2 and able to make her needs known. V/S are WNL at this time. Mepilex to coccyx noted  on admission. )no opening to coccyx area noted it is preventative.

## 2022-05-11 NOTE — ED NOTES
Pharmacy Medication Reconciliation Note     List of medications patient is currently taking is complete. Source of information:   1. [facility]    Notes regarding home medications:   1.  Received AM meds today      Sonia Carranza PharmD, BCPS  5/11/2022  12:58 PM

## 2022-05-11 NOTE — ED NOTES
Pt and family request to speak to Dr Pako Durand, Dr Pako Durand notified.       Elicia Hernandez RN  05/11/22 0373

## 2022-05-11 NOTE — ED PROVIDER NOTES
[facility]  eMERGENCY dEPARTMENT eNCOUnter        Pt Name: Ollie Bravo  MRN: [id number]  [place] 1940  Date of evaluation: 5/11/2022  Provider: Olivia Lee MD  PCP: Aamris Houser MD      [address] [place] [address]       Chief Complaint   Patient presents with    Aspiration     Per EMS, patient was drinking water when she aspirated and began coughing, Medics state patient's SpO2 was 83% on RA, patient arrived on non-rebreather, duoneb administered by EMS. Patient placed on 4L NC, cardiac/SpO2 monitor applied, patient is alert to person and place, denies aspirating but reports SOB. HISTORY OFPRESENT ILLNESS   (Location/Symptom, Timing/Onset, Context/Setting, Quality, Duration, Modifying Factors,Severity)  Note limiting factors. Ollie Bravo is a 81 y.o. female       Location/Symptom: Patient is sent in because they believe she aspirated and was hypoxic. The patient is self [place] that her problem is her stomach. Timing/Onset: Reportedly, this morning she was drinking water started coughing and they believe she aspirated. She was hypoxemic. Right now her primary complaint is that she is a little bit nauseated and has had been having some chronic abdominal discomfort. Context/Setting: Patient lives in a nursing home because she states because she cannot walk. She has multiple medical problems. Quality: Unable to really describe quality other than stomach just bothers her  Duration: She states its been a while with regards to her stomach problem but this possible aspiration event occurred this morning  Modifying Factors: She was placed on oxygen  Severity: Uncomfortable    Nursing Noteswere all reviewed and agreed with or any disagreements were addressed  in the HPI. REVIEW OF SYSTEMS    (2-9 systems for level 4, 10 or more for level 5)     Review of Systems   Constitutional: Negative for chills, fatigue and fever.    HENT: Negative for ear pain, rhinorrhea and sore throat. Eyes: Negative for pain, discharge and visual disturbance. Respiratory: Positive for cough. Negative for shortness of breath and wheezing. Cardiovascular: Negative for chest pain, palpitations and leg swelling. Gastrointestinal: Positive for abdominal pain and nausea. Negative for diarrhea and vomiting. Genitourinary: Negative for difficulty urinating, dysuria, pelvic pain and vaginal discharge. Musculoskeletal: Positive for arthralgias and gait problem. Negative for back pain, joint swelling and neck pain. Skin: Negative for rash. Allergic/Immunologic: Negative for environmental allergies. Neurological: Negative for dizziness, seizures, syncope and headaches. Hematological: Negative for adenopathy. Psychiatric/Behavioral: Negative for dysphoric mood and suicidal ideas. The patient is not nervous/anxious.           PAST MEDICAL HISTORY     Past Medical History:   Diagnosis Date    Allergic rhinitis     Anxiety     Arthritis     Asthma     Chronic kidney disease     Depression     Diverticulitis     GERD (gastroesophageal reflux disease)     Hyperlipidemia     Hypertension     Overactive bladder     PVD (peripheral vascular disease) ([facility] [address]     Thyroid disease     Tinea corporis     Vitamin B12 deficiency          SURGICAL HISTORY     Past Surgical History:   Procedure Laterality Date    OH OFFICE/OUTPT VISIT,PROCEDURE ONLY Right 11/8/2018    OPEN REDUCTION INTERNAL FIXATION RIGHT HIP GAMMA NAIL WITH C-ARM performed by Ander Rouse MD at [address] [place] [address] ENDOSCOPY N/A 9/14/2020    ESOPHAGOGASTRODUODENOSCOPY performed by Peggy Burks MD at [facility]       Previous Medications    ACETAMINOPHEN (TYLENOL) 325 MG TABLET    Take 650 mg by mouth every 6 hours as needed     ALBUTEROL SULFATE  (90 BASE) MCG/ACT INHALER    Inhale 2 puffs into the lungs every 6 hours as needed for Wheezing     ALUMINUM & MAGNESIUM HYDROXIDE-SIMETHICONE (MYLANTA) 400-400-40 MG/5ML SUSP    Take 30 mLs by mouth every 6 hours as needed    ASPIRIN 81 MG TABLET    Take 81 mg by mouth daily     BUSPIRONE (BUSPAR) 10 MG TABLET    Take 10 mg by mouth 4 times daily    CALCIUM CARBONATE 600 MG TABS TABLET    Take 1 tablet by mouth daily Two times a day    CETIRIZINE (ZYRTEC) 10 MG TABLET    Take 10 mg by mouth daily    CYCLOBENZAPRINE (FLEXERIL) 5 MG TABLET    Take 5-10 mg by mouth 3 times daily as needed     DEXTROMETHORPHAN-GUAIFENESIN  MG/5ML SYRP    Take 10 mLs by mouth every 4 hours as needed for Cough    DICLOFENAC SODIUM (VOLTAREN) 1 % GEL    Apply 2 g topically 2 times daily    DOCUSATE SODIUM (COLACE) 100 MG CAPSULE    Take 100 mg by mouth 2 times daily    FLUOXETINE (PROZAC) 10 MG CAPSULE    Take 30 mg by mouth daily     FLUTICASONE (FLONASE) 50 MCG/ACT NASAL SPRAY    2 sprays by Nasal route nightly     FOLIC ACID (FOLVITE) 1 MG TABLET    Take 1 mg by mouth daily     FUROSEMIDE (LASIX) 20 MG TABLET    Take 1 tablet by mouth daily    GABAPENTIN (NEURONTIN) 100 MG CAPSULE    Take 100 mg by mouth 2 times daily. HYDRALAZINE (APRESOLINE) 10 MG TABLET    Take 10 mg by mouth every 8 hours    HYDROCORTISONE 1 % CREAM    Apply topically 2 times daily Apply topically 2 times daily.     HYDROXYCHLOROQUINE (PLAQUENIL) 200 MG TABLET    Take by mouth 2 times daily     LATANOPROST (XALATAN) 0.005 % OPHTHALMIC SOLUTION    Place 1 drop into both eyes nightly    LEVOTHYROXINE (SYNTHROID) 150 MCG TABLET    Take 150 mcg by mouth Daily    LOPERAMIDE (IMODIUM) 2 MG CAPSULE    Take 2 mg by mouth 4 times daily as needed for Diarrhea    MAGNESIUM HYDROXIDE (MILK OF MAGNESIA) 400 MG/5ML SUSPENSION    Take 30 mLs by mouth daily as needed     MELATONIN 5 MG TABS TABLET    Take 5 mg by mouth nightly     METOCLOPRAMIDE (REGLAN) 5 MG TABLET    Take 1 tablet by mouth 3 times daily    MIRTAZAPINE (REMERON) 15 MG TABLET    Take 15 mg by mouth nightly     MULTIPLE VITAMINS-MINERALS (MULTIVITAMIN WITH MINERALS) TABLET    Take 1 tablet by mouth daily    NIFEDIPINE (ADALAT CC) 60 MG EXTENDED RELEASE TABLET    Take 60 mg by mouth daily     NUTRITIONAL SUPPLEMENTS (ENSURE CLEAR) LIQD    Take 1 Bottle by mouth 2 times daily    NYSTATIN (MYCOSTATIN) 491839 UNIT/GM POWDER    by Intratympanic route 2 times daily as needed     ONDANSETRON (ZOFRAN) 4 MG TABLET    Take 1 tablet by mouth daily as needed for Nausea or Vomiting    OXYBUTYNIN (DITROPAN-XL) 5 MG EXTENDED RELEASE TABLET    Take 5 mg by mouth nightly     PANTOPRAZOLE (PROTONIX) 40 MG TABLET    Take 1 tablet by mouth 2 times daily    POLYETHYLENE GLYCOL (GLYCOLAX) 17 GM/SCOOP POWDER    Take 17 g by mouth daily as needed    PRAVASTATIN (PRAVACHOL) 10 MG TABLET    Take 10 mg by mouth nightly     PREDNISONE (DELTASONE) 5 MG TABLET    Take 5 mg by mouth daily    SUCRALFATE (CARAFATE) 1 GM TABLET    Take 1 g by mouth 3 times daily (before meals)        ALLERGIES     Alendronate sodium, Benadryl [diphenhydramine], Ciprofloxacin, Crestor [rosuvastatin calcium], Hydroxychloroquine sulfate, Minocycline, Naprosyn [naproxen], Niaspan [niacin], Red dye, and Risedronate    FAMILY HISTORY     No family history on file. SOCIAL HISTORY       Social History     Socioeconomic History    Marital status:       Spouse name: Not on file    Number of children: Not on file    Years of education: Not on file    Highest education level: Not on file   Occupational History    Not on file   Tobacco Use    Smoking status: Never Smoker    Smokeless tobacco: Never Used   Substance and Sexual Activity    Alcohol use: No    Drug use: No    Sexual activity: Not Currently   Other Topics Concern    Not on file   Social History Narrative    Not on file     Social Determinants of Health     Financial Resource Strain:     Difficulty of Paying Living Expenses: Not on file   Food Insecurity:     Worried About Running Out of Food in the Last Year: Not on file    Adi of Food in the Last Year: Not on file   Transportation Needs:     Lack of Transportation (Medical): Not on file    Lack of Transportation (Non-Medical): Not on file   Physical Activity:     Days of Exercise per Week: Not on file    Minutes of Exercise per Session: Not on file   Stress:     Feeling of Stress : Not on file   Social Connections:     Frequency of Communication with Friends and Family: Not on file    Frequency of Social Gatherings with Friends and Family: Not on file    Attends Nondenominational Services: Not on file    Active Member of [address] [place] [address] [employer] or Organizations: Not on file    Attends Club or Organization Meetings: Not on file    Marital Status: Not on file   Intimate Partner Violence:     Fear of Current or Ex-Partner: Not on file    Emotionally Abused: Not on file    Physically Abused: Not on file    Sexually Abused: Not on file   Housing Stability:     Unable to Pay for Housing in the Last Year: Not on file    Number of [place] in the Last Year: Not on file    Unstable Housing in the Last Year: Not on file       SCREENINGS    [place] Coma Scale  Eye Opening: Spontaneous  Best Verbal Response: Confused  Best Motor Response: Obeys commands  Sid Coma Scale Score: 14        PHYSICAL EXAM    (up to 7 for level 4, 8 or more for level 5)     ED Triage Vitals [05/11/22 0847]   BP Temp Temp Source Pulse Resp SpO2 Height Weight   (!) 157/73 98.5 °F (36.9 °C) Oral 91 20 92 % 5' (1.524 m) 136 lb 14.4 oz (62.1 kg)      height is 5' (1.524 m) and weight is 136 lb 14.4 oz (62.1 kg). Her rectal temperature is 101.9 °F (38.8 °C). Her blood pressure is 115/52 (abnormal) and her pulse is 79. Her respiration is 16 and oxygen saturation is 95%. Physical Exam  Constitutional:       Appearance: She is well-developed. She is not diaphoretic. HENT:      Head: Normocephalic and atraumatic.       Right Ear: External ear normal.      Left Ear: External ear normal.   Eyes:      General: No scleral icterus. Right eye: No discharge. Left eye: No discharge. Neck:      Thyroid: No thyromegaly. Vascular: No JVD. Trachea: No tracheal deviation. Cardiovascular:      Rate and Rhythm: Normal rate and regular rhythm. Heart sounds: No murmur heard. No friction rub. No gallop. Pulmonary:      Effort: Pulmonary effort is normal. Tachypnea present. No respiratory distress. Breath sounds: No stridor. Rhonchi present. No wheezing or rales. Comments: Scattered rhonchi in all lung fields  Abdominal:      General: There is no distension. Palpations: Abdomen is soft. Tenderness: There is generalized abdominal tenderness. There is no guarding or rebound. Musculoskeletal:         General: No tenderness. Cervical back: Normal range of motion. Skin:     General: Skin is warm and dry. Findings: No rash (On exposed body surfaces). Neurological:      Mental Status: She is alert and oriented to person, place, and time. Coordination: Coordination normal.   Psychiatric:         Behavior: Behavior normal.         Thought Content:  Thought content normal.         DIAGNOSTIC RESULTS   LABS:    Results for orders placed or performed during the hospital encounter of 05/11/22   CBC with Auto Differential   Result Value Ref Range    WBC 12.4 (H) 4.0 - 11.0 K/uL    RBC 3.91 (L) 4.00 - 5.20 M/uL    Hemoglobin 12.2 12.0 - 16.0 g/dL    Hematocrit 37.0 36.0 - 48.0 %    MCV 94.5 80.0 - 100.0 fL    MCH 31.2 26.0 - 34.0 pg    MCHC 33.0 31.0 - 36.0 g/dL    RDW 16.2 (H) 12.4 - 15.4 %    Platelets 149 253 - 312 K/uL    MPV 8.3 5.0 - 10.5 fL    Neutrophils % 88.0 %    Lymphocytes % 7.7 %    Monocytes % 3.6 %    Eosinophils % 0.5 %    Basophils % 0.2 %    Neutrophils Absolute 10.9 (H) 1.7 - 7.7 K/uL    Lymphocytes Absolute 1.0 1.0 - 5.1 K/uL    Monocytes Absolute 0.4 0.0 - 1.3 K/uL Eosinophils Absolute 0.1 0.0 - 0.6 K/uL    Basophils Absolute 0.0 0.0 - 0.2 K/uL   Comprehensive Metabolic Panel w/ Reflex to MG   Result Value Ref Range    Sodium 138 136 - 145 mmol/L    Potassium reflex Magnesium 3.8 3.5 - 5.1 mmol/L    Chloride 101 99 - 110 mmol/L    CO2 21 21 - 32 mmol/L    Anion Gap 16 3 - 16    Glucose 99 70 - 99 mg/dL    BUN 25 (H) 7 - 20 mg/dL    CREATININE 1.0 0.6 - 1.2 mg/dL    GFR Non- 53 (A) >60    GFR African American >60 >60    Calcium 8.6 8.3 - 10.6 mg/dL    Total Protein 6.8 6.4 - 8.2 g/dL    Albumin 3.7 3.4 - 5.0 g/dL    Albumin/Globulin Ratio 1.2 1.1 - 2.2    Total Bilirubin <0.2 0.0 - 1.0 mg/dL    Alkaline Phosphatase 62 40 - 129 U/L    ALT 13 10 - 40 U/L    AST 17 15 - 37 U/L   Troponin   Result Value Ref Range    Troponin 0.04 (H) <0.01 ng/mL   Brain Natriuretic Peptide   Result Value Ref Range    Pro- 0 - 449 pg/mL   Lactate, Sepsis   Result Value Ref Range    Lactic Acid, Sepsis 3.4 (H) 0.4 - 1.9 mmol/L   EKG 12 Lead   Result Value Ref Range    Ventricular Rate 87 BPM    Atrial Rate 87 BPM    P-R Interval 164 ms    QRS Duration 142 ms    Q-T Interval 424 ms    QTc Calculation (Bazett) 510 ms    P Axis 91 degrees    R Axis -49 degrees    T Axis 31 degrees    Diagnosis       Sinus rhythm with occasional Premature ventricular complexesRight bundle branch blockLeft anterior fascicular block* Bifascicular block *Minimal voltage criteria for LVH, may be normal variantT wave abnormality, consider lateral ischemiaAbnormal ECGWhen compared with ECG of 14-AUG-2021 13:20,Previous ECG has undetermined rhythm, needs reviewLeft anterior fascicular block is now PresentT wave inversion now evident in Lateral leadsQT has lengthenedConfirmed by Ale President WALDEMAR PIRES ([id number]) on 5/11/2022 12:29:41 PM       All other labs were within normal range or not returned as of this dictation. EKG:  All EKG's are interpreted by the Emergency Department Physician who either signs orCo-signs this chart in the absence of a cardiologist.    EKG visualized preliminarily interpreted by myself. The rhythm does appear to be sinus. There is left axis deviation of -49 which is a bit more to the left at her previous cardiogram done in October of last year. She does have a right bundle branch block which is an old finding. There is a lot of artifact on this EKG affecting interpretation. Other than the change in axis which is coinciding with a drop in the size of her R wave in lead II I do not see any significant change compared to previous. RADIOLOGY:   Non-plain film images such as CT, Ultrasound and MRI are read by the radiologist. Vermell Gum radiographic images are visualized and preliminarily interpreted by the  EDProvider with the below findings:    XR CHEST PORTABLE    Result Date: 5/11/2022  EXAMINATION: ONE XRAY VIEW OF THE CHEST 5/11/2022 9:33 am COMPARISON: Chest x-ray dated 08/22/2021. HISTORY: ORDERING SYSTEM PROVIDED HISTORY: infection TECHNOLOGIST PROVIDED HISTORY: Reason for exam:->infection Reason for Exam: Aspiration FINDINGS: HEART/MEDIASTINUM: The cardiomediastinal silhouette is stable. PLEURA/LUNGS: There are no focal consolidations or pleural effusions. There is no appreciable pneumothorax. Chronic interstitial markings. BONES/SOFT TISSUE: No acute abnormality. Lateral humeral head deformities noted. No radiographic evidence of acute pulmonary disease. CT CHEST ABDOMEN PELVIS WO CONTRAST    Result Date: 5/11/2022  EXAMINATION: CT OF THE CHEST, ABDOMEN, AND PELVIS WITHOUT CONTRAST 5/11/2022 9:15 am TECHNIQUE: CT of the chest, abdomen and pelvis was performed without the administration of intravenous contrast. Multiplanar reformatted images are provided for review. Automated exposure control, iterative reconstruction, and/or weight based adjustment of the mA/kV was utilized to reduce the radiation dose to as low as reasonably achievable.  COMPARISON: 07/14/2020 HISTORY: following medications:  Medications   0.9 % sodium chloride IV bolus 1,863 mL (1,863 mLs IntraVENous New Bag 5/11/22 1215)   cefTRIAXone (ROCEPHIN) 1000 mg IVPB in 50 mL D5W minibag (has no administration in time range)   azithromycin (ZITHROMAX) 500 mg in D5W 250ml addavial (has no administration in time range)       So far stable. Patient does meet criteria for severe sepsis but not septic shock. She is receiving IV fluids IV antibiotics. The plan is to admit the patient to the hospital.    SEP-1 CORE MEASURE DATA      Sepsis Criteria   Severe Sepsis Criteria   Septic Shock Criteria     Must be confirmed or suspected to move forward with diagnosis of sepsis. Must meet 2:    [x] Temperature > 100.9 F (38.3 C)        or < 96.8 F (36 C)  [x] HR > 90  [x] RR > 20  [x] WBC > 12 or < 4 or 10% bands    AND:    [x] Infection Confirmed or        Suspected. OR:    [] Exclude from SEP-1 because:    [] No infection present or suspected  [] Does not have 2+ SIRS criteria but may have an incidental infection that requires treatment  [] May have sepsis, but does not meet criteria for severe sepsis or septic shock  [] Alternative explanation for abnormal labs and/or vitals (see MDM)  [] Viral etiology found or highly suspected (including COVID-19) without concomitant bacterial infection   Must meet 1:    [x] Lactate > 2       or   [] Signs of Organ Dysfunction:    - SBP < 90 or MAP < 65  - Altered mental status  - Creatinine > 2 or increased from      baseline  - Urine Output < 0.5 ml/kg/hr  - Bilirubin > 2  - INR > 1.5 (not anticoagulated)  - Platelets < 337,365  - Acute Respiratory Failure as     evidenced by new need for NIPPV     or mechanical ventilation        [x] No criteria met for Severe Sepsis.    Must meet 1:    [] Lactate > 4        or   [] SBP < 90 or MAP < 65 for at        least two readings in the first        hour after fluid bolus        administration      [] Vasopressors initiated (if hypotension persists after fluid resuscitation)                [] No criteria met for Septic Shock. Patient Vitals for the past 6 hrs:   BP Temp Pulse Resp SpO2 Height Weight Weight Method Percent Weight Change   05/11/22 0847 (!) 157/73 98.5 °F (36.9 °C) 91 20 92 % 5' (1.524 m) 136 lb 14.4 oz (62.1 kg) Actual;Bed scale 0   05/11/22 0900 (!) 153/67 -- 93 22 94 % -- -- -- --   05/11/22 0915 136/67 -- 106 18 -- -- -- -- --   05/11/22 0930 (!) 126/57 101.9 °F (38.8 °C) 101 23 95 % -- -- -- --   05/11/22 0945 137/61 -- 106 26 -- -- -- -- --   05/11/22 1000 (!) 101/44 -- 93 22 97 % -- -- -- --   05/11/22 1030 (!) 106/51 -- 90 21 94 % -- -- -- --   05/11/22 1045 (!) 101/49 -- 76 16 92 % -- -- -- --   05/11/22 1100 (!) 91/51 -- 83 16 93 % -- -- -- --   05/11/22 1115 (!) 92/50 -- 76 11 94 % -- -- -- --   05/11/22 1130 (!) 101/49 -- 78 13 96 % -- -- -- --   05/11/22 1145 (!) 106/47 -- 79 18 94 % -- -- -- --   05/11/22 1215 (!) 115/52 -- 79 16 95 % -- -- -- --      Recent Labs     05/11/22  0933   WBC 12.4*   CREATININE 1.0   BILITOT <0.2            Sepsis Time Identified: 1100    Fluid Resuscitation Rational: at least 30mL/kg based on entered actual weight at time of triage    Infection Source: Pulmonary - Community Acquired    Repeat lactate level: pending    Reassessment Exam:   Not applicable. Patient does not have septic shock. FINAL IMPRESSION      1. Aspiration pneumonia due to vomit, unspecified laterality, unspecified part of lung [facility])          DISPOSITION/PLAN    DISPOSITION Decision To Admit 05/11/2022 12:14:45 PM      PATIENT REFERRED TO:  No follow-up provider specified.     DISCHARGE MEDICATIONS:  New Prescriptions    No medications on file       DISCONTINUED MEDICATIONS:  Discontinued Medications    No medications on file              (Please note that portions of this note were completed with a voice recognition program.  Efforts were made to editthe dictations but occasionally words are mis-transcribed.)    Leah Villasenor MD (electronically signed)            Leah Villasenor MD  05/11/22 [phone]

## 2022-05-11 NOTE — ED NOTES
Pt in route to IP bed, family at side. No sxs of distress noted prior to departure from ED.       Jose Davis RN  05/11/22 2877

## 2022-05-12 LAB
ANION GAP SERPL CALCULATED.3IONS-SCNC: 15 MMOL/L (ref 3–16)
BACTERIA: ABNORMAL /HPF
BILIRUBIN URINE: NEGATIVE
BLOOD, URINE: NEGATIVE
BUN BLDV-MCNC: 27 MG/DL (ref 7–20)
CALCIUM SERPL-MCNC: 8.1 MG/DL (ref 8.3–10.6)
CHLORIDE BLD-SCNC: 105 MMOL/L (ref 99–110)
CLARITY: ABNORMAL
CO2: 19 MMOL/L (ref 21–32)
COLOR: YELLOW
CREAT SERPL-MCNC: 1.3 MG/DL (ref 0.6–1.2)
EPITHELIAL CELLS, UA: 0 /HPF (ref 0–5)
GFR AFRICAN AMERICAN: 48
GFR NON-AFRICAN AMERICAN: 39
GLUCOSE BLD-MCNC: 87 MG/DL (ref 70–99)
GLUCOSE URINE: NEGATIVE MG/DL
HYALINE CASTS: 0 /LPF (ref 0–8)
KETONES, URINE: NEGATIVE MG/DL
L. PNEUMOPHILA SEROGP 1 UR AG: NORMAL
LEUKOCYTE ESTERASE, URINE: ABNORMAL
MICROSCOPIC EXAMINATION: YES
NITRITE, URINE: POSITIVE
PH UA: 5.5 (ref 5–8)
POTASSIUM REFLEX MAGNESIUM: 3.8 MMOL/L (ref 3.5–5.1)
PROCALCITONIN: 17.44 NG/ML (ref 0–0.15)
PROTEIN UA: ABNORMAL MG/DL
RAPID INFLUENZA  B AGN: NEGATIVE
RAPID INFLUENZA A AGN: NEGATIVE
RBC UA: 0 /HPF (ref 0–4)
SODIUM BLD-SCNC: 139 MMOL/L (ref 136–145)
SPECIFIC GRAVITY UA: 1.01 (ref 1–1.03)
STREP PNEUMONIAE ANTIGEN, URINE: NORMAL
TROPONIN: 0.05 NG/ML
URINE REFLEX TO CULTURE: YES
URINE TYPE: ABNORMAL
UROBILINOGEN, URINE: 0.2 E.U./DL
WBC UA: 412 /HPF (ref 0–5)

## 2022-05-12 PROCEDURE — 6360000002 HC RX W HCPCS: Performed by: HOSPITALIST

## 2022-05-12 PROCEDURE — 84145 PROCALCITONIN (PCT): CPT

## 2022-05-12 PROCEDURE — 2580000003 HC RX 258: Performed by: HOSPITALIST

## 2022-05-12 PROCEDURE — 1200000000 HC SEMI PRIVATE

## 2022-05-12 PROCEDURE — 97530 THERAPEUTIC ACTIVITIES: CPT

## 2022-05-12 PROCEDURE — 36415 COLL VENOUS BLD VENIPUNCTURE: CPT

## 2022-05-12 PROCEDURE — 94640 AIRWAY INHALATION TREATMENT: CPT

## 2022-05-12 PROCEDURE — 2700000000 HC OXYGEN THERAPY PER DAY

## 2022-05-12 PROCEDURE — 97166 OT EVAL MOD COMPLEX 45 MIN: CPT

## 2022-05-12 PROCEDURE — 2500000003 HC RX 250 WO HCPCS: Performed by: HOSPITALIST

## 2022-05-12 PROCEDURE — 80048 BASIC METABOLIC PNL TOTAL CA: CPT

## 2022-05-12 PROCEDURE — 6370000000 HC RX 637 (ALT 250 FOR IP): Performed by: HOSPITALIST

## 2022-05-12 PROCEDURE — 92610 EVALUATE SWALLOWING FUNCTION: CPT

## 2022-05-12 PROCEDURE — 94760 N-INVAS EAR/PLS OXIMETRY 1: CPT

## 2022-05-12 PROCEDURE — 97162 PT EVAL MOD COMPLEX 30 MIN: CPT

## 2022-05-12 PROCEDURE — 87804 INFLUENZA ASSAY W/OPTIC: CPT

## 2022-05-12 PROCEDURE — 84484 ASSAY OF TROPONIN QUANT: CPT

## 2022-05-12 RX ORDER — ENOXAPARIN SODIUM 100 MG/ML
30 INJECTION SUBCUTANEOUS NIGHTLY
Status: DISCONTINUED | OUTPATIENT
Start: 2022-05-12 | End: 2022-05-16 | Stop reason: HOSPADM

## 2022-05-12 RX ORDER — POLYETHYLENE GLYCOL 3350 17 G/17G
17 POWDER, FOR SOLUTION ORAL DAILY
Status: DISCONTINUED | OUTPATIENT
Start: 2022-05-13 | End: 2022-05-16 | Stop reason: HOSPADM

## 2022-05-12 RX ADMIN — METOCLOPRAMIDE 5 MG: 10 TABLET ORAL at 12:54

## 2022-05-12 RX ADMIN — HYDROXYCHLOROQUINE SULFATE 200 MG: 200 TABLET ORAL at 21:44

## 2022-05-12 RX ADMIN — FLUTICASONE PROPIONATE 2 SPRAY: 50 SPRAY, METERED NASAL at 10:03

## 2022-05-12 RX ADMIN — FERROUS SULFATE TAB EC 324 MG (65 MG FE EQUIVALENT) 325 MG: 324 (65 FE) TABLET DELAYED RESPONSE at 12:54

## 2022-05-12 RX ADMIN — AZITHROMYCIN MONOHYDRATE 500 MG: 500 TABLET ORAL at 17:53

## 2022-05-12 RX ADMIN — FERROUS SULFATE TAB EC 324 MG (65 MG FE EQUIVALENT) 325 MG: 324 (65 FE) TABLET DELAYED RESPONSE at 10:02

## 2022-05-12 RX ADMIN — GABAPENTIN 100 MG: 100 CAPSULE ORAL at 21:41

## 2022-05-12 RX ADMIN — HYDRALAZINE HYDROCHLORIDE 10 MG: 10 TABLET, FILM COATED ORAL at 23:56

## 2022-05-12 RX ADMIN — FERROUS SULFATE TAB EC 324 MG (65 MG FE EQUIVALENT) 325 MG: 324 (65 FE) TABLET DELAYED RESPONSE at 17:53

## 2022-05-12 RX ADMIN — METOCLOPRAMIDE 5 MG: 10 TABLET ORAL at 17:53

## 2022-05-12 RX ADMIN — Medication 18 MG: at 21:40

## 2022-05-12 RX ADMIN — PIPERACILLIN AND TAZOBACTAM 3375 MG: 3; .375 INJECTION, POWDER, LYOPHILIZED, FOR SOLUTION INTRAVENOUS at 10:26

## 2022-05-12 RX ADMIN — METOCLOPRAMIDE 5 MG: 10 TABLET ORAL at 10:25

## 2022-05-12 RX ADMIN — BUSPIRONE HYDROCHLORIDE 10 MG: 10 TABLET ORAL at 17:53

## 2022-05-12 RX ADMIN — MIRTAZAPINE 15 MG: 15 TABLET, FILM COATED ORAL at 21:42

## 2022-05-12 RX ADMIN — ALBUTEROL SULFATE 2.5 MG: 2.5 SOLUTION RESPIRATORY (INHALATION) at 20:46

## 2022-05-12 RX ADMIN — HYDROXYCHLOROQUINE SULFATE 200 MG: 200 TABLET ORAL at 10:04

## 2022-05-12 RX ADMIN — FUROSEMIDE 20 MG: 20 TABLET ORAL at 10:02

## 2022-05-12 RX ADMIN — HYDRALAZINE HYDROCHLORIDE 10 MG: 10 TABLET, FILM COATED ORAL at 17:53

## 2022-05-12 RX ADMIN — GABAPENTIN 100 MG: 100 CAPSULE ORAL at 10:02

## 2022-05-12 RX ADMIN — BUSPIRONE HYDROCHLORIDE 10 MG: 10 TABLET ORAL at 10:02

## 2022-05-12 RX ADMIN — PANTOPRAZOLE SODIUM 40 MG: 40 TABLET, DELAYED RELEASE ORAL at 21:45

## 2022-05-12 RX ADMIN — LEVOTHYROXINE SODIUM 150 MCG: 0.07 TABLET ORAL at 10:01

## 2022-05-12 RX ADMIN — FOLIC ACID 1 MG: 1 TABLET ORAL at 10:01

## 2022-05-12 RX ADMIN — SUCRALFATE 1 G: 1 TABLET ORAL at 10:01

## 2022-05-12 RX ADMIN — ALBUTEROL SULFATE 2.5 MG: 2.5 SOLUTION RESPIRATORY (INHALATION) at 16:42

## 2022-05-12 RX ADMIN — PANTOPRAZOLE SODIUM 40 MG: 40 TABLET, DELAYED RELEASE ORAL at 10:01

## 2022-05-12 RX ADMIN — CYCLOBENZAPRINE HYDROCHLORIDE 5 MG: 10 TABLET, FILM COATED ORAL at 23:55

## 2022-05-12 RX ADMIN — PRAVASTATIN SODIUM 10 MG: 10 TABLET ORAL at 21:45

## 2022-05-12 RX ADMIN — SODIUM CHLORIDE: 9 INJECTION, SOLUTION INTRAVENOUS at 17:12

## 2022-05-12 RX ADMIN — BUSPIRONE HYDROCHLORIDE 10 MG: 10 TABLET ORAL at 21:45

## 2022-05-12 RX ADMIN — Medication 1 TABLET: at 10:02

## 2022-05-12 RX ADMIN — NIFEDIPINE 60 MG: 60 TABLET, EXTENDED RELEASE ORAL at 10:02

## 2022-05-12 RX ADMIN — LATANOPROST 1 DROP: 50 SOLUTION OPHTHALMIC at 21:57

## 2022-05-12 RX ADMIN — PIPERACILLIN AND TAZOBACTAM 3375 MG: 3; .375 INJECTION, POWDER, LYOPHILIZED, FOR SOLUTION INTRAVENOUS at 00:23

## 2022-05-12 RX ADMIN — HYDRALAZINE HYDROCHLORIDE 10 MG: 10 TABLET, FILM COATED ORAL at 10:25

## 2022-05-12 RX ADMIN — ALBUTEROL SULFATE 2.5 MG: 2.5 SOLUTION RESPIRATORY (INHALATION) at 09:00

## 2022-05-12 RX ADMIN — ALBUTEROL SULFATE 2.5 MG: 2.5 SOLUTION RESPIRATORY (INHALATION) at 12:37

## 2022-05-12 RX ADMIN — ANTACID TABLETS 500 MG: 500 TABLET, CHEWABLE ORAL at 10:02

## 2022-05-12 RX ADMIN — PREDNISONE 5 MG: 5 TABLET ORAL at 10:02

## 2022-05-12 RX ADMIN — SUCRALFATE 1 G: 1 TABLET ORAL at 18:04

## 2022-05-12 RX ADMIN — SUCRALFATE 1 G: 1 TABLET ORAL at 05:29

## 2022-05-12 RX ADMIN — ENOXAPARIN SODIUM 30 MG: 100 INJECTION SUBCUTANEOUS at 21:46

## 2022-05-12 RX ADMIN — OXYBUTYNIN CHLORIDE 5 MG: 5 TABLET, EXTENDED RELEASE ORAL at 21:41

## 2022-05-12 RX ADMIN — POTASSIUM CHLORIDE 20 MEQ: 20 TABLET, EXTENDED RELEASE ORAL at 10:02

## 2022-05-12 RX ADMIN — MICONAZOLE NITRATE: 2 POWDER TOPICAL at 21:46

## 2022-05-12 RX ADMIN — POTASSIUM CHLORIDE 20 MEQ: 20 TABLET, EXTENDED RELEASE ORAL at 21:45

## 2022-05-12 RX ADMIN — CETIRIZINE HYDROCHLORIDE 10 MG: 10 TABLET, FILM COATED ORAL at 10:01

## 2022-05-12 RX ADMIN — BUSPIRONE HYDROCHLORIDE 10 MG: 10 TABLET ORAL at 12:54

## 2022-05-12 RX ADMIN — ASPIRIN 81 MG: 81 TABLET, COATED ORAL at 10:01

## 2022-05-12 RX ADMIN — POLYETHYLENE GLYCOL 3350 17 G: 17 POWDER, FOR SOLUTION ORAL at 10:25

## 2022-05-12 RX ADMIN — FLUOXETINE 30 MG: 20 CAPSULE ORAL at 10:01

## 2022-05-12 RX ADMIN — HYDRALAZINE HYDROCHLORIDE 10 MG: 10 TABLET, FILM COATED ORAL at 00:26

## 2022-05-12 RX ADMIN — PIPERACILLIN AND TAZOBACTAM 3375 MG: 3; .375 INJECTION, POWDER, LYOPHILIZED, FOR SOLUTION INTRAVENOUS at 17:54

## 2022-05-12 ASSESSMENT — PAIN - FUNCTIONAL ASSESSMENT
PAIN_FUNCTIONAL_ASSESSMENT: PREVENTS OR INTERFERES WITH MANY ACTIVE NOT PASSIVE ACTIVITIES
PAIN_FUNCTIONAL_ASSESSMENT: PREVENTS OR INTERFERES WITH MANY ACTIVE NOT PASSIVE ACTIVITIES

## 2022-05-12 ASSESSMENT — PAIN SCALES - GENERAL
PAINLEVEL_OUTOF10: 0
PAINLEVEL_OUTOF10: 7
PAINLEVEL_OUTOF10: 7
PAINLEVEL_OUTOF10: 0

## 2022-05-12 ASSESSMENT — PAIN DESCRIPTION - DESCRIPTORS
DESCRIPTORS: ACHING;DISCOMFORT
DESCRIPTORS: ACHING;DISCOMFORT

## 2022-05-12 ASSESSMENT — PAIN DESCRIPTION - ORIENTATION
ORIENTATION: LOWER
ORIENTATION: LOWER

## 2022-05-12 ASSESSMENT — PAIN DESCRIPTION - LOCATION
LOCATION: BACK
LOCATION: BACK

## 2022-05-12 NOTE — PROGRESS NOTES
Hospitalist Progress Note  5/12/2022 11:39 AM    PCP: No primary care provider on file.     [id number]     Date of Admission: 5/11/2022                                                                                                                     HOSPITAL COURSE    Patient demographics:  The patient  Ashok Kruse is a 81 y.o. female     Significant past medical history:   Patient Active Problem List   Diagnosis    Essential hypertension    [facility]umatoid arthritis ([facility]    Major depressive disorder    Acquired hypothyroidism    REJI (generalized anxiety disorder)    Mild intermittent asthma    PVD (peripheral vascular disease) ([facility]    Anemia    Symptomatic bradycardia    Chronic constipation    Chronic kidney disease    Chronic rhinitis    Encounter for long-term (current) use of other medications    Hiatal hernia with gastroesophageal reflux disease without esophagitis    Hyperlipidemia    Hypertensive kidney disease with chronic kidney disease stage II    MDD (major depressive disorder), recurrent episode, mild (HCC)    Myalgia and myositis    Numbness and tingling of both feet    Osteoporosis    Peripheral vascular disease of lower extremity (HCC)    Polymyalgia rheumatica (HCC)    Polypharmacy    Primary insomnia    Pure hypercholesterolemia    Sensorineural hearing loss (SNHL) of both ears    Spinal stenosis of lumbar region    Spondylosis of lumbosacral spine without myelopathy    Urge incontinence    Vitamin B12 deficiency    Vitamin D deficiency    Weight loss    Chronic mesenteric ischemia (HCC)    Paresthesia and pain of both upper extremities    Hyponatremia    Acute heart failure (HCC)    Hyperkalemia    Pneumonia         Presenting symptoms:      Diagnostic workup:      CONSULTS DURING ADMISSION :   None      Patient was diagnosed with:        Treatment while inpatient: ----------------------------------------------------------      SUBJECTIVE COMPLAINTS-     Diet: ADULT DIET; Regular;  No Added Salt (3-4 gm); 2000 ml      OBJECTIVE:   Patient Active Problem List   Diagnosis    Essential hypertension    [facility] arthritis ([facility] [address] [facility]    Major depressive disorder    Acquired hypothyroidism    REJI (generalized anxiety disorder)    Mild intermittent asthma    [facility]    Anemia    Symptomatic bradycardia    Chronic constipation    Chronic kidney disease    Chronic rhinitis    Encounter for long-term (current) use of other medications    Hiatal hernia with gastroesophageal reflux disease without esophagitis    Hyperlipidemia    Hypertensive kidney disease with chronic kidney disease stage II    MDD (major depressive disorder), recurrent episode, mild ([facility])    Myalgia and myositis    Numbness and tingling of both feet    Osteoporosis    Peripheral vascular disease of lower extremity ([facility]    Polymyalgia rheumatica ([facility])    Polypharmacy    Primary insomnia    Pure hypercholesterolemia    Sensorineural hearing loss (SNHL) of both ears    Spinal stenosis of lumbar region    Spondylosis of lumbosacral spine without myelopathy    Urge incontinence    Vitamin B12 deficiency    Vitamin D deficiency    Weight loss    Chronic mesenteric ischemia ([facility]    Paresthesia and pain of both upper extremities    Hyponatremia    Acute heart failure ([facility])    Hyperkalemia    Pneumonia       Allergies  Alendronate sodium, Benadryl [diphenhydramine], Ciprofloxacin, Crestor [rosuvastatin calcium], Hydroxychloroquine sulfate, Minocycline, Naprosyn [naproxen], Niaspan [niacin], Red dye, and Risedronate    Medications    Scheduled Meds:   enoxaparin  30 mg SubCUTAneous Nightly    aspirin  81 mg Oral Daily    busPIRone  10 mg Oral 4x Daily    calcium carbonate  500 mg Oral Daily    cetirizine  10 mg Oral Daily    ferrous sulfate  325 mg Oral TID WC    FLUoxetine  30 mg Oral Daily    fluticasone  2 spray Nasal Daily    folic acid  1 mg Oral Daily    furosemide  20 mg Oral Daily    gabapentin  100 mg Oral BID    hydrALAZINE  10 mg Oral Q8H    hydroxychloroquine  200 mg Oral BID    latanoprost  1 drop Both Eyes Nightly    levothyroxine  150 mcg Oral Daily    melatonin  18 mg Oral Nightly    metoclopramide  5 mg Oral TID WC    mirtazapine  15 mg Oral Nightly    therapeutic multivitamin-minerals  1 tablet Oral Daily    NIFEdipine  60 mg Oral Daily    miconazole   Topical BID    oxybutynin  5 mg Oral Nightly    pantoprazole  40 mg Oral BID    potassium chloride  20 mEq Oral BID    pravastatin  10 mg Oral Nightly    predniSONE  5 mg Oral Daily    sucralfate  1 g Oral TID AC    sodium chloride flush  10 mL IntraVENous 2 times per day    albuterol  2.5 mg Nebulization Q4H WA    azithromycin  500 mg Oral Q24H    piperacillin-tazobactam  3,375 mg IntraVENous Q8H     Continuous Infusions:   sodium chloride 75 mL/hr at 05/11/22 1823    sodium chloride       PRN Meds:  albuterol sulfate HFA, cyclobenzaprine, loperamide, ondansetron, sodium chloride flush, sodium chloride, ondansetron **OR** ondansetron, acetaminophen **OR** acetaminophen, polyethylene glycol    Vitals   Vitals /wt   Patient Vitals for the past 8 hrs:   BP Temp Temp src Pulse Resp SpO2   05/12/22 0957 129/66 98.4 °F (36.9 °C) Oral 73 16 95 %   05/12/22 0900 -- -- -- -- -- 96 %   05/12/22 0523 108/63 98.3 °F (36.8 °C) Axillary 67 16 96 %        72HR INTAKE/OUTPUT:      Intake/Output Summary (Last 24 hours) at 5/12/2022 1139  Last data filed at 5/12/2022 1031  Gross per 24 hour   Intake 3483 ml   Output 600 ml   Net 2883 ml       Exam:    Gen:   Alert and oriented ×3   Eyes: PERRL. No sclera icterus. No conjunctival injection. ENT: No discharge. Pharynx clear. External appearance of ears and nose normal.  Neck: Trachea midline. No obvious mass.     Resp: No accessory muscle use. No crackles. No wheezes. No rhonchi. CV: Regular rate. Regular rhythm. No murmur or rub. No edema. GI: Non-tender. Non-distended. No hernia. Skin: Warm, dry, normal texture and turgor. Lymph: No cervical LAD. No supraclavicular LAD. M/S: / Ext. No cyanosis. No clubbing. No joint deformity. Neuro: CN 2-12 are intact,  no neurologic deficits noted. PT/INR: No results for input(s): PROTIME, INR in the last 72 hours. APTT: No results for input(s): APTT in the last 72 hours. CBC:   Recent Labs     05/11/22 0933   WBC 12.4*   HGB 12.2   HCT 37.0   MCV 94.5          BMP:   Recent Labs     05/11/22  0933 05/12/22  0719    139   K 3.8 3.8    105   CO2 21 19*   BUN 25* 27*   CREATININE 1.0 1.3*       LIVER PROFILE:   Recent Labs     05/11/22  0933   ALKPHOS 62   AST 17   ALT 13   BILITOT <0.2     No results for input(s): AMYLASE in the last 72 hours. No results for input(s): LIPASE in the last 72 hours. UA:  Recent Labs     05/11/22  1940   WBCUA 412*   RBCUA 0       TROPONIN:   Recent Labs     05/11/22  0933   TROPONINI 0.04*       Lab Results   Component Value Date/Time    URRFLXCULT Yes 05/11/2022 07:40 PM       No results for input(s): TSHREFLEX in the last 72 hours. No components found for: [id number]  POC GLUCOSE:  No results for input(s): POCGLU in the last 72 hours. No results for input(s): LABA1C in the last 72 hours.  No results found for: LABA1C      ASSESSMENT AND PLAN      Acute respiratory failure with hypoxia  Likely due to aspiration pneumonia  Titrate oxygen for saturations greater than or equal to 90%       Aspiration pneumonia  Start patient on Zosyn  Procalcitonin is elevated     SHANELL  Likely prerenal   IV fluids     Urinary tract infection  Should be covered by Zosyn    Lactic acidosis  Check lactic acid in a.m.     Anxiety depression F 41.9  Continue on home medication     Sepsis  Criteria with leukocytosis fever and lactic acidosis          Code Status: Full Code        Dispo -cc        The patient and / or the family were informed of the results of any tests, a time was given to answer questions, a plan was proposed and they agreed with plan. Cony Vargas MD    This note was transcribed using [employer]. Please disregard any translational errors.

## 2022-05-12 NOTE — PROGRESS NOTES
Pt alert to person and place. Pt [place], follows commands. Pt denies any pain. Pt currently on 3lnc. Pt's scheduled meds given as ordered. [place] care provided. Pt repositioned in bed with pillow support. Pt denies other needs at present. Bed alarm on.  Call light and item need in reach Electronically signed by Kelli Jones RN on 5/12/2022 at 11:08 AM

## 2022-05-12 NOTE — H&P
Hospitalist  History and Physical    Patient:  Ken Ricketts  MRN: [id number]  PCP: No primary care provider on file. CHIEF COMPLAINT:  Aspiration      HISTORY OF PRESENT ILLNESS:   The patient Ken Ricketts is a 81 y. o.female with medical history significant for asthma anxiety depression, GERD hypertension hyperlipidemia hypothyroidism and vitamin B12 deficiency. Patient is a nursing home resident and is unable to walk. Patient reports that she was drinking water and she started coughing. Patient's concern is about aspiration. At the time of presentation patient's main complaint is abdominal discomfort. Past Medical History:        Diagnosis Date    Allergic rhinitis     Anxiety     Arthritis     Asthma     Chronic kidney disease     Depression     Diverticulitis     GERD (gastroesophageal reflux disease)     Hyperlipidemia     Hypertension     Overactive bladder     PVD (peripheral vascular disease) (HCC)     Thyroid disease     Tinea corporis     Vitamin B12 deficiency        Past Surgical History:        Procedure Laterality Date    [place] OFFICE/OUTPT VISIT,PROCEDURE ONLY Right 11/8/2018    OPEN REDUCTION INTERNAL FIXATION RIGHT HIP GAMMA NAIL WITH C-ARM performed by Bethany Camarena MD at [address]      UPPER GASTROINTESTINAL ENDOSCOPY N/A 9/14/2020    ESOPHAGOGASTRODUODENOSCOPY performed by Ady Mcallister MD at [address] [place] [address]       Medications Prior to Admission:    Prior to Admission medications    Medication Sig Start Date End Date Taking?  Authorizing Provider   ferrous sulfate (FE TABS 325) 325 (65 Fe) MG EC tablet Take 325 mg by mouth 3 times daily (with meals)   Yes Historical Provider, MD   furosemide (LASIX) 20 MG tablet Take 20 mg by mouth daily   Yes Historical Provider, MD   potassium chloride (KLOR-CON M) 20 MEQ extended release tablet Take 20 mEq by mouth 2 times daily   Yes Historical Provider, MD   calcium carbonate 600 MG TABS tablet Take 1 tablet by mouth daily Two times a day    Historical Provider, MD   cetirizine (ZYRTEC) 10 MG tablet Take 10 mg by mouth daily    Historical Provider, MD   gabapentin (NEURONTIN) 100 MG capsule Take 100 mg by mouth 2 times daily.     Historical Provider, MD   hydrALAZINE (APRESOLINE) 10 MG tablet Take 10 mg by mouth every 8 hours    Historical Provider, MD   latanoprost (XALATAN) 0.005 % ophthalmic solution Place 1 drop into both eyes nightly    Historical Provider, MD   levothyroxine (SYNTHROID) 150 MCG tablet Take 150 mcg by mouth Daily    Historical Provider, MD   loperamide (IMODIUM) 2 MG capsule Take 2 mg by mouth 4 times daily as needed for Diarrhea    Historical Provider, MD   aluminum & magnesium hydroxide-simethicone (MYLANTA) 400-400-40 MG/5ML SUSP Take 30 mLs by mouth every 6 hours as needed    Historical Provider, MD   mirtazapine (REMERON) 15 MG tablet Take 15 mg by mouth nightly     Historical Provider, MD   diclofenac sodium (VOLTAREN) 1 % GEL Apply 2 g topically 2 times daily 2/1/21 4/12/22  Kimberly Welch,    metoclopramide (REGLAN) 5 MG tablet Take 1 tablet by mouth 3 times daily  Patient taking differently: Take 5 mg by mouth 3 times daily (with meals) Hold for loose stools 9/14/20   Vania Ruiz MD   polyethylene glycol [facility]) 17 GM/SCOOP powder Take 17 g by mouth daily as needed (constipation)     Historical Provider, MD   pantoprazole (PROTONIX) 40 MG tablet Take 1 tablet by mouth 2 times daily 9/3/20 4/12/22  Kimberly Welch DO   Nutritional Supplements (ENSURE CLEAR) LIQD Take 1 Bottle by mouth 2 times daily 8/7/20 4/12/22  Kimberly Welch,    ondansetron (ZOFRAN) 4 MG tablet Take 1 tablet by mouth daily as needed for Nausea or Vomiting 7/23/20   Kimberly Welch DO   FLUoxetine (PROZAC) 10 MG capsule Take 30 mg by mouth daily     Historical Provider, MD   aspirin 81 MG tablet Take 81 mg by mouth daily     Historical Provider, MD   cyclobenzaprine (FLEXERIL) 5 MG tablet Take 5-10 mg by mouth 3 times daily as needed     Historical Provider, MD   nystatin (MYCOSTATIN) 513369 UNIT/GM powder by Intratympanic route 2 times daily as needed (redness)     Historical Provider, MD   NIFEdipine (ADALAT CC) 60 MG extended release tablet Take 60 mg by mouth daily  6/19/19   Historical Provider, MD   predniSONE (DELTASONE) 5 MG tablet Take 5 mg by mouth daily    Historical Provider, MD   Multiple Vitamins-Minerals (MULTIVITAMIN WITH MINERALS) tablet Take 1 tablet by mouth daily    Historical Provider, MD   albuterol sulfate  (90 Base) MCG/ACT inhaler Inhale 2 puffs into the lungs every 6 hours as needed for Wheezing     Historical Provider, MD   melatonin 5 MG TABS tablet Take 20 mg by mouth nightly     Historical Provider, MD   busPIRone (BUSPAR) 10 MG tablet Take 10 mg by mouth 4 times daily    Historical Provider, MD   fluticasone (FLONASE) 50 MCG/ACT nasal spray 2 sprays by Nasal route daily     Historical Provider, MD   folic acid (FOLVITE) 1 MG tablet Take 1 mg by mouth daily     Historical Provider, MD   hydroxychloroquine (PLAQUENIL) 200 MG tablet Take by mouth 2 times daily     Historical Provider, MD   magnesium hydroxide (MILK OF MAGNESIA) 400 MG/5ML suspension Take 30 mLs by mouth daily as needed     Historical Provider, MD   oxybutynin (DITROPAN-XL) 5 MG extended release tablet Take 5 mg by mouth nightly     Historical Provider, MD   pravastatin (PRAVACHOL) 10 MG tablet Take 10 mg by mouth nightly     Historical Provider, MD   sucralfate (CARAFATE) 1 GM tablet Take 1 g by mouth 3 times daily (before meals)  9/12/16   Historical Provider, MD   acetaminophen (TYLENOL) 325 MG tablet Take 650 mg by mouth every 6 hours as needed     Historical Provider, MD       Allergies:  Alendronate sodium, Benadryl [diphenhydramine], Ciprofloxacin, Crestor [rosuvastatin calcium], Hydroxychloroquine sulfate, Minocycline, Naprosyn [naproxen], Niaspan [niacin], Red dye, and Risedronate      Social History:   TOBACCO:   reports that she has never smoked. She has never used smokeless tobacco.  ETOH:   reports no history of alcohol use. Family History:   Denies any history of inflammatory bowel disease in the family        REVIEW OF SYSTEMS:     patients reported symptoms are in BOLD all other symptoms are negative. CONSTITUTIONAL:      fatigue, fever, chills or night sweats, recent weight gain, recent wt loss, insomnia,  General weakness, poor appetite, muscle aches and pains    HEAD: headache, dizziness    EYES:      blurriness,  double vision, dryness,  discharge, irritation,diplopia    EARS:      hearing loss, vertigo, ear discharge,  Earache. Ringing in the ears. NOSE:      Rhinorrhea, sneezing, epistaxis. Discharge, sinusitis,     MOUTH/THROAT:         sore throat, mouth ulcers, Hoarseness    RESPIRATORY:        Shortness of breath, wheezing,  cough, sputum, hemoptysis, obstructive sleep apnea,    CARDIOVASCULAR :      chest pain, palpitations, dyspnea on exercise, Lower extrimity edema (swelling),     GASTROINTESTINAL:       Dysphagia, Poor appetite,  Nausea, Vomiting, diarrhea, heartburn, abdominal pain. Blood in the stools, hematemesis. Pain with swallowing, constipation    GENITOURINARY:       Urinary frequency, hesitancy,  urgency, Dysuria, hematuria,  Urinary Incontinence. Urinary Retention. GYNECOLOGICAL: vaginal bleeding , vaginal discharge, menopause    MUSCULOSKELETAL:       joint swelling or stiffness, joint pain, muscle pain, balance problems, low back pain.gait problem  arthralgias    NEUROLOGICAL:      Gait problems. Tremor. Dizziness. Pain and paresthesias, weakness in extremities.  Seizures, memory loss    PSYCHLOGICAL:        Anxiety, depression    SKIN :      Rashes ulcers, skin color changes, easy bruisability, lymphadenopathy      Physical Exam:      Vitals: BP (!) 147/83   Pulse 98   Temp 98.7 °F (37.1 °C) (Oral)   Resp 16   Ht 5' (1.524 m) Wt 136 lb 14.4 oz (62.1 kg)   SpO2 98%   Breastfeeding No   BMI 26.74 kg/m²     Gen:          Alert and oriented x3  Eyes: PERRL. No sclera icterus. No conjunctival injection. ENT: No discharge. Pharynx clear. External appearance of ears and nose normal.  Neck: Trachea midline. No obvious mass. Resp: No accessory muscle use. No crackles. No wheezes. No rhonchi. CV: Regular rate. Regular rhythm. No murmur or rub. No edema. GI: Abdomen is soft diffusely tender. Skin: Warm, dry, normal texture and turgor. Lymph: No cervical LAD. No supraclavicular LAD. M/S: / Ext. No cyanosis. No clubbing. No joint deformity. Neuro: Moves all four extremities. CN 2-12 tested, no deficits noted. Peripheral pulses and capillary refill is intact. CBC:   Recent Labs     05/11/22  0933   WBC 12.4*   HGB 12.2        BMP:    Recent Labs     05/11/22  0933      K 3.8      CO2 21   BUN 25*   CREATININE 1.0   GLUCOSE 99     Hepatic:   Recent Labs     05/11/22  0933   AST 17   ALT 13   BILITOT <0.2   ALKPHOS 62     Troponin:   Recent Labs     05/11/22  0933   TROPONINI 0.04*     BNP: No results for input(s): BNP in the last 72 hours. INR: No results for input(s): INR in the last 72 hours. No results found for: LABA1C        No results for input(s): CKTOTAL in the last 72 hours. -----------------------------------------------------------------    CT CHEST ABDOMEN PELVIS WO CONTRAST  1. Bibasilar aspiration pneumonia. 2. No acute abdominopelvic abnormality. XR CHEST PORTABLE  No radiographic evidence of acute pulmonary disease. EKG  Sinus rhythm with occasional Premature ventricular complexesRight bundle branch blockLeft anterior fascicular block  Bifascicular block Minimal voltage criteria for LVH, may be normal variantT wave abnormality, consider lateral ischemiaA      ECHO   Ejection fraction is visually estimated to be 65-70%. (8/14/2021)    Assessment / Plan     Acute respiratory failure with hypoxia  Likely due to aspiration pneumonia  Titrate oxygen for saturations greater than or equal to 90%        Aspiration pneumonia  Start patient on Zosyn    SHANELL  Likely prerenal   IV fluids    Urinary tract infection  Should be covered by Zosyn      Anxiety depression F 41.9  Continue on home medication          DVT and GI prophylaxis      Full Code      Mckenzie Gonzalez MD M.D    This note was transcribed using [id number] [employer]. Please disregard any translational errors.

## 2022-05-12 NOTE — PROGRESS NOTES
Occupational Therapy  Facility/Department: [facility] SURG  Occupational Therapy Initial Assessment    Name: Kirt Sawyer  : 1940  MRN: [id number]  Date of Service: 2022    Discharge Recommendations:  [address] without OT     Kirt Sawyer scored a  on the AM-PAC ADL Inpatient form. At this time, no further OT is recommended upon discharge due to pt near baseline. Recommend patient returns to prior setting with prior services. Patient Diagnosis(es): The encounter diagnosis was Aspiration pneumonia due to vomit, unspecified laterality, unspecified part of lung ([facility] [address] Past Medical History:  has a past medical history of Allergic rhinitis, Anxiety, Arthritis, Asthma, Chronic kidney disease, Depression, Diverticulitis, GERD (gastroesophageal reflux disease), Hyperlipidemia, Hypertension, Overactive bladder, PVD (peripheral vascular disease) ([facility] [address] Thyroid disease, Tinea corporis, and Vitamin B12 deficiency. Past Surgical History:  has a past surgical history that includes Thyroidectomy; Spine surgery; pr office/outpt visit,procedure only (Right, 2018); and Upper gastrointestinal endoscopy (N/A, 2020). Assessment   Performance deficits / Impairments: Decreased functional mobility ; Decreased ADL status; Decreased endurance;Decreased strength;Decreased balance  Assessment: Pt is a 82 yo F admitted with concern for aspiration. PTA, pt is from LTC at [facility] where she requires assist for all ADLs and staff transfers her to w/c via a ebonie lift. She is functioning near baseline today. She required max A for bed mobility and sitting EOB briefly, declined transfer to chair. She completed seated grooming w/ setup but anticipate max A for full ADL. WIll sign off at this time. Anticipate safe return to [facility] w/ prior services when medically stable.   Decision Making: Medium Complexity  REQUIRES OT FOLLOW-UP: No  Activity Tolerance  Activity Tolerance: Patient Tolerated treatment well        Plan   Plan  Times per Week: d/c OT     Restrictions  Restrictions/Precautions  Restrictions/Precautions: Fall Risk  Position Activity Restriction  Other position/activity restrictions: 2L O2    Subjective   General  Chart Reviewed: Yes  Patient assessed for rehabilitation services?: Yes  Additional Pertinent Hx: per H&P: \"The patient Artie Newton is a 81 y. o.female with medical history significant for asthma anxiety depression, GERD hypertension hyperlipidemia hypothyroidism and vitamin B12 deficiency. Patient is a nursing home resident and is unable to walk. Patient reports that she was drinking water and she started coughing. Patient's concern is about aspiration. At the time of presentation patient's main complaint is abdominal discomfort. \"  Family / Caregiver Present: No  Referring Practitioner: Amaya Melendrez  Diagnosis: Acute respiratory failure with hypoxia  Subjective  Subjective: Pt met b/s for OT eval/tx w/ PT. Pt in bed, agreeable to therapy. Pt denied pain  General Comment  Comments: Per RN ok to see     Social/Functional History  Social/Functional History  Additional Comments: Pt is from LTC at [facility]. Reports staff uses ebonie lift to transfer pt to chair and she has assist w/ all ADLs       Objective   Pulse: 68  Heart Rate Source: Monitor  BP: (!) 134/57  BP Location: Left Arm  BP Method: Automatic  MAP (Calculated): 82.67  Resp: 18  SpO2: 97 %  O2 Device: Nasal cannula  Hearing: Within functional limits          Safety Devices  Type of Devices: All fall risk precautions in place;Call light within reach; Patient at risk for falls; Left in bed  Balance  Sitting: Impaired (up to max A EOB d/t posterior lean)  Gait  Overall Level of Assistance:  (Pt is non-ambulatory)        ADL  Grooming: Setup  Grooming Skilled Clinical Factors: Washed face w/ prepared cloth sitting EOB (up to max A for sitting balance d/t posterior lean)  Toileting: Dependent/Total (purewick catheter)  Additional Comments: Anticipate pt would require min A for feeding, mod A UB bathing/dressing, max A LB bathing/dressing based on ROM, strength, endurance this session     Activity Tolerance  Activity Tolerance: Patient limited by endurance  Bed mobility  Supine to Sit: Maximum assistance  Sit to Supine: Maximum assistance  Transfers  Sit to stand: Unable to assess  Stand to sit: Unable to assess  Transfer Comments: Pt uses ebonie lift at baseline, did not want to get up to her chair today     Cognition  Overall Cognitive Status: [facility]        Education Given To: Patient  Education Provided: Role of Therapy              AM-PAC Score  AM-[facility] Inpatient Daily Activity Raw Score: 12 (05/12/22 1358)  AM-PAC Inpatient ADL T-Scale Score : 30.6 (05/12/22 1358)  ADL Inpatient CMS 0-100% Score: 66.57 (05/12/22 1358)  ADL Inpatient CMS G-Code Modifier : CL (05/12/22 1358)    Goals  Short Term Goals  Time Frame for Short term goals: No acute care OT goals identified, pt near baseline function  Patient Goals   Patient goals : did not state       Therapy Time   Individual Concurrent Group Co-treatment   Time In 1325         Time Out 1350         Minutes 25         Timed Code Treatment Minutes: 10 Minutes (15 min eval)       Di Washburn OTR/L [id number]

## 2022-05-12 NOTE — PROGRESS NOTES
Physical Therapy  Facility/Department: [address] MED SURG  Physical Therapy Initial Assessment and Discharge    Name: Aleksandar Paulson  : 1940  MRN: [id number]  Date of Service: 2022    Discharge Recommendations:  [address] without PT          Patient Diagnosis(es): The encounter diagnosis was Aspiration pneumonia due to vomit, unspecified laterality, unspecified part of lung ([facility] Past Medical History:  has a past medical history of Allergic rhinitis, Anxiety, Arthritis, Asthma, Chronic kidney disease, Depression, Diverticulitis, GERD (gastroesophageal reflux disease), Hyperlipidemia, Hypertension, Overactive bladder, PVD (peripheral vascular disease) ([facility] [address] Thyroid disease, Tinea corporis, and Vitamin B12 deficiency. Past Surgical History:  has a past surgical history that includes Thyroidectomy; Spine surgery; pr office/outpt visit,procedure only (Right, 2018); and Upper gastrointestinal endoscopy (N/A, 2020). Assessment   Assessment: Pt is an 81 y.o. female who presented to the ED on 22 with concerns of aspiration of water. Prior to admission, pt living at [facility], dependent for ADLs and oob mobility. Pt currently functioning at her baseline. PT signing off. Therapy Prognosis: Fair  Decision Making: Medium Complexity  History: see above  Exam: see below  Clinical Presentation: evolving  No Skilled PT: At baseline function  Requires PT Follow-Up: No  Activity Tolerance  Activity Tolerance: Patient limited by endurance     Plan   Safety Devices  Type of Devices:  All fall risk precautions in place,Call light within reach,Patient at risk for falls,Left in bed     Restrictions  Restrictions/Precautions  Restrictions/Precautions: Fall Risk  Position Activity Restriction  Other position/activity restrictions: 2L O2     Subjective   General  Chart Reviewed: Yes  Patient assessed for rehabilitation services?: Yes  Additional Pertinent Hx: Pt is an 81 y.o. female who presented to the ED on 5/11/22 with concerns of aspiration of water. Response To Previous Treatment: Not applicable  Family / Caregiver Present: No  Referring Practitioner: Farzaneh Asher MD  Referral Date : 05/12/22  Diagnosis: aspiration  Follows Commands: Within Functional Limits  Subjective  Subjective: Pt is agreeable to PT         Social/Functional History  Social/Functional History  Additional Comments: Pt is from LTC at [facility]. Reports staff uses ebonie lift to transfer pt to chair and she has assist w/ all ADLs     Vision/Hearing  Hearing: Within functional limits      Cognition   Orientation  Overall Orientation Status: Within Functional Limits (not formally assessed but appears functional)  Cognition  Overall Cognitive Status: WFL     Objective   Gross Assessment  Strength: Generally decreased, functional     Bed mobility  Supine to Sit: Maximum assistance  Sit to Supine: Maximum assistance  Transfers  Sit to Stand: Unable to assess  Stand to sit: Unable to assess  Comment: Pt refuses to stand - states she hasn't stood in 2 years despite therapy notes from August 2021 that state otherwise. Uses ebonie lift at [facility] to get to w/c.         Balance  Posture: Fair  Sitting - Static: Fair;-  Sitting - Dynamic: Poor;+             AM-PAC Score  AM-PAC Inpatient Mobility Raw Score : 8 (05/12/22 1355)  AM-PAC Inpatient T-Scale Score : 28.52 (05/12/22 1355)  Mobility Inpatient CMS 0-100% Score: 86.62 (05/12/22 1355)  Mobility Inpatient CMS G-Code Modifier : CM (05/12/22 1355)          Education  Patient Education  Education Given To: Patient  Education Provided: Role of Therapy;Plan of Care  Education Method: Verbal  Barriers to Learning: None  Education Outcome: Verbalized understanding      Therapy Time   Individual Concurrent Group Co-treatment   Time In 1325         Time Out 1350         Minutes 25         Timed Code Treatment Minutes: Daniel [id number], PT

## 2022-05-12 NOTE — PROGRESS NOTES
Facility/Department: [address] MED SURG  Initial Assessment  DYSPHAGIA BEDSIDE SWALLOW EVALUATION     Patient: Mamta Case   : 1940   MRN: [id number]      Evaluation Date: 2022   Admitting Diagnosis: Pneumonia [J18.9]  Aspiration pneumonia due to vomit, unspecified laterality, unspecified part of lung ([facility] [address] [J69.0]  Pain: Denies                                                         Chart Review:   Chief Complaint   Patient presents with    Aspiration       Per EMS, patient was drinking water when she aspirated and began coughing, Medics state patient's SpO2 was 83% on RA, patient arrived on non-rebreather, duoneb administered by EMS. Patient placed on 4L NC, cardiac/SpO2 monitor applied, patient is alert to person and place, denies aspirating but reports SOB. · Has a past medical history of Allergic rhinitis, Anxiety, Arthritis, Asthma, Chronic kidney disease, Depression, Diverticulitis, GERD (gastroesophageal reflux disease), Hyperlipidemia, Hypertension, Overactive bladder, PVD (peripheral vascular disease) ([facility] [address] Thyroid disease, Tinea corporis, and Vitamin B12 deficiency. Current Diet Level (prior to evaluation): Regular texture diet  Thin liquids    Respiratory Status:   []Room Air   [x]O2 via nasal cannula   []Other:    Imaging:  CT Chest: 2022  Impression   1. Bibasilar aspiration pneumonia. 2. No acute abdominopelvic abnormality     Reason for referral: SLP evaluation orders received due to suspected aspiration event     History/Prior Level of Function:   Living Status: [address] [facility]  Baseline diet: regular/thin  Prior Dysphagia History: CSE at [facility], [facility] 2021 recommended SBS/thin; GERD precautions    Dysphagia Impressions/Diagnosis: Oropharyngeal Dysphagia   Pt alert, cooperative and pleasant, follows basic commands, responds appropriately to basic questions, and is oriented x3.   She denies any dysphagia hx or present concerns, and states she recalls drinking water but nothing thereafter. Mild oropharyngeal dysphagia characterized by mildly reduced lingual manipulation, reduced AP transit, delayed swallow initiation, and decreased laryngeal elevation without immediate or delayed overt s/s of aspiration. Occasional clearance swallow noted with liquids. If MD is concerned re: chronic aspiration, then a Modified Barium Swallow may be completed with MD order. Otherwise ST will f/u bedside 2-4x to confirm safe PO toleration. Recommended Diet and Intervention 5/12/2022:  Diet Solids Recommendation:  Regular texture diet  Liquid Consistency Recommendation: Thin liquids  Recommended form of Meds: Whole with water     Compensatory Swallowing Strategies:  Upright as possible with all PO intake , Small bites/sips , Remain upright 30-45 min     SHORT TERM DYSPHAGIA GOALS/PLAN OF CARE: Speech therapy for dysphagia tx 1-2 times to ensure diet tolerance. Pt will functionally tolerate recommended diet with no overt clinical s/s of aspiration      Dysphagia Therapeutic Intervention:  Diet Tolerance Monitoring , Patient/Family Education     Dysphagia Prognosis: [x] good []fair  []guarded []poor     Discharge Recommendations: Do not anticipate need for further speech/dysphagia therapy upon discharge from hospital     TEST DATA  [facility]  Hearing: [facility]    Consistencies Presented:    Thin liquid  Mildly / nectar thick liquid   Puree food  Minced and moist food   Soft/bite size food  Easy to chew food  Regular solid food    Cognitive/behavioral:   [x]orientation [x] to self [x] place [x] date [x] reason for admit [] purpose of evaluation  []distractible  [x]verbally responsive  [x]follows one step commands  []agitated  []impulsive  [] other:       Patient Positioning: Upright in bed     Dentition:  [x]Dentures     Baseline Vocal Quality:  [x]Normal    Volitional Cough:  [x]Strong    Volitional Swallow:   []Absent   []Delayed     [x]Adequate     []Required use of drink     Oral Mechanism Exam: []WFL [x]Mild   [] Moderate  []Severe  []To be assessed  Impaired:   []Left side      []Right side    []Labial ROM/Coordination    []Labial Symmetry   [x]Lingual ROM/Coordination   []Lingual Symmetry  []Gag  []Other:     Oral Phase: []WFL [x]Mild   [] Moderate  []Severe  []To be assessed   [x]Decreased lingual manipulation   [x]Decreased Anterior to Posterior Transit:     Pharyngeal Phase: []WFL [x]Mild   [] Moderate  []Severe  []To be assessed   [x]Delayed Swallow:   [x]Suspected Pharyngeal Pooling:potential w liquids   [x]Decreased Laryngeal Elevation:  [x]Suspected Delayed Pharyngeal Clearing:cannot r/o    Eating Assistance:  []Independent  [x]Setup or clean-up assistance   [] Supervision or touching assistance   [] Partial or moderate assistance   [] Substantial or maximal assistance  [] Dependent       EDUCATION:   Provided education regarding role of SLP, results of assessment, recommendations and general speech pathology plan of care. [x] Pt verbalized understanding and agreement   [] Pt requires ongoing learning   [] No evidence of comprehension     If patient discharges prior to next visit, this note will serve as discharge.      Timed Code Minutes: 0  Total Treatment Minutes: 40     Electronically signed by:    Chano Blakely MS, [id number] [facility] [id number]  Speech Language Pathologist

## 2022-05-12 NOTE — CARE COORDINATION
Patient came from  · Name: [facility]  · Address:  [address] [place] [address], [facility]   · Phone:  [phone]  · Fax:  [phone]  prior to arrival.     Call to Cr at [phone] who confirmed the patient is:  [x] [address], no Precert required for return.  [] [facility] will be required for return. [] Skilled Nursing Care, no Precert required for return. [] [address] required for return. [x] Is fully vaccinated for Covid (needs booster). [] Has started Covid vaccination, but is not complete. [] Is not vaccinated for Covid. [] No Covid Test needed for return.  [] Rapid Covid test needed before return within: [] 48 hours, [] 72 hours, [] 5 days, [] other  [x] PCR Covid test needed before return. [x] Confirmed with the patient or family that the plan is to return to this facility at D/C. [] Patient and/or designated decision maker does not plan for the patient to return to this facility at D/C. Non-ambulatory at baseline, w/c bound.     Electronically signed by Elena Quezada RN on 5/12/2022 at 10:31 AM  .

## 2022-05-12 NOTE — PROGRESS NOTES
Clinical Pharmacy Note  Subcutaneous Anticoagulant Adjustment     Enoxaparin has been adjusted to 30 mg daily based on [facility] policy. Recent Labs     05/11/22  0933 05/12/22  0719   CREATININE 1.0 1.3*     Recent Labs     05/11/22  0933   HGB 12.2   HCT 37.0        Estimated Creatinine Clearance: 28 mL/min (A) (based on SCr of 1.3 mg/dL (H)). Pharmacist Review of Appropriate Use and Automatic Dose Adjustment of Subcutaneous Anticoagulants (Adult)    The guidance below is to provide initial recommendations for dosing. If recommended dose does not align well with patient's current clinical picture, communications with the care team will occur to determine most appropriate medication and dose. TABLE 1. ENOXAPARIN ROUTINE PROPHYLAXIS DOSING (Medically ill, routine surgery)   Patient Weight (kg)     50.9 and below 51 - 100.9 101 - 150.9 151 - 174.9 175 or greater         Estimated CrCl  (ml/min) 30 or greater   30 mg SUBQ daily   40 mg SUBQ daily 30 mg SUBQ BID  40 mg SUBQ BID 60mg SUBQ BID      15-29 UFH 5000 units SUBQ BID   30 mg SUBQ daily 30 mg SUBQ daily 40 mg SUBQ daily   60 mg SUBQ daily      Less than 15 or Dialysis UFH 5000 units SUBQ BID   UFH 5000 units SUBQ TID UFH 7500 units SUBQ TID       TABLE 2. ENOXAPARIN TREATMENT DOSING   (Based on 1mg/kg BID for DVT/PE/AFib)   Patient Weight (kg)     50.9 and below .9 151-189.9 190 or greater         Estimated CrCl  (ml/min) 30 or greater Recommend [facility] standardized UFH infusion, apixaban or rivaroxaban 1mg/kg SUBQ BID 1mg/kg SUBQ BID if anti-Xa levels are feasible per institution. Alternatively,  recommend switch to [facility] standardized UFH infusion     Recommend switch to [facility] standardized UFH infusion. 15-29 Recommend [facility] standardized UFH infusion or apixaban 1mg/kg SUBQ daily Recommend switch to [facility] standardized UFH infusion     Less than 15 or Dialysis Recommend switch to [facility] standardized UFH infusion.      JOSE Perez [facility] 5/12/2022 8:50 AM

## 2022-05-12 NOTE — PLAN OF CARE
Problem: Discharge Planning  Goal: Discharge to home or other facility with appropriate resources  5/12/2022 1112 by Mio Damon RN  Outcome: Progressing  Flowsheets (Taken 5/12/2022 0921)  Discharge to home or other facility with appropriate resources: Identify barriers to discharge with patient and caregiver     Problem: Skin/Tissue Integrity  Goal: Absence of new skin breakdown  Description: 1. Monitor for areas of redness and/or skin breakdown  2. Assess vascular access sites hourly  3. Every 4-6 hours minimum:  Change oxygen saturation probe site  4. Every 4-6 hours:  If on nasal continuous positive airway pressure, respiratory therapy assess nares and determine need for appliance change or resting period. 5/12/2022 1112 by Mio Damon RN  Outcome: Progressing  Note: Skin assessment performed each shift per protocol. Pt encouraged to reposition every two hours and often. [place] area kept clean , dry, and intact. Will continue to monitor and assess for skin breakdown. Problem: Safety - Adult  Goal: Free from fall injury  5/12/2022 1112 by Mio Damon RN  Outcome: Progressing  Note: Pt free from falls this shift. Non skid socks provided. Pt educated on use of call light as needed for assistance. Bed and chair alarm used. Call light always within reach. Pt able and agreeable to contact for safety appropriately.     5/11/2022 2243 by Michael You RN  Outcome: Progressing     Problem: ABCDS Injury Assessment  Goal: Absence of physical injury  5/12/2022 1112 by Mio Damon RN  Outcome: Progressing  5/11/2022 2243 by Michael You RN  Outcome: Progressing

## 2022-05-13 LAB — LACTIC ACID: 1.3 MMOL/L (ref 0.4–2)

## 2022-05-13 PROCEDURE — 2580000003 HC RX 258: Performed by: HOSPITALIST

## 2022-05-13 PROCEDURE — 2700000000 HC OXYGEN THERAPY PER DAY

## 2022-05-13 PROCEDURE — 6370000000 HC RX 637 (ALT 250 FOR IP): Performed by: HOSPITALIST

## 2022-05-13 PROCEDURE — 94761 N-INVAS EAR/PLS OXIMETRY MLT: CPT

## 2022-05-13 PROCEDURE — 6360000002 HC RX W HCPCS: Performed by: HOSPITALIST

## 2022-05-13 PROCEDURE — 94640 AIRWAY INHALATION TREATMENT: CPT

## 2022-05-13 PROCEDURE — 1200000000 HC SEMI PRIVATE

## 2022-05-13 PROCEDURE — 36415 COLL VENOUS BLD VENIPUNCTURE: CPT

## 2022-05-13 PROCEDURE — 83605 ASSAY OF LACTIC ACID: CPT

## 2022-05-13 RX ORDER — FUROSEMIDE 20 MG/1
20 TABLET ORAL DAILY
Status: DISCONTINUED | OUTPATIENT
Start: 2022-05-14 | End: 2022-05-16 | Stop reason: HOSPADM

## 2022-05-13 RX ADMIN — PANTOPRAZOLE SODIUM 40 MG: 40 TABLET, DELAYED RELEASE ORAL at 21:10

## 2022-05-13 RX ADMIN — METOCLOPRAMIDE 5 MG: 10 TABLET ORAL at 09:38

## 2022-05-13 RX ADMIN — GABAPENTIN 100 MG: 100 CAPSULE ORAL at 21:10

## 2022-05-13 RX ADMIN — HYDROXYCHLOROQUINE SULFATE 200 MG: 200 TABLET ORAL at 21:10

## 2022-05-13 RX ADMIN — BUSPIRONE HYDROCHLORIDE 10 MG: 10 TABLET ORAL at 12:29

## 2022-05-13 RX ADMIN — ASPIRIN 81 MG: 81 TABLET, COATED ORAL at 09:39

## 2022-05-13 RX ADMIN — LEVOTHYROXINE SODIUM 150 MCG: 0.07 TABLET ORAL at 09:37

## 2022-05-13 RX ADMIN — PANTOPRAZOLE SODIUM 40 MG: 40 TABLET, DELAYED RELEASE ORAL at 09:39

## 2022-05-13 RX ADMIN — POTASSIUM CHLORIDE 20 MEQ: 20 TABLET, EXTENDED RELEASE ORAL at 21:10

## 2022-05-13 RX ADMIN — FERROUS SULFATE TAB EC 324 MG (65 MG FE EQUIVALENT) 325 MG: 324 (65 FE) TABLET DELAYED RESPONSE at 09:37

## 2022-05-13 RX ADMIN — PIPERACILLIN AND TAZOBACTAM 3375 MG: 3; .375 INJECTION, POWDER, LYOPHILIZED, FOR SOLUTION INTRAVENOUS at 10:09

## 2022-05-13 RX ADMIN — BUSPIRONE HYDROCHLORIDE 10 MG: 10 TABLET ORAL at 16:37

## 2022-05-13 RX ADMIN — FERROUS SULFATE TAB EC 324 MG (65 MG FE EQUIVALENT) 325 MG: 324 (65 FE) TABLET DELAYED RESPONSE at 16:36

## 2022-05-13 RX ADMIN — MICONAZOLE NITRATE: 2 POWDER TOPICAL at 09:41

## 2022-05-13 RX ADMIN — PIPERACILLIN AND TAZOBACTAM 3375 MG: 3; .375 INJECTION, POWDER, LYOPHILIZED, FOR SOLUTION INTRAVENOUS at 16:36

## 2022-05-13 RX ADMIN — PIPERACILLIN AND TAZOBACTAM 3375 MG: 3; .375 INJECTION, POWDER, LYOPHILIZED, FOR SOLUTION INTRAVENOUS at 00:00

## 2022-05-13 RX ADMIN — SODIUM CHLORIDE, PRESERVATIVE FREE 10 ML: 5 INJECTION INTRAVENOUS at 22:42

## 2022-05-13 RX ADMIN — METOCLOPRAMIDE 5 MG: 10 TABLET ORAL at 12:28

## 2022-05-13 RX ADMIN — NIFEDIPINE 60 MG: 60 TABLET, EXTENDED RELEASE ORAL at 09:38

## 2022-05-13 RX ADMIN — FLUOXETINE 30 MG: 20 CAPSULE ORAL at 09:38

## 2022-05-13 RX ADMIN — LATANOPROST 1 DROP: 50 SOLUTION OPHTHALMIC at 21:28

## 2022-05-13 RX ADMIN — CETIRIZINE HYDROCHLORIDE 10 MG: 10 TABLET, FILM COATED ORAL at 09:39

## 2022-05-13 RX ADMIN — ENOXAPARIN SODIUM 30 MG: 100 INJECTION SUBCUTANEOUS at 21:11

## 2022-05-13 RX ADMIN — Medication 18 MG: at 21:10

## 2022-05-13 RX ADMIN — SUCRALFATE 1 G: 1 TABLET ORAL at 09:39

## 2022-05-13 RX ADMIN — SUCRALFATE 1 G: 1 TABLET ORAL at 16:36

## 2022-05-13 RX ADMIN — POLYETHYLENE GLYCOL 3350 17 G: 17 POWDER, FOR SOLUTION ORAL at 09:37

## 2022-05-13 RX ADMIN — MIRTAZAPINE 15 MG: 15 TABLET, FILM COATED ORAL at 21:10

## 2022-05-13 RX ADMIN — ALBUTEROL SULFATE 2.5 MG: 2.5 SOLUTION RESPIRATORY (INHALATION) at 12:02

## 2022-05-13 RX ADMIN — OXYBUTYNIN CHLORIDE 5 MG: 5 TABLET, EXTENDED RELEASE ORAL at 21:10

## 2022-05-13 RX ADMIN — SUCRALFATE 1 G: 1 TABLET ORAL at 06:24

## 2022-05-13 RX ADMIN — FOLIC ACID 1 MG: 1 TABLET ORAL at 09:38

## 2022-05-13 RX ADMIN — ANTACID TABLETS 500 MG: 500 TABLET, CHEWABLE ORAL at 09:37

## 2022-05-13 RX ADMIN — FLUTICASONE PROPIONATE 2 SPRAY: 50 SPRAY, METERED NASAL at 12:28

## 2022-05-13 RX ADMIN — MICONAZOLE NITRATE: 2 POWDER TOPICAL at 21:11

## 2022-05-13 RX ADMIN — ALBUTEROL SULFATE 2.5 MG: 2.5 SOLUTION RESPIRATORY (INHALATION) at 08:37

## 2022-05-13 RX ADMIN — GABAPENTIN 100 MG: 100 CAPSULE ORAL at 09:51

## 2022-05-13 RX ADMIN — PRAVASTATIN SODIUM 10 MG: 10 TABLET ORAL at 21:27

## 2022-05-13 RX ADMIN — ALBUTEROL SULFATE 2.5 MG: 2.5 SOLUTION RESPIRATORY (INHALATION) at 16:02

## 2022-05-13 RX ADMIN — AZITHROMYCIN MONOHYDRATE 500 MG: 500 TABLET ORAL at 19:24

## 2022-05-13 RX ADMIN — BUSPIRONE HYDROCHLORIDE 10 MG: 10 TABLET ORAL at 21:10

## 2022-05-13 RX ADMIN — METOCLOPRAMIDE 5 MG: 10 TABLET ORAL at 16:37

## 2022-05-13 RX ADMIN — FERROUS SULFATE TAB EC 324 MG (65 MG FE EQUIVALENT) 325 MG: 324 (65 FE) TABLET DELAYED RESPONSE at 12:28

## 2022-05-13 RX ADMIN — PIPERACILLIN AND TAZOBACTAM 3375 MG: 3; .375 INJECTION, POWDER, LYOPHILIZED, FOR SOLUTION INTRAVENOUS at 23:46

## 2022-05-13 RX ADMIN — POTASSIUM CHLORIDE 20 MEQ: 20 TABLET, EXTENDED RELEASE ORAL at 09:38

## 2022-05-13 RX ADMIN — HYDROXYCHLOROQUINE SULFATE 200 MG: 200 TABLET ORAL at 09:38

## 2022-05-13 RX ADMIN — PREDNISONE 5 MG: 5 TABLET ORAL at 09:39

## 2022-05-13 RX ADMIN — HYDRALAZINE HYDROCHLORIDE 10 MG: 10 TABLET, FILM COATED ORAL at 23:46

## 2022-05-13 RX ADMIN — HYDRALAZINE HYDROCHLORIDE 10 MG: 10 TABLET, FILM COATED ORAL at 09:38

## 2022-05-13 RX ADMIN — BUSPIRONE HYDROCHLORIDE 10 MG: 10 TABLET ORAL at 09:39

## 2022-05-13 RX ADMIN — FUROSEMIDE 20 MG: 20 TABLET ORAL at 09:39

## 2022-05-13 RX ADMIN — ALBUTEROL SULFATE 2.5 MG: 2.5 SOLUTION RESPIRATORY (INHALATION) at 20:26

## 2022-05-13 RX ADMIN — HYDRALAZINE HYDROCHLORIDE 10 MG: 10 TABLET, FILM COATED ORAL at 16:36

## 2022-05-13 RX ADMIN — Medication 1 TABLET: at 09:39

## 2022-05-13 ASSESSMENT — PAIN - FUNCTIONAL ASSESSMENT: PAIN_FUNCTIONAL_ASSESSMENT: PREVENTS OR INTERFERES WITH MANY ACTIVE NOT PASSIVE ACTIVITIES

## 2022-05-13 ASSESSMENT — PAIN DESCRIPTION - LOCATION: LOCATION: BACK

## 2022-05-13 ASSESSMENT — PAIN SCALES - GENERAL
PAINLEVEL_OUTOF10: 0
PAINLEVEL_OUTOF10: 7
PAINLEVEL_OUTOF10: 0
PAINLEVEL_OUTOF10: 0

## 2022-05-13 ASSESSMENT — PAIN DESCRIPTION - DESCRIPTORS: DESCRIPTORS: ACHING;DISCOMFORT

## 2022-05-13 ASSESSMENT — PAIN DESCRIPTION - ORIENTATION: ORIENTATION: LOWER

## 2022-05-13 NOTE — PLAN OF CARE
Problem: Discharge Planning  Goal: Discharge to home or other facility with appropriate resources  Outcome: Progressing  Flowsheets (Taken 5/12/2022 1952)  Discharge to home or other facility with appropriate resources:   Identify barriers to discharge with patient and caregiver   Arrange for needed discharge resources and transportation as appropriate   Identify discharge learning needs (meds, wound care, etc)     Problem: Skin/Tissue Integrity  Goal: Absence of new skin breakdown  Description: 1. Monitor for areas of redness and/or skin breakdown  2. Assess vascular access sites hourly  3. Every 4-6 hours minimum:  Change oxygen saturation probe site  4. Every 4-6 hours:  If on nasal continuous positive airway pressure, respiratory therapy assess nares and determine need for appliance change or resting period.   Outcome: Progressing     Problem: Safety - Adult  Goal: Free from fall injury  Outcome: Progressing  Flowsheets (Taken 5/13/2022 0154)  Free From Fall Injury:   Instruct family/caregiver on patient safety   Based on caregiver fall risk screen, instruct family/caregiver to ask for assistance with transferring infant if caregiver noted to have fall risk factors     Problem: ABCDS Injury Assessment  Goal: Absence of physical injury  Outcome: Progressing  Flowsheets (Taken 5/13/2022 0154)  Absence of Physical Injury: Implement safety measures based on patient assessment     Problem: Pain  Goal: Verbalizes/displays adequate comfort level or baseline comfort level  Outcome: Progressing

## 2022-05-13 NOTE — PROGRESS NOTES
Hospitalist Progress Note  5/13/2022 10:23 AM    PCP: No primary care provider on file.     [id number]     Date of Admission: 5/11/2022                                                                                                                     HOSPITAL COURSE    Patient demographics:  The patient  oBogie Klein is a 81 y.o. female     Significant past medical history:   Patient Active Problem List   Diagnosis    Essential hypertension    Rheumatoid arthritis ([facility]    Major depressive disorder    Acquired hypothyroidism    REJI (generalized anxiety disorder)    Mild intermittent asthma    PVD (peripheral vascular disease) ([facility]    Anemia    Symptomatic bradycardia    Chronic constipation    Chronic kidney disease    Chronic rhinitis    Encounter for long-term (current) use of other medications    Hiatal hernia with gastroesophageal reflux disease without esophagitis    Hyperlipidemia    Hypertensive kidney disease with chronic kidney disease stage II    MDD (major depressive disorder), recurrent episode, mild (HCC)    Myalgia and myositis    Numbness and tingling of both feet    Osteoporosis    Peripheral vascular disease of lower extremity (HCC)    Polymyalgia rheumatica (HCC)    Polypharmacy    Primary insomnia    Pure hypercholesterolemia    Sensorineural hearing loss (SNHL) of both ears    Spinal stenosis of lumbar region    Spondylosis of lumbosacral spine without myelopathy    Urge incontinence    Vitamin B12 deficiency    Vitamin D deficiency    Weight loss    Chronic mesenteric ischemia (HCC)    Paresthesia and pain of both upper extremities    Hyponatremia    Acute heart failure (HCC)    Hyperkalemia    Pneumonia         Presenting symptoms:      Diagnostic workup:      CONSULTS DURING ADMISSION :   None      Patient was diagnosed with:        Treatment while inpatient: Oral Daily    ferrous sulfate  325 mg Oral TID WC    FLUoxetine  30 mg Oral Daily    fluticasone  2 spray Nasal Daily    folic acid  1 mg Oral Daily    furosemide  20 mg Oral Daily    gabapentin  100 mg Oral BID    hydrALAZINE  10 mg Oral Q8H    hydroxychloroquine  200 mg Oral BID    latanoprost  1 drop Both Eyes Nightly    levothyroxine  150 mcg Oral Daily    melatonin  18 mg Oral Nightly    metoclopramide  5 mg Oral TID WC    mirtazapine  15 mg Oral Nightly    therapeutic multivitamin-minerals  1 tablet Oral Daily    NIFEdipine  60 mg Oral Daily    miconazole   Topical BID    oxybutynin  5 mg Oral Nightly    pantoprazole  40 mg Oral BID    potassium chloride  20 mEq Oral BID    pravastatin  10 mg Oral Nightly    predniSONE  5 mg Oral Daily    sucralfate  1 g Oral TID AC    sodium chloride flush  10 mL IntraVENous 2 times per day    albuterol  2.5 mg Nebulization Q4H WA    azithromycin  500 mg Oral Q24H    piperacillin-tazobactam  3,375 mg IntraVENous Q8H     Continuous Infusions:   sodium chloride 75 mL/hr at 05/12/22 1712    sodium chloride       PRN Meds:  albuterol sulfate HFA, cyclobenzaprine, loperamide, ondansetron, sodium chloride flush, sodium chloride, ondansetron **OR** ondansetron, acetaminophen **OR** acetaminophen    Vitals   Vitals /wt   Patient Vitals for the past 8 hrs:   BP Temp Temp src Pulse Resp SpO2 Weight   05/13/22 0837 -- -- -- -- -- 94 % --   05/13/22 0809 (!) 161/72 98.1 °F (36.7 °C) Oral 85 16 96 % --   05/13/22 0501 126/69 99.2 °F (37.3 °C) Oral 84 16 95 % --   05/13/22 0458 -- -- -- -- -- -- 142 lb 3.2 oz (64.5 kg)        72HR INTAKE/OUTPUT:      Intake/Output Summary (Last 24 hours) at 5/13/2022 1023  Last data filed at 5/13/2022 8002  Gross per 24 hour   Intake 2023 ml   Output 2125 ml   Net -102 ml       Exam:    Gen:   Alert and oriented ×3   Eyes: PERRL. No sclera icterus. No conjunctival injection. ENT: No discharge. Pharynx clear.  External appearance of ears and nose normal.  Neck: Trachea midline. No obvious mass. Resp: No accessory muscle use. No crackles. No wheezes. No rhonchi. CV: Regular rate. Regular rhythm. No murmur or rub. No edema. GI: Non-tender. Non-distended. No hernia. Skin: Warm, dry, normal texture and turgor. Lymph: No cervical LAD. No supraclavicular LAD. M/S: / Ext. No cyanosis. No clubbing. No joint deformity. Neuro: CN 2-12 are intact,  no neurologic deficits noted. PT/INR: No results for input(s): PROTIME, INR in the last 72 hours. APTT: No results for input(s): APTT in the last 72 hours. CBC:   Recent Labs     05/11/22 0933   WBC 12.4*   HGB 12.2   HCT 37.0   MCV 94.5          BMP:   Recent Labs     05/11/22  0933 05/12/22  0719    139   K 3.8 3.8    105   CO2 21 19*   BUN 25* 27*   CREATININE 1.0 1.3*       LIVER PROFILE:   Recent Labs     05/11/22 0933   ALKPHOS 62   AST 17   ALT 13   BILITOT <0.2     No results for input(s): AMYLASE in the last 72 hours. No results for input(s): LIPASE in the last 72 hours. UA:  Recent Labs     05/11/22  1940   WBCUA 412*   RBCUA 0       TROPONIN:   Recent Labs     05/11/22  0933 05/12/22  1910   TROPONINI 0.04* 0.05*       Lab Results   Component Value Date/Time    URRFLXCULT Yes 05/11/2022 07:40 PM       No results for input(s): TSHREFLEX in the last 72 hours. No components found for: [id number]  POC GLUCOSE:  No results for input(s): POCGLU in the last 72 hours. No results for input(s): LABA1C in the last 72 hours.  No results found for: LABA1C      ASSESSMENT AND PLAN      Acute respiratory failure with hypoxia  Likely due to aspiration pneumonia  Off oxygen today       Aspiration pneumonia  Start patient on Zosyn  Procalcitonin is elevated     SHANELL  Continue to monitor  Dc IVF  Resume home dose of lasix      Urinary tract infection  Urine culture is positive for E. coli  Sensitivity is pending  Continue on Zosyn    Lactic acidosis  Check lactic acid in a.m.     Anxiety depression F 41.9  Continue on home medication     Sepsis  Criteria with leukocytosis fever and lactic acidosis          Code Status: Full Code        Dispo -cc        The patient and / or the family were informed of the results of any tests, a time was given to answer questions, a plan was proposed and they agreed with plan. Elaina Maria MD    This note was transcribed using [id number] [employer]. Please disregard any translational errors.

## 2022-05-13 NOTE — PROGRESS NOTES
Pt has been restless. Poor sleep pattern. Was medicated with scheduled meds as well as PRN s' to help with this. Constantly on call light for trivial things, states 'Im helpless\" Max assist with ADLs' , toileting.  Low grade temp this am.

## 2022-05-13 NOTE — PLAN OF CARE
Problem: Discharge Planning  Goal: Discharge to home or other facility with appropriate resources  Outcome: Progressing  Flowsheets (Taken 5/13/2022 1559)  Discharge to home or other facility with appropriate resources:   Arrange for needed discharge resources and transportation as appropriate   Identify barriers to discharge with patient and caregiver     Problem: Skin/Tissue Integrity  Goal: Absence of new skin breakdown  Description: 1. Monitor for areas of redness and/or skin breakdown  2. Assess vascular access sites hourly  3. Every 4-6 hours minimum:  Change oxygen saturation probe site  4. Every 4-6 hours:  If on nasal continuous positive airway pressure, respiratory therapy assess nares and determine need for appliance change or resting period. Outcome: Progressing  Note: Skin assessment performed each shift per protocol. Pt encouraged to reposition every two hours and often. Melida are kept clean and dry. Will continue to monitor and assess for skin breakdown. Problem: Safety - Adult  Goal: Free from fall injury  Outcome: Progressing  Flowsheets (Taken 5/13/2022 1558)  Free From Fall Injury: Instruct family/caregiver on patient safety  Note: Patient remains free from accidental injury. Precautions taken to ensure safe environment including bed in lowest position, wheels locked, and call light within reach. Measures taken to prevent accidental needle sticks and proper patient Identification.         Problem: ABCDS Injury Assessment  Goal: Absence of physical injury  Outcome: Progressing     Problem: Pain  Goal: Verbalizes/displays adequate comfort level or baseline comfort level  Outcome: Progressing  Flowsheets (Taken 5/13/2022 1558)  Verbalizes/displays adequate comfort level or baseline comfort level:   Encourage patient to monitor pain and request assistance   Assess pain using appropriate pain scale   Administer analgesics based on type and severity of pain and evaluate response  Note: Pt able to express presence and absence of pain using numerical pain scale. Pt pain is managed by PRN analgesics as ordered by MD. Pain reassess after each interventions. Will continue to monitor as needed.

## 2022-05-14 PROBLEM — N39.0 UTI (URINARY TRACT INFECTION): Status: ACTIVE | Noted: 2022-05-14

## 2022-05-14 LAB
ANION GAP SERPL CALCULATED.3IONS-SCNC: 15 MMOL/L (ref 3–16)
BUN BLDV-MCNC: 13 MG/DL (ref 7–20)
CALCIUM SERPL-MCNC: 8.4 MG/DL (ref 8.3–10.6)
CHLORIDE BLD-SCNC: 105 MMOL/L (ref 99–110)
CO2: 20 MMOL/L (ref 21–32)
CREAT SERPL-MCNC: 1 MG/DL (ref 0.6–1.2)
GFR AFRICAN AMERICAN: >60
GFR NON-AFRICAN AMERICAN: 53
GLUCOSE BLD-MCNC: 104 MG/DL (ref 70–99)
ORGANISM: ABNORMAL
POTASSIUM SERPL-SCNC: 3.8 MMOL/L (ref 3.5–5.1)
SODIUM BLD-SCNC: 140 MMOL/L (ref 136–145)
URINE CULTURE, ROUTINE: ABNORMAL

## 2022-05-14 PROCEDURE — 6370000000 HC RX 637 (ALT 250 FOR IP): Performed by: HOSPITALIST

## 2022-05-14 PROCEDURE — 94761 N-INVAS EAR/PLS OXIMETRY MLT: CPT

## 2022-05-14 PROCEDURE — 94640 AIRWAY INHALATION TREATMENT: CPT

## 2022-05-14 PROCEDURE — 2580000003 HC RX 258: Performed by: INTERNAL MEDICINE

## 2022-05-14 PROCEDURE — 36415 COLL VENOUS BLD VENIPUNCTURE: CPT

## 2022-05-14 PROCEDURE — 6360000002 HC RX W HCPCS: Performed by: HOSPITALIST

## 2022-05-14 PROCEDURE — 1200000000 HC SEMI PRIVATE

## 2022-05-14 PROCEDURE — 80048 BASIC METABOLIC PNL TOTAL CA: CPT

## 2022-05-14 PROCEDURE — 6360000002 HC RX W HCPCS: Performed by: INTERNAL MEDICINE

## 2022-05-14 PROCEDURE — 2580000003 HC RX 258: Performed by: HOSPITALIST

## 2022-05-14 RX ADMIN — PREDNISONE 5 MG: 5 TABLET ORAL at 09:28

## 2022-05-14 RX ADMIN — SUCRALFATE 1 G: 1 TABLET ORAL at 17:24

## 2022-05-14 RX ADMIN — ENOXAPARIN SODIUM 30 MG: 100 INJECTION SUBCUTANEOUS at 21:33

## 2022-05-14 RX ADMIN — CETIRIZINE HYDROCHLORIDE 10 MG: 10 TABLET, FILM COATED ORAL at 09:27

## 2022-05-14 RX ADMIN — ALBUTEROL SULFATE 2.5 MG: 2.5 SOLUTION RESPIRATORY (INHALATION) at 21:13

## 2022-05-14 RX ADMIN — METOCLOPRAMIDE 5 MG: 10 TABLET ORAL at 17:23

## 2022-05-14 RX ADMIN — BUSPIRONE HYDROCHLORIDE 10 MG: 10 TABLET ORAL at 09:28

## 2022-05-14 RX ADMIN — MICONAZOLE NITRATE: 2 POWDER TOPICAL at 21:34

## 2022-05-14 RX ADMIN — METOCLOPRAMIDE 5 MG: 10 TABLET ORAL at 12:15

## 2022-05-14 RX ADMIN — LEVOTHYROXINE SODIUM 150 MCG: 0.07 TABLET ORAL at 09:27

## 2022-05-14 RX ADMIN — SODIUM CHLORIDE, PRESERVATIVE FREE 10 ML: 5 INJECTION INTRAVENOUS at 09:35

## 2022-05-14 RX ADMIN — PIPERACILLIN AND TAZOBACTAM 3375 MG: 3; .375 INJECTION, POWDER, LYOPHILIZED, FOR SOLUTION INTRAVENOUS at 09:31

## 2022-05-14 RX ADMIN — FOLIC ACID 1 MG: 1 TABLET ORAL at 09:27

## 2022-05-14 RX ADMIN — ALBUTEROL SULFATE 2.5 MG: 2.5 SOLUTION RESPIRATORY (INHALATION) at 06:41

## 2022-05-14 RX ADMIN — Medication 18 MG: at 21:32

## 2022-05-14 RX ADMIN — ALBUTEROL SULFATE 2.5 MG: 2.5 SOLUTION RESPIRATORY (INHALATION) at 16:34

## 2022-05-14 RX ADMIN — MIRTAZAPINE 15 MG: 15 TABLET, FILM COATED ORAL at 21:32

## 2022-05-14 RX ADMIN — ANTACID TABLETS 500 MG: 500 TABLET, CHEWABLE ORAL at 09:27

## 2022-05-14 RX ADMIN — GABAPENTIN 100 MG: 100 CAPSULE ORAL at 09:28

## 2022-05-14 RX ADMIN — HYDROXYCHLOROQUINE SULFATE 200 MG: 200 TABLET ORAL at 21:32

## 2022-05-14 RX ADMIN — FLUTICASONE PROPIONATE 2 SPRAY: 50 SPRAY, METERED NASAL at 09:26

## 2022-05-14 RX ADMIN — HYDRALAZINE HYDROCHLORIDE 10 MG: 10 TABLET, FILM COATED ORAL at 17:23

## 2022-05-14 RX ADMIN — FLUOXETINE 30 MG: 20 CAPSULE ORAL at 09:27

## 2022-05-14 RX ADMIN — PRAVASTATIN SODIUM 10 MG: 10 TABLET ORAL at 21:32

## 2022-05-14 RX ADMIN — FERROUS SULFATE TAB EC 324 MG (65 MG FE EQUIVALENT) 325 MG: 324 (65 FE) TABLET DELAYED RESPONSE at 12:15

## 2022-05-14 RX ADMIN — BUSPIRONE HYDROCHLORIDE 10 MG: 10 TABLET ORAL at 21:32

## 2022-05-14 RX ADMIN — SODIUM CHLORIDE, PRESERVATIVE FREE 10 ML: 5 INJECTION INTRAVENOUS at 21:33

## 2022-05-14 RX ADMIN — SUCRALFATE 1 G: 1 TABLET ORAL at 12:15

## 2022-05-14 RX ADMIN — ALBUTEROL SULFATE 2.5 MG: 2.5 SOLUTION RESPIRATORY (INHALATION) at 11:49

## 2022-05-14 RX ADMIN — BUSPIRONE HYDROCHLORIDE 10 MG: 10 TABLET ORAL at 17:23

## 2022-05-14 RX ADMIN — POTASSIUM CHLORIDE 20 MEQ: 20 TABLET, EXTENDED RELEASE ORAL at 21:32

## 2022-05-14 RX ADMIN — HYDRALAZINE HYDROCHLORIDE 10 MG: 10 TABLET, FILM COATED ORAL at 09:35

## 2022-05-14 RX ADMIN — NIFEDIPINE 60 MG: 60 TABLET, EXTENDED RELEASE ORAL at 09:30

## 2022-05-14 RX ADMIN — PANTOPRAZOLE SODIUM 40 MG: 40 TABLET, DELAYED RELEASE ORAL at 09:28

## 2022-05-14 RX ADMIN — POTASSIUM CHLORIDE 20 MEQ: 20 TABLET, EXTENDED RELEASE ORAL at 09:27

## 2022-05-14 RX ADMIN — OXYBUTYNIN CHLORIDE 5 MG: 5 TABLET, EXTENDED RELEASE ORAL at 21:32

## 2022-05-14 RX ADMIN — FUROSEMIDE 20 MG: 20 TABLET ORAL at 09:27

## 2022-05-14 RX ADMIN — METOCLOPRAMIDE 5 MG: 10 TABLET ORAL at 09:30

## 2022-05-14 RX ADMIN — SUCRALFATE 1 G: 1 TABLET ORAL at 06:22

## 2022-05-14 RX ADMIN — ASPIRIN 81 MG: 81 TABLET, COATED ORAL at 09:28

## 2022-05-14 RX ADMIN — Medication 1 TABLET: at 09:28

## 2022-05-14 RX ADMIN — HYDROXYCHLOROQUINE SULFATE 200 MG: 200 TABLET ORAL at 09:27

## 2022-05-14 RX ADMIN — BUSPIRONE HYDROCHLORIDE 10 MG: 10 TABLET ORAL at 13:49

## 2022-05-14 RX ADMIN — GABAPENTIN 100 MG: 100 CAPSULE ORAL at 21:31

## 2022-05-14 RX ADMIN — FERROUS SULFATE TAB EC 324 MG (65 MG FE EQUIVALENT) 325 MG: 324 (65 FE) TABLET DELAYED RESPONSE at 17:23

## 2022-05-14 RX ADMIN — MICONAZOLE NITRATE: 2 POWDER TOPICAL at 09:28

## 2022-05-14 RX ADMIN — CEFTRIAXONE 1000 MG: 1 INJECTION, POWDER, FOR SOLUTION INTRAMUSCULAR; INTRAVENOUS at 13:50

## 2022-05-14 RX ADMIN — POLYETHYLENE GLYCOL 3350 17 G: 17 POWDER, FOR SOLUTION ORAL at 09:28

## 2022-05-14 RX ADMIN — PANTOPRAZOLE SODIUM 40 MG: 40 TABLET, DELAYED RELEASE ORAL at 21:31

## 2022-05-14 RX ADMIN — FERROUS SULFATE TAB EC 324 MG (65 MG FE EQUIVALENT) 325 MG: 324 (65 FE) TABLET DELAYED RESPONSE at 09:26

## 2022-05-14 RX ADMIN — ACETAMINOPHEN 650 MG: 325 TABLET ORAL at 21:33

## 2022-05-14 ASSESSMENT — PAIN DESCRIPTION - LOCATION
LOCATION: ABDOMEN
LOCATION: ABDOMEN

## 2022-05-14 ASSESSMENT — PAIN SCALES - GENERAL
PAINLEVEL_OUTOF10: 0
PAINLEVEL_OUTOF10: 0
PAINLEVEL_OUTOF10: 5
PAINLEVEL_OUTOF10: 0
PAINLEVEL_OUTOF10: 0
PAINLEVEL_OUTOF10: 5

## 2022-05-14 ASSESSMENT — PAIN DESCRIPTION - DESCRIPTORS: DESCRIPTORS: ACHING

## 2022-05-14 ASSESSMENT — PAIN DESCRIPTION - ORIENTATION: ORIENTATION: MID

## 2022-05-14 NOTE — PLAN OF CARE
Problem: Discharge Planning  Goal: Discharge to home or other facility with appropriate resources  5/14/2022 0040 by Mimi Guan RN  Outcome: Progressing  Flowsheets (Taken 5/13/2022 2018)  Discharge to home or other facility with appropriate resources: Arrange for needed discharge resources and transportation as appropriate  5/13/2022 1558 by Kelli Jones RN  Outcome: Progressing  Flowsheets (Taken 5/13/2022 1558)  Discharge to home or other facility with appropriate resources:   Arrange for needed discharge resources and transportation as appropriate   Identify barriers to discharge with patient and caregiver     Problem: Skin/Tissue Integrity  Goal: Absence of new skin breakdown  Description: 1. Monitor for areas of redness and/or skin breakdown  2. Assess vascular access sites hourly  3. Every 4-6 hours minimum:  Change oxygen saturation probe site  4. Every 4-6 hours:  If on nasal continuous positive airway pressure, respiratory therapy assess nares and determine need for appliance change or resting period. 5/14/2022 0040 by Mimi Guan RN  Outcome: Progressing  5/13/2022 1558 by Kelli Jones RN  Outcome: Progressing  Note: Skin assessment performed each shift per protocol. Pt encouraged to reposition every two hours and often. Melida are kept clean and dry. Will continue to monitor and assess for skin breakdown.         Problem: Safety - Adult  Goal: Free from fall injury  5/14/2022 0040 by Mimi Guan RN  Outcome: Progressing  Flowsheets (Taken 5/14/2022 0038)  Free From Fall Injury:   Instruct family/caregiver on patient safety   Based on caregiver fall risk screen, instruct family/caregiver to ask for assistance with transferring infant if caregiver noted to have fall risk factors  5/13/2022 1558 by Kelli Jones RN  Outcome: Progressing  Flowsheets (Taken 5/13/2022 1558)  Free From Fall Injury: Instruct family/caregiver on patient safety  Note: Patient remains free from accidental injury. Precautions taken to ensure safe environment including bed in lowest position, wheels locked, and call light within reach. Measures taken to prevent accidental needle sticks and proper patient Identification.         Problem: ABCDS Injury Assessment  Goal: Absence of physical injury  5/14/2022 0040 by Julio Quijano RN  Outcome: Progressing  Flowsheets (Taken 5/14/2022 0038)  Absence of Physical Injury: Implement safety measures based on patient assessment  5/13/2022 1558 by Chris Aguirre RN  Outcome: Progressing     Problem: Pain  Goal: Verbalizes/displays adequate comfort level or baseline comfort level  5/14/2022 0040 by Julio Quijano RN  Outcome: Progressing  Flowsheets  Taken 5/13/2022 2310  Verbalizes/displays adequate comfort level or baseline comfort level:   Encourage patient to monitor pain and request assistance   Assess pain using appropriate pain scale   Administer analgesics based on type and severity of pain and evaluate response   Implement non-pharmacological measures as appropriate and evaluate response   Consider cultural and social influences on pain and pain management   Notify Licensed Independent Practitioner if interventions unsuccessful or patient reports new pain  Taken 5/13/2022 2018  Verbalizes/displays adequate comfort level or baseline comfort level:   Encourage patient to monitor pain and request assistance   Assess pain using appropriate pain scale   Administer analgesics based on type and severity of pain and evaluate response   Implement non-pharmacological measures as appropriate and evaluate response   Consider cultural and social influences on pain and pain management   Notify Licensed Independent Practitioner if interventions unsuccessful or patient reports new pain  5/13/2022 1558 by Chris Aguirre RN  Outcome: Progressing  Flowsheets (Taken 5/13/2022 1558)  Verbalizes/displays adequate comfort level or baseline comfort level:   Encourage patient to monitor pain and request assistance   Assess pain using appropriate pain scale   Administer analgesics based on type and severity of pain and evaluate response  Note: Pt able to express presence and absence of pain using numerical pain scale. Pt pain is managed by PRN analgesics as ordered by MD. Pain reassess after each interventions. Will continue to monitor as needed.

## 2022-05-14 NOTE — PROGRESS NOTES
Hospitalist Progress Note      PCP: No primary care provider on file. Date of Admission: 5/11/2022        Hospital Course:  Pt admitted from a SNF with concern for aspiration. Found on admission to have E. Coli UTI. Subjective: pt seen and examined. On RA. ST assessment not concerning for ongoing aspiration. Pt c/o abdominal pain today. Discomfort with palpation. Has had a BM. CT abdomen on admission with no acute pathology.        Medications:  Reviewed    Infusion Medications    sodium chloride       Scheduled Medications    cefTRIAXone (ROCEPHIN) IV  1,000 mg IntraVENous Q24H    furosemide  20 mg Oral Daily    enoxaparin  30 mg SubCUTAneous Nightly    polyethylene glycol  17 g Oral Daily    aspirin  81 mg Oral Daily    busPIRone  10 mg Oral 4x Daily    calcium carbonate  500 mg Oral Daily    cetirizine  10 mg Oral Daily    ferrous sulfate  325 mg Oral TID WC    FLUoxetine  30 mg Oral Daily    fluticasone  2 spray Nasal Daily    folic acid  1 mg Oral Daily    gabapentin  100 mg Oral BID    hydrALAZINE  10 mg Oral Q8H    hydroxychloroquine  200 mg Oral BID    latanoprost  1 drop Both Eyes Nightly    levothyroxine  150 mcg Oral Daily    melatonin  18 mg Oral Nightly    metoclopramide  5 mg Oral TID WC    mirtazapine  15 mg Oral Nightly    therapeutic multivitamin-minerals  1 tablet Oral Daily    NIFEdipine  60 mg Oral Daily    miconazole   Topical BID    oxybutynin  5 mg Oral Nightly    pantoprazole  40 mg Oral BID    potassium chloride  20 mEq Oral BID    pravastatin  10 mg Oral Nightly    predniSONE  5 mg Oral Daily    sucralfate  1 g Oral TID AC    sodium chloride flush  10 mL IntraVENous 2 times per day    albuterol  2.5 mg Nebulization Q4H WA     PRN Meds: albuterol sulfate HFA, cyclobenzaprine, loperamide, ondansetron, sodium chloride flush, sodium chloride, ondansetron **OR** ondansetron, acetaminophen **OR** acetaminophen      Intake/Output Summary (Last 24 hours) at 5/14/2022 1307  Last data filed at 5/14/2022 0935  Gross per 24 hour   Intake 670 ml   Output 1600 ml   Net -930 ml       Physical Exam Performed:    BP (!) 160/77   Pulse 78   Temp 99.2 °F (37.3 °C) (Oral)   Resp 16   Ht 5' (1.524 m)   Wt 134 lb 7.7 oz (61 kg)   SpO2 93%   Breastfeeding No   BMI 26.26 kg/m²     General appearance: No apparent distress, appears stated age and cooperative. HEENT: Pupils equal, round, and reactive to light. Conjunctivae/corneas clear. Respiratory:  Normal respiratory effort. Clear to auscultation  Cardiovascular: Regular rate and rhythm with normal S1/S2 without murmurs, rubs or gallops. Abdomen: Soft, mild discomfort to palpation  Musculoskeletal: No edema bilaterally. Neurologic:   grossly non-focal.  Psychiatric: Alert and oriented, thought content appropriate, normal insight      Labs:   No results for input(s): WBC, HGB, HCT, PLT in the last 72 hours. Recent Labs     05/12/22  0719 05/14/22  0742    140   K 3.8 3.8    105   CO2 19* 20*   BUN 27* 13   CREATININE 1.3* 1.0   CALCIUM 8.1* 8.4     No results for input(s): AST, ALT, BILIDIR, BILITOT, ALKPHOS in the last 72 hours. No results for input(s): INR in the last 72 hours. Recent Labs     05/12/22  1910   TROPONINI 0.05*       Urinalysis:      Lab Results   Component Value Date    NITRU POSITIVE 05/11/2022    WBCUA 412 05/11/2022    BACTERIA 4+ 05/11/2022    RBCUA 0 05/11/2022    BLOODU Negative 05/11/2022    SPECGRAV 1.012 05/11/2022    GLUCOSEU Negative 05/11/2022       Radiology:  XR CHEST PORTABLE   Final Result   No radiographic evidence of acute pulmonary disease. CT CHEST ABDOMEN PELVIS WO CONTRAST   Final Result   1. Bibasilar aspiration pneumonia. 2. No acute abdominopelvic abnormality.                  Assessment/Plan:    Active Hospital Problems    Diagnosis     UTI (urinary tract infection) [N39.0]      Priority: Medium    Pneumonia [J18.9]      Priority: Medium    Essential hypertension [I10]     Rheumatoid arthritis ([facility] [M06.9]     Acquired hypothyroidism [E03.9]      cont abx for UTI , transition to po at discharge . Follow up on sensitivities  Cont pulmo toilet   Cont home meds   Monitor BP  Bowel regimen   repeat labs in AM to ensure improvement     DVT Prophylaxis: lovenox  Diet: ADULT DIET; Regular; No Added Salt (3-4 gm); 2000 ml  Code Status: Full Code    PT/OT     Dispo - possible dc to ECF in 1-2 days pending improved labs and cont improvement in pt.     Aleksandr Bolden MD

## 2022-05-14 NOTE — PLAN OF CARE
Problem: Discharge Planning  Goal: Discharge to home or other facility with appropriate resources  5/14/2022 1140 by Antwan Taylor RN  Outcome: Progressing  5/14/2022 0040 by Kiera Esparza RN  Outcome: Progressing  Flowsheets (Taken 5/13/2022 2018)  Discharge to home or other facility with appropriate resources: Arrange for needed discharge resources and transportation as appropriate     Problem: Skin/Tissue Integrity  Goal: Absence of new skin breakdown  Description: 1. Monitor for areas of redness and/or skin breakdown  2. Assess vascular access sites hourly  3. Every 4-6 hours minimum:  Change oxygen saturation probe site  4. Every 4-6 hours:  If on nasal continuous positive airway pressure, respiratory therapy assess nares and determine need for appliance change or resting period. 5/14/2022 1140 by Antwan Taylor RN  Outcome: Progressing  Note: Skin assessment performed each shift per protocol. Pt encouraged to reposition every two hours and often. Melida care provided. Will continue to monitor and assess for skin breakdown.      5/14/2022 0040 by Kiera Esparza RN  Outcome: Progressing     Problem: Safety - Adult  Goal: Free from fall injury  5/14/2022 1140 by Antwan Taylor RN  Outcome: Progressing  5/14/2022 0040 by Kiera Esparza RN  Outcome: Progressing  Flowsheets (Taken 5/14/2022 0038)  Free From Fall Injury:   Instruct family/caregiver on patient safety   Based on caregiver fall risk screen, instruct family/caregiver to ask for assistance with transferring infant if caregiver noted to have fall risk factors     Problem: ABCDS Injury Assessment  Goal: Absence of physical injury  5/14/2022 1140 by Antwan Taylor RN  Outcome: Progressing  5/14/2022 0040 by Kiera Esparza RN  Outcome: Progressing  Flowsheets (Taken 5/14/2022 0038)  Absence of Physical Injury: Implement safety measures based on patient assessment     Problem: Pain  Goal: Verbalizes/displays adequate comfort level or baseline comfort level  5/14/2022 1140 by Serafin Zavala RN  Outcome: Progressing  Flowsheets (Taken 5/14/2022 1140)  Verbalizes/displays adequate comfort level or baseline comfort level:   Encourage patient to monitor pain and request assistance   Administer analgesics based on type and severity of pain and evaluate response   Assess pain using appropriate pain scale  Note: Pt able to express presence and absence of pain using numerical pain scale. Pt pain is managed by PRN analgesics as ordered by MD. Pain reassess after each interventions. Will continue to monitor as needed.      5/14/2022 0040 by Orren Mortimer, RN  Outcome: Progressing  Flowsheets  Taken 5/13/2022 2310  Verbalizes/displays adequate comfort level or baseline comfort level:   Encourage patient to monitor pain and request assistance   Assess pain using appropriate pain scale   Administer analgesics based on type and severity of pain and evaluate response   Implement non-pharmacological measures as appropriate and evaluate response   Consider cultural and social influences on pain and pain management   Notify Licensed Independent Practitioner if interventions unsuccessful or patient reports new pain  Taken 5/13/2022 2018  Verbalizes/displays adequate comfort level or baseline comfort level:   Encourage patient to monitor pain and request assistance   Assess pain using appropriate pain scale   Administer analgesics based on type and severity of pain and evaluate response   Implement non-pharmacological measures as appropriate and evaluate response   Consider cultural and social influences on pain and pain management   Notify Licensed Independent Practitioner if interventions unsuccessful or patient reports new pain

## 2022-05-15 LAB
A/G RATIO: 1.4 (ref 1.1–2.2)
ALBUMIN SERPL-MCNC: 3.4 G/DL (ref 3.4–5)
ALP BLD-CCNC: 61 U/L (ref 40–129)
ALT SERPL-CCNC: 11 U/L (ref 10–40)
ANION GAP SERPL CALCULATED.3IONS-SCNC: 13 MMOL/L (ref 3–16)
ANION GAP SERPL CALCULATED.3IONS-SCNC: 15 MMOL/L (ref 3–16)
AST SERPL-CCNC: 14 U/L (ref 15–37)
BASOPHILS ABSOLUTE: 0.1 K/UL (ref 0–0.2)
BASOPHILS RELATIVE PERCENT: 0.8 %
BILIRUB SERPL-MCNC: <0.2 MG/DL (ref 0–1)
BLOOD CULTURE, ROUTINE: NORMAL
BUN BLDV-MCNC: 13 MG/DL (ref 7–20)
BUN BLDV-MCNC: 13 MG/DL (ref 7–20)
CALCIUM SERPL-MCNC: 8.2 MG/DL (ref 8.3–10.6)
CALCIUM SERPL-MCNC: 8.4 MG/DL (ref 8.3–10.6)
CHLORIDE BLD-SCNC: 103 MMOL/L (ref 99–110)
CHLORIDE BLD-SCNC: 105 MMOL/L (ref 99–110)
CO2: 21 MMOL/L (ref 21–32)
CO2: 22 MMOL/L (ref 21–32)
CREAT SERPL-MCNC: 0.8 MG/DL (ref 0.6–1.2)
CREAT SERPL-MCNC: 0.9 MG/DL (ref 0.6–1.2)
CULTURE, BLOOD 2: NORMAL
EOSINOPHILS ABSOLUTE: 0.1 K/UL (ref 0–0.6)
EOSINOPHILS RELATIVE PERCENT: 1.3 %
GFR AFRICAN AMERICAN: >60
GFR AFRICAN AMERICAN: >60
GFR NON-AFRICAN AMERICAN: >60
GFR NON-AFRICAN AMERICAN: >60
GLUCOSE BLD-MCNC: 109 MG/DL (ref 70–99)
GLUCOSE BLD-MCNC: 115 MG/DL (ref 70–99)
HCT VFR BLD CALC: 32.6 % (ref 36–48)
HEMOGLOBIN: 10.9 G/DL (ref 12–16)
LACTIC ACID: 1 MMOL/L (ref 0.4–2)
LYMPHOCYTES ABSOLUTE: 1.1 K/UL (ref 1–5.1)
LYMPHOCYTES RELATIVE PERCENT: 14.2 %
MCH RBC QN AUTO: 31.4 PG (ref 26–34)
MCHC RBC AUTO-ENTMCNC: 33.6 G/DL (ref 31–36)
MCV RBC AUTO: 93.6 FL (ref 80–100)
MONOCYTES ABSOLUTE: 0.7 K/UL (ref 0–1.3)
MONOCYTES RELATIVE PERCENT: 8.5 %
NEUTROPHILS ABSOLUTE: 6 K/UL (ref 1.7–7.7)
NEUTROPHILS RELATIVE PERCENT: 75.2 %
PDW BLD-RTO: 15.8 % (ref 12.4–15.4)
PLATELET # BLD: 207 K/UL (ref 135–450)
PMV BLD AUTO: 8.4 FL (ref 5–10.5)
POTASSIUM SERPL-SCNC: 3.6 MMOL/L (ref 3.5–5.1)
POTASSIUM SERPL-SCNC: 3.7 MMOL/L (ref 3.5–5.1)
PROCALCITONIN: 3.56 NG/ML (ref 0–0.15)
RBC # BLD: 3.48 M/UL (ref 4–5.2)
SODIUM BLD-SCNC: 138 MMOL/L (ref 136–145)
SODIUM BLD-SCNC: 141 MMOL/L (ref 136–145)
TOTAL PROTEIN: 5.9 G/DL (ref 6.4–8.2)
WBC # BLD: 8 K/UL (ref 4–11)

## 2022-05-15 PROCEDURE — 94640 AIRWAY INHALATION TREATMENT: CPT

## 2022-05-15 PROCEDURE — 94760 N-INVAS EAR/PLS OXIMETRY 1: CPT

## 2022-05-15 PROCEDURE — 6360000002 HC RX W HCPCS: Performed by: HOSPITALIST

## 2022-05-15 PROCEDURE — 2580000003 HC RX 258: Performed by: HOSPITALIST

## 2022-05-15 PROCEDURE — 6370000000 HC RX 637 (ALT 250 FOR IP): Performed by: HOSPITALIST

## 2022-05-15 PROCEDURE — 1200000000 HC SEMI PRIVATE

## 2022-05-15 PROCEDURE — 85025 COMPLETE CBC W/AUTO DIFF WBC: CPT

## 2022-05-15 PROCEDURE — 36415 COLL VENOUS BLD VENIPUNCTURE: CPT

## 2022-05-15 PROCEDURE — 83605 ASSAY OF LACTIC ACID: CPT

## 2022-05-15 PROCEDURE — 6360000002 HC RX W HCPCS: Performed by: INTERNAL MEDICINE

## 2022-05-15 PROCEDURE — 2580000003 HC RX 258: Performed by: INTERNAL MEDICINE

## 2022-05-15 PROCEDURE — 80053 COMPREHEN METABOLIC PANEL: CPT

## 2022-05-15 PROCEDURE — 84145 PROCALCITONIN (PCT): CPT

## 2022-05-15 RX ADMIN — HYDRALAZINE HYDROCHLORIDE 10 MG: 10 TABLET, FILM COATED ORAL at 17:08

## 2022-05-15 RX ADMIN — CEFTRIAXONE 1000 MG: 1 INJECTION, POWDER, FOR SOLUTION INTRAMUSCULAR; INTRAVENOUS at 13:45

## 2022-05-15 RX ADMIN — HYDRALAZINE HYDROCHLORIDE 10 MG: 10 TABLET, FILM COATED ORAL at 22:22

## 2022-05-15 RX ADMIN — ALBUTEROL SULFATE 2.5 MG: 2.5 SOLUTION RESPIRATORY (INHALATION) at 19:35

## 2022-05-15 RX ADMIN — ALBUTEROL SULFATE 2.5 MG: 2.5 SOLUTION RESPIRATORY (INHALATION) at 08:40

## 2022-05-15 RX ADMIN — GABAPENTIN 100 MG: 100 CAPSULE ORAL at 09:43

## 2022-05-15 RX ADMIN — GABAPENTIN 100 MG: 100 CAPSULE ORAL at 22:03

## 2022-05-15 RX ADMIN — PANTOPRAZOLE SODIUM 40 MG: 40 TABLET, DELAYED RELEASE ORAL at 22:04

## 2022-05-15 RX ADMIN — POTASSIUM CHLORIDE 20 MEQ: 20 TABLET, EXTENDED RELEASE ORAL at 09:42

## 2022-05-15 RX ADMIN — NIFEDIPINE 60 MG: 60 TABLET, EXTENDED RELEASE ORAL at 09:43

## 2022-05-15 RX ADMIN — BUSPIRONE HYDROCHLORIDE 10 MG: 10 TABLET ORAL at 17:08

## 2022-05-15 RX ADMIN — POTASSIUM CHLORIDE 20 MEQ: 20 TABLET, EXTENDED RELEASE ORAL at 22:04

## 2022-05-15 RX ADMIN — CETIRIZINE HYDROCHLORIDE 10 MG: 10 TABLET, FILM COATED ORAL at 09:42

## 2022-05-15 RX ADMIN — LEVOTHYROXINE SODIUM 150 MCG: 0.07 TABLET ORAL at 09:43

## 2022-05-15 RX ADMIN — FLUOXETINE 30 MG: 20 CAPSULE ORAL at 09:42

## 2022-05-15 RX ADMIN — FOLIC ACID 1 MG: 1 TABLET ORAL at 09:43

## 2022-05-15 RX ADMIN — MIRTAZAPINE 15 MG: 15 TABLET, FILM COATED ORAL at 22:04

## 2022-05-15 RX ADMIN — ENOXAPARIN SODIUM 30 MG: 100 INJECTION SUBCUTANEOUS at 22:02

## 2022-05-15 RX ADMIN — Medication 1 TABLET: at 09:43

## 2022-05-15 RX ADMIN — POLYETHYLENE GLYCOL 3350 17 G: 17 POWDER, FOR SOLUTION ORAL at 09:42

## 2022-05-15 RX ADMIN — PRAVASTATIN SODIUM 10 MG: 10 TABLET ORAL at 22:03

## 2022-05-15 RX ADMIN — PREDNISONE 5 MG: 5 TABLET ORAL at 09:43

## 2022-05-15 RX ADMIN — SUCRALFATE 1 G: 1 TABLET ORAL at 11:57

## 2022-05-15 RX ADMIN — OXYBUTYNIN CHLORIDE 5 MG: 5 TABLET, EXTENDED RELEASE ORAL at 22:04

## 2022-05-15 RX ADMIN — METOCLOPRAMIDE 5 MG: 10 TABLET ORAL at 11:56

## 2022-05-15 RX ADMIN — BUSPIRONE HYDROCHLORIDE 10 MG: 10 TABLET ORAL at 22:04

## 2022-05-15 RX ADMIN — ASPIRIN 81 MG: 81 TABLET, COATED ORAL at 09:43

## 2022-05-15 RX ADMIN — LATANOPROST 1 DROP: 50 SOLUTION OPHTHALMIC at 22:25

## 2022-05-15 RX ADMIN — SUCRALFATE 1 G: 1 TABLET ORAL at 05:32

## 2022-05-15 RX ADMIN — ALBUTEROL SULFATE 2.5 MG: 2.5 SOLUTION RESPIRATORY (INHALATION) at 12:57

## 2022-05-15 RX ADMIN — BUSPIRONE HYDROCHLORIDE 10 MG: 10 TABLET ORAL at 13:45

## 2022-05-15 RX ADMIN — FLUTICASONE PROPIONATE 2 SPRAY: 50 SPRAY, METERED NASAL at 09:43

## 2022-05-15 RX ADMIN — FERROUS SULFATE TAB EC 324 MG (65 MG FE EQUIVALENT) 325 MG: 324 (65 FE) TABLET DELAYED RESPONSE at 11:57

## 2022-05-15 RX ADMIN — FUROSEMIDE 20 MG: 20 TABLET ORAL at 09:42

## 2022-05-15 RX ADMIN — SODIUM CHLORIDE, PRESERVATIVE FREE 10 ML: 5 INJECTION INTRAVENOUS at 22:05

## 2022-05-15 RX ADMIN — MICONAZOLE NITRATE: 2 POWDER TOPICAL at 22:06

## 2022-05-15 RX ADMIN — HYDROXYCHLOROQUINE SULFATE 200 MG: 200 TABLET ORAL at 09:43

## 2022-05-15 RX ADMIN — BUSPIRONE HYDROCHLORIDE 10 MG: 10 TABLET ORAL at 09:42

## 2022-05-15 RX ADMIN — ANTACID TABLETS 500 MG: 500 TABLET, CHEWABLE ORAL at 09:42

## 2022-05-15 RX ADMIN — SODIUM CHLORIDE, PRESERVATIVE FREE 10 ML: 5 INJECTION INTRAVENOUS at 09:43

## 2022-05-15 RX ADMIN — SUCRALFATE 1 G: 1 TABLET ORAL at 17:19

## 2022-05-15 RX ADMIN — HYDRALAZINE HYDROCHLORIDE 10 MG: 10 TABLET, FILM COATED ORAL at 09:55

## 2022-05-15 RX ADMIN — FERROUS SULFATE TAB EC 324 MG (65 MG FE EQUIVALENT) 325 MG: 324 (65 FE) TABLET DELAYED RESPONSE at 17:08

## 2022-05-15 RX ADMIN — METOCLOPRAMIDE 5 MG: 10 TABLET ORAL at 09:47

## 2022-05-15 RX ADMIN — Medication 18 MG: at 22:03

## 2022-05-15 RX ADMIN — HYDRALAZINE HYDROCHLORIDE 10 MG: 10 TABLET, FILM COATED ORAL at 01:08

## 2022-05-15 RX ADMIN — FERROUS SULFATE TAB EC 324 MG (65 MG FE EQUIVALENT) 325 MG: 324 (65 FE) TABLET DELAYED RESPONSE at 09:41

## 2022-05-15 RX ADMIN — ALBUTEROL SULFATE 2.5 MG: 2.5 SOLUTION RESPIRATORY (INHALATION) at 16:05

## 2022-05-15 RX ADMIN — METOCLOPRAMIDE 5 MG: 10 TABLET ORAL at 17:08

## 2022-05-15 RX ADMIN — PANTOPRAZOLE SODIUM 40 MG: 40 TABLET, DELAYED RELEASE ORAL at 09:42

## 2022-05-15 RX ADMIN — HYDROXYCHLOROQUINE SULFATE 200 MG: 200 TABLET ORAL at 22:02

## 2022-05-15 ASSESSMENT — PAIN DESCRIPTION - ORIENTATION: ORIENTATION: MID;LOWER;RIGHT;LEFT

## 2022-05-15 ASSESSMENT — PAIN DESCRIPTION - DESCRIPTORS: DESCRIPTORS: ACHING

## 2022-05-15 ASSESSMENT — PAIN DESCRIPTION - PAIN TYPE: TYPE: CHRONIC PAIN

## 2022-05-15 ASSESSMENT — PAIN - FUNCTIONAL ASSESSMENT: PAIN_FUNCTIONAL_ASSESSMENT: ACTIVITIES ARE NOT PREVENTED

## 2022-05-15 ASSESSMENT — PAIN DESCRIPTION - ONSET: ONSET: ON-GOING

## 2022-05-15 ASSESSMENT — PAIN SCALES - GENERAL
PAINLEVEL_OUTOF10: 3
PAINLEVEL_OUTOF10: 0

## 2022-05-15 ASSESSMENT — PAIN DESCRIPTION - FREQUENCY: FREQUENCY: INTERMITTENT

## 2022-05-15 ASSESSMENT — PAIN DESCRIPTION - LOCATION: LOCATION: ABDOMEN

## 2022-05-15 NOTE — PLAN OF CARE
Problem: Discharge Planning  Goal: Discharge to home or other facility with appropriate resources  Outcome: Progressing     Problem: Skin/Tissue Integrity  Goal: Absence of new skin breakdown  Description: 1. Monitor for areas of redness and/or skin breakdown  2. Assess vascular access sites hourly  3. Every 4-6 hours minimum:  Change oxygen saturation probe site  4. Every 4-6 hours:  If on nasal continuous positive airway pressure, respiratory therapy assess nares and determine need for appliance change or resting period. Outcome: Progressing  Note: Skin assessment performed each shift per protocol. Pt encouraged to reposition every two hours and often. Melida area kept clean and dry. Buttocks red-preventative dressing in place. Will continue to monitor and assess for skin breakdown. Problem: Safety - Adult  Goal: Free from fall injury  Outcome: Progressing  Flowsheets (Taken 5/15/2022 1041)  Free From Fall Injury: Instruct family/caregiver on patient safety  Note: Pt free from falls this shift. Nn skid socks provided. Pt educated on use of call light as needed for assistance. Bed alarm used. Call light always within reach. Pt able and agreeable to contact for safety appropriately. Problem: ABCDS Injury Assessment  Goal: Absence of physical injury  Outcome: Progressing     Problem: Pain  Goal: Verbalizes/displays adequate comfort level or baseline comfort level  Outcome: Progressing  Flowsheets (Taken 5/15/2022 1041)  Verbalizes/displays adequate comfort level or baseline comfort level:   Encourage patient to monitor pain and request assistance   Assess pain using appropriate pain scale   Administer analgesics based on type and severity of pain and evaluate response  Note: Pt able to express presence and absence of pain using numerical pain scale. Pt pain is managed by PRN analgesics as ordered by MD. Pain reassess after each interventions. Will continue to monitor as needed.

## 2022-05-15 NOTE — PROGRESS NOTES
Pt alert and oriented x 4. Confused at times. Pt reports mid abdominal , rates pain 3/10, achy pain and intermittent per pt. Pt states its been bothering her for 6 months. Pt had black tarry stool  Will let MD know. Pt denies other complains. Pt repositioned in bed. Bed alarm on.  Call light and item need in reach Electronically signed by Nanci Durant RN on 5/15/2022 at 10:40 AM

## 2022-05-15 NOTE — PROGRESS NOTES
Hospitalist Progress Note      PCP: No primary care provider on file. Date of Admission: 5/11/2022        Hospital Course:  Pt admitted from a SNF with concern for aspiration. Found on admission to have E. Coli UTI. Subjective:   pt seen and examined. On RA. Feeling better  ST assessment not concerning for ongoing aspiration. Pt c/o abdominal pain today. Discomfort with palpation. Has had a BM. CT abdomen on admission with no acute pathology.        Medications:  Reviewed    Infusion Medications    sodium chloride       Scheduled Medications    cefTRIAXone (ROCEPHIN) IV  1,000 mg IntraVENous Q24H    furosemide  20 mg Oral Daily    enoxaparin  30 mg SubCUTAneous Nightly    polyethylene glycol  17 g Oral Daily    aspirin  81 mg Oral Daily    busPIRone  10 mg Oral 4x Daily    calcium carbonate  500 mg Oral Daily    cetirizine  10 mg Oral Daily    ferrous sulfate  325 mg Oral TID WC    FLUoxetine  30 mg Oral Daily    fluticasone  2 spray Nasal Daily    folic acid  1 mg Oral Daily    gabapentin  100 mg Oral BID    hydrALAZINE  10 mg Oral Q8H    hydroxychloroquine  200 mg Oral BID    latanoprost  1 drop Both Eyes Nightly    levothyroxine  150 mcg Oral Daily    melatonin  18 mg Oral Nightly    metoclopramide  5 mg Oral TID WC    mirtazapine  15 mg Oral Nightly    therapeutic multivitamin-minerals  1 tablet Oral Daily    NIFEdipine  60 mg Oral Daily    miconazole   Topical BID    oxybutynin  5 mg Oral Nightly    pantoprazole  40 mg Oral BID    potassium chloride  20 mEq Oral BID    pravastatin  10 mg Oral Nightly    predniSONE  5 mg Oral Daily    sucralfate  1 g Oral TID AC    sodium chloride flush  10 mL IntraVENous 2 times per day    albuterol  2.5 mg Nebulization Q4H WA     PRN Meds: albuterol sulfate HFA, cyclobenzaprine, loperamide, ondansetron, sodium chloride flush, sodium chloride, ondansetron **OR** ondansetron, acetaminophen **OR** acetaminophen      Intake/Output  Rheumatoid arthritis ([facility] [address] [M06.9]     Acquired hypothyroidism [E03.9]      cont abx for UTI , transition to po at discharge . Follow up on sensitivities  Cont pulmo toilet   Check cbc/cmp  Cont home meds   Monitor BP  Bowel regimen   repeat labs in AM to ensure improvement     DVT Prophylaxis: lovenox  Diet: ADULT DIET; Regular;  No Added Salt (3-4 gm); 2000 ml  Code Status: Full Code    PT/OT     Dispo - possible dc to ECF tomorrow  Erwin Cwoan MD

## 2022-05-16 VITALS
WEIGHT: 131.39 LBS | SYSTOLIC BLOOD PRESSURE: 158 MMHG | TEMPERATURE: 99 F | DIASTOLIC BLOOD PRESSURE: 72 MMHG | BODY MASS INDEX: 25.8 KG/M2 | HEIGHT: 60 IN | OXYGEN SATURATION: 94 % | RESPIRATION RATE: 18 BRPM | HEART RATE: 79 BPM

## 2022-05-16 LAB — SARS-COV-2, PCR: NOT DETECTED

## 2022-05-16 PROCEDURE — 6360000002 HC RX W HCPCS: Performed by: HOSPITALIST

## 2022-05-16 PROCEDURE — 92526 ORAL FUNCTION THERAPY: CPT

## 2022-05-16 PROCEDURE — 94640 AIRWAY INHALATION TREATMENT: CPT

## 2022-05-16 PROCEDURE — 2580000003 HC RX 258: Performed by: HOSPITALIST

## 2022-05-16 PROCEDURE — 94760 N-INVAS EAR/PLS OXIMETRY 1: CPT

## 2022-05-16 PROCEDURE — 6370000000 HC RX 637 (ALT 250 FOR IP): Performed by: HOSPITALIST

## 2022-05-16 PROCEDURE — 6360000002 HC RX W HCPCS: Performed by: INTERNAL MEDICINE

## 2022-05-16 PROCEDURE — 2580000003 HC RX 258: Performed by: INTERNAL MEDICINE

## 2022-05-16 PROCEDURE — U0003 INFECTIOUS AGENT DETECTION BY NUCLEIC ACID (DNA OR RNA); SEVERE ACUTE RESPIRATORY SYNDROME CORONAVIRUS 2 (SARS-COV-2) (CORONAVIRUS DISEASE [COVID-19]), AMPLIFIED PROBE TECHNIQUE, MAKING USE OF HIGH THROUGHPUT TECHNOLOGIES AS DESCRIBED BY CMS-2020-01-R: HCPCS

## 2022-05-16 PROCEDURE — U0005 INFEC AGEN DETEC AMPLI PROBE: HCPCS

## 2022-05-16 RX ORDER — LEVOFLOXACIN 250 MG/1
250 TABLET ORAL DAILY
Qty: 3 TABLET | Refills: 0 | Status: SHIPPED | OUTPATIENT
Start: 2022-05-16 | End: 2022-05-19

## 2022-05-16 RX ADMIN — SODIUM CHLORIDE, PRESERVATIVE FREE 10 ML: 5 INJECTION INTRAVENOUS at 08:34

## 2022-05-16 RX ADMIN — ALBUTEROL SULFATE 2.5 MG: 2.5 SOLUTION RESPIRATORY (INHALATION) at 16:14

## 2022-05-16 RX ADMIN — POTASSIUM CHLORIDE 20 MEQ: 20 TABLET, EXTENDED RELEASE ORAL at 08:26

## 2022-05-16 RX ADMIN — ASPIRIN 81 MG: 81 TABLET, COATED ORAL at 08:27

## 2022-05-16 RX ADMIN — FERROUS SULFATE TAB EC 324 MG (65 MG FE EQUIVALENT) 325 MG: 324 (65 FE) TABLET DELAYED RESPONSE at 11:51

## 2022-05-16 RX ADMIN — SUCRALFATE 1 G: 1 TABLET ORAL at 11:51

## 2022-05-16 RX ADMIN — METOCLOPRAMIDE 5 MG: 10 TABLET ORAL at 15:44

## 2022-05-16 RX ADMIN — FOLIC ACID 1 MG: 1 TABLET ORAL at 08:27

## 2022-05-16 RX ADMIN — BUSPIRONE HYDROCHLORIDE 10 MG: 10 TABLET ORAL at 11:50

## 2022-05-16 RX ADMIN — NIFEDIPINE 60 MG: 60 TABLET, EXTENDED RELEASE ORAL at 08:26

## 2022-05-16 RX ADMIN — ALBUTEROL SULFATE 2.5 MG: 2.5 SOLUTION RESPIRATORY (INHALATION) at 11:21

## 2022-05-16 RX ADMIN — HYDROXYCHLOROQUINE SULFATE 200 MG: 200 TABLET ORAL at 08:27

## 2022-05-16 RX ADMIN — LEVOTHYROXINE SODIUM 150 MCG: 0.07 TABLET ORAL at 08:26

## 2022-05-16 RX ADMIN — BUSPIRONE HYDROCHLORIDE 10 MG: 10 TABLET ORAL at 08:29

## 2022-05-16 RX ADMIN — Medication 1 TABLET: at 08:27

## 2022-05-16 RX ADMIN — POLYETHYLENE GLYCOL 3350 17 G: 17 POWDER, FOR SOLUTION ORAL at 08:24

## 2022-05-16 RX ADMIN — PREDNISONE 5 MG: 5 TABLET ORAL at 08:26

## 2022-05-16 RX ADMIN — HYDRALAZINE HYDROCHLORIDE 10 MG: 10 TABLET, FILM COATED ORAL at 08:27

## 2022-05-16 RX ADMIN — BUSPIRONE HYDROCHLORIDE 10 MG: 10 TABLET ORAL at 15:45

## 2022-05-16 RX ADMIN — PANTOPRAZOLE SODIUM 40 MG: 40 TABLET, DELAYED RELEASE ORAL at 08:26

## 2022-05-16 RX ADMIN — HYDRALAZINE HYDROCHLORIDE 10 MG: 10 TABLET, FILM COATED ORAL at 15:44

## 2022-05-16 RX ADMIN — FLUOXETINE 30 MG: 20 CAPSULE ORAL at 08:26

## 2022-05-16 RX ADMIN — SUCRALFATE 1 G: 1 TABLET ORAL at 05:40

## 2022-05-16 RX ADMIN — FLUTICASONE PROPIONATE 2 SPRAY: 50 SPRAY, METERED NASAL at 08:32

## 2022-05-16 RX ADMIN — FERROUS SULFATE TAB EC 324 MG (65 MG FE EQUIVALENT) 325 MG: 324 (65 FE) TABLET DELAYED RESPONSE at 15:44

## 2022-05-16 RX ADMIN — FUROSEMIDE 20 MG: 20 TABLET ORAL at 08:27

## 2022-05-16 RX ADMIN — SUCRALFATE 1 G: 1 TABLET ORAL at 15:44

## 2022-05-16 RX ADMIN — METOCLOPRAMIDE 5 MG: 10 TABLET ORAL at 08:27

## 2022-05-16 RX ADMIN — FERROUS SULFATE TAB EC 324 MG (65 MG FE EQUIVALENT) 325 MG: 324 (65 FE) TABLET DELAYED RESPONSE at 08:26

## 2022-05-16 RX ADMIN — CEFTRIAXONE 1000 MG: 1 INJECTION, POWDER, FOR SOLUTION INTRAMUSCULAR; INTRAVENOUS at 14:07

## 2022-05-16 RX ADMIN — ALBUTEROL SULFATE 2.5 MG: 2.5 SOLUTION RESPIRATORY (INHALATION) at 08:11

## 2022-05-16 RX ADMIN — METOCLOPRAMIDE 5 MG: 10 TABLET ORAL at 11:51

## 2022-05-16 RX ADMIN — CETIRIZINE HYDROCHLORIDE 10 MG: 10 TABLET, FILM COATED ORAL at 08:26

## 2022-05-16 RX ADMIN — ANTACID TABLETS 500 MG: 500 TABLET, CHEWABLE ORAL at 08:27

## 2022-05-16 RX ADMIN — GABAPENTIN 100 MG: 100 CAPSULE ORAL at 08:25

## 2022-05-16 NOTE — PROGRESS NOTES
Called report to Mirella Duran at [facility]. PIV removed. Dressing clean dry and intact. Pt to be transported via [employer] at [facility]. Pt dc with personal belongings.

## 2022-05-16 NOTE — PLAN OF CARE
Problem: Discharge Planning  Goal: Discharge to home or other facility with appropriate resources  Outcome: Progressing  Flowsheets (Taken 5/16/2022 0820)  Discharge to home or other facility with appropriate resources: Arrange for needed discharge resources and transportation as appropriate     Problem: Skin/Tissue Integrity  Goal: Absence of new skin breakdown  Description: 1. Monitor for areas of redness and/or skin breakdown  2. Assess vascular access sites hourly  3. Every 4-6 hours minimum:  Change oxygen saturation probe site  4. Every 4-6 hours:  If on nasal continuous positive airway pressure, respiratory therapy assess nares and determine need for appliance change or resting period.   Outcome: Progressing     Problem: Safety - Adult  Goal: Free from fall injury  5/16/2022 1138 by Lawanda Ramirez RN  Outcome: Progressing     Problem: ABCDS Injury Assessment  Goal: Absence of physical injury  Outcome: Progressing     Problem: Pain  Goal: Verbalizes/displays adequate comfort level or baseline comfort level  Outcome: Progressing     Problem: Respiratory - Adult  Goal: Achieves optimal ventilation and oxygenation  Outcome: Progressing     Problem: Skin/Tissue Integrity - Adult  Goal: Skin integrity remains intact  Outcome: Progressing     Problem: Musculoskeletal - Adult  Goal: Return mobility to safest level of function  Outcome: Progressing     Problem: Gastrointestinal - Adult  Goal: Minimal or absence of nausea and vomiting  Outcome: Progressing     Problem: Genitourinary - Adult  Goal: Absence of urinary retention  Outcome: Progressing     Problem: Infection - Adult  Goal: Absence of infection at discharge  Outcome: Progressing

## 2022-05-16 NOTE — CARE COORDINATION
CASE MANAGEMENT DISCHARGE SUMMARY:    DISCHARGE DATE: 05/16/22    Discharging to Facility/ Agency   · Name: [facility]  · Address:  [address] [place] [address], [facility]   · Phone:  [phone]  · Fax:  [phone]      Covid test date: 05/16/22   Results: negative    TRANSPORTATION: [facility]             TIME: 4716-6704    INSURANCE PRECERT OBTAINED: not needed, LTC    HENS/PASAAR COMPLETED: not needed, LTC    Facility & daughter Sergio Carty notified of dc &  time.     Jn Rogers RN, BSN, Case Management  [phone]    Electronically signed by Jn Rogers RN on 5/16/2022 at 4:07 PM

## 2022-05-16 NOTE — CARE COORDINATION
05/16/22 1516   IMM Letter   IMM Letter given to Patient/Family/Significant other/Guardian/POA/by: given to daughter Licha Richard over the phone & secure emailed to [email or web address]. Lucian Levi RN   IMM Letter date given: 05/16/22   IMM Letter time given: Haider Guaman RN, BSN,   [phone]  Electronically signed by Magdi Awad RN on 5/16/2022 at 3:18 PM

## 2022-05-16 NOTE — PLAN OF CARE
Problem: Safety - Adult  Goal: Free from fall injury  Outcome: Progressing  Note: D: PT. Alert and oriented x3, but confused to time, jaky light in reach and bed alarm on   A: encouraged to call for assist as needed  R: without falls this shift

## 2022-05-16 NOTE — DISCHARGE SUMMARY
Hospital Medicine Discharge Summary    Patient: Ollie Bravo     Gender: female  : 1940   Age: 81 y.o. MRN: [id number]    Admitting Physician: Daisy Armijo MD  Discharge Physician: Elsa Palmer MD     Code Status: Full Code     Admit Date: 2022   Discharge Date:   22    Disposition:  ECF    Discharge Diagnoses: Active Hospital Problems    Diagnosis Date Noted    UTI (urinary tract infection) [N39.0] 2022     Priority: Medium    Pneumonia [J18.9] 2022     Priority: Medium    Essential hypertension [I10] 2018    Rheumatoid arthritis ([address] [M06.9] 2018    Acquired hypothyroidism [E03.9] 2018      UTI (urinary tract infection) [N39.0]         Priority: Medium    Pneumonia [J18.9]         Priority: Medium    Essential hypertension [I10]      Rheumatoid arthritis ([address] [M06.9]      Acquired hypothyroidism [E03.9]        Follow-up appointments:  As arranged with PCP    Outpatient to do list: none    Condition at Discharge:  Stable    Hospital Course: The patient Ollie Bravo is a 81 y. o.female with medical history significant for asthma anxiety depression, GERD hypertension hyperlipidemia hypothyroidism and vitamin B12 deficiency. Patient is a nursing home resident and is unable to walk. Patient reports that she was drinking water and she started coughing. Patient's concern is about aspiration. At the time of presentation patient's main complaint is abdominal discomfort. She was found to have a UTI and pneumonia that were treated per protocol and she was [facility] to [facility].     Discharge Medications:   Current Discharge Medication List      START taking these medications    Details   levoFLOXacin (LEVAQUIN) 250 MG tablet Take 1 tablet by mouth daily for 3 days  Qty: 3 tablet, Refills: 0           Current Discharge Medication List        Current Discharge Medication List      CONTINUE these medications which have NOT CHANGED    Details (generalized anxiety disorder)      aspirin 81 MG tablet Take 81 mg by mouth daily       cyclobenzaprine (FLEXERIL) 5 MG tablet Take 5-10 mg by mouth 3 times daily as needed       nystatin (MYCOSTATIN) 049576 UNIT/GM powder by Intratympanic route 2 times daily as needed (redness)       NIFEdipine (ADALAT CC) 60 MG extended release tablet Take 60 mg by mouth daily       predniSONE (DELTASONE) 5 MG tablet Take 5 mg by mouth daily      Multiple Vitamins-Minerals (MULTIVITAMIN WITH MINERALS) tablet Take 1 tablet by mouth daily      albuterol sulfate  (90 Base) MCG/ACT inhaler Inhale 2 puffs into the lungs every 6 hours as needed for Wheezing       melatonin 5 MG TABS tablet Take 20 mg by mouth nightly       busPIRone (BUSPAR) 10 MG tablet Take 10 mg by mouth 4 times daily      fluticasone (FLONASE) 50 MCG/ACT nasal spray 2 sprays by Nasal route daily       folic acid (FOLVITE) 1 MG tablet Take 1 mg by mouth daily       hydroxychloroquine (PLAQUENIL) 200 MG tablet Take by mouth 2 times daily       magnesium hydroxide (MILK OF MAGNESIA) 400 MG/5ML suspension Take 30 mLs by mouth daily as needed       oxybutynin (DITROPAN-XL) 5 MG extended release tablet Take 5 mg by mouth nightly       pravastatin (PRAVACHOL) 10 MG tablet Take 10 mg by mouth nightly       sucralfate (CARAFATE) 1 GM tablet Take 1 g by mouth 3 times daily (before meals)       acetaminophen (TYLENOL) 325 MG tablet Take 650 mg by mouth every 6 hours as needed            Current Discharge Medication List      STOP taking these medications       hydrocortisone 1 % cream Comments:   Reason for Stopping:         docusate sodium (COLACE) 100 MG capsule Comments:   Reason for Stopping:         Dextromethorphan-guaiFENesin  MG/5ML SYRP Comments:   Reason for Stopping:               Discharge ROS:  A complete review of systems was asked and negative except for above     Discharge Exam:    BP (!) 146/54   Pulse 69   Temp 99.2 °F (37.3 °C) (Oral)   Resp 18   Ht 5' (1.524 m)   Wt 131 lb 6.3 oz (59.6 kg)   SpO2 93%   Breastfeeding No   BMI 25.66 kg/m²   General appearance: No apparent distress, appears stated age and cooperative. HEENT: Pupils equal, round, and reactive to light. Conjunctivae/corneas clear. Respiratory:  Normal respiratory effort. Clear to auscultation  Cardiovascular: Regular rate and rhythm with normal S1/S2 without murmurs, rubs or gallops. Abdomen: Soft, mild discomfort to palpation  Musculoskeletal: No edema bilaterally. Neurologic:   grossly non-focal.  Psychiatric: Alert and oriented, thought content appropriate, normal insight    Labs: For convenience and continuity at follow-up the following most recent labs are provided:    Lab Results   Component Value Date    WBC 8.0 05/15/2022    HGB 10.9 05/15/2022    HCT 32.6 05/15/2022    MCV 93.6 05/15/2022     05/15/2022     05/15/2022     05/15/2022    K 3.6 05/15/2022    K 3.7 05/15/2022    K 3.8 05/12/2022     05/15/2022     05/15/2022    CO2 21 05/15/2022    CO2 22 05/15/2022    BUN 13 05/15/2022    BUN 13 05/15/2022    CREATININE 0.8 05/15/2022    CREATININE 0.9 05/15/2022    CALCIUM 8.4 05/15/2022    CALCIUM 8.2 05/15/2022    ALKPHOS 61 05/15/2022    ALT 11 05/15/2022    AST 14 05/15/2022    BILITOT <0.2 05/15/2022    BILIDIR <0.2 12/22/2017    LABALBU 3.4 05/15/2022    LDLCALC 65 10/30/2020    TRIG 72 10/30/2020     Lab Results   Component Value Date    INR 1.04 11/07/2018    INR 1.02 12/22/2017       Radiology:  XR CHEST PORTABLE    Result Date: 5/11/2022  EXAMINATION: ONE XRAY VIEW OF THE CHEST 5/11/2022 9:33 am COMPARISON: Chest x-ray dated 08/22/2021. HISTORY: ORDERING SYSTEM PROVIDED HISTORY: infection TECHNOLOGIST PROVIDED HISTORY: Reason for exam:->infection Reason for Exam: Aspiration FINDINGS: HEART/MEDIASTINUM: The cardiomediastinal silhouette is stable. PLEURA/LUNGS: There are no focal consolidations or pleural effusions.  There is no appreciable pneumothorax. Chronic interstitial markings. BONES/SOFT TISSUE: No acute abnormality. Lateral humeral head deformities noted. No radiographic evidence of acute pulmonary disease. CT CHEST ABDOMEN PELVIS WO CONTRAST    Result Date: 5/11/2022  EXAMINATION: CT OF THE CHEST, ABDOMEN, AND PELVIS WITHOUT CONTRAST 5/11/2022 9:15 am TECHNIQUE: CT of the chest, abdomen and pelvis was performed without the administration of intravenous contrast. Multiplanar reformatted images are provided for review. Automated exposure control, iterative reconstruction, and/or weight based adjustment of the mA/kV was utilized to reduce the radiation dose to as low as reasonably achievable. COMPARISON: 07/14/2020 HISTORY: ORDERING SYSTEM PROVIDED HISTORY: abd pain, n, v, ? aspiration TECHNOLOGIST PROVIDED HISTORY: Reason for exam:->abd pain, n, v, ? aspiration Additional Contrast?->None Decision Support Exception - unselect if not a suspected or confirmed emergency medical condition->Emergency Medical Condition (MA) Reason for Exam: abd pain, n, v, ? aspiration FINDINGS: Chest: Mediastinum: The central airways are patent. There is no hilar or mediastinal adenopathy. A large hiatal hernia is present. Lungs/pleura: There is nodular airspace opacification throughout both lower lobes. The upper lobes are clear. There is no pneumothorax or effusion. Soft Tissues/Bones: There is no acute fracture or aggressive osseous lesion. Abdomen/Pelvis: Organs: The liver, pancreas, spleen, kidneys and adrenals are unremarkable. GI/Bowel: There is no bowel dilatation, wall thickening or obstruction. Pelvis: The bladder and pelvic organs are unremarkable. Peritoneum/Retroperitoneum: There is no free air, free fluid or intraperitoneal inflammatory change. There is no adenopathy. Bones/Soft Tissues: There is no acute fracture or aggressive osseous lesion. 1. Bibasilar aspiration pneumonia. 2. No acute abdominopelvic abnormality. EKG     Rhythm: normal sinus   Rate: normal  Clinical Impression: no acute changes        The patient was seen and examined on day of discharge and this discharge summary is in conjunction with any daily progress note from day of discharge. Time Spent on discharge is 30 minutes  in the examination, evaluation, counseling and review of medications and discharge plan. Note that more than 30 minutes was spent in preparing discharge papers, discussing discharge with patient, medication review, etc.       Signed:    Hakeem Martinez MD   5/16/2022      Thank you No primary care provider on file. for the opportunity to be involved in this patient's care.  If you have any questions or concerns please feel free to contact me at [facility]

## 2022-05-16 NOTE — PROGRESS NOTES
5 [facility]   Speech Therapy  Daily Dysphagia Treatment Note  DISCHARGE SUMMARY    Pryor Rase Severn  AGE: 81 y.o.    GENDER: female  : 1940  [id number]  EPISODE DATE:  2022     Patient Active Problem List   Diagnosis    Essential hypertension    Rheumatoid arthritis ([facility] [address]    Major depressive disorder    Acquired hypothyroidism    REJI (generalized anxiety disorder)    Mild intermittent asthma    PVD (peripheral vascular disease) (HCC)    Anemia    Symptomatic bradycardia    Chronic constipation    Chronic kidney disease    Chronic rhinitis    Encounter for long-term (current) use of other medications    Hiatal hernia with gastroesophageal reflux disease without esophagitis    Hyperlipidemia    Hypertensive kidney disease with chronic kidney disease stage II    MDD (major depressive disorder), recurrent episode, mild (HCC)    Myalgia and myositis    Numbness and tingling of both feet    Osteoporosis    Peripheral vascular disease of lower extremity (HCC)    Polymyalgia rheumatica (HCC)    Polypharmacy    Primary insomnia    Pure hypercholesterolemia    Sensorineural hearing loss (SNHL) of both ears    Spinal stenosis of lumbar region    Spondylosis of lumbosacral spine without myelopathy    Urge incontinence    Vitamin B12 deficiency    Vitamin D deficiency    Weight loss    Chronic mesenteric ischemia (HCC)    Paresthesia and pain of both upper extremities    Hyponatremia    Acute heart failure (HCC)    Hyperkalemia    Pneumonia    UTI (urinary tract infection)     Allergies   Allergen Reactions    Alendronate Sodium     Benadryl [Diphenhydramine]      insomnia    Ciprofloxacin     Crestor [Rosuvastatin Calcium]     Hydroxychloroquine Sulfate     Minocycline     Naprosyn [Naproxen]     Niaspan [Niacin]     Red Dye Other (See Comments) and Swelling    Risedronate      Treatment Diagnosis: Dysphagia     CHART REVIEW:  5/11/2022 admitted with c/o aspiration  MD ADMISSION H&P HPI: The patient Edwar Gonzalez is a 81 y. o.female with medical history significant for asthma anxiety depression, GERD hypertension hyperlipidemia hypothyroidism and vitamin B12 deficiency. Patient is a nursing home resident and is unable to walk. Patient reports that she was drinking water and she started coughing. Patient's concern is about aspiration. At the time of presentation patient's main complaint is abdominal discomfort. IMAGING:  CXR: 5/11/2022  Impression   No radiographic evidence of acute pulmonary disease. CT CHEST: 5/11/2022  Impression   1. Bibasilar aspiration pneumonia. 2. No acute abdominopelvic abnormality. Subjective:     Current Diet Level: Regular texture diet ;  Thin liquids      Comments regarding tolerating Current Diet: RN reports good tolerance without concern for dysphagia; patient denies difficulty tolerating diet      Objective:     Pain: Denies               Vision: [x]WFL []Impaired:  Hearing: [x]WFL []Impaired:     Cognitive/behavioral/communication:   Oriented to [x] self [x] place [x] date [x] situation  [x]alert []lethargic  [x]cooperative []self-limiting   []confused   []distractible []agitated []impulsive  [x]verbally responsive []nonverbal []limited verbal responses  [x]follows one step commands []does not follow dx []follows complex commands  []aphasic []dysarthric  [] other:     Respiratory Status: [x]Room Air []O2 via nasal cannula []Other:  Dentition: []Adequate [x]Dentures []Missing Many Teeth []Edentulous []Other:    Patient Positioning: Upright in bed     PO Trials:  · Thin Liquids: suspected premature bolus loss to the pharynx; delayed swallow; decreased laryngeal elevation; NO overt signs/symptoms of penetration/aspiration  · Regular food: suspected premature bolus loss to the pharynx; delayed swallow; decreased laryngeal elevation; NO overt signs/symptoms of penetration/aspiration    Dysphagia Tx:    Direct Dysphagia tx: PO trials as described above   Training in compensatory strategies: independent   Pt response to ex/training: Good    Goals:   Pt will functionally tolerate recommended diet with no overt clinical s/s of aspiration met    Assessment:   Impressions:   Pt alert, cooperative and pleasant, follows directions, verbally responsive, and is oriented x3.       Mild oropharyngeal dysphagia characterized by mildly reduced lingual manipulation, for bolus control with suspicion for premature bolus loss to the pharynx, delayed swallow initiation, and decreased laryngeal elevation without immediate or delayed overt s/s of aspiration. Occasional clearance swallow noted with liquids.       If MD is concerned re: chronic aspiration, then a Modified Barium Swallow may be completed with MD order. Otherwise, plan to discontinue skilled ST d/t adequate clinical tolerance of current diet and max potential achieved at this time.     Recommended Diet and Intervention 5/12/2022:  Diet Solids Recommendation:  Regular texture diet  Liquid Consistency Recommendation: Thin liquids  Recommended form of Meds: Whole with water      Compensatory Swallowing Strategies:  Upright as possible with all PO intake , Small bites/sips , Remain upright 30-45 min     EDUCATION:   Provided education regarding role of SLP, results of assessment, recommendations and general speech pathology plan of care.    [x] Pt verbalized understanding and agreement   [] Pt requires ongoing learning   [] No evidence of comprehension     Dysphagia Prognosis: [x] good []fair []poor []guarded       Plan:     Continue Dysphagia Therapy: NO      Coded treatment time: 0  Total treatment time: 22    Electronically signed by AMRIK Medina on 5/16/2022 at 11:35 AM

## 2022-05-16 NOTE — DISCHARGE INSTR - COC
Chronic rhinitis J31.0    Encounter for long-term (current) use of other medications Z79.899    Hiatal hernia with gastroesophageal reflux disease without esophagitis K44.9, K21.9    Hyperlipidemia E78.5    Hypertensive kidney disease with chronic kidney disease stage II I12.9, N18.2    MDD (major depressive disorder), recurrent episode, mild (HCC) F33.0    Myalgia and myositis [id number]    Numbness and tingling of both feet R20.0, R20.2    Osteoporosis M81.0    Peripheral vascular disease of lower extremity (HCC) I73.9    Polymyalgia rheumatica ([facility]) M35.3    Polypharmacy Z79.899    Primary insomnia F51.01    Pure hypercholesterolemia E78.00    Sensorineural hearing loss (SNHL) of both ears H90.3    Spinal stenosis of lumbar region M48.061    Spondylosis of lumbosacral spine without myelopathy M47.817    Urge incontinence N39.41    Vitamin B12 deficiency E53.8    Vitamin D deficiency E55.9    Weight loss R63.4    Chronic mesenteric ischemia ([facility]) K55.1    Paresthesia and pain of both upper extremities R20.2, M79.601, M79.602    Hyponatremia E87.1    Acute heart failure ([facility]) I50.9    Hyperkalemia E87.5    Pneumonia J18.9    UTI (urinary tract infection) N39.0       Isolation/Infection:   Isolation            No Isolation          Patient Infection Status       Infection Onset Added Last Indicated Last Indicated By Review Planned Expiration Resolved Resolved By    None active    Resolved    COVID-19 (Rule Out) 05/16/22 05/16/22 05/16/22 COVID-19 (Ordered)   05/16/22 Rule-Out Test Resulted    COVID-19 (Rule Out) 08/22/21 08/22/21 08/22/21 COVID-19 (Ordered)   08/23/21 Rule-Out Test Resulted    COVID-19 (Rule Out) 08/20/21 08/20/21 08/20/21 COVID-19 (Ordered)   08/20/21 Rule-Out Test Resulted    COVID-19 (Rule Out) 09/14/20 09/14/20 09/14/20 COVID-19 (Ordered)   09/14/20 Rule-Out Test Resulted            Nurse Assessment:  Last Vital Signs: BP (!) 146/54   Pulse 69   Temp 99.2 °F (37.3 °C) (Oral)   Resp 18   Ht 5' (1.524 m)   Wt 131 lb 6.3 oz (59.6 kg)   SpO2 93%   Breastfeeding No   BMI 25.66 kg/m²     Last documented pain score (0-10 scale): Pain Level: 3  Last Weight:   Wt Readings from Last 1 Encounters:   05/16/22 131 lb 6.3 oz (59.6 kg)     Mental Status:  oriented and alert    IV Access:  - None    Nursing Mobility/ADLs:  Walking   Dependent  Transfer  Dependent  Bathing  Dependent  Dressing  Dependent  Toileting  Dependent  Feeding  Independent  Med Admin  Assisted  Med Delivery   whole and on a spoon    Wound Care Documentation and Therapy:  Incision 11/08/18 Hip Right (Active)   Number of days: 1284        Elimination:  Continence: Bowel: No  Bladder: No  Urinary Catheter: None   Colostomy/Ileostomy/Ileal Conduit: No       Date of Last BM: 5/16/22    Intake/Output Summary (Last 24 hours) at 5/16/2022 1452  Last data filed at 5/16/2022 1306  Gross per 24 hour   Intake 1060 ml   Output 1000 ml   Net 60 ml     I/O last 3 completed shifts: In: 910 [P.O.:900; I.V.:10]  Out: 1000 [Urine:1000]    Safety Concerns: At Risk for Falls    Impairments/Disabilities:      Hearing    Nutrition Therapy:  Current Nutrition Therapy:   - Oral Diet:  General and Low Sodium (3-4gm)    Routes of Feeding: Oral  Liquids: Thin Liquids  Daily Fluid Restriction: yes - amount 2000mL  Last Modified Barium Swallow with Video (Video Swallowing Test): not done    Treatments at the Time of Hospital Discharge:   Respiratory Treatments: ***  Oxygen Therapy:  is not on home oxygen therapy.   Ventilator:    - No ventilator support    Rehab Therapies: Physical Therapy, Occupational Therapy, and Speech/Language Therapy  Weight Bearing Status/Restrictions: No weight bearing restrictions  Other Medical Equipment (for information only, NOT a DME order):  hospital bed  Other Treatments: ***    Patient's personal belongings (please select all that are sent with patient):  Hearing Aides bilateral    RN SIGNATURE:  Electronically signed by Tu Shankar RN on 5/16/22 at 4:17 PM EDT    CASE MANAGEMENT/SOCIAL WORK SECTION    Inpatient Status Date: ***    Readmission Risk Assessment Score:  Readmission Risk              Risk of Unplanned Readmission:  33           Discharging to Facility/ Agency   Name:   Address:  Phone:  Fax:    Dialysis Facility (if applicable)   Name:  Address:  Dialysis Schedule:  Phone:  Fax:    / signature: {Esignature:[id number]}    PHYSICIAN SECTION    Prognosis: Fair    Condition at Discharge: Stable    Rehab Potential (if transferring to Rehab): Fair    Recommended Labs or Other Treatments After Discharge: none    Physician Certification: I certify the above information and transfer of Rebecca Chavez  is necessary for the continuing treatment of the diagnosis listed and that she requires [place] for less 30 days.      Update Admission H&P: No change in H&P    PHYSICIAN SIGNATURE:  Electronically signed by Sahra Gtz MD on 5/16/22 at 2:53 PM EDT

## 2022-06-13 PROBLEM — N39.0 UTI (URINARY TRACT INFECTION): Status: RESOLVED | Noted: 2022-05-14 | Resolved: 2022-06-13

## 2022-07-09 LAB — SARS-COV-2: DETECTED

## 2023-07-12 ENCOUNTER — HOSPITAL ENCOUNTER (OUTPATIENT)
Dept: CT IMAGING | Age: 83
Discharge: HOME | End: 2023-07-12
Payer: COMMERCIAL

## 2023-07-12 DIAGNOSIS — N18.30 STAGE 3 CHRONIC KIDNEY DISEASE, UNSPECIFIED WHETHER STAGE 3A OR 3B CKD (HCC): ICD-10-CM

## 2023-07-12 PROCEDURE — 74176 CT ABD & PELVIS W/O CONTRAST: CPT

## 2023-07-14 ENCOUNTER — HOSPITAL ENCOUNTER (INPATIENT)
Age: 83
LOS: 7 days | Discharge: NURSING, REHAB OR LONG-TERM CARE | End: 2023-07-21
Attending: EMERGENCY MEDICINE | Admitting: INTERNAL MEDICINE
Payer: COMMERCIAL

## 2023-07-14 ENCOUNTER — APPOINTMENT (OUTPATIENT)
Dept: GENERAL RADIOLOGY | Age: 83
End: 2023-07-14
Payer: COMMERCIAL

## 2023-07-14 DIAGNOSIS — R06.02 SHORTNESS OF BREATH: ICD-10-CM

## 2023-07-14 DIAGNOSIS — I48.91 NEW ONSET A-FIB (HCC): Primary | ICD-10-CM

## 2023-07-14 DIAGNOSIS — I50.9 CONGESTIVE HEART FAILURE, UNSPECIFIED HF CHRONICITY, UNSPECIFIED HEART FAILURE TYPE (HCC): ICD-10-CM

## 2023-07-14 DIAGNOSIS — Z78.9 FULL CODE STATUS: ICD-10-CM

## 2023-07-14 DIAGNOSIS — E87.1 HYPONATREMIA: ICD-10-CM

## 2023-07-14 DIAGNOSIS — J81.0 ACUTE PULMONARY EDEMA (HCC): ICD-10-CM

## 2023-07-14 PROBLEM — I50.33 ACUTE ON CHRONIC DIASTOLIC CHF (CONGESTIVE HEART FAILURE) (HCC): Status: ACTIVE | Noted: 2023-07-14

## 2023-07-14 LAB
ALBUMIN SERPL-MCNC: 3.2 G/DL (ref 3.4–5)
ALBUMIN/GLOB SERPL: 0.9 {RATIO} (ref 1.1–2.2)
ALP SERPL-CCNC: 104 U/L (ref 40–129)
ALT SERPL-CCNC: 11 U/L (ref 10–40)
ANION GAP SERPL CALCULATED.3IONS-SCNC: 12 MMOL/L (ref 3–16)
AST SERPL-CCNC: 23 U/L (ref 15–37)
BACTERIA URNS QL MICRO: NORMAL /HPF
BASOPHILS # BLD: 0 K/UL (ref 0–0.2)
BASOPHILS NFR BLD: 0.5 %
BILIRUB SERPL-MCNC: <0.2 MG/DL (ref 0–1)
BILIRUB UR QL STRIP.AUTO: NEGATIVE
BUN SERPL-MCNC: 13 MG/DL (ref 7–20)
CALCIUM SERPL-MCNC: 8.1 MG/DL (ref 8.3–10.6)
CHLORIDE SERPL-SCNC: 89 MMOL/L (ref 99–110)
CLARITY UR: CLEAR
CO2 SERPL-SCNC: 20 MMOL/L (ref 21–32)
COLOR UR: YELLOW
CREAT SERPL-MCNC: 0.9 MG/DL (ref 0.6–1.2)
DEPRECATED RDW RBC AUTO: 14.7 % (ref 12.4–15.4)
EOSINOPHIL # BLD: 0 K/UL (ref 0–0.6)
EOSINOPHIL NFR BLD: 0 %
EPI CELLS #/AREA URNS AUTO: 0 /HPF (ref 0–5)
GFR SERPLBLD CREATININE-BSD FMLA CKD-EPI: >60 ML/MIN/{1.73_M2}
GLUCOSE SERPL-MCNC: 109 MG/DL (ref 70–99)
GLUCOSE UR STRIP.AUTO-MCNC: NEGATIVE MG/DL
HCT VFR BLD AUTO: 31.6 % (ref 36–48)
HGB BLD-MCNC: 10.5 G/DL (ref 12–16)
HGB UR QL STRIP.AUTO: NEGATIVE
HYALINE CASTS #/AREA URNS AUTO: 2 /LPF (ref 0–8)
INR PPP: 1.16 (ref 0.84–1.16)
KETONES UR STRIP.AUTO-MCNC: NEGATIVE MG/DL
LEUKOCYTE ESTERASE UR QL STRIP.AUTO: NEGATIVE
LYMPHOCYTES # BLD: 1.4 K/UL (ref 1–5.1)
LYMPHOCYTES NFR BLD: 17.3 %
MCH RBC QN AUTO: 30.1 PG (ref 26–34)
MCHC RBC AUTO-ENTMCNC: 33.1 G/DL (ref 31–36)
MCV RBC AUTO: 90.8 FL (ref 80–100)
MONOCYTES # BLD: 0.6 K/UL (ref 0–1.3)
MONOCYTES NFR BLD: 6.9 %
NEUTROPHILS # BLD: 6.3 K/UL (ref 1.7–7.7)
NEUTROPHILS NFR BLD: 75.3 %
NITRITE UR QL STRIP.AUTO: NEGATIVE
NT-PROBNP SERPL-MCNC: 3697 PG/ML (ref 0–449)
PH UR STRIP.AUTO: 5.5 [PH] (ref 5–8)
PLATELET # BLD AUTO: 334 K/UL (ref 135–450)
PMV BLD AUTO: 8.3 FL (ref 5–10.5)
POTASSIUM SERPL-SCNC: 4.7 MMOL/L (ref 3.5–5.1)
PROCALCITONIN SERPL IA-MCNC: 0.08 NG/ML (ref 0–0.15)
PROT SERPL-MCNC: 6.9 G/DL (ref 6.4–8.2)
PROT UR STRIP.AUTO-MCNC: ABNORMAL MG/DL
PROTHROMBIN TIME: 14.8 SEC (ref 11.5–14.8)
RBC # BLD AUTO: 3.48 M/UL (ref 4–5.2)
RBC CLUMPS #/AREA URNS AUTO: 0 /HPF (ref 0–4)
SARS-COV-2 RDRP RESP QL NAA+PROBE: NOT DETECTED
SODIUM SERPL-SCNC: 121 MMOL/L (ref 136–145)
SP GR UR STRIP.AUTO: 1.01 (ref 1–1.03)
T4 FREE SERPL-MCNC: 1 NG/DL (ref 0.9–1.8)
TROPONIN, HIGH SENSITIVITY: 116 NG/L (ref 0–14)
TSH SERPL DL<=0.005 MIU/L-ACNC: 16.21 UIU/ML (ref 0.27–4.2)
UA DIPSTICK W REFLEX MICRO PNL UR: YES
URN SPEC COLLECT METH UR: ABNORMAL
UROBILINOGEN UR STRIP-ACNC: 0.2 E.U./DL
WBC # BLD AUTO: 8.3 K/UL (ref 4–11)
WBC #/AREA URNS AUTO: 1 /HPF (ref 0–5)

## 2023-07-14 PROCEDURE — 83880 ASSAY OF NATRIURETIC PEPTIDE: CPT

## 2023-07-14 PROCEDURE — 6370000000 HC RX 637 (ALT 250 FOR IP): Performed by: INTERNAL MEDICINE

## 2023-07-14 PROCEDURE — 85610 PROTHROMBIN TIME: CPT

## 2023-07-14 PROCEDURE — 2580000003 HC RX 258: Performed by: INTERNAL MEDICINE

## 2023-07-14 PROCEDURE — 87635 SARS-COV-2 COVID-19 AMP PRB: CPT

## 2023-07-14 PROCEDURE — 51701 INSERT BLADDER CATHETER: CPT

## 2023-07-14 PROCEDURE — 93005 ELECTROCARDIOGRAM TRACING: CPT | Performed by: PHYSICIAN ASSISTANT

## 2023-07-14 PROCEDURE — 96374 THER/PROPH/DIAG INJ IV PUSH: CPT

## 2023-07-14 PROCEDURE — 84439 ASSAY OF FREE THYROXINE: CPT

## 2023-07-14 PROCEDURE — 6360000002 HC RX W HCPCS: Performed by: PHYSICIAN ASSISTANT

## 2023-07-14 PROCEDURE — 80053 COMPREHEN METABOLIC PANEL: CPT

## 2023-07-14 PROCEDURE — 2060000000 HC ICU INTERMEDIATE R&B

## 2023-07-14 PROCEDURE — 36415 COLL VENOUS BLD VENIPUNCTURE: CPT

## 2023-07-14 PROCEDURE — 99285 EMERGENCY DEPT VISIT HI MDM: CPT

## 2023-07-14 PROCEDURE — 81001 URINALYSIS AUTO W/SCOPE: CPT

## 2023-07-14 PROCEDURE — 6370000000 HC RX 637 (ALT 250 FOR IP): Performed by: EMERGENCY MEDICINE

## 2023-07-14 PROCEDURE — 84145 PROCALCITONIN (PCT): CPT

## 2023-07-14 PROCEDURE — 84484 ASSAY OF TROPONIN QUANT: CPT

## 2023-07-14 PROCEDURE — 84443 ASSAY THYROID STIM HORMONE: CPT

## 2023-07-14 PROCEDURE — 85025 COMPLETE CBC W/AUTO DIFF WBC: CPT

## 2023-07-14 PROCEDURE — 71045 X-RAY EXAM CHEST 1 VIEW: CPT

## 2023-07-14 RX ORDER — ASPIRIN 81 MG/1
324 TABLET, CHEWABLE ORAL ONCE
Status: DISCONTINUED | OUTPATIENT
Start: 2023-07-14 | End: 2023-07-14

## 2023-07-14 RX ORDER — ALBUTEROL SULFATE 90 UG/1
2 AEROSOL, METERED RESPIRATORY (INHALATION) EVERY 6 HOURS PRN
Status: DISCONTINUED | OUTPATIENT
Start: 2023-07-14 | End: 2023-07-21 | Stop reason: HOSPADM

## 2023-07-14 RX ORDER — LISINOPRIL 5 MG/1
5 TABLET ORAL DAILY
Status: DISCONTINUED | OUTPATIENT
Start: 2023-07-15 | End: 2023-07-14

## 2023-07-14 RX ORDER — MIRTAZAPINE 15 MG/1
15 TABLET, FILM COATED ORAL NIGHTLY
Status: DISCONTINUED | OUTPATIENT
Start: 2023-07-15 | End: 2023-07-15

## 2023-07-14 RX ORDER — GABAPENTIN 100 MG/1
100 CAPSULE ORAL 2 TIMES DAILY
Status: DISCONTINUED | OUTPATIENT
Start: 2023-07-15 | End: 2023-07-21 | Stop reason: HOSPADM

## 2023-07-14 RX ORDER — FUROSEMIDE 10 MG/ML
40 INJECTION INTRAMUSCULAR; INTRAVENOUS 2 TIMES DAILY
Status: DISCONTINUED | OUTPATIENT
Start: 2023-07-15 | End: 2023-07-17

## 2023-07-14 RX ORDER — MAGNESIUM SULFATE IN WATER 40 MG/ML
2000 INJECTION, SOLUTION INTRAVENOUS PRN
Status: DISCONTINUED | OUTPATIENT
Start: 2023-07-14 | End: 2023-07-21 | Stop reason: HOSPADM

## 2023-07-14 RX ORDER — SODIUM CHLORIDE 9 MG/ML
INJECTION, SOLUTION INTRAVENOUS PRN
Status: DISCONTINUED | OUTPATIENT
Start: 2023-07-14 | End: 2023-07-21 | Stop reason: HOSPADM

## 2023-07-14 RX ORDER — ASPIRIN 81 MG/1
81 TABLET, CHEWABLE ORAL DAILY
Status: DISCONTINUED | OUTPATIENT
Start: 2023-07-15 | End: 2023-07-21 | Stop reason: HOSPADM

## 2023-07-14 RX ORDER — FUROSEMIDE 10 MG/ML
80 INJECTION INTRAMUSCULAR; INTRAVENOUS ONCE
Status: COMPLETED | OUTPATIENT
Start: 2023-07-14 | End: 2023-07-14

## 2023-07-14 RX ORDER — LEVOTHYROXINE SODIUM 0.12 MG/1
125 TABLET ORAL DAILY
Status: DISCONTINUED | OUTPATIENT
Start: 2023-07-15 | End: 2023-07-21 | Stop reason: HOSPADM

## 2023-07-14 RX ORDER — FLUOXETINE HYDROCHLORIDE 20 MG/1
20 CAPSULE ORAL DAILY
Status: DISCONTINUED | OUTPATIENT
Start: 2023-07-15 | End: 2023-07-21 | Stop reason: HOSPADM

## 2023-07-14 RX ORDER — PANTOPRAZOLE SODIUM 40 MG/1
40 TABLET, DELAYED RELEASE ORAL
Status: DISCONTINUED | OUTPATIENT
Start: 2023-07-15 | End: 2023-07-21 | Stop reason: HOSPADM

## 2023-07-14 RX ORDER — FOLIC ACID 1 MG/1
1 TABLET ORAL DAILY
Status: DISCONTINUED | OUTPATIENT
Start: 2023-07-15 | End: 2023-07-21 | Stop reason: HOSPADM

## 2023-07-14 RX ORDER — NIFEDIPINE 60 MG/1
60 TABLET, EXTENDED RELEASE ORAL DAILY
Status: DISCONTINUED | OUTPATIENT
Start: 2023-07-15 | End: 2023-07-15

## 2023-07-14 RX ORDER — OXYBUTYNIN CHLORIDE 5 MG/1
5 TABLET, EXTENDED RELEASE ORAL NIGHTLY
Status: DISCONTINUED | OUTPATIENT
Start: 2023-07-15 | End: 2023-07-21 | Stop reason: HOSPADM

## 2023-07-14 RX ORDER — ASPIRIN 81 MG/1
81 TABLET, CHEWABLE ORAL ONCE
Status: COMPLETED | OUTPATIENT
Start: 2023-07-14 | End: 2023-07-14

## 2023-07-14 RX ORDER — ONDANSETRON 4 MG/1
4 TABLET, ORALLY DISINTEGRATING ORAL EVERY 8 HOURS PRN
Status: DISCONTINUED | OUTPATIENT
Start: 2023-07-14 | End: 2023-07-21 | Stop reason: HOSPADM

## 2023-07-14 RX ORDER — ACETAMINOPHEN 650 MG/1
650 SUPPOSITORY RECTAL EVERY 6 HOURS PRN
Status: DISCONTINUED | OUTPATIENT
Start: 2023-07-14 | End: 2023-07-21 | Stop reason: HOSPADM

## 2023-07-14 RX ORDER — HYDRALAZINE HYDROCHLORIDE 10 MG/1
10 TABLET, FILM COATED ORAL EVERY 8 HOURS SCHEDULED
Status: DISCONTINUED | OUTPATIENT
Start: 2023-07-15 | End: 2023-07-17

## 2023-07-14 RX ORDER — POLYETHYLENE GLYCOL 3350 17 G/17G
17 POWDER, FOR SOLUTION ORAL DAILY PRN
Status: DISCONTINUED | OUTPATIENT
Start: 2023-07-14 | End: 2023-07-21 | Stop reason: HOSPADM

## 2023-07-14 RX ORDER — ACETAMINOPHEN 325 MG/1
650 TABLET ORAL EVERY 6 HOURS PRN
Status: DISCONTINUED | OUTPATIENT
Start: 2023-07-14 | End: 2023-07-21 | Stop reason: HOSPADM

## 2023-07-14 RX ORDER — HYDROXYCHLOROQUINE SULFATE 200 MG/1
200 TABLET, FILM COATED ORAL 2 TIMES DAILY
Status: DISCONTINUED | OUTPATIENT
Start: 2023-07-15 | End: 2023-07-21 | Stop reason: HOSPADM

## 2023-07-14 RX ORDER — LANOLIN ALCOHOL/MO/W.PET/CERES
5 CREAM (GRAM) TOPICAL NIGHTLY PRN
Status: DISCONTINUED | OUTPATIENT
Start: 2023-07-14 | End: 2023-07-21 | Stop reason: HOSPADM

## 2023-07-14 RX ORDER — SODIUM CHLORIDE 0.9 % (FLUSH) 0.9 %
5-40 SYRINGE (ML) INJECTION PRN
Status: DISCONTINUED | OUTPATIENT
Start: 2023-07-14 | End: 2023-07-21 | Stop reason: HOSPADM

## 2023-07-14 RX ORDER — BUSPIRONE HYDROCHLORIDE 10 MG/1
10 TABLET ORAL 4 TIMES DAILY
Status: DISCONTINUED | OUTPATIENT
Start: 2023-07-15 | End: 2023-07-15

## 2023-07-14 RX ORDER — ONDANSETRON 2 MG/ML
4 INJECTION INTRAMUSCULAR; INTRAVENOUS EVERY 6 HOURS PRN
Status: DISCONTINUED | OUTPATIENT
Start: 2023-07-14 | End: 2023-07-21 | Stop reason: HOSPADM

## 2023-07-14 RX ORDER — PRAVASTATIN SODIUM 10 MG
10 TABLET ORAL NIGHTLY
Status: DISCONTINUED | OUTPATIENT
Start: 2023-07-15 | End: 2023-07-15

## 2023-07-14 RX ORDER — SODIUM CHLORIDE 0.9 % (FLUSH) 0.9 %
5-40 SYRINGE (ML) INJECTION EVERY 12 HOURS SCHEDULED
Status: DISCONTINUED | OUTPATIENT
Start: 2023-07-14 | End: 2023-07-21 | Stop reason: HOSPADM

## 2023-07-14 RX ORDER — LATANOPROST 50 UG/ML
1 SOLUTION/ DROPS OPHTHALMIC NIGHTLY
Status: DISCONTINUED | OUTPATIENT
Start: 2023-07-15 | End: 2023-07-21 | Stop reason: HOSPADM

## 2023-07-14 RX ADMIN — FUROSEMIDE 80 MG: 10 INJECTION, SOLUTION INTRAMUSCULAR; INTRAVENOUS at 21:15

## 2023-07-14 RX ADMIN — APIXABAN 2.5 MG: 5 TABLET, FILM COATED ORAL at 21:14

## 2023-07-14 RX ADMIN — SODIUM CHLORIDE, PRESERVATIVE FREE 10 ML: 5 INJECTION INTRAVENOUS at 23:49

## 2023-07-14 RX ADMIN — Medication 4.5 MG: at 23:49

## 2023-07-14 RX ADMIN — ASPIRIN 81 MG: 81 TABLET, CHEWABLE ORAL at 21:14

## 2023-07-14 ASSESSMENT — ENCOUNTER SYMPTOMS
VOMITING: 0
COLOR CHANGE: 0
SHORTNESS OF BREATH: 1
ABDOMINAL PAIN: 0
COUGH: 1

## 2023-07-14 ASSESSMENT — PAIN SCALES - GENERAL
PAINLEVEL_OUTOF10: 0
PAINLEVEL_OUTOF10: 0

## 2023-07-15 PROBLEM — I24.89 DEMAND ISCHEMIA: Status: ACTIVE | Noted: 2023-07-15

## 2023-07-15 PROBLEM — I25.10 CORONARY ARTERY DISEASE INVOLVING NATIVE CORONARY ARTERY OF NATIVE HEART WITHOUT ANGINA PECTORIS: Status: ACTIVE | Noted: 2023-07-15

## 2023-07-15 PROBLEM — I49.8 JUNCTIONAL RHYTHM: Status: ACTIVE | Noted: 2023-07-15

## 2023-07-15 PROBLEM — I24.8 DEMAND ISCHEMIA (HCC): Status: ACTIVE | Noted: 2023-07-15

## 2023-07-15 LAB
ANION GAP SERPL CALCULATED.3IONS-SCNC: 10 MMOL/L (ref 3–16)
BASOPHILS # BLD: 0 K/UL (ref 0–0.2)
BASOPHILS NFR BLD: 0.6 %
BUN SERPL-MCNC: 12 MG/DL (ref 7–20)
CALCIUM SERPL-MCNC: 7.9 MG/DL (ref 8.3–10.6)
CHLORIDE SERPL-SCNC: 92 MMOL/L (ref 99–110)
CHOLEST SERPL-MCNC: 104 MG/DL (ref 0–199)
CO2 SERPL-SCNC: 24 MMOL/L (ref 21–32)
CREAT SERPL-MCNC: 0.8 MG/DL (ref 0.6–1.2)
DEPRECATED RDW RBC AUTO: 14.6 % (ref 12.4–15.4)
EKG DIAGNOSIS: NORMAL
EKG Q-T INTERVAL: 398 MS
EKG QRS DURATION: 136 MS
EKG QTC CALCULATION (BAZETT): 362 MS
EKG R AXIS: 15 DEGREES
EKG T AXIS: -67 DEGREES
EKG VENTRICULAR RATE: 50 BPM
EOSINOPHIL # BLD: 0 K/UL (ref 0–0.6)
EOSINOPHIL NFR BLD: 0 %
FERRITIN SERPL IA-MCNC: 513.8 NG/ML (ref 15–150)
FOLATE SERPL-MCNC: >20 NG/ML (ref 4.78–24.2)
GFR SERPLBLD CREATININE-BSD FMLA CKD-EPI: >60 ML/MIN/{1.73_M2}
GLUCOSE SERPL-MCNC: 84 MG/DL (ref 70–99)
HCT VFR BLD AUTO: 28.1 % (ref 36–48)
HDLC SERPL-MCNC: 49 MG/DL (ref 40–60)
HGB BLD-MCNC: 9.7 G/DL (ref 12–16)
IRON SATN MFR SERPL: 18 % (ref 15–50)
IRON SERPL-MCNC: 27 UG/DL (ref 37–145)
LACTATE BLDV-SCNC: 0.7 MMOL/L (ref 0.4–2)
LDLC SERPL CALC-MCNC: 42 MG/DL
LYMPHOCYTES # BLD: 1.3 K/UL (ref 1–5.1)
LYMPHOCYTES NFR BLD: 21.6 %
MAGNESIUM SERPL-MCNC: 1.7 MG/DL (ref 1.8–2.4)
MCH RBC QN AUTO: 31.2 PG (ref 26–34)
MCHC RBC AUTO-ENTMCNC: 34.3 G/DL (ref 31–36)
MCV RBC AUTO: 90.9 FL (ref 80–100)
MONOCYTES # BLD: 0.5 K/UL (ref 0–1.3)
MONOCYTES NFR BLD: 8 %
NEUTROPHILS # BLD: 4.2 K/UL (ref 1.7–7.7)
NEUTROPHILS NFR BLD: 69.8 %
PLATELET # BLD AUTO: 309 K/UL (ref 135–450)
PMV BLD AUTO: 8.7 FL (ref 5–10.5)
POTASSIUM SERPL-SCNC: 3.7 MMOL/L (ref 3.5–5.1)
RBC # BLD AUTO: 3.1 M/UL (ref 4–5.2)
SODIUM SERPL-SCNC: 126 MMOL/L (ref 136–145)
TIBC SERPL-MCNC: 152 UG/DL (ref 260–445)
TRIGL SERPL-MCNC: 64 MG/DL (ref 0–150)
TROPONIN, HIGH SENSITIVITY: 111 NG/L (ref 0–14)
TROPONIN, HIGH SENSITIVITY: 113 NG/L (ref 0–14)
VIT B12 SERPL-MCNC: 703 PG/ML (ref 211–911)
VLDLC SERPL CALC-MCNC: 13 MG/DL
WBC # BLD AUTO: 6.1 K/UL (ref 4–11)

## 2023-07-15 PROCEDURE — 82728 ASSAY OF FERRITIN: CPT

## 2023-07-15 PROCEDURE — 80048 BASIC METABOLIC PNL TOTAL CA: CPT

## 2023-07-15 PROCEDURE — 82746 ASSAY OF FOLIC ACID SERUM: CPT

## 2023-07-15 PROCEDURE — 83540 ASSAY OF IRON: CPT

## 2023-07-15 PROCEDURE — 80061 LIPID PANEL: CPT

## 2023-07-15 PROCEDURE — 36415 COLL VENOUS BLD VENIPUNCTURE: CPT

## 2023-07-15 PROCEDURE — 93010 ELECTROCARDIOGRAM REPORT: CPT | Performed by: INTERNAL MEDICINE

## 2023-07-15 PROCEDURE — 99223 1ST HOSP IP/OBS HIGH 75: CPT | Performed by: INTERNAL MEDICINE

## 2023-07-15 PROCEDURE — 83605 ASSAY OF LACTIC ACID: CPT

## 2023-07-15 PROCEDURE — 2580000003 HC RX 258: Performed by: INTERNAL MEDICINE

## 2023-07-15 PROCEDURE — 2060000000 HC ICU INTERMEDIATE R&B

## 2023-07-15 PROCEDURE — 84484 ASSAY OF TROPONIN QUANT: CPT

## 2023-07-15 PROCEDURE — 6360000002 HC RX W HCPCS: Performed by: INTERNAL MEDICINE

## 2023-07-15 PROCEDURE — 83735 ASSAY OF MAGNESIUM: CPT

## 2023-07-15 PROCEDURE — 6370000000 HC RX 637 (ALT 250 FOR IP): Performed by: INTERNAL MEDICINE

## 2023-07-15 PROCEDURE — 85025 COMPLETE CBC W/AUTO DIFF WBC: CPT

## 2023-07-15 PROCEDURE — 83550 IRON BINDING TEST: CPT

## 2023-07-15 PROCEDURE — 82607 VITAMIN B-12: CPT

## 2023-07-15 RX ORDER — MIRTAZAPINE 15 MG/1
7.5 TABLET, FILM COATED ORAL NIGHTLY
Status: DISCONTINUED | OUTPATIENT
Start: 2023-07-15 | End: 2023-07-15

## 2023-07-15 RX ORDER — MIRTAZAPINE 15 MG/1
7.5 TABLET, FILM COATED ORAL NIGHTLY
Status: DISCONTINUED | OUTPATIENT
Start: 2023-07-15 | End: 2023-07-21 | Stop reason: HOSPADM

## 2023-07-15 RX ORDER — BUSPIRONE HYDROCHLORIDE 10 MG/1
10 TABLET ORAL 4 TIMES DAILY
Status: DISCONTINUED | OUTPATIENT
Start: 2023-07-15 | End: 2023-07-21 | Stop reason: HOSPADM

## 2023-07-15 RX ORDER — FERROUS SULFATE TAB EC 324 MG (65 MG FE EQUIVALENT) 324 (65 FE) MG
324 TABLET DELAYED RESPONSE ORAL 2 TIMES DAILY WITH MEALS
Status: DISCONTINUED | OUTPATIENT
Start: 2023-07-15 | End: 2023-07-21 | Stop reason: HOSPADM

## 2023-07-15 RX ORDER — ATORVASTATIN CALCIUM 40 MG/1
40 TABLET, FILM COATED ORAL DAILY
Status: DISCONTINUED | OUTPATIENT
Start: 2023-07-16 | End: 2023-07-21 | Stop reason: HOSPADM

## 2023-07-15 RX ORDER — LANOLIN ALCOHOL/MO/W.PET/CERES
325 CREAM (GRAM) TOPICAL
COMMUNITY

## 2023-07-15 RX ADMIN — LEVOTHYROXINE SODIUM 125 MCG: 0.12 TABLET ORAL at 06:50

## 2023-07-15 RX ADMIN — PANTOPRAZOLE SODIUM 40 MG: 40 TABLET, DELAYED RELEASE ORAL at 06:50

## 2023-07-15 RX ADMIN — HYDROXYCHLOROQUINE SULFATE 200 MG: 200 TABLET ORAL at 09:11

## 2023-07-15 RX ADMIN — BUSPIRONE HYDROCHLORIDE 10 MG: 10 TABLET ORAL at 20:29

## 2023-07-15 RX ADMIN — FERROUS SULFATE TAB EC 324 MG (65 MG FE EQUIVALENT) 324 MG: 324 (65 FE) TABLET DELAYED RESPONSE at 09:11

## 2023-07-15 RX ADMIN — MIRTAZAPINE 7.5 MG: 15 TABLET, FILM COATED ORAL at 01:47

## 2023-07-15 RX ADMIN — BUSPIRONE HYDROCHLORIDE 10 MG: 10 TABLET ORAL at 13:08

## 2023-07-15 RX ADMIN — GABAPENTIN 100 MG: 100 CAPSULE ORAL at 20:30

## 2023-07-15 RX ADMIN — FUROSEMIDE 40 MG: 10 INJECTION, SOLUTION INTRAMUSCULAR; INTRAVENOUS at 18:32

## 2023-07-15 RX ADMIN — HYDRALAZINE HYDROCHLORIDE 10 MG: 10 TABLET, FILM COATED ORAL at 13:09

## 2023-07-15 RX ADMIN — GABAPENTIN 100 MG: 100 CAPSULE ORAL at 01:47

## 2023-07-15 RX ADMIN — BUSPIRONE HYDROCHLORIDE 10 MG: 10 TABLET ORAL at 18:32

## 2023-07-15 RX ADMIN — GABAPENTIN 100 MG: 100 CAPSULE ORAL at 09:11

## 2023-07-15 RX ADMIN — FERROUS SULFATE TAB EC 324 MG (65 MG FE EQUIVALENT) 324 MG: 324 (65 FE) TABLET DELAYED RESPONSE at 18:32

## 2023-07-15 RX ADMIN — FUROSEMIDE 40 MG: 10 INJECTION, SOLUTION INTRAMUSCULAR; INTRAVENOUS at 09:11

## 2023-07-15 RX ADMIN — SODIUM CHLORIDE, PRESERVATIVE FREE 10 ML: 5 INJECTION INTRAVENOUS at 20:33

## 2023-07-15 RX ADMIN — OXYBUTYNIN CHLORIDE 5 MG: 5 TABLET, EXTENDED RELEASE ORAL at 20:30

## 2023-07-15 RX ADMIN — ONDANSETRON 4 MG: 2 INJECTION INTRAMUSCULAR; INTRAVENOUS at 20:33

## 2023-07-15 RX ADMIN — SODIUM CHLORIDE, PRESERVATIVE FREE 10 ML: 5 INJECTION INTRAVENOUS at 09:12

## 2023-07-15 RX ADMIN — FOLIC ACID 1 MG: 1 TABLET ORAL at 09:11

## 2023-07-15 RX ADMIN — HYDROXYCHLOROQUINE SULFATE 200 MG: 200 TABLET ORAL at 20:30

## 2023-07-15 RX ADMIN — ASPIRIN 81 MG: 81 TABLET, CHEWABLE ORAL at 09:11

## 2023-07-15 RX ADMIN — HYDRALAZINE HYDROCHLORIDE 10 MG: 10 TABLET, FILM COATED ORAL at 20:29

## 2023-07-15 RX ADMIN — LATANOPROST 1 DROP: 50 SOLUTION OPHTHALMIC at 02:19

## 2023-07-15 RX ADMIN — MIRTAZAPINE 7.5 MG: 15 TABLET, FILM COATED ORAL at 20:30

## 2023-07-15 RX ADMIN — HYDROXYCHLOROQUINE SULFATE 200 MG: 200 TABLET ORAL at 01:47

## 2023-07-15 RX ADMIN — BUSPIRONE HYDROCHLORIDE 10 MG: 10 TABLET ORAL at 09:11

## 2023-07-15 RX ADMIN — PRAVASTATIN SODIUM 10 MG: 10 TABLET ORAL at 01:47

## 2023-07-15 RX ADMIN — LATANOPROST 1 DROP: 50 SOLUTION OPHTHALMIC at 20:37

## 2023-07-15 RX ADMIN — BUSPIRONE HYDROCHLORIDE 10 MG: 10 TABLET ORAL at 01:47

## 2023-07-15 RX ADMIN — FLUOXETINE 20 MG: 20 CAPSULE ORAL at 09:11

## 2023-07-15 RX ADMIN — PANTOPRAZOLE SODIUM 40 MG: 40 TABLET, DELAYED RELEASE ORAL at 18:32

## 2023-07-15 RX ADMIN — OXYBUTYNIN CHLORIDE 5 MG: 5 TABLET, EXTENDED RELEASE ORAL at 01:47

## 2023-07-15 RX ADMIN — HYDRALAZINE HYDROCHLORIDE 10 MG: 10 TABLET, FILM COATED ORAL at 06:50

## 2023-07-15 RX ADMIN — Medication 4.5 MG: at 20:32

## 2023-07-15 ASSESSMENT — PAIN SCALES - GENERAL
PAINLEVEL_OUTOF10: 0
PAINLEVEL_OUTOF10: 0

## 2023-07-15 NOTE — PROGRESS NOTES
4 Eyes Skin Assessment     NAME:  Divine Alvarenga  YOB: 1940  MEDICAL RECORD NUMBER:  [id number]    The patient is being assessed for  Admission    I agree that at least one RN has performed a thorough Head to Toe Skin Assessment on the patient. ALL assessment sites listed below have been assessed. Areas assessed by both nurses:    Head, Face, Ears, Shoulders, Back, Chest, Arms, Elbows, Hands, Sacrum. Buttock, Coccyx, Ischium, Legs. Feet and Heels, and Under Medical Devices         Does the Patient have a Wound? Yes wound(s) were present on assessment.  LDA wound assessment was Initiated and completed by RN       Ryley Prevention initiated by RN: Yes  Wound Care Orders initiated by RN: Yes    Pressure Injury (Stage 3,4, Unstageable, DTI, NWPT, and Complex wounds) if present, place Wound referral order by RN under : No    New Ostomies, if present place, Ostomy referral order under : No     Nurse 1 eSignature: Electronically signed by Eleanor Skinner RN on 7/15/23 at 3:21 AM EDT    **SHARE this note so that the co-signing nurse can place an eSignature**    Nurse 2 eSignature: Electronically signed by Tavon Guerra RN on 7/15/23 at 3:22 AM EDT

## 2023-07-15 NOTE — ED PROVIDER NOTES
Reason for Stopping:         cyclobenzaprine (FLEXERIL) 5 MG tablet Comments:   Reason for Stopping:                      (Please note that portions of this note were completed with a voice recognition program.  Efforts were made to edit the dictations but occasionally words are mis-transcribed.)    Mary Ellen Coats (electronically signed)           YOHAN Coats  07/14/23 2115       Mary Ellen Coats  07/15/23 0008

## 2023-07-15 NOTE — ED NOTES
Report called to Keith iSngh RN on 5W. Denies further questions.      Sergo Camacho RN  07/14/23 6741

## 2023-07-15 NOTE — PROGRESS NOTES
Hospital Medicine Progress Note     Date:  7/15/2023    PCP: No primary care provider on file. (Tel: None)    Date of Admission: 7/14/2023    Chief complaint:   Chief Complaint   Patient presents with    Cough     Pt in via EMS from nursing home with cough. Pt was sent here due to abnormal CT scan from nursing home. Pt presents to ED alert and oriented at this time with no signs of distress noted. Pt denies pain at rest.       Brief admission history:   Acute on chronic diastolic CHF (congestive heart failure) ([address])  Isael Middleton is a 81 y.o. female with hypertension, rheumatoid arthritis, bilateral hearing loss, cervical spine stenosis, hypothyroidism was sent to ER from nursing facility for abnormal CT findings. As per information patient was complaining of abdominal pain, bloating sensation, shortness of breath which prompted them to do a CT abdomen/pelvis. CT abdomen/pelvis was reported as bilateral pleural effusions, complete collapse of the left lower lobe and hence patient was sent to the ER. Patient reporting abdominal bloating, nausea, no vomiting, denied any chest pain, admits to having shortness of breath. Also reported bilateral lower extremity swelling. Patient denied any urinary complaints, admits to having constipation, last BM yesterday. Patient denied any abdominal distention. Vitals on arrival-/54, HR 51, RR 18, temp 98.8, saturating 93% on room air. Labs significant for sodium 121, chloride 89, CO2 20, random glucose 109, proBNP 3697, troponin 116, 113, albumin 3.2, TSH 16.01, free T4 1, hemoglobin 10.5.  UA-not suggestive of infection. Rapid COVID-negative. Chest x-ray-moderate to large left and small right pleural effusion with adjacent airspace disease. As per ED physician EKG with A-fib. Patient received Lasix 80 mg IV, aspirin 81 mg, Eliquis in ED    Assessment/Paln:    1.   Acute on chronic diastolic congestive heart failure  -CT abdomen/pelvis-7/12/2023-trace

## 2023-07-15 NOTE — PLAN OF CARE
Problem: Discharge Planning  Goal: Discharge to home or other facility with appropriate resources  Outcome: Progressing  Flowsheets (Taken 7/14/2023 2230 by Ali Cowden, RN)  Discharge to home or other facility with appropriate resources:   Identify barriers to discharge with patient and caregiver   Arrange for needed discharge resources and transportation as appropriate     Problem: Skin/Tissue Integrity  Goal: Absence of new skin breakdown  Description: 1. Monitor for areas of redness and/or skin breakdown  2. Assess vascular access sites hourly  3. Every 4-6 hours minimum:  Change oxygen saturation probe site  4. Every 4-6 hours:  If on nasal continuous positive airway pressure, respiratory therapy assess nares and determine need for appliance change or resting period.   Outcome: Progressing     Problem: Safety - Adult  Goal: Free from fall injury  Outcome: Progressing     Problem: ABCDS Injury Assessment  Goal: Absence of physical injury  Outcome: Progressing     Problem: Respiratory - Adult  Goal: Achieves optimal ventilation and oxygenation  Outcome: Progressing     Problem: Cardiovascular - Adult  Goal: Maintains optimal cardiac output and hemodynamic stability  Outcome: Progressing  Goal: Absence of cardiac dysrhythmias or at baseline  Outcome: Progressing     Problem: Skin/Tissue Integrity - Adult  Goal: Skin integrity remains intact  Outcome: Progressing     Problem: Musculoskeletal - Adult  Goal: Return mobility to safest level of function  Outcome: Progressing

## 2023-07-16 PROBLEM — J81.0 ACUTE PULMONARY EDEMA (HCC): Status: ACTIVE | Noted: 2023-07-16

## 2023-07-16 PROBLEM — J90 PLEURAL EFFUSION: Status: ACTIVE | Noted: 2023-07-16

## 2023-07-16 LAB
ANION GAP SERPL CALCULATED.3IONS-SCNC: 11 MMOL/L (ref 3–16)
BUN SERPL-MCNC: 13 MG/DL (ref 7–20)
CALCIUM SERPL-MCNC: 7.7 MG/DL (ref 8.3–10.6)
CHLORIDE SERPL-SCNC: 91 MMOL/L (ref 99–110)
CO2 SERPL-SCNC: 24 MMOL/L (ref 21–32)
CREAT SERPL-MCNC: 0.8 MG/DL (ref 0.6–1.2)
DEPRECATED RDW RBC AUTO: 14.6 % (ref 12.4–15.4)
GFR SERPLBLD CREATININE-BSD FMLA CKD-EPI: >60 ML/MIN/{1.73_M2}
GLUCOSE SERPL-MCNC: 82 MG/DL (ref 70–99)
HCT VFR BLD AUTO: 27.5 % (ref 36–48)
HGB BLD-MCNC: 9 G/DL (ref 12–16)
MAGNESIUM SERPL-MCNC: 1.6 MG/DL (ref 1.8–2.4)
MCH RBC QN AUTO: 29.6 PG (ref 26–34)
MCHC RBC AUTO-ENTMCNC: 32.8 G/DL (ref 31–36)
MCV RBC AUTO: 90.2 FL (ref 80–100)
PLATELET # BLD AUTO: 324 K/UL (ref 135–450)
PMV BLD AUTO: 8.7 FL (ref 5–10.5)
POTASSIUM SERPL-SCNC: 3.8 MMOL/L (ref 3.5–5.1)
RBC # BLD AUTO: 3.05 M/UL (ref 4–5.2)
SODIUM SERPL-SCNC: 126 MMOL/L (ref 136–145)
WBC # BLD AUTO: 5.5 K/UL (ref 4–11)

## 2023-07-16 PROCEDURE — 6370000000 HC RX 637 (ALT 250 FOR IP): Performed by: INTERNAL MEDICINE

## 2023-07-16 PROCEDURE — 80048 BASIC METABOLIC PNL TOTAL CA: CPT

## 2023-07-16 PROCEDURE — 99223 1ST HOSP IP/OBS HIGH 75: CPT | Performed by: INTERNAL MEDICINE

## 2023-07-16 PROCEDURE — 99233 SBSQ HOSP IP/OBS HIGH 50: CPT | Performed by: NURSE PRACTITIONER

## 2023-07-16 PROCEDURE — 85027 COMPLETE CBC AUTOMATED: CPT

## 2023-07-16 PROCEDURE — 6360000002 HC RX W HCPCS: Performed by: INTERNAL MEDICINE

## 2023-07-16 PROCEDURE — 36415 COLL VENOUS BLD VENIPUNCTURE: CPT

## 2023-07-16 PROCEDURE — 2060000000 HC ICU INTERMEDIATE R&B

## 2023-07-16 PROCEDURE — 2580000003 HC RX 258: Performed by: INTERNAL MEDICINE

## 2023-07-16 PROCEDURE — 83735 ASSAY OF MAGNESIUM: CPT

## 2023-07-16 RX ADMIN — HYDRALAZINE HYDROCHLORIDE 10 MG: 10 TABLET, FILM COATED ORAL at 20:42

## 2023-07-16 RX ADMIN — LEVOTHYROXINE SODIUM 125 MCG: 0.12 TABLET ORAL at 06:03

## 2023-07-16 RX ADMIN — ACETAMINOPHEN 650 MG: 325 TABLET ORAL at 17:10

## 2023-07-16 RX ADMIN — FUROSEMIDE 40 MG: 10 INJECTION, SOLUTION INTRAMUSCULAR; INTRAVENOUS at 08:56

## 2023-07-16 RX ADMIN — FERROUS SULFATE TAB EC 324 MG (65 MG FE EQUIVALENT) 324 MG: 324 (65 FE) TABLET DELAYED RESPONSE at 17:07

## 2023-07-16 RX ADMIN — BUSPIRONE HYDROCHLORIDE 10 MG: 10 TABLET ORAL at 17:07

## 2023-07-16 RX ADMIN — HYDROXYCHLOROQUINE SULFATE 200 MG: 200 TABLET ORAL at 08:56

## 2023-07-16 RX ADMIN — LATANOPROST 1 DROP: 50 SOLUTION OPHTHALMIC at 20:48

## 2023-07-16 RX ADMIN — MAGNESIUM SULFATE HEPTAHYDRATE 2000 MG: 40 INJECTION, SOLUTION INTRAVENOUS at 16:44

## 2023-07-16 RX ADMIN — ONDANSETRON 4 MG: 4 TABLET, ORALLY DISINTEGRATING ORAL at 10:24

## 2023-07-16 RX ADMIN — BUSPIRONE HYDROCHLORIDE 10 MG: 10 TABLET ORAL at 08:56

## 2023-07-16 RX ADMIN — ASPIRIN 81 MG: 81 TABLET, CHEWABLE ORAL at 08:56

## 2023-07-16 RX ADMIN — PANTOPRAZOLE SODIUM 40 MG: 40 TABLET, DELAYED RELEASE ORAL at 17:07

## 2023-07-16 RX ADMIN — FOLIC ACID 1 MG: 1 TABLET ORAL at 08:56

## 2023-07-16 RX ADMIN — FERROUS SULFATE TAB EC 324 MG (65 MG FE EQUIVALENT) 324 MG: 324 (65 FE) TABLET DELAYED RESPONSE at 08:56

## 2023-07-16 RX ADMIN — OXYBUTYNIN CHLORIDE 5 MG: 5 TABLET, EXTENDED RELEASE ORAL at 20:42

## 2023-07-16 RX ADMIN — SODIUM CHLORIDE, PRESERVATIVE FREE 10 ML: 5 INJECTION INTRAVENOUS at 10:23

## 2023-07-16 RX ADMIN — BUSPIRONE HYDROCHLORIDE 10 MG: 10 TABLET ORAL at 20:41

## 2023-07-16 RX ADMIN — FUROSEMIDE 40 MG: 10 INJECTION, SOLUTION INTRAMUSCULAR; INTRAVENOUS at 18:42

## 2023-07-16 RX ADMIN — ATORVASTATIN CALCIUM 40 MG: 40 TABLET, FILM COATED ORAL at 08:56

## 2023-07-16 RX ADMIN — BUSPIRONE HYDROCHLORIDE 10 MG: 10 TABLET ORAL at 12:53

## 2023-07-16 RX ADMIN — HYDRALAZINE HYDROCHLORIDE 10 MG: 10 TABLET, FILM COATED ORAL at 14:13

## 2023-07-16 RX ADMIN — ONDANSETRON 4 MG: 2 INJECTION INTRAMUSCULAR; INTRAVENOUS at 20:43

## 2023-07-16 RX ADMIN — HYDROXYCHLOROQUINE SULFATE 200 MG: 200 TABLET ORAL at 20:42

## 2023-07-16 RX ADMIN — MIRTAZAPINE 7.5 MG: 15 TABLET, FILM COATED ORAL at 20:41

## 2023-07-16 RX ADMIN — PANTOPRAZOLE SODIUM 40 MG: 40 TABLET, DELAYED RELEASE ORAL at 06:03

## 2023-07-16 RX ADMIN — Medication 4.5 MG: at 20:42

## 2023-07-16 RX ADMIN — GABAPENTIN 100 MG: 100 CAPSULE ORAL at 08:56

## 2023-07-16 RX ADMIN — FLUOXETINE 20 MG: 20 CAPSULE ORAL at 08:56

## 2023-07-16 RX ADMIN — SODIUM CHLORIDE, PRESERVATIVE FREE 10 ML: 5 INJECTION INTRAVENOUS at 20:43

## 2023-07-16 RX ADMIN — GABAPENTIN 100 MG: 100 CAPSULE ORAL at 20:41

## 2023-07-16 RX ADMIN — HYDRALAZINE HYDROCHLORIDE 10 MG: 10 TABLET, FILM COATED ORAL at 06:03

## 2023-07-16 ASSESSMENT — PAIN - FUNCTIONAL ASSESSMENT: PAIN_FUNCTIONAL_ASSESSMENT: PREVENTS OR INTERFERES SOME ACTIVE ACTIVITIES AND ADLS

## 2023-07-16 ASSESSMENT — PAIN DESCRIPTION - LOCATION: LOCATION: ARM

## 2023-07-16 ASSESSMENT — PAIN DESCRIPTION - DESCRIPTORS: DESCRIPTORS: SORE

## 2023-07-16 ASSESSMENT — PAIN DESCRIPTION - ORIENTATION: ORIENTATION: LEFT

## 2023-07-16 NOTE — PROGRESS NOTES
Cardiology - PROGRESS NOTE    Admit Date: 7/14/2023     Reason for follow up:   81 y.o. patient who presented to the hospital from a nursing home with complaints of abdominal pain and shortness of breath. The patient seems to be answering questions appropriately but needed redirection on the date. The patient typically follows with Dr. Ángel Valera. She describes a poor functional status and states that she is nonambulatory. She states for the past few weeks she has felt more short of breath and has had the abdominal discomfort. She denies chest pain, vomiting, fevers, or chills. Presently, she is most concerned about the abdominal pain. In general she states she has a poor appetite but denies worsening abdominal pain with eating. Social History:   reports that she has never smoked. She has never used smokeless tobacco. She reports that she does not drink alcohol and does not use drugs. Family History: family history is not on file. Living Situation:       Interval History:   Patient seen and examined and notes reviewed   Sitting in bed  Anxious   [place]   Wt 109 lb   -2.6 L   BP controlled overlnight    Elevated in AM   Cr 0.8  All other systems reviewed and negative except as above. Diet: ADULT DIET; Regular; Low Fat/Low Chol/High Fiber/2 gm Na      In: 250 [P.O.:240;  I.V.:10]  Out: 1400    Patient Vitals for the past 96 hrs (Last 3 readings):   Weight   07/16/23 0433 109 lb 2 oz (49.5 kg)   07/15/23 0520 111 lb 5.3 oz (50.5 kg)   07/14/23 2230 111 lb 5.3 oz (50.5 kg)           Data:   Scheduled Meds:   Scheduled Meds:   busPIRone  10 mg Oral 4x Daily    mirtazapine  7.5 mg Oral Nightly    ferrous sulfate  324 mg Oral BID WC    atorvastatin  40 mg Oral Daily    sodium chloride flush  5-40 mL IntraVENous 2 times per day    furosemide  40 mg IntraVENous BID    aspirin  81 mg Oral Daily    FLUoxetine  20 mg Oral Daily    folic acid  1 mg Oral Daily

## 2023-07-16 NOTE — PROGRESS NOTES
right and small to moderate left pleural effusion causing complete collapse of the left lower lobe. -consult pulmnology    2. New trace pericardial effusion, worsening moderate anasarca  -Chest x-ray-moderate to large left and small right pleural effusion with adjacent airspace disease. Pulmonary edema would be primarily considered. Patient has bilateral lower extremity edema. -proBNP 3697  -Received IV Lasix 80 mg in ED  -Continue Lasix IV 40 mg twice daily  -Strict input/output, daily weight  -2D echo ordered  -Consult cardiology. #. Hypervolemic hyponatremia  -Na 121, was 138-5/2022.  -Diurese and monitor with repeat BMP. #.  Elevated troponin  -Patient denying any chest pain, EKG with no acute ST-T wave changes  -Serial troponins, repeat EKG  -Consult cardiology     #. ?  New onset A-fib  -EKG with slow ventricular response  -Eliquis ordered in ER- hold until evaluated by cardiology  -Consult cardiology  -Patient is on nifedipine-hold  -TSH 16.21, free T4 1.0     #. Chronic normocytic anemia  - on ferrous sulfate     #. History of mesenteric ischemia  #. 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 History of COVID-19-7/8/2022     #. Hypertension-patient is on nifedipine 60 mg daily, hydralazine 10 mg 3 times daily  -Holding nifedipine due to bradycardia     #. Depression/anxiety/insomnia  -Patient is on fluoxetine, BuSpar, Remeron     #. Hypothyroidism-on levothyroxine     #. History of right hip intertrochanteric glhjvuhw-[facility]-18/2018     #. Rheumatoid arthritis-on Plaquenil  -Patient was on prednisone in the past     #.  Bilateral sensorineural hearing loss     #. Urge incontinence     #.   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  -Neurosurgery had evaluated the

## 2023-07-17 ENCOUNTER — APPOINTMENT (OUTPATIENT)
Dept: GENERAL RADIOLOGY | Age: 83
End: 2023-07-17
Payer: COMMERCIAL

## 2023-07-17 LAB
ALBUMIN FLD-MCNC: 2.2 G/DL
ANION GAP SERPL CALCULATED.3IONS-SCNC: 10 MMOL/L (ref 3–16)
APPEARANCE FLUID: CLEAR
BDY FLUID QUALITY: NORMAL
BUN SERPL-MCNC: 15 MG/DL (ref 7–20)
CALCIUM SERPL-MCNC: 7.6 MG/DL (ref 8.3–10.6)
CELL COUNT FLUID TYPE: NORMAL
CHLORIDE SERPL-SCNC: 92 MMOL/L (ref 99–110)
CHOLEST FLD-MCNC: 61 MG/DL
CO2 SERPL-SCNC: 26 MMOL/L (ref 21–32)
COLOR FLUID: YELLOW
CREAT SERPL-MCNC: 1.1 MG/DL (ref 0.6–1.2)
GFR SERPLBLD CREATININE-BSD FMLA CKD-EPI: 50 ML/MIN/{1.73_M2}
GLUCOSE FLD-MCNC: 111 MG/DL
GLUCOSE SERPL-MCNC: 102 MG/DL (ref 70–99)
LDH FLD L TO P-CCNC: 161 U/L
LV EF: 70 %
LVEF MODALITY: NORMAL
LYMPHOCYTES NFR FLD: 34 %
MACROPHAGES # FLD: 53 %
MAGNESIUM SERPL-MCNC: 2 MG/DL (ref 1.8–2.4)
MESOTHL CELL NFR FLD: 11 %
NEUTROPHIL, FLUID: 2 %
NUC CELL # FLD: 661 /CUMM
POTASSIUM SERPL-SCNC: 3.4 MMOL/L (ref 3.5–5.1)
PROT FLD-MCNC: 4 G/DL
RBC FLUID: <2000 /CUMM
SODIUM SERPL-SCNC: 128 MMOL/L (ref 136–145)
SPECIMEN SOURCE FLD: NORMAL
TOTAL CELLS COUNTED FLD: 100

## 2023-07-17 PROCEDURE — 71045 X-RAY EXAM CHEST 1 VIEW: CPT

## 2023-07-17 PROCEDURE — 83735 ASSAY OF MAGNESIUM: CPT

## 2023-07-17 PROCEDURE — 6370000000 HC RX 637 (ALT 250 FOR IP): Performed by: NURSE PRACTITIONER

## 2023-07-17 PROCEDURE — 6370000000 HC RX 637 (ALT 250 FOR IP): Performed by: INTERNAL MEDICINE

## 2023-07-17 PROCEDURE — 99233 SBSQ HOSP IP/OBS HIGH 50: CPT | Performed by: INTERNAL MEDICINE

## 2023-07-17 PROCEDURE — 32555 ASPIRATE PLEURA W/ IMAGING: CPT | Performed by: INTERNAL MEDICINE

## 2023-07-17 PROCEDURE — 0W9B3ZZ DRAINAGE OF LEFT PLEURAL CAVITY, PERCUTANEOUS APPROACH: ICD-10-PCS | Performed by: INTERNAL MEDICINE

## 2023-07-17 PROCEDURE — 83615 LACTATE (LD) (LDH) ENZYME: CPT

## 2023-07-17 PROCEDURE — 82465 ASSAY BLD/SERUM CHOLESTEROL: CPT

## 2023-07-17 PROCEDURE — 88112 CYTOPATH CELL ENHANCE TECH: CPT

## 2023-07-17 PROCEDURE — 82945 GLUCOSE OTHER FLUID: CPT

## 2023-07-17 PROCEDURE — 88305 TISSUE EXAM BY PATHOLOGIST: CPT

## 2023-07-17 PROCEDURE — 80048 BASIC METABOLIC PNL TOTAL CA: CPT

## 2023-07-17 PROCEDURE — 2060000000 HC ICU INTERMEDIATE R&B

## 2023-07-17 PROCEDURE — 94760 N-INVAS EAR/PLS OXIMETRY 1: CPT

## 2023-07-17 PROCEDURE — 99233 SBSQ HOSP IP/OBS HIGH 50: CPT | Performed by: NURSE PRACTITIONER

## 2023-07-17 PROCEDURE — 9990000010 HC NO CHARGE VISIT: Performed by: PHYSICAL THERAPIST

## 2023-07-17 PROCEDURE — 84157 ASSAY OF PROTEIN OTHER: CPT

## 2023-07-17 PROCEDURE — 82042 OTHER SOURCE ALBUMIN QUAN EA: CPT

## 2023-07-17 PROCEDURE — 89051 BODY FLUID CELL COUNT: CPT

## 2023-07-17 PROCEDURE — 2580000003 HC RX 258: Performed by: INTERNAL MEDICINE

## 2023-07-17 PROCEDURE — 93306 TTE W/DOPPLER COMPLETE: CPT

## 2023-07-17 PROCEDURE — 36415 COLL VENOUS BLD VENIPUNCTURE: CPT

## 2023-07-17 RX ORDER — AMLODIPINE BESYLATE 5 MG/1
5 TABLET ORAL DAILY
Status: DISCONTINUED | OUTPATIENT
Start: 2023-07-17 | End: 2023-07-21 | Stop reason: HOSPADM

## 2023-07-17 RX ORDER — FUROSEMIDE 20 MG/1
20 TABLET ORAL 2 TIMES DAILY
Status: DISCONTINUED | OUTPATIENT
Start: 2023-07-17 | End: 2023-07-21 | Stop reason: HOSPADM

## 2023-07-17 RX ORDER — POTASSIUM CHLORIDE 20 MEQ/1
40 TABLET, EXTENDED RELEASE ORAL PRN
Status: DISCONTINUED | OUTPATIENT
Start: 2023-07-17 | End: 2023-07-17

## 2023-07-17 RX ORDER — HYDRALAZINE HYDROCHLORIDE 25 MG/1
25 TABLET, FILM COATED ORAL EVERY 8 HOURS SCHEDULED
Status: DISCONTINUED | OUTPATIENT
Start: 2023-07-17 | End: 2023-07-21 | Stop reason: HOSPADM

## 2023-07-17 RX ORDER — POTASSIUM CHLORIDE 7.45 MG/ML
10 INJECTION INTRAVENOUS PRN
Status: DISCONTINUED | OUTPATIENT
Start: 2023-07-17 | End: 2023-07-17

## 2023-07-17 RX ADMIN — BUSPIRONE HYDROCHLORIDE 10 MG: 10 TABLET ORAL at 16:28

## 2023-07-17 RX ADMIN — PANTOPRAZOLE SODIUM 40 MG: 40 TABLET, DELAYED RELEASE ORAL at 16:28

## 2023-07-17 RX ADMIN — FERROUS SULFATE TAB EC 324 MG (65 MG FE EQUIVALENT) 324 MG: 324 (65 FE) TABLET DELAYED RESPONSE at 16:28

## 2023-07-17 RX ADMIN — GABAPENTIN 100 MG: 100 CAPSULE ORAL at 10:37

## 2023-07-17 RX ADMIN — FUROSEMIDE 20 MG: 20 TABLET ORAL at 16:28

## 2023-07-17 RX ADMIN — FOLIC ACID 1 MG: 1 TABLET ORAL at 10:38

## 2023-07-17 RX ADMIN — HYDROXYCHLOROQUINE SULFATE 200 MG: 200 TABLET ORAL at 19:58

## 2023-07-17 RX ADMIN — LEVOTHYROXINE SODIUM 125 MCG: 0.12 TABLET ORAL at 05:45

## 2023-07-17 RX ADMIN — BUSPIRONE HYDROCHLORIDE 10 MG: 10 TABLET ORAL at 13:17

## 2023-07-17 RX ADMIN — SODIUM CHLORIDE, PRESERVATIVE FREE 10 ML: 5 INJECTION INTRAVENOUS at 10:40

## 2023-07-17 RX ADMIN — LATANOPROST 1 DROP: 50 SOLUTION OPHTHALMIC at 20:00

## 2023-07-17 RX ADMIN — Medication 4.5 MG: at 20:00

## 2023-07-17 RX ADMIN — POTASSIUM BICARBONATE 40 MEQ: 782 TABLET, EFFERVESCENT ORAL at 10:37

## 2023-07-17 RX ADMIN — ACETAMINOPHEN 650 MG: 325 TABLET ORAL at 13:19

## 2023-07-17 RX ADMIN — GABAPENTIN 100 MG: 100 CAPSULE ORAL at 19:58

## 2023-07-17 RX ADMIN — ACETAMINOPHEN 650 MG: 325 TABLET ORAL at 19:58

## 2023-07-17 RX ADMIN — BUSPIRONE HYDROCHLORIDE 10 MG: 10 TABLET ORAL at 10:37

## 2023-07-17 RX ADMIN — OXYBUTYNIN CHLORIDE 5 MG: 5 TABLET, EXTENDED RELEASE ORAL at 19:58

## 2023-07-17 RX ADMIN — SODIUM CHLORIDE, PRESERVATIVE FREE 10 ML: 5 INJECTION INTRAVENOUS at 19:58

## 2023-07-17 RX ADMIN — HYDROXYCHLOROQUINE SULFATE 200 MG: 200 TABLET ORAL at 10:38

## 2023-07-17 RX ADMIN — ASPIRIN 81 MG: 81 TABLET, CHEWABLE ORAL at 10:37

## 2023-07-17 RX ADMIN — HYDRALAZINE HYDROCHLORIDE 25 MG: 25 TABLET, FILM COATED ORAL at 13:17

## 2023-07-17 RX ADMIN — MIRTAZAPINE 7.5 MG: 15 TABLET, FILM COATED ORAL at 19:57

## 2023-07-17 RX ADMIN — PANTOPRAZOLE SODIUM 40 MG: 40 TABLET, DELAYED RELEASE ORAL at 05:45

## 2023-07-17 RX ADMIN — FERROUS SULFATE TAB EC 324 MG (65 MG FE EQUIVALENT) 324 MG: 324 (65 FE) TABLET DELAYED RESPONSE at 10:38

## 2023-07-17 RX ADMIN — HYDRALAZINE HYDROCHLORIDE 10 MG: 10 TABLET, FILM COATED ORAL at 05:45

## 2023-07-17 RX ADMIN — ATORVASTATIN CALCIUM 40 MG: 40 TABLET, FILM COATED ORAL at 10:38

## 2023-07-17 RX ADMIN — BUSPIRONE HYDROCHLORIDE 10 MG: 10 TABLET ORAL at 19:58

## 2023-07-17 RX ADMIN — AMLODIPINE BESYLATE 5 MG: 5 TABLET ORAL at 10:38

## 2023-07-17 RX ADMIN — HYDRALAZINE HYDROCHLORIDE 25 MG: 25 TABLET, FILM COATED ORAL at 20:47

## 2023-07-17 RX ADMIN — FLUOXETINE 20 MG: 20 CAPSULE ORAL at 10:38

## 2023-07-17 ASSESSMENT — PAIN DESCRIPTION - DESCRIPTORS
DESCRIPTORS: ACHING
DESCRIPTORS: SORE
DESCRIPTORS: SORE;ACHING

## 2023-07-17 ASSESSMENT — PAIN SCALES - GENERAL
PAINLEVEL_OUTOF10: 0
PAINLEVEL_OUTOF10: 5
PAINLEVEL_OUTOF10: 0
PAINLEVEL_OUTOF10: 2
PAINLEVEL_OUTOF10: 4
PAINLEVEL_OUTOF10: 2

## 2023-07-17 ASSESSMENT — PAIN DESCRIPTION - LOCATION
LOCATION: ARM
LOCATION: HEAD
LOCATION: ARM

## 2023-07-17 ASSESSMENT — ENCOUNTER SYMPTOMS
SHORTNESS OF BREATH: 1
ABDOMINAL DISTENTION: 1
ABDOMINAL PAIN: 1

## 2023-07-17 ASSESSMENT — PAIN SCALES - WONG BAKER: WONGBAKER_NUMERICALRESPONSE: 0

## 2023-07-17 ASSESSMENT — PAIN - FUNCTIONAL ASSESSMENT: PAIN_FUNCTIONAL_ASSESSMENT: PREVENTS OR INTERFERES SOME ACTIVE ACTIVITIES AND ADLS

## 2023-07-17 ASSESSMENT — PAIN DESCRIPTION - ORIENTATION
ORIENTATION: LEFT
ORIENTATION: LEFT

## 2023-07-17 NOTE — DISCHARGE INSTRUCTIONS
Extra Heart Failure sites:     https://Meebo. com/publication/?n=975500   --- this is American Heart Association interactive Healthier Living with Heart Failure guidebook. Please click hyperlink or copy / paste link into search bar. Use your mouse to scroll through the pages. Lots of information about weight monitoring, diet tips, activity, meds, etc    HF [place] helder  -- this is a free smart phone helder available for iPhone and Android download. Use your phone to track sodium / fluid intake, zone tool symptom tracking, weights, medications, etc. Click on this hyperlink  HF [place] Helder   for QR code for easy download. DASH (Dietary Approach to Stop Hypertension) diet --  SeekAlumni.no -- this diet is a flexible eating plan that promotes heart healthy eating style. Click on hyperlink or copy / paste link into search bar. Lots of low sodium recipes and tips.     CigarRepair.ca  -- more free recipes

## 2023-07-17 NOTE — CARE COORDINATION
Wound care consulted for stage 1 to buttock area. Pt skin assessed. Pt has no wounds present on buttock area. Heels red but blanchable. Pt already on specialty bed. Preventative sacral border placed. Will not continue to follow. Please re-consult if needed.   -moisture barrier to buttocks  -sacral border to sacral area, peel back each shift to assess skin, change every 3 days and as needed  -turn and reposition every 2 hours  -chair cushion, encourage to reposition self frequently while in chair  -elevate heels, apply liquid barrier film twice daily  Electronically signed by Beltran Abdalla RN CWOCN on 7/17/2023 at 10:56 AM

## 2023-07-17 NOTE — PROGRESS NOTES
Physical Therapy    Selena Byrne  [id number]  [id number]    PT orders received and chart reviewed; pt is dep for care needs at [facility] for last few years; Pt at her baseline;  Will sign off from therapy at this time  Electronically signed by JONATHAN LUO PT on 7/17/2023 at 12:48 PM

## 2023-07-17 NOTE — DISCHARGE INSTR - COC
Continuity of Care Form    Patient Name: Jyoti Tejada   :  1940  MRN:  [id number]    Admit date:  2023  Discharge date:  2023    Code Status Order: Full Code   Advance Directives:     Admitting Physician:  Lady Kawasaki, MD  PCP: No primary care provider on file.     Discharging Nurse: Feliberto Harris, [address] Unit/Room#: W3S-0519/7353-61  Discharging Unit Phone Number: [phone]    Emergency Contact:   Extended Emergency Contact Information  Primary Emergency Contact: Elijahthanhjerrod Grubbs [place] [address] [place] Phone: [phone]  Mobile Phone: [phone]  Relation: Child  Secondary Emergency Contact: Lien Grover [place] [address] [place] Phone: [phone]  Mobile Phone: [phone]  Relation: Child    Past Surgical History:  Past Surgical History:   Procedure Laterality Date    OK OFFICE/OUTPT VISIT,PROCEDURE ONLY Right 2018    OPEN REDUCTION INTERNAL FIXATION RIGHT HIP GAMMA NAIL WITH C-ARM performed by Sander Cardenas MD at [address] [place] [address] N/A 2020    ESOPHAGOGASTRODUODENOSCOPY performed by Dillon Pearson MD at [facility] ENDOSCOPY       Immunization History:   Immunization History   Administered Date(s) Administered    Meningococcal Pradeep Amador, (age 8y-23y), IM, 0.5mL 10/29/2013    Pneumococcal, PCV-13, PREVNAR 15, (age 6w+), IM, 0.5mL 2015    TDaP, ADACEL (age 6y-58y), Carlos Corriganr (age 10y+), IM, 0.5mL 2018       Active Problems:  Patient Active Problem List   Diagnosis Code    Primary hypertension I10    Rheumatoid arthritis ([address]) M06.9    Major depressive disorder F32.9    Acquired hypothyroidism E03.9    REJI (generalized anxiety disorder) F41.1    Mild intermittent asthma J45.20    PAD (peripheral artery disease) (HCC) I73.9    Anemia D64.9    Symptomatic bradycardia R00.1    Chronic constipation K59.09    Chronic kidney disease N18.9    Chronic rhinitis J31.0

## 2023-07-17 NOTE — PROGRESS NOTES
Hospital Medicine Progress Note     Date:  7/17/2023    PCP: No primary care provider on file. (Tel: None)    Date of Admission: 7/14/2023    Chief complaint:   Chief Complaint   Patient presents with    Cough     Pt in via EMS from nursing home with cough. Pt was sent here due to abnormal CT scan from nursing home. Pt presents to ED alert and oriented at this time with no signs of distress noted. Pt denies pain at rest.       Brief admission history:   Acute on chronic diastolic CHF (congestive heart failure) ([facility])  Ashwini Reed is a 81 y.o. female with hypertension, rheumatoid arthritis, bilateral hearing loss, cervical spine stenosis, hypothyroidism was sent to ER from nursing facility for abnormal CT findings. As per information patient was complaining of abdominal pain, bloating sensation, shortness of breath which prompted them to do a CT abdomen/pelvis. CT abdomen/pelvis was reported as bilateral pleural effusions, complete collapse of the left lower lobe and hence patient was sent to the ER. Patient reporting abdominal bloating, nausea, no vomiting, denied any chest pain, admits to having shortness of breath. Also reported bilateral lower extremity swelling. Patient denied any urinary complaints, admits to having constipation, last BM yesterday. Patient denied any abdominal distention. Vitals on arrival-/54, HR 51, RR 18, temp 98.8, saturating 93% on room air. Labs significant for sodium 121, chloride 89, CO2 20, random glucose 109, proBNP 3697, troponin 116, 113, albumin 3.2, TSH 16.01, free T4 1, hemoglobin 10.5.  UA-not suggestive of infection. Rapid COVID-negative. Chest x-ray-moderate to large left and small right pleural effusion with adjacent airspace disease. As per ED physician EKG with A-fib. Patient received Lasix 80 mg IV, aspirin 81 mg, Eliquis in ED    Assessment/Paln:    1.   Acute on chronic diastolic congestive heart failure  -CT abdomen/pelvis-7/12/2023-trace

## 2023-07-17 NOTE — PROGRESS NOTES
This RN took thoracentesis samples to lab and handed them directly to . Pt is resting in bed, visitor at bedside, and has no complaints of pain at this time. Site is clean, dry, and intact.  Electronically signed by Jarrell Sandoval RN on 7/17/23 at 4:15 PM EDT

## 2023-07-17 NOTE — CARE COORDINATION
Case Management Assessment  Initial Evaluation    Date/Time of Evaluation: 7/17/2023 2:19 PM  Assessment Completed by: Roger García RN    If patient is discharged prior to next notation, then this note serves as note for discharge by case management. Patient Name: Bushra Brown                   YOB: 1940  Diagnosis: Shortness of breath [R06.02]  Hyponatremia [E87.1]  Acute pulmonary edema ([address]) [J81.0]  New onset a-fib ([address]) [I48.91]  Full code status [Z78.9]  Acute on chronic diastolic CHF (congestive heart failure) ([facility]) [I50.33]  Congestive heart failure, unspecified HF chronicity, unspecified heart failure type ([address]) [I50.9]                   Date / Time: 7/14/2023  7:54 PM    Patient Admission Status: Inpatient   Readmission Risk (Low < 19, Mod (19-27), High > 27): Readmission Risk Score: 15.6    Current PCP: No primary care provider on file. PCP verified by CM? Yes (MD at [facility])    Chart Reviewed: Yes      History Provided by: Patient  Patient Orientation: Alert and Oriented    Patient Cognition: Alert    Hospitalization in the last 30 days (Readmission):  No    If yes, Readmission Assessment in CM Navigator will be completed. Advance Directives:      Code Status: Full Code   Patient's Primary Decision Maker is: Named in [place]     Primary Decision Maker[facility]iatric hospital Standard - Child - [phone]    Discharge Planning:    Patient lives with: Other (Comment) (Vania at [facility]) Type of Home: Long-Term Care  Primary Care Giver:  Other (Comment) (Vania at [address] [place] [address])  Patient Support Systems include: Family Members   Current Financial resources: Medicare ([address] [place] [address])  Current community resources:  LTC facility  Current services prior to admission: Extended Care Facility            Current DME:  supplied by facility            Type of Home Care services:  None    ADLS  Prior functional level: Assistance with the following:, Bathing, Dressing, Toileting, Cooking,

## 2023-07-17 NOTE — ACP (ADVANCE CARE PLANNING)
Advance Care Planning     Advance Care Planning Activator (Inpatient)  Conversation Note      Date of ACP Conversation: 7/17/2023     Conversation Conducted with: Patient with [address]: Named in Advance Directive or Healthcare Power of  (name) Yobany Izaguirre    ACP Activator: Jermaine Gant RN    Health Care Decision Maker:     Current Designated Health Care Decision Maker:     Primary Decision Maker: Yobany Izaguirre - Child - [phone]    Today we documented Decision Maker(s) consistent with ACP documents on file. Care Preferences    Ventilation: \"If you were in your present state of health and suddenly became very ill and were unable to breathe on your own, what would your preference be about the use of a ventilator (breathing machine) if it were available to you? \"      Would the patient desire the use of ventilator (breathing machine)?: yes    \"If your health worsens and it becomes clear that your chance of recovery is unlikely, what would your preference be about the use of a ventilator (breathing machine) if it were available to you? \"     Would the patient desire the use of ventilator (breathing machine)?: Yes      Resuscitation  \"CPR works best to restart the heart when there is a sudden event, like a heart attack, in someone who is otherwise healthy. Unfortunately, CPR does not typically restart the heart for people who have serious health conditions or who are very sick. \"    \"In the event your heart stopped as a result of an underlying serious health condition, would you want attempts to be made to restart your heart (answer \"yes\" for attempt to resuscitate) or would you prefer a natural death (answer \"no\" for do not attempt to resuscitate)? \" yes       [] Yes   [] No   Educated Patient / Dave Duarte regarding differences between Advance Directives and portable DNR orders.     Length of ACP Conversation in minutes:      Conversation Outcomes:  Existing advance directive reviewed with patient; no changes to patient's previously recorded wishes    Follow-up plan:    [] Schedule follow-up conversation to continue planning  [] Referred individual to Provider for additional questions/concerns   [] Advised patient/agent/surrogate to review completed ACP document and update if needed with changes in condition, patient preferences or care setting    [x] This note routed to one or more involved healthcare providers    #[phone]  Electronically signed by Eh Jasso RN on 7/17/2023 at 2:14 PM

## 2023-07-17 NOTE — PROCEDURES
ultrasound guided thoracentesis    Indication: pleural effusion    Informed consent was obtained from the patient after explaining the risks and benefits, including, but not limited to bleeding, infection, pneumothorax and death. Time out was taken    Estimated Blood Loss: Minimal    Ultrasound visualization of large fluid pocket and the left side marked. Sterile prep with cholorhexidine    Topical anesthetic with lidocaine    Procedure:  Using standard technique, a catheter was introduced into the pleural space and 1000 ml  straw colored fluid was removed. Patient tolerated procedure well. Hemostasis at conclusion. Chest X Ray pending.

## 2023-07-17 NOTE — CONSULTS
PALLIATIVE MEDICINE CONSULTATION     Patient name:Eula V Severn   BIJ:[id number]    QYN:[birth year]  Room/Bed:O1G-5500/5125-01   LOS: 3 days         Date of consult:7/17/2023    Consult Information  Palliative Medicine Consult performed by: BRAULIO Malone CNP   Inpatient consult to Palliative Care  Consult performed by: BRAULIO Malone CNP  Consult ordered by: Richa Prince MD  Reason for consult: Goals of care, code status. ASSESSMENT/RECOMMENDATIONS     81 y.o. female with shortness of breath and abdominal pain. Symptom Management:  Bilateral pleural effusion/pericardial effusion - Cardiology and pulmonology following. No improvement with diuresis per CXR. Thoracentesis today per pulmonary. Continue lasix as directed. Echo pending. New onset atrial fibrillation - cardiology following, started on eliquis. Abdominal pain - Reviewed abdominal CT, no acute findings. Atherosclerosis noted. Goals of Care- See below. Patient/Family Goals of Care : Attempted goals of care conversation with the patient at the bedside. Although the patient appears oriented to self and place, date is a concern. She repeats what is told to her which makes it appear as though she understands. Attempted a code status discuss, patient repeated that she wants intubation, ventilation, and CPR, but when describing comfort measures, she also states she would like that. Spoke with patients named decision maker, [place], regarding goals of care. She admits her mother is confused at times. Per her report, the patient has told her she only wants temporary measures such as intubation if that will \"allow her to heal\". Code status options including full code, DNR/Arrest, and DNR/CC described to Maribel Chang. She states, \"this is so hard because I just found out my mom has breast cancer\". She feels as though regardless of emotion, she needs to do what's best for her mother.  Code status potential for severe exacerbation of condition/side effects of treatment. Therapy requires intensive monitoring for toxicity     Advanced Care Planning Note. Purpose of Encounter: Advanced care planning   Parties In Attendance: Patient, spoke with daughter and decision maker, [place] via telephone. Decisional Capacity: Limited   Goals of Care Determination: Patient is agreeable to temporary intubation and a voluntary setting. At the point of cardiac or respiratory arrest, the patient would like to cease all measures and focus on comfort care.   The Patient has the following current code status:    Code Status: DNR-CCA  Time spent on Advanced care Plannin minutes             Signed By: Electronically signed by BRAULIO Campos CNP on 2023 at 2:51 PM  Palliative Medicine     2023

## 2023-07-17 NOTE — PLAN OF CARE
Problem: Discharge Planning  Goal: Discharge to home or other facility with appropriate resources  Outcome: Progressing     Problem: Skin/Tissue Integrity  Goal: Absence of new skin breakdown  Description: 1. Monitor for areas of redness and/or skin breakdown  2. Assess vascular access sites hourly  3. Every 4-6 hours minimum:  Change oxygen saturation probe site  4. Every 4-6 hours:  If on nasal continuous positive airway pressure, respiratory therapy assess nares and determine need for appliance change or resting period.   Outcome: Progressing     Problem: Safety - Adult  Goal: Free from fall injury  Outcome: Progressing     Problem: ABCDS Injury Assessment  Goal: Absence of physical injury  Outcome: Progressing     Problem: Respiratory - Adult  Goal: Achieves optimal ventilation and oxygenation  Outcome: Progressing     Problem: Cardiovascular - Adult  Goal: Maintains optimal cardiac output and hemodynamic stability  Outcome: Progressing  Goal: Absence of cardiac dysrhythmias or at baseline  Outcome: Progressing     Problem: Skin/Tissue Integrity - Adult  Goal: Skin integrity remains intact  Outcome: Progressing     Problem: Musculoskeletal - Adult  Goal: Return mobility to safest level of function  Outcome: Progressing     Problem: Pain  Goal: Verbalizes/displays adequate comfort level or baseline comfort level  Outcome: Progressing     Problem: Chronic Conditions and Co-morbidities  Goal: Patient's chronic conditions and co-morbidity symptoms are monitored and maintained or improved  Outcome: Progressing

## 2023-07-17 NOTE — PROGRESS NOTES
Occupational Therapy      OT orders received and chart reviewed; pt is dep for care needs at [facility] for last few years; Pt at her baseline; Will sign off from therapy at this time.     Electronically signed by Yumiko Marrero OT on 7/17/2023 at 12:50 PM

## 2023-07-17 NOTE — PROGRESS NOTES
Cardiology - PROGRESS NOTE    Admit Date: 7/14/2023     Reason for follow up:   81 y.o. patient who presented to the hospital from a nursing home with complaints of abdominal pain and shortness of breath. The patient seems to be answering questions appropriately but needed redirection on the date. The patient typically follows with Dr. Nanci Arroyo. She describes a poor functional status and states that she is nonambulatory. She states for the past few weeks she has felt more short of breath and has had the abdominal discomfort. She denies chest pain, vomiting, fevers, or chills. Presently, she is most concerned about the abdominal pain. In general she states she has a poor appetite but denies worsening abdominal pain with eating. Social History:   reports that she has never smoked. She has never used smokeless tobacco. She reports that she does not drink alcohol and does not use drugs. Family History: family history is not on file. Living Situation:       Interval History:   Patient seen and examined and notes reviewed     [place]   Wt 104 lb   -3.6 L   BP elevated   Cr 1.1  Elevated BP   All other systems reviewed and negative except as above. Diet: ADULT DIET;  Regular; Low Fat/Low Chol/High Fiber/2 gm Na      In: 600 [P.O.:600]  Out: 1600    Patient Vitals for the past 96 hrs (Last 3 readings):   Weight   07/17/23 0544 104 lb 11.5 oz (47.5 kg)   07/16/23 0433 109 lb 2 oz (49.5 kg)   07/15/23 0520 111 lb 5.3 oz (50.5 kg)           Data:   Scheduled Meds:   Scheduled Meds:   busPIRone  10 mg Oral 4x Daily    mirtazapine  7.5 mg Oral Nightly    ferrous sulfate  324 mg Oral BID WC    atorvastatin  40 mg Oral Daily    sodium chloride flush  5-40 mL IntraVENous 2 times per day    furosemide  40 mg IntraVENous BID    aspirin  81 mg Oral Daily    FLUoxetine  20 mg Oral Daily    folic acid  1 mg Oral Daily    gabapentin  100 mg Oral BID

## 2023-07-18 LAB
ANION GAP SERPL CALCULATED.3IONS-SCNC: 11 MMOL/L (ref 3–16)
BASOPHILS # BLD: 0 K/UL (ref 0–0.2)
BASOPHILS NFR BLD: 0.6 %
BUN SERPL-MCNC: 19 MG/DL (ref 7–20)
CALCIUM SERPL-MCNC: 7.8 MG/DL (ref 8.3–10.6)
CHLORIDE SERPL-SCNC: 88 MMOL/L (ref 99–110)
CO2 SERPL-SCNC: 26 MMOL/L (ref 21–32)
CREAT SERPL-MCNC: 1.3 MG/DL (ref 0.6–1.2)
DEPRECATED RDW RBC AUTO: 14.5 % (ref 12.4–15.4)
EOSINOPHIL # BLD: 0 K/UL (ref 0–0.6)
EOSINOPHIL NFR BLD: 0 %
FERRITIN SERPL IA-MCNC: 481.3 NG/ML (ref 15–150)
FOLATE SERPL-MCNC: >20 NG/ML (ref 4.78–24.2)
GFR SERPLBLD CREATININE-BSD FMLA CKD-EPI: 41 ML/MIN/{1.73_M2}
GLUCOSE SERPL-MCNC: 95 MG/DL (ref 70–99)
HCT VFR BLD AUTO: 28.1 % (ref 36–48)
HGB BLD-MCNC: 9.7 G/DL (ref 12–16)
IRON SATN MFR SERPL: 13 % (ref 15–50)
IRON SERPL-MCNC: 18 UG/DL (ref 37–145)
LDH SERPL L TO P-CCNC: 191 U/L (ref 100–190)
LYMPHOCYTES # BLD: 1.2 K/UL (ref 1–5.1)
LYMPHOCYTES NFR BLD: 18.1 %
MAGNESIUM SERPL-MCNC: 1.9 MG/DL (ref 1.8–2.4)
MCH RBC QN AUTO: 31 PG (ref 26–34)
MCHC RBC AUTO-ENTMCNC: 34.5 G/DL (ref 31–36)
MCV RBC AUTO: 89.9 FL (ref 80–100)
MONOCYTES # BLD: 0.6 K/UL (ref 0–1.3)
MONOCYTES NFR BLD: 9.3 %
NEUTROPHILS # BLD: 5 K/UL (ref 1.7–7.7)
NEUTROPHILS NFR BLD: 72 %
PLATELET # BLD AUTO: 301 K/UL (ref 135–450)
PMV BLD AUTO: 9.3 FL (ref 5–10.5)
POTASSIUM SERPL-SCNC: 4.1 MMOL/L (ref 3.5–5.1)
RBC # BLD AUTO: 3.12 M/UL (ref 4–5.2)
SODIUM SERPL-SCNC: 125 MMOL/L (ref 136–145)
TIBC SERPL-MCNC: 139 UG/DL (ref 260–445)
VIT B12 SERPL-MCNC: 550 PG/ML (ref 211–911)
WBC # BLD AUTO: 6.9 K/UL (ref 4–11)

## 2023-07-18 PROCEDURE — 2060000000 HC ICU INTERMEDIATE R&B

## 2023-07-18 PROCEDURE — 82607 VITAMIN B-12: CPT

## 2023-07-18 PROCEDURE — 84165 PROTEIN E-PHORESIS SERUM: CPT

## 2023-07-18 PROCEDURE — 84155 ASSAY OF PROTEIN SERUM: CPT

## 2023-07-18 PROCEDURE — 6370000000 HC RX 637 (ALT 250 FOR IP): Performed by: NURSE PRACTITIONER

## 2023-07-18 PROCEDURE — 83540 ASSAY OF IRON: CPT

## 2023-07-18 PROCEDURE — 2580000003 HC RX 258: Performed by: INTERNAL MEDICINE

## 2023-07-18 PROCEDURE — 83550 IRON BINDING TEST: CPT

## 2023-07-18 PROCEDURE — 6370000000 HC RX 637 (ALT 250 FOR IP): Performed by: INTERNAL MEDICINE

## 2023-07-18 PROCEDURE — 83010 ASSAY OF HAPTOGLOBIN QUANT: CPT

## 2023-07-18 PROCEDURE — 83615 LACTATE (LD) (LDH) ENZYME: CPT

## 2023-07-18 PROCEDURE — 99232 SBSQ HOSP IP/OBS MODERATE 35: CPT | Performed by: INTERNAL MEDICINE

## 2023-07-18 PROCEDURE — 82746 ASSAY OF FOLIC ACID SERUM: CPT

## 2023-07-18 PROCEDURE — 6360000002 HC RX W HCPCS: Performed by: STUDENT IN AN ORGANIZED HEALTH CARE EDUCATION/TRAINING PROGRAM

## 2023-07-18 PROCEDURE — 83735 ASSAY OF MAGNESIUM: CPT

## 2023-07-18 PROCEDURE — 82728 ASSAY OF FERRITIN: CPT

## 2023-07-18 PROCEDURE — 99232 SBSQ HOSP IP/OBS MODERATE 35: CPT | Performed by: NURSE PRACTITIONER

## 2023-07-18 PROCEDURE — 85025 COMPLETE CBC W/AUTO DIFF WBC: CPT

## 2023-07-18 PROCEDURE — 36415 COLL VENOUS BLD VENIPUNCTURE: CPT

## 2023-07-18 PROCEDURE — 80048 BASIC METABOLIC PNL TOTAL CA: CPT

## 2023-07-18 PROCEDURE — 83883 ASSAY NEPHELOMETRY NOT SPEC: CPT

## 2023-07-18 RX ORDER — ENOXAPARIN SODIUM 100 MG/ML
40 INJECTION SUBCUTANEOUS DAILY
Status: DISCONTINUED | OUTPATIENT
Start: 2023-07-18 | End: 2023-07-18

## 2023-07-18 RX ORDER — HEPARIN SODIUM 5000 [USP'U]/ML
5000 INJECTION, SOLUTION INTRAVENOUS; SUBCUTANEOUS 2 TIMES DAILY
Status: DISCONTINUED | OUTPATIENT
Start: 2023-07-18 | End: 2023-07-21 | Stop reason: HOSPADM

## 2023-07-18 RX ORDER — SENNOSIDES A AND B 8.6 MG/1
1 TABLET, FILM COATED ORAL NIGHTLY
Status: DISCONTINUED | OUTPATIENT
Start: 2023-07-18 | End: 2023-07-20

## 2023-07-18 RX ADMIN — SODIUM CHLORIDE, PRESERVATIVE FREE 10 ML: 5 INJECTION INTRAVENOUS at 21:46

## 2023-07-18 RX ADMIN — ATORVASTATIN CALCIUM 40 MG: 40 TABLET, FILM COATED ORAL at 08:23

## 2023-07-18 RX ADMIN — FLUOXETINE 20 MG: 20 CAPSULE ORAL at 08:24

## 2023-07-18 RX ADMIN — HEPARIN SODIUM 5000 UNITS: 5000 INJECTION INTRAVENOUS; SUBCUTANEOUS at 14:57

## 2023-07-18 RX ADMIN — SENNOSIDES 8.6 MG: 8.6 TABLET, FILM COATED ORAL at 21:38

## 2023-07-18 RX ADMIN — HYDROXYCHLOROQUINE SULFATE 200 MG: 200 TABLET ORAL at 08:23

## 2023-07-18 RX ADMIN — ASPIRIN 81 MG: 81 TABLET, CHEWABLE ORAL at 08:23

## 2023-07-18 RX ADMIN — FERROUS SULFATE TAB EC 324 MG (65 MG FE EQUIVALENT) 324 MG: 324 (65 FE) TABLET DELAYED RESPONSE at 08:23

## 2023-07-18 RX ADMIN — FUROSEMIDE 20 MG: 20 TABLET ORAL at 17:15

## 2023-07-18 RX ADMIN — Medication 4.5 MG: at 21:39

## 2023-07-18 RX ADMIN — BUSPIRONE HYDROCHLORIDE 10 MG: 10 TABLET ORAL at 08:23

## 2023-07-18 RX ADMIN — PANTOPRAZOLE SODIUM 40 MG: 40 TABLET, DELAYED RELEASE ORAL at 17:15

## 2023-07-18 RX ADMIN — ACETAMINOPHEN 650 MG: 325 TABLET ORAL at 17:19

## 2023-07-18 RX ADMIN — LATANOPROST 1 DROP: 50 SOLUTION OPHTHALMIC at 21:45

## 2023-07-18 RX ADMIN — OXYBUTYNIN CHLORIDE 5 MG: 5 TABLET, EXTENDED RELEASE ORAL at 21:38

## 2023-07-18 RX ADMIN — HYDRALAZINE HYDROCHLORIDE 25 MG: 25 TABLET, FILM COATED ORAL at 06:04

## 2023-07-18 RX ADMIN — FUROSEMIDE 20 MG: 20 TABLET ORAL at 08:23

## 2023-07-18 RX ADMIN — FOLIC ACID 1 MG: 1 TABLET ORAL at 08:23

## 2023-07-18 RX ADMIN — AMLODIPINE BESYLATE 5 MG: 5 TABLET ORAL at 08:23

## 2023-07-18 RX ADMIN — PANTOPRAZOLE SODIUM 40 MG: 40 TABLET, DELAYED RELEASE ORAL at 06:04

## 2023-07-18 RX ADMIN — BUSPIRONE HYDROCHLORIDE 10 MG: 10 TABLET ORAL at 21:38

## 2023-07-18 RX ADMIN — BUSPIRONE HYDROCHLORIDE 10 MG: 10 TABLET ORAL at 13:23

## 2023-07-18 RX ADMIN — MIRTAZAPINE 7.5 MG: 15 TABLET, FILM COATED ORAL at 21:38

## 2023-07-18 RX ADMIN — GABAPENTIN 100 MG: 100 CAPSULE ORAL at 21:38

## 2023-07-18 RX ADMIN — ONDANSETRON 4 MG: 4 TABLET, ORALLY DISINTEGRATING ORAL at 21:59

## 2023-07-18 RX ADMIN — GABAPENTIN 100 MG: 100 CAPSULE ORAL at 08:23

## 2023-07-18 RX ADMIN — BUSPIRONE HYDROCHLORIDE 10 MG: 10 TABLET ORAL at 17:15

## 2023-07-18 RX ADMIN — FERROUS SULFATE TAB EC 324 MG (65 MG FE EQUIVALENT) 324 MG: 324 (65 FE) TABLET DELAYED RESPONSE at 17:15

## 2023-07-18 RX ADMIN — SODIUM CHLORIDE, PRESERVATIVE FREE 10 ML: 5 INJECTION INTRAVENOUS at 08:27

## 2023-07-18 RX ADMIN — HYDRALAZINE HYDROCHLORIDE 25 MG: 25 TABLET, FILM COATED ORAL at 21:38

## 2023-07-18 RX ADMIN — LEVOTHYROXINE SODIUM 125 MCG: 0.12 TABLET ORAL at 06:05

## 2023-07-18 RX ADMIN — HEPARIN SODIUM 5000 UNITS: 5000 INJECTION INTRAVENOUS; SUBCUTANEOUS at 21:38

## 2023-07-18 RX ADMIN — HYDROXYCHLOROQUINE SULFATE 200 MG: 200 TABLET ORAL at 21:38

## 2023-07-18 ASSESSMENT — ENCOUNTER SYMPTOMS
SHORTNESS OF BREATH: 0
COUGH: 0
ABDOMINAL PAIN: 1
CONSTIPATION: 1
ABDOMINAL DISTENTION: 0

## 2023-07-18 ASSESSMENT — PAIN SCALES - WONG BAKER
WONGBAKER_NUMERICALRESPONSE: 0

## 2023-07-18 ASSESSMENT — PAIN DESCRIPTION - LOCATION: LOCATION: HEAD

## 2023-07-18 ASSESSMENT — PAIN DESCRIPTION - ORIENTATION: ORIENTATION: RIGHT;LEFT

## 2023-07-18 ASSESSMENT — PAIN SCALES - GENERAL
PAINLEVEL_OUTOF10: 3
PAINLEVEL_OUTOF10: 0

## 2023-07-18 ASSESSMENT — PAIN DESCRIPTION - DESCRIPTORS: DESCRIPTORS: ACHING

## 2023-07-18 NOTE — PROGRESS NOTES
[facility] called and stated the pt HE was 30-40s and was showing a junctional rhythm. This RN went to cardiac monitor to verify and a cardiologist was sitting there and stated this is not new for the pt and if pt is asymptomatic it is fine.  Pt is resting in bed with no complaints eating breakfast. Electronically signed by Alicia Benoit RN on 7/18/23 at 10:10 AM EDT

## 2023-07-18 NOTE — PROGRESS NOTES
General surgery was consult for pt left breast biopsy site. Dr. Westchester Huron called this RN and was told updated her on the situation and Dr. Fernando Sparrow stated she is not in office this week and she does not feel like it needs to be followed by general surgery. Dr. Fernando Sparrow stated to have wound care follow up and if there were any questions to contact her and to d/c the consult. Message sent to on call doctor (Dr. Kaleb Chilel) with Hematology Oncology at [phone]; no response at this time. Electronically signed by Ekta Pacheco RN on 7/18/23 at 6:06 PM EDT     General surgery consult [facility].  Electronically signed by Ekta Pacheco RN on 7/18/23 at [facility]  PM EDT

## 2023-07-18 NOTE — PROGRESS NOTES
Clinical Pharmacy Note  Subcutaneous Anticoagulant Adjustment     Enoxaparin has been adjusted to Heparin 5000 units SUBQ BID based on [facility] policy. Recent Labs     07/17/23  0450 07/18/23  0448   CREATININE 1.1 1.3*     Recent Labs     07/16/23  0852   HGB 9.0*   HCT 27.5*        Estimated Creatinine Clearance: 25 mL/min (A) (based on SCr of 1.3 mg/dL (H)). Pharmacist Review of Appropriate Use and Automatic Dose Adjustment of Subcutaneous Anticoagulants (Adult)    The guidance below is to provide initial recommendations for dosing. If recommended dose does not align well with patient's current clinical picture, communications with the care team will occur to determine most appropriate medication and dose. TABLE 1. ENOXAPARIN ROUTINE PROPHYLAXIS DOSING (Medically ill, routine surgery)   Patient Weight (kg)     50.9 and below 51 - 100.9 101 - 150.9 151 - 174.9 175 or greater         Estimated CrCl  (ml/min) 30 or greater   30 mg SUBQ daily   40 mg SUBQ daily 30 mg SUBQ BID  40 mg SUBQ BID 60mg SUBQ BID      15-29 UFH 5000 units SUBQ BID   30 mg SUBQ daily 30 mg SUBQ daily 40 mg SUBQ daily   60 mg SUBQ daily      Less than 15 or Dialysis UFH 5000 units SUBQ BID   UFH 5000 units SUBQ TID UFH 7500 units SUBQ TID       TABLE 2. ENOXAPARIN TREATMENT DOSING   (Based on 1mg/kg BID for DVT/PE/AFib)   Patient Weight (kg)     50.9 and below .9 151-189.9 190 or greater         Estimated CrCl  (ml/min) 30 or greater Recommend [facility] standardized UFH infusion, apixaban or rivaroxaban 1mg/kg SUBQ BID 1mg/kg SUBQ BID if anti-Xa levels are feasible per institution. Alternatively,  recommend switch to [facility] standardized UFH infusion     Recommend switch to [facility] standardized UFH infusion. 15-29 Recommend [facility] standardized UFH infusion or apixaban 1mg/kg SUBQ daily Recommend switch to [facility] standardized UFH infusion     Less than 15 or Dialysis Recommend switch to [facility] standardized UFH infusion.      Marilee BAXTER

## 2023-07-18 NOTE — CONSULTS
Oncology Hematology Care    Consult Note      Requesting Physician:      CHIEF COMPLAINT:  breast biopsy +(as per daughter)      HISTORY OF PRESENT ILLNESS:    Ms. Jacobo Myers  is a 81 y.o. 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 Patient admitted for shortness of breath, chf, abdominal pain and new onset a fib. Patient reports left nipple redness and drainage for about a month. She was seen at the dermatology group in [place], biopsy performed, and is positive for \"paget's\" per daughter. Patient reports personal history of thyroid cancer with thyroid removal. No family history of cancer. Unsure if she has ever had a mammogram.     ICD-10-CM    1. New onset a-fib ([address])  I48.91       2. Congestive heart failure, unspecified HF chronicity, unspecified heart failure type ([address])  I50.9       3. Hyponatremia  E87.1       4. Shortness of breath  R06.02       5. Full code status  Z78.9       6.  Acute pulmonary edema (HCC)  J81.0              Past Medical History:  Past Medical History:   Diagnosis Date    Allergic rhinitis     Anxiety     Arthritis     Asthma     Chronic kidney disease     Depression     Diverticulitis     GERD (gastroesophageal reflux disease)     Hyperlipidemia     Hypertension     Overactive bladder     PVD (peripheral vascular disease) ([address])     Thyroid disease     Tinea corporis     Vitamin B12 deficiency        Past Surgical History:  Past Surgical History:   Procedure Laterality Date    [place] OFFICE/OUTPT VISIT,PROCEDURE ONLY Right 11/8/2018    OPEN REDUCTION INTERNAL FIXATION RIGHT HIP GAMMA NAIL WITH C-ARM performed by Yolette Blum MD at [facility] ENDOSCOPY N/A 9/14/2020    ESOPHAGOGASTRODUODENOSCOPY performed by Valentin Hummel MD at [facility] on chronic diastolic heart failure (HCC)    Junctional rhythm    Coronary artery disease involving native coronary artery of native heart without angina pectoris    Demand ischemia (HCC)    Pleural effusion    Acute pulmonary edema (HCC)       IMPRESSION/RECOMMENDATIONS:  Left nipple redness/drainage   -biopsy 7/10 at the dermatology group in [facility] [place] with Justo Gunter, still trying to obtain official path report, I spoke with their pathology department and requested they fax our office the report, the official results are still not back per pathology, but her daughter she got results \"+ for paget's\"    -gen surg consult placed for    Anemia   -normocytic   -anemia labs sent  Pleural effusion    -s/p thoracentesis             Thank you for asking me to see the patient.        BRAULIO Menendez - CNP  Please Contact Through [employer]

## 2023-07-18 NOTE — PROGRESS NOTES
PALLIATIVE MEDICINE PROGRESS NOTE     Patient name:Eula V Shireen Burkitt    RSS:[id number] PVO:7/86/6028  Room/Bed:F8I-[id number]    LOS: 4 days        ASSESSMENT/RECOMMENDATIONS     81 y.o. female with  with shortness of breath and abdominal pain. Symptom Management:  Bilateral pleural effusion/pericardial effusion - Cardiology and pulmonology following. Thoracentesis on 7/17/23 with improvement of symptoms. Continue lasix as directed. Echo complete. New onset atrial fibrillation - cardiology following, no intervention at this time. Abdominal pain - Reviewed abdominal CT, no acute findings. Atherosclerosis noted. Constipation - Start Senna-S nightly, Miralax as needed. Left breast inflammatory standing - Biopsy last week, per daughter, positive for Pagets disease. Will discuss with attending. Goals of Care- See below. Patient/Family Goals of Care :    7/17/23 - Attempted goals of care conversation with the patient at the bedside. Although the patient appears oriented to self and place, date is a concern. She repeats what is told to her which makes it appear as though she understands. Attempted a code status discuss, patient repeated that she wants intubation, ventilation, and CPR, but when describing comfort measures, she also states she would like that. Spoke with patients named decision maker, Callie [place], regarding goals of care. She admits her mother is confused at times. Per her report, the patient has told her she only wants temporary measures such as intubation if that will \"allow her to heal\". Code status options including full code, DNR/Arrest, and DNR/CC described to Maribel Chang. She states, \"this is so hard because I just found out my mom has breast cancer\". She feels as though regardless of emotion, she needs to do what's best for her mother. Code status changed to DNR/CC/Arrest per her request. Plans to come to the hospital to be with the patient for thoracentesis.  Will continue to

## 2023-07-18 NOTE — PROGRESS NOTES
I spoke with Tequila Song. She is aware of consult. Will work with her office to get patient in for outpatient follow up and imaging on the same day, they will work with patient, daughter and [id number] [address] to facilitate.

## 2023-07-18 NOTE — PROGRESS NOTES
Cardiology - PROGRESS NOTE    Admit Date: 7/14/2023     Reason for follow up:   81 y.o. patient who presented to the hospital from a nursing home with complaints of abdominal pain and shortness of breath. The patient seems to be answering questions appropriately but needed redirection on the date. The patient typically follows with Dr. Herminio Cutler. She describes a poor functional status and states that she is nonambulatory. She states for the past few weeks she has felt more short of breath and has had the abdominal discomfort. She denies chest pain, vomiting, fevers, or chills. Presently, she is most concerned about the abdominal pain. In general she states she has a poor appetite but denies worsening abdominal pain with eating. Social History:   reports that she has never smoked. She has never used smokeless tobacco. She reports that she does not drink alcohol and does not use drugs. Family History: family history is not on file. Living Situation:       Interval History:   Patient seen and examined and notes reviewed     [place]   Wt 105 lb   -3.6 L   BP labile   Cr 1.3  Thoracentesis 7/17  All other systems reviewed and negative except as above. Diet: ADULT DIET;  Regular; Low Fat/Low Chol/High Fiber/2 gm Na      In: 240 [P.O.:240]  Out: -    Patient Vitals for the past 96 hrs (Last 3 readings):   Weight   07/18/23 0331 105 lb 2.6 oz (47.7 kg)   07/17/23 2242 104 lb 8 oz (47.4 kg)   07/17/23 0544 104 lb 11.5 oz (47.5 kg)           Data:   Scheduled Meds:   Scheduled Meds:   senna  1 tablet Oral Nightly    heparin (porcine)  5,000 Units SubCUTAneous BID    hydrALAZINE  25 mg Oral 3 times per day    amLODIPine  5 mg Oral Daily    furosemide  20 mg Oral BID    busPIRone  10 mg Oral 4x Daily    mirtazapine  7.5 mg Oral Nightly    ferrous sulfate  324 mg Oral BID WC    atorvastatin  40 mg Oral Daily    sodium chloride flush  5-40 mL IntraVENous 2

## 2023-07-19 ENCOUNTER — TELEPHONE (OUTPATIENT)
Dept: BREAST CENTER | Age: 83
End: 2023-07-19

## 2023-07-19 ENCOUNTER — APPOINTMENT (OUTPATIENT)
Dept: GENERAL RADIOLOGY | Age: 83
End: 2023-07-19
Payer: COMMERCIAL

## 2023-07-19 LAB
ALBUMIN SERPL ELPH-MCNC: 2.1 G/DL (ref 3.1–4.9)
ALPHA1 GLOB SERPL ELPH-MCNC: 0.4 G/DL (ref 0.2–0.4)
ALPHA2 GLOB SERPL ELPH-MCNC: 1.1 G/DL (ref 0.4–1.1)
ANION GAP SERPL CALCULATED.3IONS-SCNC: 11 MMOL/L (ref 3–16)
B-GLOBULIN SERPL ELPH-MCNC: 1 G/DL (ref 0.9–1.6)
BASOPHILS # BLD: 0.1 K/UL (ref 0–0.2)
BASOPHILS NFR BLD: 0.6 %
BUN SERPL-MCNC: 20 MG/DL (ref 7–20)
CALCIUM SERPL-MCNC: 8.1 MG/DL (ref 8.3–10.6)
CHLORIDE SERPL-SCNC: 89 MMOL/L (ref 99–110)
CO2 SERPL-SCNC: 25 MMOL/L (ref 21–32)
CREAT SERPL-MCNC: 1.1 MG/DL (ref 0.6–1.2)
DEPRECATED RDW RBC AUTO: 14.4 % (ref 12.4–15.4)
EOSINOPHIL # BLD: 0 K/UL (ref 0–0.6)
EOSINOPHIL NFR BLD: 0.5 %
GAMMA GLOB SERPL ELPH-MCNC: 0.9 G/DL (ref 0.6–1.8)
GFR SERPLBLD CREATININE-BSD FMLA CKD-EPI: 50 ML/MIN/{1.73_M2}
GLUCOSE SERPL-MCNC: 94 MG/DL (ref 70–99)
HAPTOGLOB SERPL-MCNC: 323 MG/DL (ref 30–200)
HCT VFR BLD AUTO: 29.5 % (ref 36–48)
HGB BLD-MCNC: 10 G/DL (ref 12–16)
LYMPHOCYTES # BLD: 1.5 K/UL (ref 1–5.1)
LYMPHOCYTES NFR BLD: 18.5 %
MAGNESIUM SERPL-MCNC: 2.2 MG/DL (ref 1.8–2.4)
MCH RBC QN AUTO: 30.4 PG (ref 26–34)
MCHC RBC AUTO-ENTMCNC: 33.8 G/DL (ref 31–36)
MCV RBC AUTO: 89.9 FL (ref 80–100)
MONOCYTES # BLD: 0.6 K/UL (ref 0–1.3)
MONOCYTES NFR BLD: 7.4 %
NEUTROPHILS # BLD: 5.9 K/UL (ref 1.7–7.7)
NEUTROPHILS NFR BLD: 73 %
PLATELET # BLD AUTO: 355 K/UL (ref 135–450)
PMV BLD AUTO: 9.4 FL (ref 5–10.5)
POTASSIUM SERPL-SCNC: 4 MMOL/L (ref 3.5–5.1)
PROT SERPL-MCNC: 5.5 G/DL (ref 6.4–8.2)
RBC # BLD AUTO: 3.28 M/UL (ref 4–5.2)
SODIUM SERPL-SCNC: 125 MMOL/L (ref 136–145)
SPE/IFE INTERPRETATION: NORMAL
WBC # BLD AUTO: 8 K/UL (ref 4–11)

## 2023-07-19 PROCEDURE — 6360000002 HC RX W HCPCS: Performed by: STUDENT IN AN ORGANIZED HEALTH CARE EDUCATION/TRAINING PROGRAM

## 2023-07-19 PROCEDURE — 6370000000 HC RX 637 (ALT 250 FOR IP): Performed by: NURSE PRACTITIONER

## 2023-07-19 PROCEDURE — 6370000000 HC RX 637 (ALT 250 FOR IP): Performed by: INTERNAL MEDICINE

## 2023-07-19 PROCEDURE — 83735 ASSAY OF MAGNESIUM: CPT

## 2023-07-19 PROCEDURE — 80048 BASIC METABOLIC PNL TOTAL CA: CPT

## 2023-07-19 PROCEDURE — 74018 RADEX ABDOMEN 1 VIEW: CPT

## 2023-07-19 PROCEDURE — 85025 COMPLETE CBC W/AUTO DIFF WBC: CPT

## 2023-07-19 PROCEDURE — 2060000000 HC ICU INTERMEDIATE R&B

## 2023-07-19 PROCEDURE — 6370000000 HC RX 637 (ALT 250 FOR IP): Performed by: STUDENT IN AN ORGANIZED HEALTH CARE EDUCATION/TRAINING PROGRAM

## 2023-07-19 PROCEDURE — 94760 N-INVAS EAR/PLS OXIMETRY 1: CPT

## 2023-07-19 PROCEDURE — 36415 COLL VENOUS BLD VENIPUNCTURE: CPT

## 2023-07-19 PROCEDURE — 2580000003 HC RX 258: Performed by: INTERNAL MEDICINE

## 2023-07-19 RX ORDER — LIDOCAINE HYDROCHLORIDE 20 MG/ML
15 SOLUTION OROPHARYNGEAL
Status: DISCONTINUED | OUTPATIENT
Start: 2023-07-19 | End: 2023-07-19

## 2023-07-19 RX ADMIN — BUSPIRONE HYDROCHLORIDE 10 MG: 10 TABLET ORAL at 08:05

## 2023-07-19 RX ADMIN — SODIUM CHLORIDE, PRESERVATIVE FREE 10 ML: 5 INJECTION INTRAVENOUS at 08:08

## 2023-07-19 RX ADMIN — ASPIRIN 81 MG: 81 TABLET, CHEWABLE ORAL at 08:05

## 2023-07-19 RX ADMIN — FERROUS SULFATE TAB EC 324 MG (65 MG FE EQUIVALENT) 324 MG: 324 (65 FE) TABLET DELAYED RESPONSE at 08:05

## 2023-07-19 RX ADMIN — PANTOPRAZOLE SODIUM 40 MG: 40 TABLET, DELAYED RELEASE ORAL at 05:48

## 2023-07-19 RX ADMIN — POLYETHYLENE GLYCOL 3350 17 G: 17 POWDER, FOR SOLUTION ORAL at 08:05

## 2023-07-19 RX ADMIN — MIRTAZAPINE 7.5 MG: 15 TABLET, FILM COATED ORAL at 21:25

## 2023-07-19 RX ADMIN — HYDRALAZINE HYDROCHLORIDE 25 MG: 25 TABLET, FILM COATED ORAL at 13:51

## 2023-07-19 RX ADMIN — GABAPENTIN 100 MG: 100 CAPSULE ORAL at 08:05

## 2023-07-19 RX ADMIN — FLUOXETINE 20 MG: 20 CAPSULE ORAL at 08:05

## 2023-07-19 RX ADMIN — FOLIC ACID 1 MG: 1 TABLET ORAL at 08:05

## 2023-07-19 RX ADMIN — ATORVASTATIN CALCIUM 40 MG: 40 TABLET, FILM COATED ORAL at 08:05

## 2023-07-19 RX ADMIN — LATANOPROST 1 DROP: 50 SOLUTION OPHTHALMIC at 21:26

## 2023-07-19 RX ADMIN — HEPARIN SODIUM 5000 UNITS: 5000 INJECTION INTRAVENOUS; SUBCUTANEOUS at 21:26

## 2023-07-19 RX ADMIN — AMLODIPINE BESYLATE 5 MG: 5 TABLET ORAL at 08:05

## 2023-07-19 RX ADMIN — HYDROXYCHLOROQUINE SULFATE 200 MG: 200 TABLET ORAL at 21:25

## 2023-07-19 RX ADMIN — PANTOPRAZOLE SODIUM 40 MG: 40 TABLET, DELAYED RELEASE ORAL at 17:05

## 2023-07-19 RX ADMIN — MAGNESIUM HYDROXIDE 30 ML: 400 SUSPENSION ORAL at 13:53

## 2023-07-19 RX ADMIN — Medication 4.5 MG: at 21:26

## 2023-07-19 RX ADMIN — GABAPENTIN 100 MG: 100 CAPSULE ORAL at 21:25

## 2023-07-19 RX ADMIN — HEPARIN SODIUM 5000 UNITS: 5000 INJECTION INTRAVENOUS; SUBCUTANEOUS at 08:05

## 2023-07-19 RX ADMIN — BUSPIRONE HYDROCHLORIDE 10 MG: 10 TABLET ORAL at 17:05

## 2023-07-19 RX ADMIN — HYDROXYCHLOROQUINE SULFATE 200 MG: 200 TABLET ORAL at 08:05

## 2023-07-19 RX ADMIN — HYDRALAZINE HYDROCHLORIDE 25 MG: 25 TABLET, FILM COATED ORAL at 05:48

## 2023-07-19 RX ADMIN — FUROSEMIDE 20 MG: 20 TABLET ORAL at 17:05

## 2023-07-19 RX ADMIN — SENNOSIDES 8.6 MG: 8.6 TABLET, FILM COATED ORAL at 21:25

## 2023-07-19 RX ADMIN — BUSPIRONE HYDROCHLORIDE 10 MG: 10 TABLET ORAL at 13:51

## 2023-07-19 RX ADMIN — FERROUS SULFATE TAB EC 324 MG (65 MG FE EQUIVALENT) 324 MG: 324 (65 FE) TABLET DELAYED RESPONSE at 17:05

## 2023-07-19 RX ADMIN — LEVOTHYROXINE SODIUM 125 MCG: 0.12 TABLET ORAL at 05:48

## 2023-07-19 RX ADMIN — FUROSEMIDE 20 MG: 20 TABLET ORAL at 08:05

## 2023-07-19 RX ADMIN — BUSPIRONE HYDROCHLORIDE 10 MG: 10 TABLET ORAL at 21:25

## 2023-07-19 RX ADMIN — SODIUM CHLORIDE, PRESERVATIVE FREE 10 ML: 5 INJECTION INTRAVENOUS at 21:26

## 2023-07-19 RX ADMIN — OXYBUTYNIN CHLORIDE 5 MG: 5 TABLET, EXTENDED RELEASE ORAL at 21:25

## 2023-07-19 ASSESSMENT — ENCOUNTER SYMPTOMS
CONSTIPATION: 1
SHORTNESS OF BREATH: 0
ABDOMINAL PAIN: 1
COUGH: 0
ABDOMINAL DISTENTION: 0

## 2023-07-19 ASSESSMENT — PAIN SCALES - WONG BAKER
WONGBAKER_NUMERICALRESPONSE: 0

## 2023-07-19 NOTE — TELEPHONE ENCOUNTER
In relation to email from Dr. Arely Mathews ,reached out to Kayla Rivas from [facility] regarding dermatologist biopsy results of Paget's disease, waiting call back. Spoke with nurse at [facility] to coordinate breast exam and imagining appointments. Will also need name of dermatologist for biopsy results and office note. Medication list and rounding physician name    When [facility] nurse calls back please offer 8/4/23 @ 1230p  please reserve bilateral dx mammogram and limited u/s spot for after appointment.

## 2023-07-19 NOTE — TELEPHONE ENCOUNTER
Received a return call from Amish Dempsey the patient's nurse at [facility], the patient has been admitted to [facility] [place] since 7/14/23 for acute respiratory failure. Nurse Amish Dempsey will follow up with the patient's family to see if the patient should see Dr. Nilson Butler once she has been discharged and will return call to Dr. Fernanda Daniel office  with further instructions and schedule any appointments. Amish Dempsey will also give the patient's family Dr. Fernanda Daniel office number to follow up with any further questions. Called [facility] in [place] [phone], had to leave a voicemail requesting the pathology results from the 7/10/2023 biopsy be faxed to [phone].

## 2023-07-19 NOTE — PROGRESS NOTES
cardiology, advised to avoid beta blockers.   -Patient is on nifedipine-hold  -TSH 16.21, free T4 1.0     Chronic normocytic anemia  - on ferrous sulfate     History of mesenteric ischemia  celiac artery stenosis-s/p angioplasty and stenting-2/2021      Hypertension  -patient is on nifedipine 60 mg daily, hydralazine 10 mg 3 times daily  -Holding nifedipine due to bradycardia     Depression/anxiety/insomnia  -Patient is on fluoxetine, BuSpar, Remeron   Hypothyroidism-on levothyroxine     Rheumatoid arthritis-on Plaquenil  -Patient was on prednisone in the past       Cervical spine stenosis  -MRI spine-8/2021-severe spinal canal stenosis, severe left and moderate right neural foraminal narrowing at C3-4, 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  -Neurosurgery had evaluated the patient-patient was deemed high risk for surgery  -Patient is currently wheelchair/bed-bound     ? Breast cancer. patient had undergone a biopsy right before admission, daughter was contacted and informed that biopsy was positive for malignancy. Daughter requested oncology review , ordered      Diet ADULT DIET;  Regular; Low Fat/Low Chol/High Fiber/2 gm Na  ADULT ORAL NUTRITION SUPPLEMENT; Breakfast, Lunch, Dinner; Frozen Oral Supplement     DVT Prophylaxis [x] Lovenox, []  Heparin, [] SCDs, [] Ambulation,  [] Eliquis, [] Xarelto  [] Coumadin   Code Status Full Code   Disposition From: ECF   Expected Disposition: ECF   Estimated Date of Discharge: 7/20  Patient requires continued admission due to heart failyre    Surrogate Decision Maker/ POA  daughter     Personally reviewed Lab Studies and Imaging         Electronically signed by Annika Whittington MD on 7/19/2023 at 4:33 PM  Comment: Please note this report has been produced using speech recognition software and may contain errors related to that system including errors in grammar, punctuation, and spelling, as well as words and phrases that may be inappropriate. If there are any questions or concerns, please feel free to contact the dictating provider for clarification.

## 2023-07-19 NOTE — PROGRESS NOTES
PALLIATIVE MEDICINE PROGRESS NOTE     Patient name:Estrella Carlson    GCW:[id number] KMI:[id number]  Room/Bed:T4U-3700/5125-    LOS: 5 days        ASSESSMENT/RECOMMENDATIONS     81 y.o. female with  with shortness of breath and abdominal pain. Symptom Management:  Bilateral pleural effusion/pericardial effusion - Cardiology and pulmonology following. Thoracentesis on 7/17/23 CXR showing improvement. Shortness of breath has improved. New onset atrial fibrillation - cardiology following, no intervention at this time. Abdominal pain - Reviewed abdominal CT, no acute findings. Atherosclerosis noted. Constipation - Senna-S, 1 tablet, nightly, Miralax as needed. Requested the Miralax be given today, if no BM, will increased Senna. Left breast inflammatory standing - Biopsy last week, oncology consulted. May be able to treat with AI. Goals of Care- See below. Patient/Family Goals of Care :    7/17/23 - Attempted goals of care conversation with the patient at the bedside. Although the patient appears oriented to self and place, date is a concern. She repeats what is told to her which makes it appear as though she understands. Attempted a code status discuss, patient repeated that she wants intubation, ventilation, and CPR, but when describing comfort measures, she also states she would like that. Spoke with patients named decision maker, Tamara Sales, regarding goals of care. She admits her mother is confused at times. Per her report, the patient has told her she only wants temporary measures such as intubation if that will \"allow her to heal\". Code status options including full code, DNR/Arrest, and DNR/CC described to Maribel Chang. She states, \"this is so hard because I just found out my mom has breast cancer\". She feels as though regardless of emotion, she needs to do what's best for her mother.  Code status changed to DNR/CC/Arrest per her request. Plans to come to the hospital to be with the patient labs, radiology and testing relevant to this consult done in this chart today 7/19/2023    Data Reviewed related to this consultation:    Review of prior note(s) from each unique source relevant to today's visit: Hospitalist, Case management, Pulmonary, cardiology. Unique test results reviewed: CBC, BMP, and Mg.        Signed By: Electronically signed by BRAULIO Burnham CNP on 7/19/2023 at 8:02 AM   Palliative Medicine     July 19, 2023

## 2023-07-19 NOTE — H&P
History and Physical      Name:  Bushra Brown /Age/Sex: 1940  (81 y.o. female)   MRN & CSN:  [id number] & [id number] Encounter Date/Time: 2023 9:52 PM EDT   Location:  [address] [place] [address] PCP: No primary care provider on file. Hospital Day: 1    Assessment and Plan:     1. Acute on chronic diastolic congestive heart failure. -CT abdomen/pelvis-2023-trace right and small to moderate left pleural effusion + complete collapse of the left lower lobe, -proBNP 3697  -Pulm consult for centesis    2. New trace pericardial effusion assoc with  worsening moderate anasarca  -Chest x-ray-moderate to large left and small right pleural effusion with adjacent airspace disease.    -cardiology consult for pericardial effusion.  -Continue Lasix IV 40 mg twice daily  -Strict input/output, daily weight  -2D echo ordered  -Consult cardiology. 3. Hypervolemic hyponatremia secondary to secondary hyperaldosteronism  -Na 121, wa possibly 3. Asking more details was by kidney disease team to come up and putting them in the in the thing similar LFTs back on the right drug since also s 138-2022.  -Diurese and monitor with repeat BMP. 4.  Elevated troponin  -Patient denying any chest pain, EKG with no acute ST-T wave changes  -Serial troponins, repeat EKG  -Consult cardiology    5. ?  New onset A-fib  -EKG with slow ventricular response  -Eliquis ordered in ER- hold until evaluated by cardiology  -Consult cardiology  -Patient is on nifedipine-hold  -TSH 16.21, free T4 1.0    6. Chronic normocytic anemia  - on ferrous sulfate      7. 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 History of COVID-    9. Hypertension-patient is on nifedipine 60 mg daily, hydralazine 10 mg 3 times daily  -Holding nifedipine due to bradycardia    10. Depression/anxiety/insomnia  -Patient is on fluoxetine, BuSpar, Remeron    11. Hypothyroidism-on levothyroxine    12.   History of right hip
effusion  TECHNOLOGIST PROVIDED HISTORY:  Reason for exam:->assess pleural effusion  Reason for Exam: assess pleural effusion    FINDINGS:  Cardiomegaly. Mediastinum and pulmonary vascular markings are normal.  Right  lung is clear. Stable volume of a moderate left pleural effusion with  significant airspace change in the left mid and lower lung zone. Prior ACDF  in the cervical spine. Advanced degenerative change and deformity of the  bilateral shoulders. No acute skeletal finding. Impression: Relatively stable volume of a moderate left-sided pleural effusion with  associated airspace change in the left mid and lower lung zone.       Problem List  Patient Active Problem List   Diagnosis    Primary hypertension    Rheumatoid arthritis ([address])    Major depressive disorder    Acquired hypothyroidism    REJI (generalized anxiety disorder)    Mild intermittent asthma    PAD (peripheral artery disease) (HCC)    Anemia    Symptomatic bradycardia    Chronic constipation    Chronic kidney disease    Chronic rhinitis    Encounter for long-term (current) use of other medications    Hiatal hernia with gastroesophageal reflux disease without esophagitis    Hyperlipidemia    Hypertensive kidney disease with chronic kidney disease stage II    MDD (major depressive disorder), recurrent episode, mild (HCC)    Myalgia and myositis    Numbness and tingling of both feet    Osteoporosis    Peripheral vascular disease of lower extremity (HCC)    Polymyalgia rheumatica (HCC)    Polypharmacy    Primary insomnia    Pure hypercholesterolemia    Sensorineural hearing loss (SNHL) of both ears    Spinal stenosis of lumbar region    Spondylosis of lumbosacral spine without myelopathy    Urge incontinence    Vitamin B12 deficiency    Vitamin D deficiency    Weight loss    Chronic mesenteric ischemia (HCC)    Paresthesia and pain of both upper extremities    Hyponatremia    Congestive heart failure (HCC)    Hyperkalemia    Pneumonia    Acute on

## 2023-07-20 ENCOUNTER — TELEPHONE (OUTPATIENT)
Dept: BREAST CENTER | Age: 83
End: 2023-07-20

## 2023-07-20 ENCOUNTER — APPOINTMENT (OUTPATIENT)
Dept: NUCLEAR MEDICINE | Age: 83
End: 2023-07-20
Payer: COMMERCIAL

## 2023-07-20 ENCOUNTER — APPOINTMENT (OUTPATIENT)
Dept: CT IMAGING | Age: 83
End: 2023-07-20
Payer: COMMERCIAL

## 2023-07-20 ENCOUNTER — APPOINTMENT (OUTPATIENT)
Dept: GENERAL RADIOLOGY | Age: 83
End: 2023-07-20
Payer: COMMERCIAL

## 2023-07-20 LAB
ANION GAP SERPL CALCULATED.3IONS-SCNC: 10 MMOL/L (ref 3–16)
BASOPHILS # BLD: 0 K/UL (ref 0–0.2)
BASOPHILS NFR BLD: 0.3 %
BUN SERPL-MCNC: 21 MG/DL (ref 7–20)
CALCIUM SERPL-MCNC: 8 MG/DL (ref 8.3–10.6)
CHLORIDE SERPL-SCNC: 90 MMOL/L (ref 99–110)
CO2 SERPL-SCNC: 24 MMOL/L (ref 21–32)
CREAT SERPL-MCNC: 1.4 MG/DL (ref 0.6–1.2)
DEPRECATED RDW RBC AUTO: 14.6 % (ref 12.4–15.4)
EOSINOPHIL # BLD: 0 K/UL (ref 0–0.6)
EOSINOPHIL NFR BLD: 0 %
GFR SERPLBLD CREATININE-BSD FMLA CKD-EPI: 37 ML/MIN/{1.73_M2}
GLUCOSE SERPL-MCNC: 104 MG/DL (ref 70–99)
HCT VFR BLD AUTO: 29.5 % (ref 36–48)
HGB BLD-MCNC: 10 G/DL (ref 12–16)
LYMPHOCYTES # BLD: 0.7 K/UL (ref 1–5.1)
LYMPHOCYTES NFR BLD: 5.2 %
MAGNESIUM SERPL-MCNC: 2 MG/DL (ref 1.8–2.4)
MCH RBC QN AUTO: 30.6 PG (ref 26–34)
MCHC RBC AUTO-ENTMCNC: 33.8 G/DL (ref 31–36)
MCV RBC AUTO: 90.6 FL (ref 80–100)
MONOCYTES # BLD: 0.7 K/UL (ref 0–1.3)
MONOCYTES NFR BLD: 5.4 %
NEUTROPHILS # BLD: 11.5 K/UL (ref 1.7–7.7)
NEUTROPHILS NFR BLD: 89.1 %
PLATELET # BLD AUTO: 365 K/UL (ref 135–450)
PMV BLD AUTO: 9.5 FL (ref 5–10.5)
POTASSIUM SERPL-SCNC: 4.5 MMOL/L (ref 3.5–5.1)
RBC # BLD AUTO: 3.25 M/UL (ref 4–5.2)
SODIUM SERPL-SCNC: 124 MMOL/L (ref 136–145)
WBC # BLD AUTO: 12.9 K/UL (ref 4–11)

## 2023-07-20 PROCEDURE — 78306 BONE IMAGING WHOLE BODY: CPT | Performed by: INTERNAL MEDICINE

## 2023-07-20 PROCEDURE — 36415 COLL VENOUS BLD VENIPUNCTURE: CPT

## 2023-07-20 PROCEDURE — 6370000000 HC RX 637 (ALT 250 FOR IP): Performed by: INTERNAL MEDICINE

## 2023-07-20 PROCEDURE — 6370000000 HC RX 637 (ALT 250 FOR IP): Performed by: STUDENT IN AN ORGANIZED HEALTH CARE EDUCATION/TRAINING PROGRAM

## 2023-07-20 PROCEDURE — 3430000000 HC RX DIAGNOSTIC RADIOPHARMACEUTICAL: Performed by: INTERNAL MEDICINE

## 2023-07-20 PROCEDURE — 74018 RADEX ABDOMEN 1 VIEW: CPT

## 2023-07-20 PROCEDURE — 85025 COMPLETE CBC W/AUTO DIFF WBC: CPT

## 2023-07-20 PROCEDURE — 2580000003 HC RX 258: Performed by: INTERNAL MEDICINE

## 2023-07-20 PROCEDURE — 2060000000 HC ICU INTERMEDIATE R&B

## 2023-07-20 PROCEDURE — 80048 BASIC METABOLIC PNL TOTAL CA: CPT

## 2023-07-20 PROCEDURE — A9503 TC99M MEDRONATE: HCPCS | Performed by: INTERNAL MEDICINE

## 2023-07-20 PROCEDURE — 6370000000 HC RX 637 (ALT 250 FOR IP): Performed by: NURSE PRACTITIONER

## 2023-07-20 PROCEDURE — 94760 N-INVAS EAR/PLS OXIMETRY 1: CPT

## 2023-07-20 PROCEDURE — 6360000002 HC RX W HCPCS: Performed by: STUDENT IN AN ORGANIZED HEALTH CARE EDUCATION/TRAINING PROGRAM

## 2023-07-20 PROCEDURE — 71250 CT THORAX DX C-: CPT

## 2023-07-20 PROCEDURE — 83735 ASSAY OF MAGNESIUM: CPT

## 2023-07-20 RX ORDER — MAGNESIUM HYDROXIDE/ALUMINUM HYDROXICE/SIMETHICONE 120; 1200; 1200 MG/30ML; MG/30ML; MG/30ML
30 SUSPENSION ORAL ONCE
Status: COMPLETED | OUTPATIENT
Start: 2023-07-20 | End: 2023-07-20

## 2023-07-20 RX ORDER — TC 99M MEDRONATE 20 MG/10ML
25 INJECTION, POWDER, LYOPHILIZED, FOR SOLUTION INTRAVENOUS
Status: COMPLETED | OUTPATIENT
Start: 2023-07-20 | End: 2023-07-20

## 2023-07-20 RX ORDER — SENNOSIDES A AND B 8.6 MG/1
2 TABLET, FILM COATED ORAL NIGHTLY
Status: DISCONTINUED | OUTPATIENT
Start: 2023-07-20 | End: 2023-07-21 | Stop reason: HOSPADM

## 2023-07-20 RX ADMIN — SODIUM CHLORIDE, PRESERVATIVE FREE 10 ML: 5 INJECTION INTRAVENOUS at 08:05

## 2023-07-20 RX ADMIN — HYDRALAZINE HYDROCHLORIDE 25 MG: 25 TABLET, FILM COATED ORAL at 13:42

## 2023-07-20 RX ADMIN — PANTOPRAZOLE SODIUM 40 MG: 40 TABLET, DELAYED RELEASE ORAL at 17:36

## 2023-07-20 RX ADMIN — FERROUS SULFATE TAB EC 324 MG (65 MG FE EQUIVALENT) 324 MG: 324 (65 FE) TABLET DELAYED RESPONSE at 08:05

## 2023-07-20 RX ADMIN — LEVOTHYROXINE SODIUM 125 MCG: 0.12 TABLET ORAL at 06:22

## 2023-07-20 RX ADMIN — BUSPIRONE HYDROCHLORIDE 10 MG: 10 TABLET ORAL at 13:42

## 2023-07-20 RX ADMIN — GABAPENTIN 100 MG: 100 CAPSULE ORAL at 20:59

## 2023-07-20 RX ADMIN — ALUMINUM HYDROXIDE, MAGNESIUM HYDROXIDE, AND SIMETHICONE 30 ML: 200; 200; 20 SUSPENSION ORAL at 12:01

## 2023-07-20 RX ADMIN — POLYETHYLENE GLYCOL 3350 17 G: 17 POWDER, FOR SOLUTION ORAL at 06:22

## 2023-07-20 RX ADMIN — ACETAMINOPHEN 650 MG: 325 TABLET ORAL at 02:13

## 2023-07-20 RX ADMIN — OXYBUTYNIN CHLORIDE 5 MG: 5 TABLET, EXTENDED RELEASE ORAL at 20:59

## 2023-07-20 RX ADMIN — BUSPIRONE HYDROCHLORIDE 10 MG: 10 TABLET ORAL at 20:59

## 2023-07-20 RX ADMIN — ACETAMINOPHEN 650 MG: 325 TABLET ORAL at 20:58

## 2023-07-20 RX ADMIN — PANTOPRAZOLE SODIUM 40 MG: 40 TABLET, DELAYED RELEASE ORAL at 06:22

## 2023-07-20 RX ADMIN — LATANOPROST 1 DROP: 50 SOLUTION OPHTHALMIC at 21:00

## 2023-07-20 RX ADMIN — HEPARIN SODIUM 5000 UNITS: 5000 INJECTION INTRAVENOUS; SUBCUTANEOUS at 08:04

## 2023-07-20 RX ADMIN — TC 99M MEDRONATE 25 MILLICURIE: 20 INJECTION, POWDER, LYOPHILIZED, FOR SOLUTION INTRAVENOUS at 09:20

## 2023-07-20 RX ADMIN — BUSPIRONE HYDROCHLORIDE 10 MG: 10 TABLET ORAL at 08:05

## 2023-07-20 RX ADMIN — BUSPIRONE HYDROCHLORIDE 10 MG: 10 TABLET ORAL at 17:36

## 2023-07-20 RX ADMIN — Medication 4.5 MG: at 21:00

## 2023-07-20 RX ADMIN — FLUOXETINE 20 MG: 20 CAPSULE ORAL at 08:05

## 2023-07-20 RX ADMIN — HYDROXYCHLOROQUINE SULFATE 200 MG: 200 TABLET ORAL at 08:05

## 2023-07-20 RX ADMIN — HYDRALAZINE HYDROCHLORIDE 25 MG: 25 TABLET, FILM COATED ORAL at 20:59

## 2023-07-20 RX ADMIN — ATORVASTATIN CALCIUM 40 MG: 40 TABLET, FILM COATED ORAL at 08:05

## 2023-07-20 RX ADMIN — FOLIC ACID 1 MG: 1 TABLET ORAL at 08:05

## 2023-07-20 RX ADMIN — HEPARIN SODIUM 5000 UNITS: 5000 INJECTION INTRAVENOUS; SUBCUTANEOUS at 21:00

## 2023-07-20 RX ADMIN — AMLODIPINE BESYLATE 5 MG: 5 TABLET ORAL at 08:05

## 2023-07-20 RX ADMIN — FUROSEMIDE 20 MG: 20 TABLET ORAL at 17:36

## 2023-07-20 RX ADMIN — HYDRALAZINE HYDROCHLORIDE 25 MG: 25 TABLET, FILM COATED ORAL at 06:22

## 2023-07-20 RX ADMIN — FUROSEMIDE 20 MG: 20 TABLET ORAL at 08:05

## 2023-07-20 RX ADMIN — GABAPENTIN 100 MG: 100 CAPSULE ORAL at 08:05

## 2023-07-20 RX ADMIN — MIRTAZAPINE 7.5 MG: 15 TABLET, FILM COATED ORAL at 21:00

## 2023-07-20 RX ADMIN — ASPIRIN 81 MG: 81 TABLET, CHEWABLE ORAL at 08:05

## 2023-07-20 RX ADMIN — HYDROXYCHLOROQUINE SULFATE 200 MG: 200 TABLET ORAL at 21:00

## 2023-07-20 RX ADMIN — SODIUM CHLORIDE, PRESERVATIVE FREE 10 ML: 5 INJECTION INTRAVENOUS at 21:00

## 2023-07-20 RX ADMIN — SENNOSIDES 17.2 MG: 8.6 TABLET, FILM COATED ORAL at 20:59

## 2023-07-20 RX ADMIN — FERROUS SULFATE TAB EC 324 MG (65 MG FE EQUIVALENT) 324 MG: 324 (65 FE) TABLET DELAYED RESPONSE at 17:36

## 2023-07-20 ASSESSMENT — PAIN DESCRIPTION - DESCRIPTORS
DESCRIPTORS: CRAMPING
DESCRIPTORS: CRAMPING

## 2023-07-20 ASSESSMENT — PAIN SCALES - GENERAL
PAINLEVEL_OUTOF10: 2
PAINLEVEL_OUTOF10: 3
PAINLEVEL_OUTOF10: 3

## 2023-07-20 ASSESSMENT — PAIN DESCRIPTION - ORIENTATION
ORIENTATION: RIGHT;MID
ORIENTATION: RIGHT;LOWER

## 2023-07-20 ASSESSMENT — PAIN DESCRIPTION - LOCATION
LOCATION: ABDOMEN

## 2023-07-20 ASSESSMENT — PAIN DESCRIPTION - PAIN TYPE: TYPE: ACUTE PAIN

## 2023-07-20 ASSESSMENT — PAIN SCALES - WONG BAKER: WONGBAKER_NUMERICALRESPONSE: 0

## 2023-07-20 NOTE — PLAN OF CARE
Problem: Discharge Planning  Goal: Discharge to home or other facility with appropriate resources  Outcome: Progressing     Problem: Skin/Tissue Integrity  Goal: Absence of new skin breakdown  Description: 1. Monitor for areas of redness and/or skin breakdown  2. Assess vascular access sites hourly  3. Every 4-6 hours minimum:  Change oxygen saturation probe site  4. Every 4-6 hours:  If on nasal continuous positive airway pressure, respiratory therapy assess nares and determine need for appliance change or resting period.   Outcome: Progressing     Problem: Safety - Adult  Goal: Free from fall injury  Outcome: Progressing     Problem: ABCDS Injury Assessment  Goal: Absence of physical injury  Outcome: Progressing     Problem: Respiratory - Adult  Goal: Achieves optimal ventilation and oxygenation  Outcome: Progressing     Problem: Cardiovascular - Adult  Goal: Maintains optimal cardiac output and hemodynamic stability  Outcome: Progressing  Goal: Absence of cardiac dysrhythmias or at baseline  Outcome: Progressing     Problem: Skin/Tissue Integrity - Adult  Goal: Skin integrity remains intact  Outcome: Progressing  Flowsheets (Taken 7/20/2023 0156)  Skin Integrity Remains Intact: Monitor for areas of redness and/or skin breakdown     Problem: Musculoskeletal - Adult  Goal: Return mobility to safest level of function  Outcome: Progressing     Problem: Pain  Goal: Verbalizes/displays adequate comfort level or baseline comfort level  Outcome: Progressing     Problem: Chronic Conditions and Co-morbidities  Goal: Patient's chronic conditions and co-morbidity symptoms are monitored and maintained or improved  Outcome: Progressing

## 2023-07-20 NOTE — TELEPHONE ENCOUNTER
Spoke with patient's nurse Miles Monte) at [facility] to notify that patient is scheduled 8/2/23 at [facility] for bilateral diagnostic mammogram and L limited u/s and to arrive at 1p followed by an office visit with Dr. Coretta De Leon at 215p. Miles Monte will set up patient transportation and will note in patient's chart to notify Dr. Coretta De Leon office if patient in unable to make appointment.

## 2023-07-20 NOTE — TELEPHONE ENCOUNTER
Tian Damon CNP for [facility] called back. States that she anticipates that patient will be discharged from the hospital and will seek surgical consult. She will fax us the pathology report for the shave bx. From dermatology group.   Informed [facility] that I will reserve an appointment slot of 8/2 @ 2:15pm at the breast office, with a bilateral diagnostic mammogram to be performed just prior in the women's center @ 1:20pm.  [id number] [facility] notified of appointments

## 2023-07-21 VITALS
WEIGHT: 102.51 LBS | HEIGHT: 62 IN | SYSTOLIC BLOOD PRESSURE: 130 MMHG | BODY MASS INDEX: 18.86 KG/M2 | DIASTOLIC BLOOD PRESSURE: 54 MMHG | OXYGEN SATURATION: 97 % | HEART RATE: 51 BPM | RESPIRATION RATE: 18 BRPM | TEMPERATURE: 98 F

## 2023-07-21 LAB
ANION GAP SERPL CALCULATED.3IONS-SCNC: 10 MMOL/L (ref 3–16)
BASOPHILS # BLD: 0.1 K/UL (ref 0–0.2)
BASOPHILS NFR BLD: 0.8 %
BUN SERPL-MCNC: 28 MG/DL (ref 7–20)
CALCIUM SERPL-MCNC: 7.9 MG/DL (ref 8.3–10.6)
CHLORIDE SERPL-SCNC: 87 MMOL/L (ref 99–110)
CO2 SERPL-SCNC: 24 MMOL/L (ref 21–32)
CREAT SERPL-MCNC: 1.5 MG/DL (ref 0.6–1.2)
DEPRECATED RDW RBC AUTO: 14.4 % (ref 12.4–15.4)
EOSINOPHIL # BLD: 0 K/UL (ref 0–0.6)
EOSINOPHIL NFR BLD: 0.3 %
GFR SERPLBLD CREATININE-BSD FMLA CKD-EPI: 35 ML/MIN/{1.73_M2}
GLUCOSE SERPL-MCNC: 103 MG/DL (ref 70–99)
HCT VFR BLD AUTO: 25.7 % (ref 36–48)
HGB BLD-MCNC: 8.8 G/DL (ref 12–16)
KAPPA LC FREE SER-MCNC: 98.05 MG/L (ref 3.3–19.4)
KAPPA LC FREE/LAMBDA FREE SER: 1.62 {RATIO} (ref 0.26–1.65)
LAMBDA LC FREE SERPL-MCNC: 60.66 MG/L (ref 5.71–26.3)
LYMPHOCYTES # BLD: 1.3 K/UL (ref 1–5.1)
LYMPHOCYTES NFR BLD: 14.9 %
MAGNESIUM SERPL-MCNC: 1.9 MG/DL (ref 1.8–2.4)
MCH RBC QN AUTO: 30.8 PG (ref 26–34)
MCHC RBC AUTO-ENTMCNC: 34.4 G/DL (ref 31–36)
MCV RBC AUTO: 89.6 FL (ref 80–100)
MONOCYTES # BLD: 0.5 K/UL (ref 0–1.3)
MONOCYTES NFR BLD: 6.1 %
NEUTROPHILS # BLD: 6.8 K/UL (ref 1.7–7.7)
NEUTROPHILS NFR BLD: 77.9 %
PLATELET # BLD AUTO: 335 K/UL (ref 135–450)
PMV BLD AUTO: 9.5 FL (ref 5–10.5)
POTASSIUM SERPL-SCNC: 4.1 MMOL/L (ref 3.5–5.1)
RBC # BLD AUTO: 2.87 M/UL (ref 4–5.2)
RPT COMMENT: ABNORMAL
SODIUM SERPL-SCNC: 121 MMOL/L (ref 136–145)
WBC # BLD AUTO: 8.7 K/UL (ref 4–11)

## 2023-07-21 PROCEDURE — 2580000003 HC RX 258: Performed by: INTERNAL MEDICINE

## 2023-07-21 PROCEDURE — 85025 COMPLETE CBC W/AUTO DIFF WBC: CPT

## 2023-07-21 PROCEDURE — 80048 BASIC METABOLIC PNL TOTAL CA: CPT

## 2023-07-21 PROCEDURE — 6370000000 HC RX 637 (ALT 250 FOR IP): Performed by: INTERNAL MEDICINE

## 2023-07-21 PROCEDURE — 6370000000 HC RX 637 (ALT 250 FOR IP): Performed by: NURSE PRACTITIONER

## 2023-07-21 PROCEDURE — 36415 COLL VENOUS BLD VENIPUNCTURE: CPT

## 2023-07-21 PROCEDURE — 94760 N-INVAS EAR/PLS OXIMETRY 1: CPT

## 2023-07-21 PROCEDURE — 83735 ASSAY OF MAGNESIUM: CPT

## 2023-07-21 PROCEDURE — 6360000002 HC RX W HCPCS: Performed by: STUDENT IN AN ORGANIZED HEALTH CARE EDUCATION/TRAINING PROGRAM

## 2023-07-21 RX ORDER — SENNOSIDES A AND B 8.6 MG/1
2 TABLET, FILM COATED ORAL NIGHTLY
Qty: 60 TABLET | Refills: 0 | Status: SHIPPED | OUTPATIENT
Start: 2023-07-21 | End: 2023-08-20

## 2023-07-21 RX ADMIN — LEVOTHYROXINE SODIUM 125 MCG: 0.12 TABLET ORAL at 06:21

## 2023-07-21 RX ADMIN — GABAPENTIN 100 MG: 100 CAPSULE ORAL at 09:33

## 2023-07-21 RX ADMIN — PANTOPRAZOLE SODIUM 40 MG: 40 TABLET, DELAYED RELEASE ORAL at 06:21

## 2023-07-21 RX ADMIN — HYDROXYCHLOROQUINE SULFATE 200 MG: 200 TABLET ORAL at 09:34

## 2023-07-21 RX ADMIN — ATORVASTATIN CALCIUM 40 MG: 40 TABLET, FILM COATED ORAL at 09:33

## 2023-07-21 RX ADMIN — FUROSEMIDE 20 MG: 20 TABLET ORAL at 09:34

## 2023-07-21 RX ADMIN — HEPARIN SODIUM 5000 UNITS: 5000 INJECTION INTRAVENOUS; SUBCUTANEOUS at 09:34

## 2023-07-21 RX ADMIN — FERROUS SULFATE TAB EC 324 MG (65 MG FE EQUIVALENT) 324 MG: 324 (65 FE) TABLET DELAYED RESPONSE at 09:34

## 2023-07-21 RX ADMIN — FOLIC ACID 1 MG: 1 TABLET ORAL at 09:33

## 2023-07-21 RX ADMIN — BUSPIRONE HYDROCHLORIDE 10 MG: 10 TABLET ORAL at 09:34

## 2023-07-21 RX ADMIN — SODIUM CHLORIDE, PRESERVATIVE FREE 10 ML: 5 INJECTION INTRAVENOUS at 09:34

## 2023-07-21 RX ADMIN — ASPIRIN 81 MG: 81 TABLET, CHEWABLE ORAL at 09:34

## 2023-07-21 RX ADMIN — AMLODIPINE BESYLATE 5 MG: 5 TABLET ORAL at 09:34

## 2023-07-21 RX ADMIN — FLUOXETINE 20 MG: 20 CAPSULE ORAL at 09:33

## 2023-07-21 RX ADMIN — HYDRALAZINE HYDROCHLORIDE 25 MG: 25 TABLET, FILM COATED ORAL at 06:21

## 2023-07-21 ASSESSMENT — PAIN SCALES - GENERAL
PAINLEVEL_OUTOF10: 0
PAINLEVEL_OUTOF10: 0

## 2023-07-21 ASSESSMENT — PAIN SCALES - WONG BAKER
WONGBAKER_NUMERICALRESPONSE: 0
WONGBAKER_NUMERICALRESPONSE: 0

## 2023-07-21 NOTE — PLAN OF CARE
Problem: Discharge Planning  Goal: Discharge to home or other facility with appropriate resources  Outcome: Progressing    Problem: Skin/Tissue Integrity  Goal: Absence of new skin breakdown  Description: 1. Monitor for areas of redness and/or skin breakdown  2. Assess vascular access sites hourly  3. Every 4-6 hours minimum:  Change oxygen saturation probe site  4. Every 4-6 hours:  If on nasal continuous positive airway pressure, respiratory therapy assess nares and determine need for appliance change or resting period.   Outcome: Progressing     Problem: Safety - Adult  Goal: Free from fall injury  Outcome: Progressing     Problem: ABCDS Injury Assessment  Goal: Absence of physical injury  Outcome: Progressing     Problem: Respiratory - Adult  Goal: Achieves optimal ventilation and oxygenation  Outcome: Progressing     Problem: Cardiovascular - Adult  Goal: Maintains optimal cardiac output and hemodynamic stability  Outcome: Progressing     Problem: Cardiovascular - Adult  Goal: Absence of cardiac dysrhythmias or at baseline  Outcome: Progressing     Problem: Skin/Tissue Integrity - Adult  Goal: Skin integrity remains intact  Outcome: Progressing  Flowsheets      Problem: Musculoskeletal - Adult  Goal: Return mobility to safest level of function  Outcome: Progressing     Problem: Pain  Goal: Verbalizes/displays adequate comfort level or baseline comfort level  Outcome: Progressing

## 2023-07-21 NOTE — PROGRESS NOTES
Patient discharged via [facility] via stretcher. Alert and oriented with bilateral hearing aids and other belongings.

## 2023-07-21 NOTE — PROGRESS NOTES
Discharge report called to Vania at [facility], nurse did not  line, updated reception that patient would be returning around 11:30 am and noon. Left this nurse info for a return call for any questions. Patient bathed this am and is ready for transport.

## 2023-07-21 NOTE — CARE COORDINATION
07/21/23 1046   IMM Letter   IMM Letter given to Patient/Family/Significant other/Guardian/POA/by: Provided to patient at bedside by Elisa Wilde RN. Education provided to patient, patient reported no questions and verbalized understanding. Patient aware of 4 hours allotted time to determine if they choose to pursue Medicare appeal process.    IMM Letter date given: 07/21/23   IMM Letter time given: 1005     #[phone]  Electronically signed by Elisa Wilde RN on 7/21/2023 at 10:46 AM

## 2023-07-21 NOTE — PROGRESS NOTES
Patient slept well throughout the night. HR sustained in the 30-50s overnight. Patient continued to have multiple large soft bowel movements. Patient denies any abdominal pain.

## 2023-07-21 NOTE — CARE COORDINATION
Case Management Discharge Note          Date / Time of Note: 7/21/2023 10:00 AM                  Patient Name: Bashir Lamar   YOB: 1940  Diagnosis: Shortness of breath [R06.02]  Hyponatremia [E87.1]  Acute pulmonary edema ([address]) [J81.0]  New onset a-fib ([address]) [I48.91]  Full code status [Z78.9]  Acute on chronic diastolic CHF (congestive heart failure) ([address]) [I50.33]  Congestive heart failure, unspecified HF chronicity, unspecified heart failure type ([address]) [I50.9]   Date / Time: 7/14/2023  7:54 PM    Financial:  Payor: Elisabet Gibbons / Plan: Leslie Kurtz / Product Type: *No Product type* /      Pharmacy:    [address] [place] [address] [place] [phone] - F [phone]  [address] [address]  Phone: [phone] Fax: [phone]      Assistance purchasing medications?: Potential Assistance Purchasing Medications: No  Assistance provided by Case Management: None at this time    DISCHARGE Disposition: [address] [facility] [address] (LTC)    Nursing Facility:   Discharging to Facility/ Agency   Name: Rohit Joeon at [facility]  Address:  [address]   Phone:  [phone]  Fax:  [phone]     LOC at discharge: [facility] Completed: Yes               Transportation:  Transportation PLAN for discharge: EMS transportation   Mode of Transport: Ambulance stretcher - BLS  Reason for medical transport: Bed confined: Meets the following criteria 1) unable to get out of bed without assistance or ambulate, 2) unable to safely sit up in a wheelchair, 3) unable to maintain erect seating position in a chair for time needed for transport  Name of [address] [place] [address] Road: [facility]  Phone: [phone]  Time of Transport: 7676-2794 pm    Transport form completed: Yes    IMM Completed:   Yes, Case management has presented and reviewed IMM letter #2.    IMM Letter given to Patient/Family/Significant other/Guardian/POA/by[de-identified] Provided to patient at bedside by Sang Garcia RN. Education provided to patient, patient reported no questions and verbalized understanding. Patient aware of 4 hours allotted time to determine if they choose to pursue Medicare appeal process. IMM Letter date given[de-identified] 07/21/23  IMM Letter time given[de-identified] 1005. Patient and/or family/POA verbalized understanding of their medicare rights and appeal process if needed. Patient and/or family/POA has signed, initialed and placed the date and time on IMM letter #2 on the the appropriate lines. Copy of letter offered and they are aware that the original copy of IMM letter #2 is available prior to discharge from the paper chart on the unit. Electronic documentation has been entered into epic for IMM letter #2 and original paper copy has been added to the paper chart at the nurses station. Additional CM Notes:   Discharge order noted. Transportation arranged with [employer] at [address] with Haider Wu notified,  Bedside nurse made aware. Patient notified at bedside. The Plan for Transition of Care is related to the following treatment goals of Shortness of breath [R06.02]  Hyponatremia [E87.1]  Acute pulmonary edema (HCC) [J81.0]  New onset a-fib (HCC) [I48.91]  Full code status [Z78.9]  Acute on chronic diastolic CHF (congestive heart failure) (HCC) [I50.33]  Congestive heart failure, unspecified HF chronicity, unspecified heart failure type ([address]) [I50.9]    The Patient and/or patient representative Catherene Lesch and her family were provided with a choice of provider and agrees with the discharge plan Yes    Freedom of choice list was provided with basic dialogue that supports the patient's individualized plan of care/goals and shares the quality data associated with the providers.  Yes    Sang Garcia, RN  [facility]   Case Management Department  Ph: [phone]

## 2023-07-21 NOTE — DISCHARGE SUMMARY
Hospital Medicine Discharge Summary    Patient ID: Susie Rodriguez      Patient's PCP: No primary care provider on file. Admit Date: 7/14/2023     Discharge Date:   07/21/23      Admitting Provider: Dheeraj Mejia MD     Discharge Provider: Zak Bradley MD     Discharge Diagnoses: Active Hospital Problems    Diagnosis     Pleural effusion [J90]     Acute pulmonary edema (HCC) [J81.0]     Junctional rhythm [I49.8]     Coronary artery disease involving native coronary artery of native heart without angina pectoris [I25.10]     Demand ischemia (HCC) [I24.8]     Acute on chronic diastolic heart failure (HCC) [I50.33]     Congestive heart failure (HCC) [I50.9]     PAD (peripheral artery disease) ([address]) [I73.9]     Primary hypertension [I10]        The patient was seen and examined on day of discharge and this discharge summary is in conjunction with any daily progress note from day of discharge. Hospital Course:     81 y.o. female hypertension, rheumatoid arthritis, bilateral hearing loss, cervical spine stenosis, hypothyroidism was sent to ER from nursing facility for abnormal CT findings. As per information patient was complaining of abdominal pain, bloating sensation, shortness of breath which prompted them to do a CT abdomen/pelvis. CT abdomen/pelvis was reported as bilateral pleural effusions, complete collapse of the left lower lobe and hence patient was sent to the ER. Patient reporting abdominal bloating, nausea, no vomiting, denied any chest pain, admits to having shortness of breath. Also reported bilateral lower extremity swelling. Admitted with acute on chronic diastolic heart failure exacerbation. Abdominal pain. Constipation . No BM in 5 days, XR abdomen with large stool burden 7/19. Given milk of mag and enema 7/19.   7/20 with large stool but continued with abdominal pain. 7/21 abdominal pain resolved, will DC on senna.       Acute on chronic diastolic

## 2023-07-21 NOTE — PLAN OF CARE
Problem: Discharge Planning  Goal: Discharge to home or other facility with appropriate resources  7/21/2023 1049 by Becki Brian RN  Outcome: Adequate for Discharge  Flowsheets (Taken 7/21/2023 0933)  Discharge to home or other facility with appropriate resources: Identify barriers to discharge with patient and caregiver  7/20/2023 2341 by Julian Meyer RN  Outcome: Progressing  Flowsheets (Taken 7/20/2023 1011 by Nanette Marquez RN)  Discharge to home or other facility with appropriate resources: Identify barriers to discharge with patient and caregiver     Problem: Skin/Tissue Integrity  Goal: Absence of new skin breakdown  Description: 1. Monitor for areas of redness and/or skin breakdown  2. Assess vascular access sites hourly  3. Every 4-6 hours minimum:  Change oxygen saturation probe site  4. Every 4-6 hours:  If on nasal continuous positive airway pressure, respiratory therapy assess nares and determine need for appliance change or resting period.   7/21/2023 1049 by Becki Brian RN  Outcome: Adequate for Discharge  7/20/2023 2341 by Julian Meyer RN  Outcome: Progressing     Problem: Safety - Adult  Goal: Free from fall injury  7/21/2023 1049 by Becki Brian RN  Outcome: Adequate for Discharge  7/20/2023 2341 by Julian Meyer RN  Outcome: Progressing     Problem: ABCDS Injury Assessment  Goal: Absence of physical injury  7/21/2023 1049 by Becki Brian RN  Outcome: Adequate for Discharge  7/20/2023 2341 by Julian Meyer RN  Outcome: Progressing     Problem: Respiratory - Adult  Goal: Achieves optimal ventilation and oxygenation  7/21/2023 1049 by Becki Brian RN  Outcome: Adequate for Discharge  Flowsheets (Taken 7/21/2023 0933)  Achieves optimal ventilation and oxygenation: Assess for changes in respiratory status  7/20/2023 2341 by Julian Meyer RN  Outcome: Progressing     Problem: Cardiovascular - Adult  Goal: Maintains optimal cardiac output and hemodynamic

## 2023-07-31 ENCOUNTER — HOSPITAL ENCOUNTER (OUTPATIENT)
Age: 83
Discharge: HOME | End: 2023-07-31
Payer: COMMERCIAL

## 2023-07-31 ENCOUNTER — OFFICE VISIT (OUTPATIENT)
Dept: CARDIOLOGY CLINIC | Age: 83
End: 2023-07-31
Payer: COMMERCIAL

## 2023-07-31 ENCOUNTER — HOSPITAL ENCOUNTER (OUTPATIENT)
Dept: GENERAL RADIOLOGY | Age: 83
Discharge: HOME | End: 2023-07-31
Payer: COMMERCIAL

## 2023-07-31 VITALS
HEART RATE: 37 BPM | BODY MASS INDEX: 18.77 KG/M2 | HEIGHT: 62 IN | DIASTOLIC BLOOD PRESSURE: 55 MMHG | SYSTOLIC BLOOD PRESSURE: 114 MMHG | OXYGEN SATURATION: 97 % | WEIGHT: 102 LBS

## 2023-07-31 DIAGNOSIS — J90 PLEURAL EFFUSION, LEFT: Primary | ICD-10-CM

## 2023-07-31 DIAGNOSIS — I50.32 CHRONIC DIASTOLIC HEART FAILURE (HCC): Primary | ICD-10-CM

## 2023-07-31 DIAGNOSIS — I50.32 CHRONIC DIASTOLIC HEART FAILURE (HCC): ICD-10-CM

## 2023-07-31 DIAGNOSIS — J90 PLEURAL EFFUSION: ICD-10-CM

## 2023-07-31 LAB
ANION GAP SERPL CALCULATED.3IONS-SCNC: 12 MMOL/L (ref 3–16)
BUN SERPL-MCNC: 17 MG/DL (ref 7–20)
CALCIUM SERPL-MCNC: 8.5 MG/DL (ref 8.3–10.6)
CHLORIDE SERPL-SCNC: 94 MMOL/L (ref 99–110)
CO2 SERPL-SCNC: 20 MMOL/L (ref 21–32)
CREAT SERPL-MCNC: 0.9 MG/DL (ref 0.6–1.2)
GFR SERPLBLD CREATININE-BSD FMLA CKD-EPI: >60 ML/MIN/{1.73_M2}
GLUCOSE SERPL-MCNC: 92 MG/DL (ref 70–99)
NT-PROBNP SERPL-MCNC: 4280 PG/ML (ref 0–449)
POTASSIUM SERPL-SCNC: 4.7 MMOL/L (ref 3.5–5.1)
SODIUM SERPL-SCNC: 126 MMOL/L (ref 136–145)

## 2023-07-31 PROCEDURE — 1123F ACP DISCUSS/DSCN MKR DOCD: CPT | Performed by: NURSE PRACTITIONER

## 2023-07-31 PROCEDURE — 93000 ELECTROCARDIOGRAM COMPLETE: CPT | Performed by: NURSE PRACTITIONER

## 2023-07-31 PROCEDURE — 99214 OFFICE O/P EST MOD 30 MIN: CPT | Performed by: NURSE PRACTITIONER

## 2023-07-31 PROCEDURE — G8420 CALC BMI NORM PARAMETERS: HCPCS | Performed by: NURSE PRACTITIONER

## 2023-07-31 PROCEDURE — 3078F DIAST BP <80 MM HG: CPT | Performed by: NURSE PRACTITIONER

## 2023-07-31 PROCEDURE — 1090F PRES/ABSN URINE INCON ASSESS: CPT | Performed by: NURSE PRACTITIONER

## 2023-07-31 PROCEDURE — G8427 DOCREV CUR MEDS BY ELIG CLIN: HCPCS | Performed by: NURSE PRACTITIONER

## 2023-07-31 PROCEDURE — 3074F SYST BP LT 130 MM HG: CPT | Performed by: NURSE PRACTITIONER

## 2023-07-31 PROCEDURE — 1111F DSCHRG MED/CURRENT MED MERGE: CPT | Performed by: NURSE PRACTITIONER

## 2023-07-31 PROCEDURE — 71046 X-RAY EXAM CHEST 2 VIEWS: CPT

## 2023-07-31 PROCEDURE — 1036F TOBACCO NON-USER: CPT | Performed by: NURSE PRACTITIONER

## 2023-07-31 PROCEDURE — G8400 PT W/DXA NO RESULTS DOC: HCPCS | Performed by: NURSE PRACTITIONER

## 2023-07-31 RX ORDER — FUROSEMIDE 40 MG/1
40 TABLET ORAL DAILY
Qty: 90 TABLET | Refills: 3 | Status: SHIPPED | OUTPATIENT
Start: 2023-07-31

## 2023-07-31 NOTE — PROGRESS NOTES
Spoke t Tamika Ortiz pt's daughter  States to call the [facility] [phone]  Called l/m  wcb in 24 hrs  (Daughter States the NH should schedule the test due to transportation)

## 2023-07-31 NOTE — PROGRESS NOTES
-weight loss  -heart healthy low sodium and low fat diet that consist of mostly fruits, vegetables and grains (Dash diet)  -limited amount of alcohol (no more than 1 drink/day for women, 2 drinks/day for men)  -regular physical activity  -no smoking  -stress reduction    Follow UP Dr. Tameka Matias    Instructions:   Daily weight: Call for increase 3 lbs/day or 5 lbs/week. 2 gm sodium diet:  Fluid Restriction: 64 oz.     I appreciate the opportunity of cooperating in the care of this individual.    BRAULIO Tejada - CNP, CNP, 7/31/2023,9:32 AM

## 2023-08-01 ENCOUNTER — TELEPHONE (OUTPATIENT)
Dept: SURGERY | Age: 83
End: 2023-08-01

## 2023-08-01 ENCOUNTER — TELEPHONE (OUTPATIENT)
Dept: CARDIOLOGY CLINIC | Age: 83
End: 2023-08-01

## 2023-08-01 NOTE — TELEPHONE ENCOUNTER
----- Message from [phone], APRN - CNP sent at 7/31/2023  4:44 PM EDT -----  Labs reviewed. Increase lasix to 40mg daily. Please notify daughter and ECF EVAN ENGLE [facility])  Also change FU for 1 month.

## 2023-08-01 NOTE — PROGRESS NOTES
Called NH again--please hold the line someone will be with you soon-then message --this is a back up phone do not l/m just keep calling back til call is answered  X2

## 2023-08-01 NOTE — PROGRESS NOTES
Called NH at 8 30am they are busy was message and then hung up on me  Called at 9 20am transferred 3 x and then hung up on

## 2023-08-01 NOTE — TELEPHONE ENCOUNTER
Breast History:  History of Previous Breast Biopsy:None, bilateral screen 2011  Family History of Breast Cancer: mother dx in 46s, self Paget's disease  Family History of Other Cancers: sister unknown type, self thyroid dx age unknown to patient   Ashkenazi Yazidi Decent: No  Bra Size: 36C      Gyne History:  : 3  Para: 3  Age of Menarche: unknown to patient  Age of Menopause: unknown to patient  Age of first live Birth: 26  History of Hysterectomy / [place]-BSO: No  History of OCP's: 4 years  HRT: None  Family History or personal history of Ovarian Cancer: none    Lifestyle:  Diet: Regular  Smoker ( Current / Former ):None  ETOH ( alcohol consumption ):None  Exercise:None  Caffeine: tea daily  Drug use ( including THC/ Mariajuana ) None

## 2023-08-01 NOTE — TELEPHONE ENCOUNTER
Daughter aware of lasix dose change. New RX was sent to pharmacy- Lasix 40 mg. [facility] -[phone]- spoke with Nurse Porsha Santoyo). She understood and will schedule pt for 1 month FU.

## 2023-08-02 ENCOUNTER — APPOINTMENT (OUTPATIENT)
Dept: ULTRASOUND IMAGING | Age: 83
End: 2023-08-02
Attending: INTERNAL MEDICINE
Payer: COMMERCIAL

## 2023-08-02 ENCOUNTER — HOSPITAL ENCOUNTER (OUTPATIENT)
Dept: ULTRASOUND IMAGING | Age: 83
Discharge: HOME | End: 2023-08-02
Payer: COMMERCIAL

## 2023-08-02 ENCOUNTER — HOSPITAL ENCOUNTER (OUTPATIENT)
Dept: WOMENS IMAGING | Age: 83
Discharge: HOME | End: 2023-08-02
Payer: COMMERCIAL

## 2023-08-02 ENCOUNTER — OFFICE VISIT (OUTPATIENT)
Dept: BREAST CENTER | Age: 83
End: 2023-08-02
Payer: COMMERCIAL

## 2023-08-02 VITALS
DIASTOLIC BLOOD PRESSURE: 66 MMHG | WEIGHT: 104 LBS | SYSTOLIC BLOOD PRESSURE: 96 MMHG | HEIGHT: 62 IN | BODY MASS INDEX: 19.14 KG/M2 | OXYGEN SATURATION: 88 % | HEART RATE: 40 BPM | RESPIRATION RATE: 18 BRPM

## 2023-08-02 DIAGNOSIS — R92.8 ABNORMAL MAMMOGRAM: ICD-10-CM

## 2023-08-02 DIAGNOSIS — N63.20 MASS OF LEFT BREAST, UNSPECIFIED QUADRANT: ICD-10-CM

## 2023-08-02 DIAGNOSIS — C50.012 MALIGNANT NEOPLASM INVOLVING BOTH NIPPLE AND AREOLA OF LEFT BREAST IN FEMALE, ESTROGEN RECEPTOR NEGATIVE (HCC): Primary | ICD-10-CM

## 2023-08-02 DIAGNOSIS — Z17.1 MALIGNANT NEOPLASM INVOLVING BOTH NIPPLE AND AREOLA OF LEFT BREAST IN FEMALE, ESTROGEN RECEPTOR NEGATIVE (HCC): Primary | ICD-10-CM

## 2023-08-02 PROCEDURE — 1111F DSCHRG MED/CURRENT MED MERGE: CPT | Performed by: SURGERY

## 2023-08-02 PROCEDURE — 76642 ULTRASOUND BREAST LIMITED: CPT

## 2023-08-02 PROCEDURE — 1036F TOBACCO NON-USER: CPT | Performed by: SURGERY

## 2023-08-02 PROCEDURE — G8427 DOCREV CUR MEDS BY ELIG CLIN: HCPCS | Performed by: SURGERY

## 2023-08-02 PROCEDURE — 1090F PRES/ABSN URINE INCON ASSESS: CPT | Performed by: SURGERY

## 2023-08-02 PROCEDURE — 3074F SYST BP LT 130 MM HG: CPT | Performed by: SURGERY

## 2023-08-02 PROCEDURE — 3078F DIAST BP <80 MM HG: CPT | Performed by: SURGERY

## 2023-08-02 PROCEDURE — G8400 PT W/DXA NO RESULTS DOC: HCPCS | Performed by: SURGERY

## 2023-08-02 PROCEDURE — G8420 CALC BMI NORM PARAMETERS: HCPCS | Performed by: SURGERY

## 2023-08-02 PROCEDURE — G0279 TOMOSYNTHESIS, MAMMO: HCPCS

## 2023-08-02 PROCEDURE — 1123F ACP DISCUSS/DSCN MKR DOCD: CPT | Performed by: SURGERY

## 2023-08-02 PROCEDURE — 99205 OFFICE O/P NEW HI 60 MIN: CPT | Performed by: SURGERY

## 2023-08-02 NOTE — PROGRESS NOTES
Dr. Yessenia Ayala spoke to patient regarding recommendation for breast biopsy. RN and patient discussed medical history, allergies, and current medication list. Biopsy procedure explained to patient and printed education and instructions were provided as well. Patient denies any further questions at this time. Reviewed pre and post biopsy instructions/information with patient. Pt verbalized understanding. Requesting order for biopsy from Provider at this time.

## 2023-08-02 NOTE — PATIENT INSTRUCTIONS
Breast exam performed, left breast skin changes noted  Mammogram was unremarkable with the exception of enlarged lymph node    HER2 + tumor that would usually require chemo treatment    Left armpit lymph node will need to be biopsied- NURSE NAVIGATOR WILL SET UP  Plan for a possible lumpectomy under MAC anesthesia  Surgery Risks vs benefits discussed    SEE DR. BARBOSA AT [facility] FOR PLAN    Will need PCP and pulmonary clearance before surgery  REFER TO [facility] FOR PRE AND POST OP INSTRUCTIONS    PLEASE COVER LEFT NIPPLE WITH ANTIBIOTIC OINTMENT, GAUZE AND PAPER TAPE NIGHTLY    ** 8/11/2023 ARRIVE AT 10:00 TO [address], THEN DOWN TO ULTRASOUND FOR LEFT AXILLARY LYMPH NODE BIOPSY.   HOLD ASPIRIN FOR 3 DAYS PRIOR    IF THIS APPOINTMENT NEEDS TO BE CHANGED PLEASE CALL THE NURSE NAVIGATORS [place] OR [facility]AUSHIK @ [phone]

## 2023-08-02 NOTE — PROGRESS NOTES
2023    Chief Complaint   Patient presents with    New Patient     Pagets consults        HPI Patient was hospitalized recently with CHF and shortness of breath, noted to have left nipple redness and drainage for about a month. 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 The wound has been draining. No pain. Here with her daughter, Tato Garcia. Breast History:  History of Previous Breast Biopsy:None, last bilateral mammogram 2011  Family History of Breast Cancer: mother dx in 46s, self Paget's disease  Family History of Other Cancers: sister unknown type, self thyroid dx age unknown to patient   Ashkenazi Amish Decent: No  Bra Size: 36C     Gyne History:  : 3  Para: 3  Age of Menarche: unknown to patient  Age of Menopause: unknown to patient  Age of first live Birth: 26  History of Hysterectomy / [place]-BSO: No  History of OCP's: 4 years  HRT: None  Family History or personal history of Ovarian Cancer: none     Lifestyle:  Diet: Regular  Smoker ( Current / Former ):None  ETOH ( alcohol consumption ):None  Exercise:None  Caffeine: tea daily  Drug use ( including THC/ Mariajuana ) None         Current Outpatient Medications:     furosemide (LASIX) 40 MG tablet, Take 1 tablet by mouth daily, Disp: 90 tablet, Rfl: 3    senna (SENOKOT) 8.6 MG tablet, Take 2 tablets by mouth nightly, Disp: 60 tablet, Rfl: 0    ferrous sulfate (FE TABS 325) 325 (65 Fe) MG EC tablet, Take 1 tablet by mouth 3 times daily (with meals), Disp: , Rfl:     potassium chloride (KLOR-CON M) 20 MEQ extended release tablet, Take 1 tablet by mouth 2 times daily, Disp: , Rfl:     gabapentin (NEURONTIN) 100 MG capsule, Take 1 capsule by mouth 2 times daily. , Disp: , Rfl:     hydrALAZINE (APRESOLINE) 10 MG tablet, Take 1 tablet by mouth every 8 hours, Disp: , Rfl:     latanoprost (XALATAN) 0.005 % ophthalmic

## 2023-08-07 ENCOUNTER — HOSPITAL ENCOUNTER (OUTPATIENT)
Dept: GENERAL RADIOLOGY | Age: 83
Discharge: HOME | End: 2023-08-07
Attending: INTERNAL MEDICINE
Payer: COMMERCIAL

## 2023-08-07 ENCOUNTER — HOSPITAL ENCOUNTER (OUTPATIENT)
Dept: ULTRASOUND IMAGING | Age: 83
Discharge: HOME | End: 2023-08-07
Attending: INTERNAL MEDICINE
Payer: COMMERCIAL

## 2023-08-07 VITALS
HEIGHT: 62 IN | BODY MASS INDEX: 19.14 KG/M2 | DIASTOLIC BLOOD PRESSURE: 48 MMHG | TEMPERATURE: 98.9 F | WEIGHT: 104 LBS | SYSTOLIC BLOOD PRESSURE: 145 MMHG | RESPIRATION RATE: 16 BRPM | OXYGEN SATURATION: 97 % | HEART RATE: 40 BPM

## 2023-08-07 DIAGNOSIS — J90 PLEURAL EFFUSION, LEFT: ICD-10-CM

## 2023-08-07 LAB
ALBUMIN FLD-MCNC: 1.6 G/DL
GLUCOSE FLD-MCNC: 124 MG/DL
LDH FLD L TO P-CCNC: 116 U/L
PH FLD STRIP: 8 [PH]
PROT FLD-MCNC: 3 G/DL
SPECIMEN SOURCE FLD: NORMAL

## 2023-08-07 PROCEDURE — 83615 LACTATE (LD) (LDH) ENZYME: CPT

## 2023-08-07 PROCEDURE — 7100000011 HC PHASE II RECOVERY - ADDTL 15 MIN

## 2023-08-07 PROCEDURE — 71045 X-RAY EXAM CHEST 1 VIEW: CPT

## 2023-08-07 PROCEDURE — 32555 ASPIRATE PLEURA W/ IMAGING: CPT

## 2023-08-07 PROCEDURE — 88184 FLOWCYTOMETRY/ TC 1 MARKER: CPT

## 2023-08-07 PROCEDURE — 84157 ASSAY OF PROTEIN OTHER: CPT

## 2023-08-07 PROCEDURE — 88112 CYTOPATH CELL ENHANCE TECH: CPT

## 2023-08-07 PROCEDURE — 82042 OTHER SOURCE ALBUMIN QUAN EA: CPT

## 2023-08-07 PROCEDURE — 88185 FLOWCYTOMETRY/TC ADD-ON: CPT

## 2023-08-07 PROCEDURE — 83986 ASSAY PH BODY FLUID NOS: CPT

## 2023-08-07 PROCEDURE — 82945 GLUCOSE OTHER FLUID: CPT

## 2023-08-07 PROCEDURE — 88305 TISSUE EXAM BY PATHOLOGIST: CPT

## 2023-08-07 PROCEDURE — 7100000010 HC PHASE II RECOVERY - FIRST 15 MIN

## 2023-08-07 ASSESSMENT — PAIN - FUNCTIONAL ASSESSMENT: PAIN_FUNCTIONAL_ASSESSMENT: NONE - DENIES PAIN

## 2023-08-07 NOTE — DISCHARGE INSTRUCTIONS
[address]  [address] [place] [address] Drive  Telephone: [phone]      PATIENT NAME: Bunny [facility] RECORD NUMBER:  [id number]  TODAY'S DATE: @ED@    Discharge Instructions - Post Thoracentesis    [x]  Do not take Aspirin or Aspirin products the day of your procedure. [x] Your physician has instructed you to take Tylenol (Acetaminophen) the day of your biopsy for any discomfort. [x] Do not participate in any strenuous exercise for 48 hours after your procedure, such as tennis, aerobics, weight lifting and household activities. Do not lift over 10 pounds for two days. [x] You may remove your dressing tomorrow and shower. Do not submerge your drainage site in water for 4 days (ie tub, pool, hot tub, etc)    Thoracentesis (say \"swpr-sa-tpp-BUDDY-sis\") is a procedure to remove fluid from the space between the lungs and the chest wall (pleural space). This procedure may also be called a \"chest tap. \" It is normal to have a small amount of fluid in the pleural space. But too much fluid can build up because of problems such as infection, heart failure, or lung cancer. The procedure may have been done to help with shortness of breath and pain caused by the fluid buildup. Or you may have had this procedure so the doctor could test the fluid to find the cause of the buildup. Your chest may be sore where the doctor put the needle or catheter into your skin (the puncture site). This usually gets better after a day or two. You can go back to work or your normal activities as soon as you feel up to it. If the doctor sent the fluid to a lab for testing, it may take several days to get the results. The doctor or nurse will discuss the results with you. Call your doctor now or seek immediate medical care if:  You have shortness of breath that is new or getting worse.   You have new or worse pain in your chest, especially when you take

## 2023-08-10 DIAGNOSIS — Z17.1 MALIGNANT NEOPLASM INVOLVING BOTH NIPPLE AND AREOLA OF LEFT BREAST IN FEMALE, ESTROGEN RECEPTOR NEGATIVE (HCC): Primary | ICD-10-CM

## 2023-08-10 DIAGNOSIS — C50.012 MALIGNANT NEOPLASM INVOLVING BOTH NIPPLE AND AREOLA OF LEFT BREAST IN FEMALE, ESTROGEN RECEPTOR NEGATIVE (HCC): Primary | ICD-10-CM

## 2023-08-11 ENCOUNTER — HOSPITAL ENCOUNTER (OUTPATIENT)
Dept: WOMENS IMAGING | Age: 83
Discharge: HOME | End: 2023-08-11
Payer: COMMERCIAL

## 2023-08-11 ENCOUNTER — HOSPITAL ENCOUNTER (OUTPATIENT)
Dept: ULTRASOUND IMAGING | Age: 83
Discharge: HOME | End: 2023-08-11
Payer: COMMERCIAL

## 2023-08-11 VITALS — DIASTOLIC BLOOD PRESSURE: 50 MMHG | SYSTOLIC BLOOD PRESSURE: 165 MMHG

## 2023-08-11 DIAGNOSIS — C50.012 MALIGNANT NEOPLASM INVOLVING BOTH NIPPLE AND AREOLA OF LEFT BREAST IN FEMALE, ESTROGEN RECEPTOR NEGATIVE (HCC): ICD-10-CM

## 2023-08-11 DIAGNOSIS — Z17.1 MALIGNANT NEOPLASM INVOLVING BOTH NIPPLE AND AREOLA OF LEFT BREAST IN FEMALE, ESTROGEN RECEPTOR NEGATIVE (HCC): ICD-10-CM

## 2023-08-11 DIAGNOSIS — R92.8 ABNORMAL MAMMOGRAM OF LEFT BREAST: ICD-10-CM

## 2023-08-11 PROCEDURE — 38505 NEEDLE BIOPSY LYMPH NODES: CPT

## 2023-08-11 PROCEDURE — 77065 DX MAMMO INCL CAD UNI: CPT

## 2023-08-11 ASSESSMENT — PAIN SCALES - GENERAL
PAINLEVEL_OUTOF10: 0
PAINLEVEL_OUTOF10: 0

## 2023-08-11 NOTE — DISCHARGE INSTRUCTIONS
[facility] Center and Discharge [address] [place] [address]   [address]  Telephone: [phone]    NAME:  Jordana Alfaro  YOB: 1940  GENDER: female  MEDICAL RECORD NUMBER:  [id number]  TODAY'S DATE:  @ED@    Discharge Instructions [facility]:    Post Breast Biopsy Instructions    [x] Place the provided cold pack inside your bra on top of the dressing for at least 3 hours. You may re-freeze it and use it again later if you have any discomfort. [x] You may take Tylenol (Acetaminophen) the day of your biopsy for any discomfort. [x] Watch for excessive bleeding. If bleeding occurs apply pressure to site. Watch for signs of infection, increased pain, redness, swelling and heat. If this occurs call your physician. [x] Do not participate in any strenuous exercise for 48 hours after your biopsy, such as tennis, aerobics, weight lifting and household activities that involve use of your affected   arm. [x] You may remove your outer dressing in 24 hours and you may shower. \"Steri-strips\" tape will stay on for a few days longer then can be removed. [facility] Information:   Should you experience any significant changes in your health or have questions about your care, please contact:  [address] [place] [address],[address]   Monday-Friday 8:00 am - 4:30 pm.  If you need help with your care outside these hours and cannot wait until we are again available,  contact your Physician or go to the hospital emergency.        Electronically signed by Altagracia Ramos RN on 8/11/2023 at 10:44 AM

## 2023-08-11 NOTE — PROGRESS NOTES
Breast Biopsy Nursing Note    NAME:  Faina Marie  YOB: 1940 GENDER: female  MEDICAL RECORD NUMBER:  [id number]  DATE:  8/11/2023    Left axillary lymph node biopsy completed by . Expiration date site marker checked immediately prior to use. Package intact prior to use and no damage noted. Site marker was removed from protective sterile packaging by physician. Site marker was placed by physician into left axillary lymph node  Hemostasis achieved via site pressure; Biopsy site cleansed with alcohol  Steri-strips applied along with small amount of bacitracin-neomycin polymixin then Coverderm   Biopsy tissue was placed in proper container/s and labeled. Biopsy tissue was taken to Pathology for evaluation. Procedural Pain:  0  / 10     Post Procedural Pain:  0 / 10     Procedure well tolerated. Pt stable and alert and oriented x 3 upon discharge. Ice pack placed over biopsy site. Pt given post-biopsy education and all questions answered. Pt has NN contact info.        Electronically signed by Marysol Luong RN on 8/11/2023 at 11:22 AM

## 2023-09-27 NOTE — PROGRESS NOTES
and low fat diet that consist of mostly fruits, vegetables and grains (Dash diet)  -limited amount of alcohol (no more than 1 drink/day for women, 2 drinks/day for men)  -regular physical activity  -no smoking  -stress reduction    Follow UP Dr. Marylou Ortiz    Instructions:   Daily weight: Call for increase 3 lbs/day or 5 lbs/week. 2 gm sodium diet:  Fluid Restriction: 64 oz.     I appreciate the opportunity of cooperating in the care of this individual.    BRAULIO Anders - CNP, CNP, 9/28/2023,3:38 PM

## 2023-09-28 ENCOUNTER — OFFICE VISIT (OUTPATIENT)
Dept: CARDIOLOGY CLINIC | Age: 83
End: 2023-09-28
Payer: COMMERCIAL

## 2023-09-28 ENCOUNTER — TELEPHONE (OUTPATIENT)
Dept: CARDIOLOGY CLINIC | Age: 83
End: 2023-09-28

## 2023-09-28 VITALS
HEIGHT: 61 IN | BODY MASS INDEX: 17.75 KG/M2 | WEIGHT: 94 LBS | DIASTOLIC BLOOD PRESSURE: 68 MMHG | OXYGEN SATURATION: 96 % | SYSTOLIC BLOOD PRESSURE: 138 MMHG | HEART RATE: 48 BPM

## 2023-09-28 DIAGNOSIS — R00.1 JUNCTIONAL BRADYCARDIA: ICD-10-CM

## 2023-09-28 DIAGNOSIS — I50.32 CHRONIC DIASTOLIC HEART FAILURE (HCC): Primary | ICD-10-CM

## 2023-09-28 DIAGNOSIS — I10 PRIMARY HYPERTENSION: ICD-10-CM

## 2023-09-28 DIAGNOSIS — E03.9 HYPOTHYROIDISM, UNSPECIFIED TYPE: ICD-10-CM

## 2023-09-28 PROCEDURE — 1036F TOBACCO NON-USER: CPT | Performed by: NURSE PRACTITIONER

## 2023-09-28 PROCEDURE — 93000 ELECTROCARDIOGRAM COMPLETE: CPT | Performed by: NURSE PRACTITIONER

## 2023-09-28 PROCEDURE — G8400 PT W/DXA NO RESULTS DOC: HCPCS | Performed by: NURSE PRACTITIONER

## 2023-09-28 PROCEDURE — 1123F ACP DISCUSS/DSCN MKR DOCD: CPT | Performed by: NURSE PRACTITIONER

## 2023-09-28 PROCEDURE — 99214 OFFICE O/P EST MOD 30 MIN: CPT | Performed by: NURSE PRACTITIONER

## 2023-09-28 PROCEDURE — 93246 EXT ECG>7D<15D RECORDING: CPT | Performed by: NURSE PRACTITIONER

## 2023-09-28 PROCEDURE — 93246 EXT ECG>7D<15D RECORDING: CPT | Performed by: INTERNAL MEDICINE

## 2023-09-28 PROCEDURE — 3078F DIAST BP <80 MM HG: CPT | Performed by: NURSE PRACTITIONER

## 2023-09-28 PROCEDURE — 1090F PRES/ABSN URINE INCON ASSESS: CPT | Performed by: NURSE PRACTITIONER

## 2023-09-28 PROCEDURE — G8427 DOCREV CUR MEDS BY ELIG CLIN: HCPCS | Performed by: NURSE PRACTITIONER

## 2023-09-28 PROCEDURE — 3075F SYST BP GE 130 - 139MM HG: CPT | Performed by: NURSE PRACTITIONER

## 2023-09-28 PROCEDURE — G8419 CALC BMI OUT NRM PARAM NOF/U: HCPCS | Performed by: NURSE PRACTITIONER

## 2023-09-28 NOTE — TELEPHONE ENCOUNTER
Monitor placed by [address] [place] [address] [place]  Length of monitor 7  Monitor ordered by Hailey Camarena NP  Serial number [id number]    Activation successful prior to pt leaving office?   yes

## 2023-09-29 LAB
T4 FREE SERPL-MCNC: 1 NG/DL (ref 0.9–1.8)
TSH SERPL DL<=0.005 MIU/L-ACNC: 18.03 UIU/ML (ref 0.27–4.2)

## 2023-10-04 ENCOUNTER — TELEPHONE (OUTPATIENT)
Dept: CARDIOLOGY CLINIC | Age: 83
End: 2023-10-04

## 2023-10-04 NOTE — TELEPHONE ENCOUNTER
Faxed lab work (Thyroid studies) as requested by Elyssa SULLIVAN to [facility]. Fax confirmation rec'd.

## 2023-10-04 NOTE — TELEPHONE ENCOUNTER
Estrella, from [facility] called in this afternoon. Estrella's doctor information at [facility] is:      Dr. Jessy Yoo  [phone]  Fax number is [phone].

## 2023-10-11 ENCOUNTER — HOSPITAL ENCOUNTER (OUTPATIENT)
Dept: GENERAL RADIOLOGY | Age: 83
Discharge: HOME | End: 2023-10-11
Payer: COMMERCIAL

## 2023-10-11 ENCOUNTER — TELEPHONE (OUTPATIENT)
Dept: CARDIOLOGY CLINIC | Age: 83
End: 2023-10-11

## 2023-10-11 ENCOUNTER — HOSPITAL ENCOUNTER (OUTPATIENT)
Age: 83
Discharge: HOME | End: 2023-10-11
Payer: COMMERCIAL

## 2023-10-11 DIAGNOSIS — C50.019 PAGET'S DISEASE OF THE BREAST, UNSPECIFIED LATERALITY (HCC): ICD-10-CM

## 2023-10-11 PROCEDURE — 74018 RADEX ABDOMEN 1 VIEW: CPT

## 2023-10-11 PROCEDURE — 71046 X-RAY EXAM CHEST 2 VIEWS: CPT

## 2023-10-11 NOTE — TELEPHONE ENCOUNTER
Noted. Will look for report. Should try and see if she was sleeping at these times or not. Keep f/u with Dr. Sherman Syed.

## 2023-10-11 NOTE — TELEPHONE ENCOUNTER
LAURIE Nieves     Spoke with daughter Tamika Ortiz [phone] and she states she \"definitely\" would have been sleeping at 0815 and \"most likely\" taking a nap in her mag at 1005.

## 2023-10-11 NOTE — TELEPHONE ENCOUNTER
Alfredito with [facility] diagnostics about to upload 7 day to their portal today and wanted to provide an update:     Lowest HR 28 bpm on 10/3/2023 at 0616, 5.4 second pause longest on 9/29/23 1005 out of 123 pauses. Included other pauses along with the study. Patient was evaluated per LAURIE Bergeron on 9/28/23 and noted to be bradycardic, ? Junctional with a rate of 33 bpm. Repeat 9/29 labs TSH high/ T4 WNL and f/u with EP 10/23/2023 Dr. Padgett Adjutant as discussed with Dr. Ny Gross.

## 2023-10-18 ENCOUNTER — HOSPITAL ENCOUNTER (OUTPATIENT)
Dept: GENERAL RADIOLOGY | Age: 83
Discharge: HOME | End: 2023-10-18
Payer: COMMERCIAL

## 2023-10-18 ENCOUNTER — HOSPITAL ENCOUNTER (OUTPATIENT)
Dept: ULTRASOUND IMAGING | Age: 83
Discharge: HOME | End: 2023-10-18
Payer: COMMERCIAL

## 2023-10-18 ENCOUNTER — HOSPITAL ENCOUNTER (OUTPATIENT)
Dept: INTERVENTIONAL RADIOLOGY/VASCULAR | Age: 83
Discharge: HOME | End: 2023-10-18
Payer: COMMERCIAL

## 2023-10-18 VITALS
TEMPERATURE: 97 F | HEART RATE: 78 BPM | OXYGEN SATURATION: 100 % | SYSTOLIC BLOOD PRESSURE: 149 MMHG | RESPIRATION RATE: 20 BRPM | DIASTOLIC BLOOD PRESSURE: 69 MMHG

## 2023-10-18 VITALS
HEART RATE: 81 BPM | RESPIRATION RATE: 24 BRPM | SYSTOLIC BLOOD PRESSURE: 151 MMHG | TEMPERATURE: 97 F | DIASTOLIC BLOOD PRESSURE: 67 MMHG | OXYGEN SATURATION: 97 %

## 2023-10-18 VITALS
TEMPERATURE: 97 F | RESPIRATION RATE: 24 BRPM | HEART RATE: 72 BPM | SYSTOLIC BLOOD PRESSURE: 157 MMHG | OXYGEN SATURATION: 98 % | DIASTOLIC BLOOD PRESSURE: 71 MMHG

## 2023-10-18 DIAGNOSIS — C50.012 MALIGNANT NEOPLASM OF NIPPLE OR AREOLA OF FEMALE BREAST, LEFT (HCC): ICD-10-CM

## 2023-10-18 LAB
BASOPHILS # BLD: 0.1 K/UL (ref 0–0.2)
BASOPHILS NFR BLD: 0.8 %
DEPRECATED RDW RBC AUTO: 17.6 % (ref 12.4–15.4)
EOSINOPHIL # BLD: 0 K/UL (ref 0–0.6)
EOSINOPHIL NFR BLD: 0 %
HCT VFR BLD AUTO: 33.5 % (ref 36–48)
HGB BLD-MCNC: 11.6 G/DL (ref 12–16)
INR PPP: 1.11 (ref 0.84–1.16)
LYMPHOCYTES # BLD: 1.5 K/UL (ref 1–5.1)
LYMPHOCYTES NFR BLD: 19.3 %
MCH RBC QN AUTO: 32.4 PG (ref 26–34)
MCHC RBC AUTO-ENTMCNC: 34.6 G/DL (ref 31–36)
MCV RBC AUTO: 93.5 FL (ref 80–100)
MONOCYTES # BLD: 0.5 K/UL (ref 0–1.3)
MONOCYTES NFR BLD: 6.6 %
NEUTROPHILS # BLD: 5.6 K/UL (ref 1.7–7.7)
NEUTROPHILS NFR BLD: 73.3 %
PLATELET # BLD AUTO: 265 K/UL (ref 135–450)
PMV BLD AUTO: 8.8 FL (ref 5–10.5)
PROTHROMBIN TIME: 14.3 SEC (ref 11.5–14.8)
RBC # BLD AUTO: 3.58 M/UL (ref 4–5.2)
WBC # BLD AUTO: 7.7 K/UL (ref 4–11)

## 2023-10-18 PROCEDURE — 76937 US GUIDE VASCULAR ACCESS: CPT

## 2023-10-18 PROCEDURE — 85025 COMPLETE CBC W/AUTO DIFF WBC: CPT

## 2023-10-18 PROCEDURE — 71045 X-RAY EXAM CHEST 1 VIEW: CPT

## 2023-10-18 PROCEDURE — 99153 MOD SED SAME PHYS/QHP EA: CPT

## 2023-10-18 PROCEDURE — 2709999900 IR PORT PLACEMENT > 5 YEARS

## 2023-10-18 PROCEDURE — 77001 FLUOROGUIDE FOR VEIN DEVICE: CPT

## 2023-10-18 PROCEDURE — 36561 INSERT TUNNELED CV CATH: CPT

## 2023-10-18 PROCEDURE — 36415 COLL VENOUS BLD VENIPUNCTURE: CPT

## 2023-10-18 PROCEDURE — 6360000002 HC RX W HCPCS: Performed by: RADIOLOGY

## 2023-10-18 PROCEDURE — 2580000003 HC RX 258: Performed by: RADIOLOGY

## 2023-10-18 PROCEDURE — 99152 MOD SED SAME PHYS/QHP 5/>YRS: CPT

## 2023-10-18 PROCEDURE — 2709999900 IR GUIDED THORACENTESIS PLEURAL

## 2023-10-18 PROCEDURE — 32555 ASPIRATE PLEURA W/ IMAGING: CPT

## 2023-10-18 PROCEDURE — 85610 PROTHROMBIN TIME: CPT

## 2023-10-18 RX ORDER — FENTANYL CITRATE 50 UG/ML
INJECTION, SOLUTION INTRAMUSCULAR; INTRAVENOUS PRN
Status: COMPLETED | OUTPATIENT
Start: 2023-10-18 | End: 2023-10-18

## 2023-10-18 RX ORDER — SODIUM CHLORIDE 9 MG/ML
INJECTION, SOLUTION INTRAVENOUS ONCE
Status: DISCONTINUED | OUTPATIENT
Start: 2023-10-18 | End: 2023-10-19 | Stop reason: HOSPADM

## 2023-10-18 RX ORDER — MIDAZOLAM HYDROCHLORIDE 1 MG/ML
INJECTION INTRAMUSCULAR; INTRAVENOUS PRN
Status: COMPLETED | OUTPATIENT
Start: 2023-10-18 | End: 2023-10-18

## 2023-10-18 RX ADMIN — CEFAZOLIN SODIUM 2000 MG: 1 INJECTION, POWDER, FOR SOLUTION INTRAMUSCULAR; INTRAVENOUS at 14:56

## 2023-10-18 RX ADMIN — MIDAZOLAM 0.5 MG: 1 INJECTION INTRAMUSCULAR; INTRAVENOUS at 15:04

## 2023-10-18 RX ADMIN — FENTANYL CITRATE 25 MCG: 50 INJECTION INTRAMUSCULAR; INTRAVENOUS at 15:04

## 2023-10-18 NOTE — BRIEF OP NOTE
Brief Postoperative Note    Faina Marie  YOB: 1940  [id number]    Pre-operative Diagnosis: cancer    Post-operative Diagnosis: Same    Procedure: Port placement    Anesthesia: Moderate Sedation    Surgeons: Arabella Hicks MD    Estimated Blood Loss: Less than 5 mL    Complications: None    Specimens: Was Not Obtained    Findings: Successful placement of a right IJ port.     Electronically signed by Arabella Hicks MD on 10/18/2023 at 3:57 PM

## 2023-10-18 NOTE — PROGRESS NOTES
Multiple attempts made to reach [facility] staff to give report. No answer. Discharge instructions reviewed with patient and her daughter at bedside. Discharge instructions to be sent with patient and transport for staff.

## 2023-10-18 NOTE — BRIEF OP NOTE
Brief Postoperative Note    Andreas Session  YOB: 1940  [id number]    Pre-operative Diagnosis: left pleural effusion    Post-operative Diagnosis: Same    Procedure: US-guided left thoracentesis    Anesthesia: Local    Surgeons: Srinivas Em MD    Estimated Blood Loss: Less than 5 mL    Complications: None    Specimens: Was Obtained: left pleural fluid drained and sent for labs    Findings: Successful US-guided left thoracentesis.     Electronically signed by Srinivas Em MD on 10/18/2023 at 3:57 PM

## 2023-10-18 NOTE — DISCHARGE INSTRUCTIONS
[address]  [address] [place] [address] [employer]  Telephone: [phone]      [facility]:  [id number]  TODAY'S DATE: @ED@        [facility]    Ladies you need to wear a sports bra or a really good support bra for at least the first  week. If you are large breasted you need to always wear a good support bra during the night as well. If you have a dressing in place, do not remove for two days. If there is no dressing, that means we used a liquid bandage underneath your steri strips. A little oozing is expected, if it continues contact BRAULIO Choe CNP. No lifting over 5 lbs for the first 4 days and nothing over 10lbs for the next three days. Also limit your overhead arm movement and pulling for 3-4 days. You have dissolvable sutures in place so they will not need to be removed. The little steri strips will fall off over the next 2-3 weeks - do not remove them, just let them fall off. You may shower starting tomorrow, just dry off your incision area with a towel when you are done. Do not submerge the incision in water until the steri strips are completely gone. 8.     You should contact BRAULIO Choe CNP if you experience any of the following: Fever          Greater than 100, any pus or drainage from the incision, any redness,warmth           Or swelling at the site. 9.     Always keep your port ID card with you. 10.   If you take blood thinners,including Aspirin,  you may resume them tomorrow, do not take them today.                  [address]  [address] [place] [address] [employer]  Telephone: [phone]      PATIENT NAME: Bunny [facility] RECORD NUMBER:  [id number]  TODAY'S DATE: @ED@    Discharge Instructions - Post Thoracentesis    [x]  Do not take Aspirin or Aspirin

## 2023-10-18 NOTE — PRE SEDATION
Sedation Pre-Procedure Note    Patient Name: Tyesha Chopra   YOB: 1940  Room/Bed: Room/bed info not found  Medical Record Number: [id number]  Date: 10/18/2023   Time: 3:56 PM       Indication:  port placement. Consent: I have discussed with the patient and/or the patient representative the indication, alternatives, and the possible risks and/or complications of the planned procedure and the anesthesia methods. The patient and/or patient representative appear to understand and agree to proceed. Vital Signs:   Vitals:    10/18/23 1522   BP: (!) 163/82   Pulse: 84   Resp: 26   SpO2: 100%       Past Medical History:   has a past medical history of Allergic rhinitis, Anxiety, Arthritis, Asthma, Chronic kidney disease, Depression, Diverticulitis, GERD (gastroesophageal reflux disease), Hyperlipidemia, Hypertension, Overactive bladder, PVD (peripheral vascular disease) ([address]), Thyroid disease, Tinea corporis, and Vitamin B12 deficiency. Past Surgical History:   has a past surgical history that includes Thyroidectomy; Spine surgery; pr office/outpt visit,procedure only (Right, 11/08/2018); Upper gastrointestinal endoscopy (N/A, 09/14/2020); US BIOPSY LYMPH NODE (08/11/2023); and Portacath placement (Right, 10/18/2023). Medications:   Scheduled Meds:   Continuous Infusions:    ceFAZolin 2,000 mg (10/18/23 1456)     PRN Meds: ceFAZolin, midazolam, fentanNYL  Home Meds:   Prior to Admission medications    Medication Sig Start Date End Date Taking?  Authorizing Provider   furosemide (LASIX) 40 MG tablet Take 1 tablet by mouth daily 7/31/23   BRAULIO Fishman - CNP   ferrous sulfate (FE TABS 325) 325 (65 Fe) MG EC tablet Take 1 tablet by mouth 3 times daily (with meals)    ProviderFlo MD   potassium chloride (KLOR-CON M) 20 MEQ extended release tablet Take 1 tablet by mouth 2 times daily    ProviderFlo MD   gabapentin (NEURONTIN) 100 MG capsule Take 1 capsule by mouth 2 times

## 2023-10-23 ENCOUNTER — OFFICE VISIT (OUTPATIENT)
Dept: CARDIOLOGY CLINIC | Age: 83
End: 2023-10-23
Payer: COMMERCIAL

## 2023-10-23 VITALS
DIASTOLIC BLOOD PRESSURE: 80 MMHG | HEART RATE: 54 BPM | BODY MASS INDEX: 17.56 KG/M2 | HEIGHT: 61 IN | SYSTOLIC BLOOD PRESSURE: 128 MMHG | OXYGEN SATURATION: 97 % | WEIGHT: 93 LBS

## 2023-10-23 DIAGNOSIS — I50.32 CHRONIC DIASTOLIC HEART FAILURE (HCC): ICD-10-CM

## 2023-10-23 DIAGNOSIS — I10 PRIMARY HYPERTENSION: ICD-10-CM

## 2023-10-23 DIAGNOSIS — R00.1 JUNCTIONAL BRADYCARDIA: Primary | ICD-10-CM

## 2023-10-23 DIAGNOSIS — I25.10 CORONARY ARTERY DISEASE INVOLVING NATIVE CORONARY ARTERY OF NATIVE HEART WITHOUT ANGINA PECTORIS: ICD-10-CM

## 2023-10-23 DIAGNOSIS — E78.5 HYPERLIPIDEMIA, UNSPECIFIED HYPERLIPIDEMIA TYPE: ICD-10-CM

## 2023-10-23 PROCEDURE — 1036F TOBACCO NON-USER: CPT | Performed by: INTERNAL MEDICINE

## 2023-10-23 PROCEDURE — 93000 ELECTROCARDIOGRAM COMPLETE: CPT | Performed by: INTERNAL MEDICINE

## 2023-10-23 PROCEDURE — 93248 EXT ECG>7D<15D REV&INTERPJ: CPT | Performed by: INTERNAL MEDICINE

## 2023-10-23 PROCEDURE — 93248 EXT ECG>7D<15D REV&INTERPJ: CPT | Performed by: NURSE PRACTITIONER

## 2023-10-23 PROCEDURE — G8400 PT W/DXA NO RESULTS DOC: HCPCS | Performed by: INTERNAL MEDICINE

## 2023-10-23 PROCEDURE — 3074F SYST BP LT 130 MM HG: CPT | Performed by: INTERNAL MEDICINE

## 2023-10-23 PROCEDURE — 3079F DIAST BP 80-89 MM HG: CPT | Performed by: INTERNAL MEDICINE

## 2023-10-23 PROCEDURE — 1123F ACP DISCUSS/DSCN MKR DOCD: CPT | Performed by: INTERNAL MEDICINE

## 2023-10-23 PROCEDURE — 1090F PRES/ABSN URINE INCON ASSESS: CPT | Performed by: INTERNAL MEDICINE

## 2023-10-23 PROCEDURE — G8484 FLU IMMUNIZE NO ADMIN: HCPCS | Performed by: INTERNAL MEDICINE

## 2023-10-23 PROCEDURE — G8427 DOCREV CUR MEDS BY ELIG CLIN: HCPCS | Performed by: INTERNAL MEDICINE

## 2023-10-23 PROCEDURE — 99205 OFFICE O/P NEW HI 60 MIN: CPT | Performed by: INTERNAL MEDICINE

## 2023-10-23 PROCEDURE — G8419 CALC BMI OUT NRM PARAM NOF/U: HCPCS | Performed by: INTERNAL MEDICINE

## 2023-10-23 NOTE — PATIENT INSTRUCTIONS
If you have any question regarding your procedure please contact Nurse Mamta at [phone]. Implantation of  Dual Chamber  Pre procedure Instructions    Date:______________________________    Arrive at:__________________________    Procedure time:____________________    The morning of your procedure you will park in the hospital parking lot and report directly to the cath lab to check in. At the information desk stay right and go all the way to the end of the tai, this will take you directly to your check in desk for the cath lab. Pre-Procedure Instructions   You will need to fast (nothing to eat or drink) after midnight the day of your procedure  Do NOT chew gum or eat mints the day of your procedure  You will need to hold your Aspirin for 7 days prior to the procedure. You may hold your Furosemide the morning of the procedure for comfort to decrease urinary urgency. Do not use any lotions, creams or perfume the morning of procedure. You will need to complete pre-procedure lab work 5-7 days prior to your procedure. Please have a responsible adult to drive you home after procedure, you should go home same day, but there is always a possibility of an overnight stay. Cath lab will provide you with all post procedure instructions                                          [facility] Lab services  [address] [place] [address]   Jennifer Pelletier , [address] [place] [address]  Phone: [phone]  The hours are Mon -Fri.   6:30 am - 4:00 pm   Saturday 8:00 am - noon  No appointment necessary

## 2023-10-23 NOTE — PROGRESS NOTES
Cardiac Electrophysiology Consultation   Date: 10/23/2023  Reason for Consultation: Junctional bradycardia  Consult Requesting Physician: Doloris Claude, MD  / Ronny Pickett CNP. Primary Care Physician: Bebeto Gallego    Chief Complaint:   Chief Complaint   Patient presents with    New Patient        HPI: Isael Middleton is a 81 y.o. patient with a history of essential hypertension, hypelipidemia, mesenteric artery stenosis, peripheral vascular disease, coronary artery disease and breast CA. She was seen in office by Ronny Pickett NP on 09/28/2023 and EKG showed Junctional bradycardia, v-rate 33 bpm with retrograde p waves. 7 day CAM patch was placed. Today, she presents to office accompanied by her daughter for evaluation of Junctional bradycardia. She has breast cancer and is currently undergoing chemotherapy and up to 3-4 months of further treatment. She reported that she felt short of breath on 09/28/2023 when EKG showed Junctional bradycardia. She is compliant with her medications and tolerating them well. She denies chest pain/pressure, tightness, edema, heart racing, palpitations, lightheadedness, dizziness, syncope, presyncope,  PND or orthopnea.       Past Medical History:   Diagnosis Date    Allergic rhinitis     Anxiety     Arthritis     Asthma     Chronic kidney disease     Depression     Diverticulitis     GERD (gastroesophageal reflux disease)     Hyperlipidemia     Hypertension     Overactive bladder     PVD (peripheral vascular disease) (HCC)     Thyroid disease     Tinea corporis     Vitamin B12 deficiency         Past Surgical History:   Procedure Laterality Date    IR PORT PLACEMENT EQUAL OR GREATER THAN 5 YEARS  10/18/2023    IR PORT PLACEMENT EQUAL OR GREATER THAN 5 YEARS 10/18/2023 [facility] SPECIAL PROCEDURES    PORTACATH PLACEMENT Right 10/18/2023    Power Port; RIJ access; 25 cm; Dr. Huang Prom OFFICE/OUTPT VISIT,PROCEDURE ONLY Right 11/08/2018    OPEN REDUCTION INTERNAL FIXATION

## 2023-10-24 DIAGNOSIS — R00.2 PALPITATIONS: Primary | ICD-10-CM

## 2023-10-24 DIAGNOSIS — R00.1 JUNCTIONAL BRADYCARDIA: ICD-10-CM

## 2023-10-25 ENCOUNTER — TELEPHONE (OUTPATIENT)
Dept: CARDIOLOGY CLINIC | Age: 83
End: 2023-10-25

## 2023-10-25 DIAGNOSIS — R00.1 JUNCTIONAL BRADYCARDIA: Primary | ICD-10-CM

## 2023-10-25 NOTE — TELEPHONE ENCOUNTER
Please reach out to patient and schedule dual chamber pacemaker with Dr. Daria Pollack. Anesthesia is NOT needed   Include [employer] rep in email [email or web address]      Implantation of  Dual Chamber  Pre procedure Instructions     Date:______________________________     Peewee Si at:__________________________     Procedure time:____________________     The morning of your procedure you will park in the hospital parking lot and report directly to the cath lab to check in. At the information desk stay right and go all the way to the end of the tai, this will take you directly to your check in desk for the cath lab. Pre-Procedure Instructions   You will need to fast (nothing to eat or drink) after midnight the day of your procedure  Do NOT chew gum or eat mints the day of your procedure  You will need to hold your Aspirin for 7 days prior to the procedure. You may hold your Furosemide the morning of the procedure for comfort to decrease urinary urgency. Do not use any lotions, creams or perfume the morning of procedure. You will need to complete pre-procedure lab work 5-7 days prior to your procedure. Please have a responsible adult to drive you home after procedure, you should go home same day, but there is always a possibility of an overnight stay. Cath lab will provide you with all post procedure instructions                                             [facility] Lab services  [address] [place] [address], [address] [place] [address]  Phone: [phone]  The hours are Mon -Fri.   6:30 am - 4:00 pm   Saturday 8:00 am - noon  No appointment necessary

## 2023-10-25 NOTE — TELEPHONE ENCOUNTER
Pts daughter Roger Castañeda called to speak to Ebenezer Lei about the procedure Dr Daria Pollack had spoken to her mom about. Her mom at first said no, but she is now interested. She would like to speak to Ebenezer Lei to see what the next steps are.       Sana # [phone]

## 2023-10-26 DIAGNOSIS — R00.2 PALPITATIONS: Primary | ICD-10-CM

## 2023-10-27 NOTE — TELEPHONE ENCOUNTER
Spoke with Roger Castañeda and got the pt scheduled for procedure. She stated she lives in nursing home so those RN's may call with questions. We went over instructions below and she verbalized understanding. Implantation of  Dual Chamber  Pre procedure Instructions     Date: 11/29/2023     Arrive at: 9:00 am  Procedure time: 10:30 am      The morning of your procedure you will park in the hospital parking lot and report directly to the cath lab to check in. At the information desk stay right and go all the way to the end of the tai, this will take you directly to your check in desk for the cath lab. Pre-Procedure Instructions   You will need to fast (nothing to eat or drink) after midnight the day of your procedure  Do NOT chew gum or eat mints the day of your procedure  You will need to hold your Aspirin for 7 days prior to the procedure. You may hold your Furosemide the morning of the procedure for comfort to decrease urinary urgency. Do not use any lotions, creams or perfume the morning of procedure. You will need to complete pre-procedure lab work 5-7 days prior to your procedure. Please have a responsible adult to drive you home after procedure, you should go home same day, but there is always a possibility of an overnight stay. Cath lab will provide you with all post procedure instructions                                             [facility] Lab services  [address] [place] [address], [address] [place] [address]  Phone: [phone]  The hours are Mon -Fri.   6:30 am - 4:00 pm   Saturday 8:00 am - noon  No appointment necessary         Qgenda updated / emailed cath lab

## 2023-11-20 ENCOUNTER — HOSPITAL ENCOUNTER (OUTPATIENT)
Dept: NON INVASIVE DIAGNOSTICS | Age: 83
Discharge: HOME | End: 2023-11-20
Payer: COMMERCIAL

## 2023-11-20 DIAGNOSIS — I42.7 CARDIOMYOPATHY SECONDARY TO DRUG (HCC): ICD-10-CM

## 2023-11-20 PROCEDURE — 93306 TTE W/DOPPLER COMPLETE: CPT

## 2023-11-22 DIAGNOSIS — R00.1 JUNCTIONAL BRADYCARDIA: ICD-10-CM

## 2023-11-22 LAB
ANION GAP SERPL CALCULATED.3IONS-SCNC: 14 MMOL/L (ref 3–16)
BUN SERPL-MCNC: 21 MG/DL (ref 7–20)
CALCIUM SERPL-MCNC: 8.1 MG/DL (ref 8.3–10.6)
CHLORIDE SERPL-SCNC: 105 MMOL/L (ref 99–110)
CO2 SERPL-SCNC: 17 MMOL/L (ref 21–32)
CREAT SERPL-MCNC: 1.2 MG/DL (ref 0.6–1.2)
DEPRECATED RDW RBC AUTO: 17.6 % (ref 12.4–15.4)
GFR SERPLBLD CREATININE-BSD FMLA CKD-EPI: 45 ML/MIN/{1.73_M2}
GLUCOSE SERPL-MCNC: 125 MG/DL (ref 70–99)
HCT VFR BLD AUTO: 32 % (ref 36–48)
HGB BLD-MCNC: 10.7 G/DL (ref 12–16)
MCH RBC QN AUTO: 32.8 PG (ref 26–34)
MCHC RBC AUTO-ENTMCNC: 33.5 G/DL (ref 31–36)
MCV RBC AUTO: 97.8 FL (ref 80–100)
PLATELET # BLD AUTO: 346 K/UL (ref 135–450)
PMV BLD AUTO: 9.5 FL (ref 5–10.5)
POTASSIUM SERPL-SCNC: 3.9 MMOL/L (ref 3.5–5.1)
RBC # BLD AUTO: 3.27 M/UL (ref 4–5.2)
SODIUM SERPL-SCNC: 136 MMOL/L (ref 136–145)
WBC # BLD AUTO: 7.3 K/UL (ref 4–11)

## 2023-11-29 ENCOUNTER — HOSPITAL ENCOUNTER (OUTPATIENT)
Dept: CARDIAC CATH/INVASIVE PROCEDURES | Age: 83
Discharge: HOME | End: 2023-11-29
Payer: COMMERCIAL

## 2023-11-29 ENCOUNTER — HOSPITAL ENCOUNTER (OUTPATIENT)
Dept: GENERAL RADIOLOGY | Age: 83
Discharge: HOME | End: 2023-11-29
Payer: COMMERCIAL

## 2023-11-29 VITALS
BODY MASS INDEX: 17.67 KG/M2 | HEART RATE: 61 BPM | SYSTOLIC BLOOD PRESSURE: 117 MMHG | OXYGEN SATURATION: 99 % | HEIGHT: 60 IN | RESPIRATION RATE: 20 BRPM | DIASTOLIC BLOOD PRESSURE: 73 MMHG | WEIGHT: 90 LBS | TEMPERATURE: 99 F

## 2023-11-29 DIAGNOSIS — Z95.0 CARDIAC PACEMAKER IN SITU: Primary | ICD-10-CM

## 2023-11-29 DIAGNOSIS — R00.1 JUNCTIONAL BRADYCARDIA: ICD-10-CM

## 2023-11-29 DIAGNOSIS — I50.32 CHRONIC DIASTOLIC HEART FAILURE (HCC): ICD-10-CM

## 2023-11-29 DIAGNOSIS — I49.8 JUNCTIONAL RHYTHM: ICD-10-CM

## 2023-11-29 DIAGNOSIS — I50.33 ACUTE ON CHRONIC DIASTOLIC HEART FAILURE (HCC): ICD-10-CM

## 2023-11-29 PROCEDURE — 99153 MOD SED SAME PHYS/QHP EA: CPT

## 2023-11-29 PROCEDURE — 33208 INSRT HEART PM ATRIAL & VENT: CPT | Performed by: INTERNAL MEDICINE

## 2023-11-29 PROCEDURE — 99152 MOD SED SAME PHYS/QHP 5/>YRS: CPT

## 2023-11-29 PROCEDURE — 93005 ELECTROCARDIOGRAM TRACING: CPT | Performed by: INTERNAL MEDICINE

## 2023-11-29 PROCEDURE — C1892 INTRO/SHEATH,FIXED,PEEL-AWAY: HCPCS

## 2023-11-29 PROCEDURE — C1887 CATHETER, GUIDING: HCPCS

## 2023-11-29 PROCEDURE — 33208 INSRT HEART PM ATRIAL & VENT: CPT

## 2023-11-29 PROCEDURE — C1898 LEAD, PMKR, OTHER THAN TRANS: HCPCS

## 2023-11-29 PROCEDURE — 2580000003 HC RX 258

## 2023-11-29 PROCEDURE — 71046 X-RAY EXAM CHEST 2 VIEWS: CPT

## 2023-11-29 PROCEDURE — C1785 PMKR, DUAL, RATE-RESP: HCPCS

## 2023-11-29 PROCEDURE — 6360000002 HC RX W HCPCS

## 2023-11-29 PROCEDURE — 99152 MOD SED SAME PHYS/QHP 5/>YRS: CPT | Performed by: INTERNAL MEDICINE

## 2023-11-29 PROCEDURE — 2500000003 HC RX 250 WO HCPCS

## 2023-11-29 RX ORDER — SODIUM CHLORIDE 9 MG/ML
INJECTION, SOLUTION INTRAVENOUS PRN
Status: DISCONTINUED | OUTPATIENT
Start: 2023-11-29 | End: 2023-11-29

## 2023-11-29 RX ORDER — SODIUM CHLORIDE 0.9 % (FLUSH) 0.9 %
5-40 SYRINGE (ML) INJECTION EVERY 12 HOURS SCHEDULED
Status: DISCONTINUED | OUTPATIENT
Start: 2023-11-29 | End: 2023-11-30 | Stop reason: HOSPADM

## 2023-11-29 RX ORDER — SODIUM CHLORIDE 0.9 % (FLUSH) 0.9 %
5-40 SYRINGE (ML) INJECTION PRN
Status: DISCONTINUED | OUTPATIENT
Start: 2023-11-29 | End: 2023-11-29

## 2023-11-29 RX ORDER — ASPIRIN 81 MG/1
81 TABLET ORAL DAILY
Qty: 30 TABLET | Refills: 5 | Status: SHIPPED | OUTPATIENT
Start: 2023-12-06

## 2023-11-29 RX ORDER — SODIUM CHLORIDE 9 MG/ML
INJECTION, SOLUTION INTRAVENOUS PRN
Status: DISCONTINUED | OUTPATIENT
Start: 2023-11-29 | End: 2023-11-30 | Stop reason: HOSPADM

## 2023-11-29 RX ORDER — SODIUM CHLORIDE 0.9 % (FLUSH) 0.9 %
5-40 SYRINGE (ML) INJECTION PRN
Status: DISCONTINUED | OUTPATIENT
Start: 2023-11-29 | End: 2023-11-30 | Stop reason: HOSPADM

## 2023-11-29 RX ORDER — SODIUM CHLORIDE 0.9 % (FLUSH) 0.9 %
5-40 SYRINGE (ML) INJECTION EVERY 12 HOURS SCHEDULED
Status: DISCONTINUED | OUTPATIENT
Start: 2023-11-29 | End: 2023-11-29

## 2023-11-29 NOTE — PROGRESS NOTES
9796: I have made several attempts with different phone numbers to speak to a nurse at [facility], to ask what medications were administered to the patient this morning. The information sent with patient did not have this written on her med list.    I, finally, got through to someone name, Davonjoanie Montalvo who told me, she was not a nurse and would need to find her and have her call me.       3811: Lulanurse called me back with the list of medications given to patient this am    Wesley Atkinson RN, CCRN-CSC

## 2023-11-29 NOTE — PROGRESS NOTES
1430:  I called the number provided by nurse to give report. I left a message to return my call. I, then called the number provided on the paperwork left to me, unable to get through, no one answers this number. I reached out to [facility] and spoke with her, she said she would have someone call me back. Awaiting call back to give report.   Divina Jones RN, CCRN-CSC

## 2023-11-29 NOTE — DISCHARGE INSTRUCTIONS
Pacemaker/ICD/BiV Discharge Instructions      Do you have the help you need at home? Activity:           ? Do not lift greater than 10 lbs with left arm for one month. ? Do not raise the arm on the side the device was implanted over your head for              one month. ? No driving until initial visit after your  procedure. ? Sling and swathe to affected arm nightly for one month to avoid accidentally raising affected arm while sleeping. These rules help to heal the implant site and stabilize the heart lead wires. Nutrition:          Regular diet or ***    Wound Care:   ? Do not get incision wet until after first appointment with clinic in 7-10 days. Do not submerge your incision in any water       for seven days. (i.e. tub bath, swimming pool)   ? Keep the incision dry and clean. ? Dressing should stay on until follow up appointment. ? Do not remove the steri strips (strips of paper over the incision). If they fall off      naturally do not reapply. ? Do not rub or twist the device. ? Avoid tight clothes over the incision. Carry your card:  Slick Aliciaiff your temporary pacer/ICD/BiV care with you until your permanent card arrives in four to six weeks. An ID card should always be with you. What symptoms or health problems do you need to look out for after you leave the hospital? Call your physician if you have any of these symptoms. ? Increase swelling and/or tenderness in incision. ? Redness of incision or drainage from incision. ? If a suture works its way through the incision. ? Fever, unexplained temperature greater than 100? If your condition worsens or for any concerns, call your doctor or seek emergency medical services as needed.

## 2023-11-29 NOTE — PROGRESS NOTES
Malka Pickering, nurse from [facility] [place], returned my call for report.   Divina Jones RN, CCRN-CSC

## 2023-11-29 NOTE — PROGRESS NOTES
Discharge instructions reviewed with patient and family member. Patient and family verbalized understanding. Follow up appointment(s) reviewed with patient and family. Patient given discharge instructions, and appointment times.     Emre Iraheta RN, CCRN-CSC

## 2023-11-29 NOTE — PROCEDURES
risk. In a very rare chance of catastrophic complication that cannot be managed at our hospital, there may be the chance of having to transfer the patient to another hospital system for further care. The patient was given the option of postponing their procedure. The patient was also presented reasonable alternatives to the proposed care, treatment, and services. The discussion I have had with the patient encompassed risks, benefits, and side effects related to the alternatives and the risks related to not receiving the proposed care, treatment and services. The patient opted to proceed with the device implantation. Written informed consent was signed and placed in the chart. Prophylactic antibiotic using Ancef 1mg IV was given. The patient was prepped and draped in sterile fashion. A timeout protocol was completed to identify the patient and the procedure being performed. An independent trained observer assumed the sole responsibility of administering IV sedation medication - Versed, Fentanyl - at my direction and closely monitored the patient. The patient was monitored continuously with ECG, pulse oximetry, blood pressure monitoring, and direct observation. An incision was made in the left upper pectoral area in a transverse line roughly 1 cm from the clavicle after administration of lidocaine/bupivicaine combination. Using electrocautery and blunt dissection, a pocket was created. Central venous access into the left axillary vein was obtained using the modified Seldinger technique. After central venous access was obtained, a sheath was placed in the axillary vein.      A Innohat [id number] guide-sheath was used to deliver a Medtronic Wave Systemsecure MRI SureScan 3830 69cm lead to the His-position as confirmed by observing an atrial, His, and ventricular potential. We screwed the His lead by rotating the lead in the sheath 5-7 rotations to this position and found the paced ventricular complex to have a narrow

## 2023-11-29 NOTE — PROGRESS NOTES
Patient received post procedure in stable condition. Post-cath handoff/site assessment performed to Left subclavian   Pt is alert and oriented x4, No distress noted. VSS-see flowsheet. Post-cath restrictions/instructions provided  Patient provided with snack/drink. No further needs at this time, call light within reach. MD to talk to patient and family soon.      Kathryn Raygoza RN, CCRN-CSC

## 2023-11-29 NOTE — H&P
Cardiac Electrophysiology Consultation   Date: 11/29/2023  Reason for Consultation: Junctional bradycardia  Consult Requesting Physician: Xiomara Faustin MD  / Lelia De CNP. Primary Care Physician: Rianna Dumont     Chief Complaint:       Chief Complaint   Patient presents with    New Patient         HPI: Mirta Garcia is a 81 y.o. patient with a history of essential hypertension, hypelipidemia, mesenteric artery stenosis, peripheral vascular disease, coronary artery disease and breast CA. She was seen in office by Lelia De NP on 09/28/2023 and EKG showed Junctional bradycardia, v-rate 33 bpm with retrograde p waves. 7 day CAM patch was placed. Today, she presents to office accompanied by her daughter for evaluation of Junctional bradycardia. She has breast cancer and is currently undergoing chemotherapy and up to 3-4 months of further treatment. She reported that she felt short of breath on 09/28/2023 when EKG showed Junctional bradycardia. She is compliant with her medications and tolerating them well. She denies chest pain/pressure, tightness, edema, heart racing, palpitations, lightheadedness, dizziness, syncope, presyncope,  PND or orthopnea.         Past Medical History        Past Medical History:   Diagnosis Date    Allergic rhinitis      Anxiety      Arthritis      Asthma      Chronic kidney disease      Depression      Diverticulitis      GERD (gastroesophageal reflux disease)      Hyperlipidemia      Hypertension      Overactive bladder      PVD (peripheral vascular disease) (HCC)      Thyroid disease      Tinea corporis      Vitamin B12 deficiency              Past Surgical History         Past Surgical History:   Procedure Laterality Date    IR PORT PLACEMENT EQUAL OR GREATER THAN 5 YEARS   10/18/2023     IR PORT PLACEMENT EQUAL OR GREATER THAN 5 YEARS 10/18/2023 [facility] SPECIAL PROCEDURES    PORTACATH PLACEMENT Right 10/18/2023     Power Port; RIJ access; 25 cm; Dr. Bob Kearns

## 2023-11-29 NOTE — PROGRESS NOTES
PRE-PROCEDURE      DATE: 11/29/2023 ARRIVAL TO CATH LAB: 8:42 AM    ADMIT SOURCE: Outpatient    ID & ALLERGY BAND: On    CONSENT: Yes    NPO SINCE: Midnight    PULSES - see flowsheet        BLEEDING PROBLEMS: No  BLEEDING RISK: 7.8    Low Risk < 2%  Intermediate Risk >2% or < 6.5%  High Risk >6.5%        LAST DOSE (if applicable):  ASA: .     P2Y12 (Plavix, Effient, Brilinta): NA  Anticoagulants (Coumadin, Xarelto, Eliquis): NA  Other Blood Thinners: NA      OTHER MEDICATIONS TAKEN AT HOME:      MEDICATION COMPLIANCE: Yes   Patient lives in nursing home  [address], [address] [place] [address]

## 2023-11-29 NOTE — PROGRESS NOTES
Taken to discharge bridge via wheelchair. Patient discharged to nursing home with family via nursing home car.       Vinayak Dunham RN, CCRN-CSC

## 2023-11-30 PROBLEM — Z95.0 CARDIAC PACEMAKER IN SITU: Status: ACTIVE | Noted: 2023-11-30

## 2023-11-30 LAB
EKG ATRIAL RATE: 61 BPM
EKG DIAGNOSIS: NORMAL
EKG P AXIS: 25 DEGREES
EKG P-R INTERVAL: 200 MS
EKG Q-T INTERVAL: 480 MS
EKG QRS DURATION: 148 MS
EKG QTC CALCULATION (BAZETT): 483 MS
EKG R AXIS: -42 DEGREES
EKG T AXIS: -31 DEGREES
EKG VENTRICULAR RATE: 61 BPM

## 2023-11-30 PROCEDURE — 93010 ELECTROCARDIOGRAM REPORT: CPT | Performed by: INTERNAL MEDICINE

## 2023-12-06 ENCOUNTER — NURSE ONLY (OUTPATIENT)
Dept: CARDIOLOGY CLINIC | Age: 83
End: 2023-12-06

## 2023-12-06 DIAGNOSIS — I50.32 CHRONIC DIASTOLIC HEART FAILURE (HCC): ICD-10-CM

## 2023-12-06 DIAGNOSIS — R00.1 SYMPTOMATIC BRADYCARDIA: ICD-10-CM

## 2023-12-06 DIAGNOSIS — Z95.0 CARDIAC PACEMAKER IN SITU: Primary | ICD-10-CM

## 2023-12-06 DIAGNOSIS — R00.1 JUNCTIONAL BRADYCARDIA: ICD-10-CM

## 2023-12-15 ENCOUNTER — CASE MANAGEMENT (OUTPATIENT)
Dept: WOMENS IMAGING | Age: 83
End: 2023-12-15

## 2023-12-15 NOTE — PROGRESS NOTES
RN LVM with daughter, Gianna Burkett ([phone]) concerning arranging an appt for pt to come in for left breast diagnostics per [facility] request. Orders are in the system.

## 2024-01-05 ENCOUNTER — CASE MANAGEMENT (OUTPATIENT)
Dept: WOMENS IMAGING | Age: 84
End: 2024-01-05

## 2024-01-05 NOTE — PROGRESS NOTES
Late entry from 1/4/24  RN narda to Sana (pt's daughter) concerning [facility]' s order for further testing.    1/5 - RN informed Shanti at [facility] that calls to reach Estrella/Sana have been unsuccessful. Shanti will pass information along to the ordering provider.

## 2024-01-22 ENCOUNTER — HOSPITAL ENCOUNTER (OUTPATIENT)
Dept: CT IMAGING | Age: 84
Discharge: HOME | End: 2024-01-22
Attending: INTERNAL MEDICINE
Payer: COMMERCIAL

## 2024-01-22 DIAGNOSIS — C50.012 PAGET'S DISEASE OF THE BREAST, LEFT (HCC): ICD-10-CM

## 2024-01-22 PROCEDURE — 71250 CT THORAX DX C-: CPT

## 2024-01-31 ENCOUNTER — HOSPITAL ENCOUNTER (OUTPATIENT)
Dept: ULTRASOUND IMAGING | Age: 84
Discharge: HOME | End: 2024-01-31
Payer: COMMERCIAL

## 2024-01-31 ENCOUNTER — HOSPITAL ENCOUNTER (OUTPATIENT)
Dept: WOMENS IMAGING | Age: 84
Discharge: HOME | End: 2024-01-31
Payer: COMMERCIAL

## 2024-01-31 DIAGNOSIS — R92.8 ABNORMAL MAMMOGRAM: ICD-10-CM

## 2024-01-31 DIAGNOSIS — C50.012 PAGET'S DISEASE OF THE BREAST, LEFT (HCC): ICD-10-CM

## 2024-01-31 PROCEDURE — G0279 TOMOSYNTHESIS, MAMMO: HCPCS

## 2024-01-31 PROCEDURE — 76642 ULTRASOUND BREAST LIMITED: CPT

## 2024-03-07 ENCOUNTER — NURSE ONLY (OUTPATIENT)
Dept: CARDIOLOGY CLINIC | Age: 84
End: 2024-03-07

## 2024-03-07 ENCOUNTER — OFFICE VISIT (OUTPATIENT)
Dept: CARDIOLOGY CLINIC | Age: 84
End: 2024-03-07
Payer: COMMERCIAL

## 2024-03-07 VITALS
WEIGHT: 90 LBS | OXYGEN SATURATION: 97 % | HEART RATE: 81 BPM | DIASTOLIC BLOOD PRESSURE: 78 MMHG | HEIGHT: 60 IN | BODY MASS INDEX: 17.67 KG/M2 | SYSTOLIC BLOOD PRESSURE: 148 MMHG

## 2024-03-07 DIAGNOSIS — Z95.0 CARDIAC PACEMAKER IN SITU: Primary | ICD-10-CM

## 2024-03-07 DIAGNOSIS — R00.1 JUNCTIONAL BRADYCARDIA: ICD-10-CM

## 2024-03-07 DIAGNOSIS — I50.32 CHRONIC DIASTOLIC HEART FAILURE (HCC): ICD-10-CM

## 2024-03-07 DIAGNOSIS — Z85.3 HISTORY OF BREAST CANCER: ICD-10-CM

## 2024-03-07 DIAGNOSIS — E78.5 HYPERLIPIDEMIA, UNSPECIFIED HYPERLIPIDEMIA TYPE: ICD-10-CM

## 2024-03-07 DIAGNOSIS — I10 PRIMARY HYPERTENSION: ICD-10-CM

## 2024-03-07 DIAGNOSIS — R00.1 SYMPTOMATIC BRADYCARDIA: ICD-10-CM

## 2024-03-07 PROCEDURE — G8427 DOCREV CUR MEDS BY ELIG CLIN: HCPCS | Performed by: INTERNAL MEDICINE

## 2024-03-07 PROCEDURE — 3077F SYST BP >= 140 MM HG: CPT | Performed by: INTERNAL MEDICINE

## 2024-03-07 PROCEDURE — 3078F DIAST BP <80 MM HG: CPT | Performed by: INTERNAL MEDICINE

## 2024-03-07 PROCEDURE — G8419 CALC BMI OUT NRM PARAM NOF/U: HCPCS | Performed by: INTERNAL MEDICINE

## 2024-03-07 PROCEDURE — 1090F PRES/ABSN URINE INCON ASSESS: CPT | Performed by: INTERNAL MEDICINE

## 2024-03-07 PROCEDURE — 1036F TOBACCO NON-USER: CPT | Performed by: INTERNAL MEDICINE

## 2024-03-07 PROCEDURE — 99214 OFFICE O/P EST MOD 30 MIN: CPT | Performed by: INTERNAL MEDICINE

## 2024-03-07 PROCEDURE — 93000 ELECTROCARDIOGRAM COMPLETE: CPT | Performed by: INTERNAL MEDICINE

## 2024-03-07 PROCEDURE — G8400 PT W/DXA NO RESULTS DOC: HCPCS | Performed by: INTERNAL MEDICINE

## 2024-03-07 PROCEDURE — 1123F ACP DISCUSS/DSCN MKR DOCD: CPT | Performed by: INTERNAL MEDICINE

## 2024-03-07 PROCEDURE — G8484 FLU IMMUNIZE NO ADMIN: HCPCS | Performed by: INTERNAL MEDICINE

## 2024-03-07 RX ORDER — CEPHALEXIN 500 MG/1
1 CAPSULE ORAL
COMMUNITY
Start: 2024-03-06

## 2024-03-07 NOTE — PROGRESS NOTES
questions and concerns were addressed to the patient/family. Alternatives to my treatment were discussed.     This note was scribed in the presence of Dr. Bhupinder Nur MD by Mamta Frausto RN.      The scribe's documentation has been prepared under my direction and personally reviewed by me in its entirety. I confirm that the note above accurately reflects all work, physical examination, the discussion of treatments and procedures, and medical decision making performed by me.    Bhupinder Nur MD, MS, FACC, RS  Cardiac Electrophysiology  [facility]  [address], [address]  [place], [place] [address] [phone]

## 2024-03-29 ENCOUNTER — HOSPITAL ENCOUNTER (OUTPATIENT)
Dept: NON INVASIVE DIAGNOSTICS | Age: 84
Discharge: HOME | End: 2024-03-29
Payer: COMMERCIAL

## 2024-03-29 DIAGNOSIS — T45.1X5A CARDIOMYOPATHY SECONDARY TO CHEMOTHERAPY (HCC): ICD-10-CM

## 2024-03-29 DIAGNOSIS — I42.7 CARDIOMYOPATHY SECONDARY TO CHEMOTHERAPY (HCC): ICD-10-CM

## 2024-03-29 PROCEDURE — 93356 MYOCRD STRAIN IMG SPCKL TRCK: CPT

## 2024-03-29 PROCEDURE — 93306 TTE W/DOPPLER COMPLETE: CPT

## 2024-04-03 NOTE — PROGRESS NOTES
[facility]      Cardiology Progress Note    Eula V Severn  1940    April 4, 2024    CC: \"I have no new symptoms.\"     HPI:  The patient is 84 y.o. female with a past medical history significant for HTN, HLD, , thyroid disease, asthma, CKD, polyneuropathy, mesenteric artery stenosis, PAD, CAD, and breast cancer. Junctional bradycardia in the presence of multiple electrolyte disturbance 8/2021. 9/28/2023 EKG showed junctional bradycardia, v-rate 33 bpm with retrograde p waves with reported SOB. She was therefore referred to our EP team Dr. AUGUSTA Nur 10/23/2023. She was currently undergoing chemotherapy with another 3-4 months of treatment. Implantation of permanent dual chamber pacemaker was implanted on 11/29/2023. Her most recent follow up with Dr. AUGUSTA Nur was 3/7/2024.  Echo completed 3/29/24.     Today, here for routine follow up and recent echo.  She is accompanied by her family and presents in a wheel chair. She resides at [facility]. Some swelling in ankles, otherwise she reports she is doing well and feeling well from a cardiac standpoint with no specific cardiac complaints. Patient denies exertional chest pain/pressure, dyspnea at rest, WINSTON, PND, orthopnea, palpitations, lightheadedness, weight changes, changes in LE edema, and syncope. The patient admits to medical therapy compliance and feels she is tolerating.       Past Medical History:   Diagnosis Date    Allergic rhinitis     Anxiety     Arthritis     Asthma     Chronic kidney disease     Depression     Diverticulitis     GERD (gastroesophageal reflux disease)     Hyperlipidemia     Hypertension     Overactive bladder     PVD (peripheral vascular disease) (HCC)     Thyroid disease     Tinea corporis     Vitamin B12 deficiency      Past Surgical History:   Procedure Laterality Date    IR PORT PLACEMENT EQUAL OR GREATER THAN 5 YEARS  10/18/2023    IR PORT PLACEMENT EQUAL OR GREATER THAN 5 YEARS 10/18/2023 [facility] SPECIAL PROCEDURES

## 2024-04-04 ENCOUNTER — OFFICE VISIT (OUTPATIENT)
Dept: CARDIOLOGY CLINIC | Age: 84
End: 2024-04-04
Payer: COMMERCIAL

## 2024-04-04 VITALS
WEIGHT: 85 LBS | HEART RATE: 70 BPM | BODY MASS INDEX: 16.69 KG/M2 | OXYGEN SATURATION: 98 % | DIASTOLIC BLOOD PRESSURE: 58 MMHG | SYSTOLIC BLOOD PRESSURE: 126 MMHG | HEIGHT: 60 IN

## 2024-04-04 DIAGNOSIS — I10 PRIMARY HYPERTENSION: ICD-10-CM

## 2024-04-04 DIAGNOSIS — E78.5 HYPERLIPIDEMIA, UNSPECIFIED HYPERLIPIDEMIA TYPE: ICD-10-CM

## 2024-04-04 DIAGNOSIS — R00.1 JUNCTIONAL BRADYCARDIA: Primary | ICD-10-CM

## 2024-04-04 DIAGNOSIS — I25.10 CORONARY ARTERY DISEASE INVOLVING NATIVE CORONARY ARTERY OF NATIVE HEART WITHOUT ANGINA PECTORIS: ICD-10-CM

## 2024-04-04 DIAGNOSIS — I50.32 CHRONIC DIASTOLIC HEART FAILURE (HCC): ICD-10-CM

## 2024-04-04 DIAGNOSIS — Z95.0 CARDIAC PACEMAKER IN SITU: ICD-10-CM

## 2024-04-04 PROCEDURE — 1123F ACP DISCUSS/DSCN MKR DOCD: CPT | Performed by: INTERNAL MEDICINE

## 2024-04-04 PROCEDURE — 99213 OFFICE O/P EST LOW 20 MIN: CPT | Performed by: INTERNAL MEDICINE

## 2024-04-04 PROCEDURE — G8419 CALC BMI OUT NRM PARAM NOF/U: HCPCS | Performed by: INTERNAL MEDICINE

## 2024-04-04 PROCEDURE — 1090F PRES/ABSN URINE INCON ASSESS: CPT | Performed by: INTERNAL MEDICINE

## 2024-04-04 PROCEDURE — 3078F DIAST BP <80 MM HG: CPT | Performed by: INTERNAL MEDICINE

## 2024-04-04 PROCEDURE — G8427 DOCREV CUR MEDS BY ELIG CLIN: HCPCS | Performed by: INTERNAL MEDICINE

## 2024-04-04 PROCEDURE — 1036F TOBACCO NON-USER: CPT | Performed by: INTERNAL MEDICINE

## 2024-04-04 PROCEDURE — G8400 PT W/DXA NO RESULTS DOC: HCPCS | Performed by: INTERNAL MEDICINE

## 2024-04-04 PROCEDURE — 3074F SYST BP LT 130 MM HG: CPT | Performed by: INTERNAL MEDICINE

## 2024-09-23 ENCOUNTER — HOSPITAL ENCOUNTER (OUTPATIENT)
Dept: GENERAL RADIOLOGY | Age: 84
Discharge: HOME | End: 2024-09-23
Attending: INTERNAL MEDICINE
Payer: COMMERCIAL

## 2024-09-23 ENCOUNTER — HOSPITAL ENCOUNTER (OUTPATIENT)
Age: 84
Discharge: HOME | End: 2024-09-23
Payer: COMMERCIAL

## 2024-09-23 PROCEDURE — 74019 RADEX ABDOMEN 2 VIEWS: CPT

## 2024-09-23 PROCEDURE — 71046 X-RAY EXAM CHEST 2 VIEWS: CPT

## 2024-09-27 ENCOUNTER — HOSPITAL ENCOUNTER (EMERGENCY)
Age: 84
Discharge: HOME | End: 2024-09-27
Payer: COMMERCIAL

## 2024-09-27 ENCOUNTER — APPOINTMENT (OUTPATIENT)
Dept: CT IMAGING | Age: 84
End: 2024-09-27
Payer: COMMERCIAL

## 2024-09-27 VITALS
RESPIRATION RATE: 18 BRPM | TEMPERATURE: 98 F | DIASTOLIC BLOOD PRESSURE: 78 MMHG | OXYGEN SATURATION: 91 % | SYSTOLIC BLOOD PRESSURE: 154 MMHG | HEART RATE: 64 BPM

## 2024-09-27 DIAGNOSIS — E87.6 HYPOKALEMIA: ICD-10-CM

## 2024-09-27 DIAGNOSIS — R19.7 DIARRHEA, UNSPECIFIED TYPE: Primary | ICD-10-CM

## 2024-09-27 LAB
ALBUMIN SERPL-MCNC: 4.1 G/DL (ref 3.4–5)
ALBUMIN/GLOB SERPL: 1.1 {RATIO} (ref 1.1–2.2)
ALP SERPL-CCNC: 97 U/L (ref 40–129)
ALT SERPL-CCNC: 9 U/L (ref 10–40)
ANION GAP SERPL CALCULATED.3IONS-SCNC: 13 MMOL/L (ref 3–16)
AST SERPL-CCNC: 23 U/L (ref 15–37)
BASOPHILS # BLD: 0.1 K/UL (ref 0–0.2)
BASOPHILS NFR BLD: 0.7 %
BILIRUB SERPL-MCNC: 0.3 MG/DL (ref 0–1)
BILIRUB UR QL STRIP.AUTO: NEGATIVE
BUN SERPL-MCNC: 18 MG/DL (ref 7–20)
CALCIUM SERPL-MCNC: 9.1 MG/DL (ref 8.3–10.6)
CHLORIDE SERPL-SCNC: 101 MMOL/L (ref 99–110)
CLARITY UR: CLEAR
CO2 SERPL-SCNC: 24 MMOL/L (ref 21–32)
COLOR UR: YELLOW
CREAT SERPL-MCNC: 1 MG/DL (ref 0.6–1.2)
DEPRECATED RDW RBC AUTO: 15.8 % (ref 12.4–15.4)
EOSINOPHIL # BLD: 0 K/UL (ref 0–0.6)
EOSINOPHIL NFR BLD: 0.4 %
GFR SERPLBLD CREATININE-BSD FMLA CKD-EPI: 56 ML/MIN/{1.73_M2}
GLUCOSE SERPL-MCNC: 105 MG/DL (ref 70–99)
GLUCOSE UR STRIP.AUTO-MCNC: NEGATIVE MG/DL
HCT VFR BLD AUTO: 35.6 % (ref 36–48)
HEMOCCULT STL QL: NORMAL
HGB BLD-MCNC: 12 G/DL (ref 12–16)
HGB UR QL STRIP.AUTO: NEGATIVE
KETONES UR STRIP.AUTO-MCNC: NEGATIVE MG/DL
LEUKOCYTE ESTERASE UR QL STRIP.AUTO: NEGATIVE
LIPASE SERPL-CCNC: 12 U/L (ref 13–60)
LYMPHOCYTES # BLD: 2.2 K/UL (ref 1–5.1)
LYMPHOCYTES NFR BLD: 31.4 %
MAGNESIUM SERPL-MCNC: 1.96 MG/DL (ref 1.8–2.4)
MCH RBC QN AUTO: 31.6 PG (ref 26–34)
MCHC RBC AUTO-ENTMCNC: 33.7 G/DL (ref 31–36)
MCV RBC AUTO: 93.9 FL (ref 80–100)
MONOCYTES # BLD: 0.5 K/UL (ref 0–1.3)
MONOCYTES NFR BLD: 7.5 %
NEUTROPHILS # BLD: 4.2 K/UL (ref 1.7–7.7)
NEUTROPHILS NFR BLD: 60 %
NITRITE UR QL STRIP.AUTO: NEGATIVE
PH UR STRIP.AUTO: 5.5 [PH] (ref 5–8)
PLATELET # BLD AUTO: 284 K/UL (ref 135–450)
PMV BLD AUTO: 8.8 FL (ref 5–10.5)
POTASSIUM SERPL-SCNC: 3.3 MMOL/L (ref 3.5–5.1)
PROT SERPL-MCNC: 7.7 G/DL (ref 6.4–8.2)
PROT UR STRIP.AUTO-MCNC: NEGATIVE MG/DL
RBC # BLD AUTO: 3.79 M/UL (ref 4–5.2)
SODIUM SERPL-SCNC: 138 MMOL/L (ref 136–145)
SP GR UR STRIP.AUTO: 1.02 (ref 1–1.03)
UA COMPLETE W REFLEX CULTURE PNL UR: NORMAL
UA DIPSTICK W REFLEX MICRO PNL UR: NORMAL
URN SPEC COLLECT METH UR: NORMAL
UROBILINOGEN UR STRIP-ACNC: 0.2 E.U./DL
WBC # BLD AUTO: 7.1 K/UL (ref 4–11)

## 2024-09-27 PROCEDURE — 2580000003 HC RX 258: Performed by: NURSE PRACTITIONER

## 2024-09-27 PROCEDURE — 83735 ASSAY OF MAGNESIUM: CPT

## 2024-09-27 PROCEDURE — 96374 THER/PROPH/DIAG INJ IV PUSH: CPT

## 2024-09-27 PROCEDURE — 74177 CT ABD & PELVIS W/CONTRAST: CPT

## 2024-09-27 PROCEDURE — 81003 URINALYSIS AUTO W/O SCOPE: CPT

## 2024-09-27 PROCEDURE — 80053 COMPREHEN METABOLIC PANEL: CPT

## 2024-09-27 PROCEDURE — 6360000004 HC RX CONTRAST MEDICATION: Performed by: NURSE PRACTITIONER

## 2024-09-27 PROCEDURE — 99285 EMERGENCY DEPT VISIT HI MDM: CPT

## 2024-09-27 PROCEDURE — 6360000002 HC RX W HCPCS: Performed by: NURSE PRACTITIONER

## 2024-09-27 PROCEDURE — 83690 ASSAY OF LIPASE: CPT

## 2024-09-27 PROCEDURE — 6370000000 HC RX 637 (ALT 250 FOR IP): Performed by: NURSE PRACTITIONER

## 2024-09-27 PROCEDURE — 82270 OCCULT BLOOD FECES: CPT

## 2024-09-27 PROCEDURE — 85025 COMPLETE CBC W/AUTO DIFF WBC: CPT

## 2024-09-27 RX ORDER — POTASSIUM CHLORIDE 750 MG/1
40 TABLET, EXTENDED RELEASE ORAL ONCE
Status: COMPLETED | OUTPATIENT
Start: 2024-09-27 | End: 2024-09-27

## 2024-09-27 RX ORDER — SACCHAROMYCES BOULARDII 250 MG
250 CAPSULE ORAL 2 TIMES DAILY
Qty: 30 CAPSULE | Refills: 0 | Status: SHIPPED | OUTPATIENT
Start: 2024-09-27 | End: 2024-10-27

## 2024-09-27 RX ORDER — MORPHINE SULFATE 2 MG/ML
2 INJECTION, SOLUTION INTRAMUSCULAR; INTRAVENOUS ONCE
Status: COMPLETED | OUTPATIENT
Start: 2024-09-27 | End: 2024-09-27

## 2024-09-27 RX ORDER — IOPAMIDOL 755 MG/ML
75 INJECTION, SOLUTION INTRAVASCULAR
Status: COMPLETED | OUTPATIENT
Start: 2024-09-27 | End: 2024-09-27

## 2024-09-27 RX ORDER — SODIUM CHLORIDE 9 MG/ML
INJECTION, SOLUTION INTRAVENOUS CONTINUOUS
Status: DISCONTINUED | OUTPATIENT
Start: 2024-09-27 | End: 2024-09-27 | Stop reason: HOSPADM

## 2024-09-27 RX ADMIN — IOPAMIDOL 75 ML: 755 INJECTION, SOLUTION INTRAVENOUS at 18:27

## 2024-09-27 RX ADMIN — MORPHINE SULFATE 2 MG: 2 INJECTION, SOLUTION INTRAMUSCULAR; INTRAVENOUS at 19:52

## 2024-09-27 RX ADMIN — POTASSIUM CHLORIDE 40 MEQ: 750 TABLET, EXTENDED RELEASE ORAL at 19:55

## 2024-09-27 RX ADMIN — SODIUM CHLORIDE: 9 INJECTION, SOLUTION INTRAVENOUS at 19:50

## 2024-09-27 ASSESSMENT — ENCOUNTER SYMPTOMS
ABDOMINAL PAIN: 1
RESPIRATORY NEGATIVE: 1
DIARRHEA: 1

## 2024-09-27 ASSESSMENT — PAIN DESCRIPTION - LOCATION: LOCATION: ABDOMEN

## 2024-09-27 ASSESSMENT — PAIN - FUNCTIONAL ASSESSMENT: PAIN_FUNCTIONAL_ASSESSMENT: 0-10

## 2024-09-27 ASSESSMENT — PAIN SCALES - GENERAL: PAINLEVEL_OUTOF10: 9

## 2024-09-27 NOTE — ED TRIAGE NOTES
Pt presents to ED with her daughter for abdominal pain and diarrhea. Pt states pain is in her lower and upper abdomen. Daughter states pt had last chemo treatment in June. After her last treatment, pt began having diarrhea and it has been on going since.     Pt A/ox4. Baseline uses a wheelchair to get around.

## 2024-09-27 NOTE — ED PROVIDER NOTES
[facility] EMERGENCY DEPARTMENT  [address] [address]  Dept: [phone]  Loc: [phone]  eMERGENCYdEPARTMENT eNCOUnter      Pt Name: Eula V Severn  MRN: [id number]  Birthdate 1940  Date of evaluation: 9/27/2024  Provider:BRAULIO Amador CNP      Independently seen and evaluated by the advanced practice provider  CHIEF COMPLAINT       Chief Complaint   Patient presents with    Abdominal Pain    Diarrhea       CRITICAL CARE TIME     HISTORY OF PRESENT ILLNESS  (Location/Symptom, Timing/Onset, Context/Setting, Quality, Duration,Modifying Factors, Severity.)   Eula V Severn is a 84 y.o. female who presents to the emergency department PMHx: Breast cancer, thyroid, vitamin B12, CKD, asthma, anxiety, hyperlipidemia, HTN, PVD, hiatal hernia, REJI, MDD, RA, HTN      Lives at: ECF  CODE STATUS:  Anticoagulation therapy:      HPI provided by the patient and the daughter that is present:    Patient is presenting via private vehicle Need by her daughter from the nursing care with a complaint of diarrhea intermittently since June.  Now she is beginning to have abdominal pain and loss of energy.  Nursing care facility tested her for C. difficile and it was negative.  The daughter does not believe that there is been any further workup at the nursing care facility for the diarrhea and the abdominal pain.  There was a consult to the oncologist on the last visit about the diarrhea and the oncologist did not feel that this was related to the chemotherapy treatments for the breast cancer.    Nursing Notes were reviewedand agreed with or any disagreements were addressed in the HPI.    REVIEW OF SYSTEMS    (2-9 systems for level 4, 10 or more for level 5)     Review of Systems   Constitutional:  Positive for activity change and appetite change.   Respiratory: Negative.     Cardiovascular: Negative.    Gastrointestinal:  Positive for abdominal pain and  diarrhea.   Genitourinary: Negative.        Except as noted above the remainder of the review of systems was reviewed and negative.       PAST MEDICAL HISTORY         Diagnosis Date    Allergic rhinitis     Anxiety     Arthritis     Asthma     Chronic kidney disease     Depression     Diverticulitis     GERD (gastroesophageal reflux disease)     Hyperlipidemia     Hypertension     Overactive bladder     PVD (peripheral vascular disease) (HCC)     Thyroid disease     Tinea corporis     Vitamin B12 deficiency        SURGICAL HISTORY           Procedure Laterality Date    IR PORT PLACEMENT EQUAL OR GREATER THAN 5 YEARS  10/18/2023    IR PORT PLACEMENT EQUAL OR GREATER THAN 5 YEARS 10/18/2023 [facility] SPECIAL PROCEDURES    PORTACATH PLACEMENT Right 10/18/2023    Power Port; RIJ access; 25 cm; Dr. Harris    [facility]PT VISIT,PROCEDURE ONLY Right 11/08/2018    OPEN REDUCTION INTERNAL FIXATION RIGHT HIP GAMMA NAIL WITH C-ARM performed by Anyi Renee MD at [facility] OR    SPINE SURGERY      THYROIDECTOMY      UPPER GASTROINTESTINAL ENDOSCOPY N/A 09/14/2020    ESOPHAGOGASTRODUODENOSCOPY performed by Jet Vicente MD at [facility] ENDOSCOPY    US LYMPH NODE BIOPSY  08/11/2023    US LYMPH NODE BIOPSY 8/11/2023 [facility] ULTRASOUND       CURRENT MEDICATIONS     [unfilled]    ALLERGIES     Alendronate sodium, Benadryl [diphenhydramine], Ciprofloxacin, Crestor [rosuvastatin calcium], Hydroxychloroquine sulfate, Minocycline, Naprosyn [naproxen], Niaspan [niacin], Red dye #40 (allura red), and Risedronate    FAMILY HISTORY     History reviewed. No pertinent family history.  No family status information on file.        SOCIAL HISTORY      reports that she has never smoked. She has never used smokeless tobacco. She reports that she does not drink alcohol and does not use drugs.    PHYSICAL EXAM    (up to 7 for level 4, 8 or more for level 5)     ED Triage Vitals [09/27/24 1706]   Encounter Vitals Group      BP (!) 182/77      Systolic

## 2024-09-28 NOTE — DISCHARGE INSTRUCTIONS
There are several findings on the CT scan however none of them warrant admission to the hospital.  These findings can be followed up on an outpatient basis.  Impression:    1. Asymmetric heterogeneous wall thickening of mid to distal rectum centered  on the left measuring grossly 2.4 x 2.8 x 3.5 cm. This is concerning for  neoplasm. Recommend correlation with direct visualization.  2. Liquid stool within portions of large bowel to include distally in the  sigmoid colon and rectum. This is nonspecific but can be seen in the setting  of diarrheal illness.  3. Small loculated left pleural effusion with mildly thickened rim. This  could be related to a chronic effusion with underlying empyema not excluded.  4. Dense consolidation to left lower lobe and to a lesser extent lingula of  left upper lobe. This could be related to atelectasis or pneumonia.  5. Large hiatal hernia is redemonstrated.  6. Distended gallbladder without other gallbladder abnormality identified.  7. Osteopenia.  8. Chronic compression deformity involving T11 vertebral body redemonstrated.        I would like to address the #3 and #4: She has no clinical findings that is consistent with a pneumonia.  She does have scoliosis and she can have chronic pleural effusion which is thickening in the lower lobe of the lung.  Regarding the gallbladder being dilated: There is no evidence of her having an acute gallbladder.    The stool was negative for blood.  Her white blood cell count is not elevated lowering the index of suspicion for a colon infection  .

## 2024-11-22 RX ORDER — LORATADINE 10 MG/1
10 CAPSULE, LIQUID FILLED ORAL DAILY
COMMUNITY

## 2024-11-22 RX ORDER — ALUMINA, MAGNESIA, AND SIMETHICONE 2400; 2400; 240 MG/30ML; MG/30ML; MG/30ML
30 SUSPENSION ORAL EVERY 6 HOURS PRN
COMMUNITY

## 2024-11-22 RX ORDER — DIPHENOXYLATE HYDROCHLORIDE AND ATROPINE SULFATE 2.5; .025 MG/1; MG/1
2 TABLET ORAL 4 TIMES DAILY
COMMUNITY

## 2024-11-22 RX ORDER — BRIMONIDINE TARTRATE 2 MG/ML
1 SOLUTION/ DROPS OPHTHALMIC
COMMUNITY

## 2024-11-22 RX ORDER — DRONABINOL 2.5 MG/1
2.5 CAPSULE ORAL
COMMUNITY

## 2024-11-22 NOTE — PROGRESS NOTES
Nursing Home Patient Worksheet-Pre Admission Testing Department  : [birth year]   Day of Surgery: 2024  Surgeon: Jules    Arrival Time: 0700   Surgery Time: 0830  Facility: [facility]   Nurse or Care provider: Alondra  Contact number:  [phone]  Fax number: [phone]    Day of Surgery Information    Can Patient sign own consent?   [x] YES    []  NO      H/P: Complete:      [] YES    []  NO    [x]  N/A    Labs:        [] YES    []  NO   [x]  N/A    Transportation confirmed:     [x] YES    []  NO    Will STNA be provided:     [] YES    [x]  NO    Medication Reconciliation Complete:  [x] YES    []  NO    Demographics (Med History) Complete:  [x] YES    []  NO    Pt. Ambulation Status: bed bound needs ebonie lift     Pt. LOC: Per Alondra pt A&O can sign own consent.  Daughter Cristina meeting her here.     PAT INTERVIEW COMPLETE:    [x] YES   []  NO    Additional Notes:    Haider Jaquez does have orders for fleet enemas and NPO after midnight.

## 2024-11-22 NOTE — PROGRESS NOTES
Faxed to Alondra at [phone]    [facility] PRE-OPERATIVE INSTRUCTIONS    Day of Procedure:   12/9/2024             Arrival time:  0700                Surgery time: 0830    Take the following medications with a sip of water:  Follow your MD/Surgeons pre-procedure instructions regarding your medications     Do not eat or drink anything after 12:00 midnight prior to your surgery.  This includes water, chewing gum, mints and ice chips.   You may brush your teeth and gargle the morning of your surgery, but do not swallow the water.     Please see your family doctor/pediatrician for a history and physical and/or concerning medications.   Bring any test results/reports from your physicians office.   If you are under the care of a heart doctor or specialist doctor, please be aware that you may be asked to see them for clearance.    You may be asked to stop blood thinners such as Coumadin, Plavix, Fragmin, Lovenox, etc., or any anti-inflammatories such as:  Aspirin, Ibuprofen, Advil, Naproxen prior to your surgery.    We also ask that you stop any over the counter medications such as fish oil, vitamin E, glucosamine, garlic, Multivitamins, COQ 10, etc.    We ask that you do not smoke 24 hours prior to surgery.  We ask that you do not  drink any alcoholic beverages 24 hours prior to surgery     You must make arrangements for a responsible adult to take you home after your surgery.    For your safety, you will not be allowed to leave alone or drive yourself home.  Your surgery will be cancelled if you do not have a ride home.     Also for your safety, you must have someone stay with you the first 24 hours after your surgery.     A parent or legal guardian must accompany a child scheduled for surgery and plan to stay at the hospital until the child is discharged.    Please do not bring other children with you.    For your comfort, please wear simple loose fitting clothing to the hospital.  Please do not bring

## 2024-12-07 NOTE — OP NOTE
Flexible Sigmoidoscopy Note      Patient: Eula V Severn  : 1940  Acct#:     Procedure: Flexible Sigmoidoscopy to 25 cm    Date:  2024    Surgeon:  Jet Vicente MD    Referring Physician:  Joel Cameron MD     Preoperative Diagnosis:  Abnormal CT/?Rectal mass     Postoperative Diagnosis:  1. Normal Flex Sig to 25 cm.     Anesthesia:  Versed 1 mg IV (sedation time 8:35-8:43)    Indications: 1. Abnormal CT/?Rectal mass     Consent:  The patient or their legal guardian has signed a consent, and is aware of the potential risks, benefits, alternatives, and potential complications of this procedure.  These include, but are not limited to hemorrhage, bleeding, post procedural pain, perforation, phlebitis, aspiration, hypotension, hypoxia, cardiovascular events such as arryhthmia, and possibly death.  Additionally, the possibility of missed colonic polyps and interval colon cancer was discussed in the consent.    Procedure:   An informed consent was obtained from the patient after explanation of indications, benefits, possible risks and complications of the procedure.  The patient was then taken to the endoscopy suite, placed in the left lateral decubitus position, and the above IV anesthesia was administered.    A digital rectal examination was performed and revealed negative without mass, lesions or tenderness.      The Olympus video colonoscope was placed in the patient's rectum under digital direction and advanced 25 cm from the anal verge. The prep was fair. Extensive lavage performed.     The scope was then withdrawn back through the descending and sigmoid colons.  Carefull circumferential examination of the mucosa in these areas demonstrated normal colonic mucosa throughout.   The scope was then withdrawn into the rectum and retroflexed.  The retroflexed view of the anal verge and rectum demonstrates no abnormalities.  No rectal mass present. The scope was straightened, the colon was

## 2024-12-07 NOTE — H&P
Pre-operative History and Physical    Patient: Eula V Severn  : 1940  Acct#:     Intended Procedure:  Flexible Sigmoidoscopy    HISTORY OF PRESENT ILLNESS:  The patient is a 84 y.o. female  who presents for/due to Abnormal CT/?Rectal mass       Past Medical History:        Diagnosis Date    A-fib (HCC)     Acute nonspecific idiopathic pericarditis     Acute pulmonary edema     Adverse effect of antineoplastic and immunosuppressive drugs, sequela     Allergic rhinitis     Anxiety     Arthritis     Asthma     Atherosclerotic heart disease of native coronary artery without angina pectoris     Bradycardia     Chronic kidney disease     CKD (chronic kidney disease)     Cognitive communication deficit     Constipation     Depression     Diverticulitis     Dysphagia, oropharyngeal phase     Frequent falls     Generalized edema     GERD (gastroesophageal reflux disease)     Heart failure (HCC)     Hyperlipidemia     Hypertension     Hypokalemia     Hypovolemia     Insomnia     Malignant neoplasm of central portion of unspecified female breast (HCC)     Nausea     Other forms of acute ischemic heart disease (HCC)     Other pulmonary collapse     Other specified cardiac arrhythmias     Overactive bladder     Pacemaker     Pleural effusion     Polymyalgia rheumatica (HCC)     Polyneuropathy     Primary open angle glaucoma     PVD (peripheral vascular disease) (HCC)     PVD (peripheral vascular disease) (HCC)     RA (rheumatoid arthritis) (HCC)     Stricture of artery (HCC)     Thyroid disease     Tinea corporis     Vitamin B12 deficiency      Past Surgical History:        Procedure Laterality Date    IR PORT PLACEMENT EQUAL OR GREATER THAN 5 YEARS  10/18/2023    IR PORT PLACEMENT EQUAL OR GREATER THAN 5 YEARS 10/18/2023 [facility] SPECIAL PROCEDURES    PORTACATH PLACEMENT Right 10/18/2023    Power Port; RIJ access; 25 cm; Dr. Kimberly [facility] OFFICE/OUTPT VISIT,PROCEDURE ONLY Right 2018    OPEN REDUCTION INTERNAL

## 2024-12-09 ENCOUNTER — APPOINTMENT (OUTPATIENT)
Dept: ENDOSCOPY | Age: 84
End: 2024-12-09
Attending: INTERNAL MEDICINE
Payer: COMMERCIAL

## 2024-12-09 ENCOUNTER — HOSPITAL ENCOUNTER (OUTPATIENT)
Age: 84
Setting detail: OUTPATIENT SURGERY
Discharge: NURSING, REHAB OR LONG-TERM CARE | End: 2024-12-09
Attending: INTERNAL MEDICINE | Admitting: INTERNAL MEDICINE
Payer: COMMERCIAL

## 2024-12-09 VITALS
TEMPERATURE: 97.2 F | SYSTOLIC BLOOD PRESSURE: 151 MMHG | HEIGHT: 61 IN | RESPIRATION RATE: 15 BRPM | OXYGEN SATURATION: 97 % | HEART RATE: 64 BPM | DIASTOLIC BLOOD PRESSURE: 82 MMHG | WEIGHT: 90 LBS | BODY MASS INDEX: 16.99 KG/M2

## 2024-12-09 PROCEDURE — 6360000002 HC RX W HCPCS: Performed by: INTERNAL MEDICINE

## 2024-12-09 PROCEDURE — 99152 MOD SED SAME PHYS/QHP 5/>YRS: CPT | Performed by: INTERNAL MEDICINE

## 2024-12-09 PROCEDURE — 7100000010 HC PHASE II RECOVERY - FIRST 15 MIN: Performed by: INTERNAL MEDICINE

## 2024-12-09 PROCEDURE — 2709999900 HC NON-CHARGEABLE SUPPLY: Performed by: INTERNAL MEDICINE

## 2024-12-09 PROCEDURE — 7100000011 HC PHASE II RECOVERY - ADDTL 15 MIN: Performed by: INTERNAL MEDICINE

## 2024-12-09 PROCEDURE — 3609008400 HC SIGMOIDOSCOPY DIAGNOSTIC: Performed by: INTERNAL MEDICINE

## 2024-12-09 RX ORDER — MIDAZOLAM HYDROCHLORIDE 1 MG/ML
INJECTION, SOLUTION INTRAMUSCULAR; INTRAVENOUS PRN
Status: DISCONTINUED | OUTPATIENT
Start: 2024-12-09 | End: 2024-12-09 | Stop reason: ALTCHOICE

## 2024-12-09 ASSESSMENT — PAIN DESCRIPTION - DESCRIPTORS
DESCRIPTORS: TENDER;SORE
DESCRIPTORS: SORE

## 2024-12-09 ASSESSMENT — PAIN - FUNCTIONAL ASSESSMENT
PAIN_FUNCTIONAL_ASSESSMENT: 0-10
PAIN_FUNCTIONAL_ASSESSMENT: ACTIVITIES ARE NOT PREVENTED
PAIN_FUNCTIONAL_ASSESSMENT: 0-10

## 2024-12-09 ASSESSMENT — PAIN DESCRIPTION - LOCATION
LOCATION: ABDOMEN
LOCATION: ABDOMEN

## 2024-12-09 ASSESSMENT — PAIN SCALES - GENERAL
PAINLEVEL_OUTOF10: 3
PAINLEVEL_OUTOF10: 3

## 2024-12-09 NOTE — DISCHARGE INSTRUCTIONS
Otherwise, return to your normal routine as soon as you are comfortable to do so, which is usually the next day after the procedure.   Medications - When taking medications, it's important to:   Take your medication as directed, not more, not less, not at a different time.   Do not stop taking them without consulting your healthcare provider.   Don't share them with anyone else.   Know what effects and side effects to expect, and report them to your healthcare provider.   If you are taking more than one drug, even if it is an over-the-counter medication, herb, or dietary supplement, be sure to check with a physician or pharmacist about drug interactions.   Plan ahead for refills so you don't run out.   Lifestyle Changes - The results of your colonoscopy will determine if any lifestyle changes are necessary.     Follow-up:  The doctor will usually give you a preliminary report after the medication wears off and you are more alert. The results from a biopsy can take as long as 1-2 weeks to be completed.   Schedule a follow-up appointment as directed by your doctor.   You should schedule a follow-up colonoscopy as recommended by your doctor.     Call Your Doctor If Any of the Following Occurs:  Bleeding from your rectum; notify your doctor if you pass a teaspoonful or more of blood   Black, tarry stools   Severe abdominal pain   Hard, swollen abdomen   Signs of infection, including fever or chills   Inability to pass gas or stool   Coughing, shortness of breath, chest pain, severe nausea or vomiting     In case of an emergency, call 911 immediately.

## 2024-12-09 NOTE — PROGRESS NOTES
Called Vania at [facility] for last time of medications and to review patient list.  No answer from facility.

## 2024-12-09 NOTE — PROGRESS NOTES
IV sedation provided for flexible sigmoidoscopy. Total of 1mg versed given. Patient tolerated well. Abdominal pain unchanged from prior to procedure. Patient taken to phase 2 recovery at this time awake and alert on room air.

## 2024-12-19 ENCOUNTER — OFFICE VISIT (OUTPATIENT)
Dept: CARDIOLOGY CLINIC | Age: 84
End: 2024-12-19
Payer: COMMERCIAL

## 2024-12-19 VITALS
HEART RATE: 76 BPM | HEIGHT: 61 IN | BODY MASS INDEX: 17.18 KG/M2 | WEIGHT: 91 LBS | OXYGEN SATURATION: 94 % | DIASTOLIC BLOOD PRESSURE: 66 MMHG | SYSTOLIC BLOOD PRESSURE: 118 MMHG

## 2024-12-19 DIAGNOSIS — I10 PRIMARY HYPERTENSION: ICD-10-CM

## 2024-12-19 DIAGNOSIS — E07.9 THYROID DISEASE: ICD-10-CM

## 2024-12-19 DIAGNOSIS — R00.1 JUNCTIONAL BRADYCARDIA: Primary | ICD-10-CM

## 2024-12-19 DIAGNOSIS — I50.32 CHRONIC DIASTOLIC HEART FAILURE (HCC): ICD-10-CM

## 2024-12-19 DIAGNOSIS — E78.5 HYPERLIPIDEMIA, UNSPECIFIED HYPERLIPIDEMIA TYPE: ICD-10-CM

## 2024-12-19 DIAGNOSIS — I25.10 CORONARY ARTERY DISEASE INVOLVING NATIVE CORONARY ARTERY OF NATIVE HEART WITHOUT ANGINA PECTORIS: ICD-10-CM

## 2024-12-19 DIAGNOSIS — K55.1 MESENTERIC ARTERY STENOSIS (HCC): ICD-10-CM

## 2024-12-19 PROCEDURE — G8427 DOCREV CUR MEDS BY ELIG CLIN: HCPCS | Performed by: INTERNAL MEDICINE

## 2024-12-19 PROCEDURE — G8419 CALC BMI OUT NRM PARAM NOF/U: HCPCS | Performed by: INTERNAL MEDICINE

## 2024-12-19 PROCEDURE — 1123F ACP DISCUSS/DSCN MKR DOCD: CPT | Performed by: INTERNAL MEDICINE

## 2024-12-19 PROCEDURE — G8484 FLU IMMUNIZE NO ADMIN: HCPCS | Performed by: INTERNAL MEDICINE

## 2024-12-19 PROCEDURE — 1036F TOBACCO NON-USER: CPT | Performed by: INTERNAL MEDICINE

## 2024-12-19 PROCEDURE — G8400 PT W/DXA NO RESULTS DOC: HCPCS | Performed by: INTERNAL MEDICINE

## 2024-12-19 PROCEDURE — 3078F DIAST BP <80 MM HG: CPT | Performed by: INTERNAL MEDICINE

## 2024-12-19 PROCEDURE — 3074F SYST BP LT 130 MM HG: CPT | Performed by: INTERNAL MEDICINE

## 2024-12-19 PROCEDURE — 1159F MED LIST DOCD IN RCRD: CPT | Performed by: INTERNAL MEDICINE

## 2024-12-19 PROCEDURE — 99214 OFFICE O/P EST MOD 30 MIN: CPT | Performed by: INTERNAL MEDICINE

## 2024-12-19 PROCEDURE — 1090F PRES/ABSN URINE INCON ASSESS: CPT | Performed by: INTERNAL MEDICINE

## 2024-12-19 NOTE — PATIENT INSTRUCTIONS
1) Fax all labs that you have completed since 9/2024 to Dr. Trinh at [phone]    2) If you have not completed labs-please complete CMP, lipid, CBC, TSH with reflex to T4.     3) She will follow up every 9 months

## 2024-12-19 NOTE — PROGRESS NOTES
assuming a right atrial pressure of 8 mmHg.    Cath: 02/10/2021  1.  Ultrasound-guided access to the left brachial artery.  Vessel was patent and pulsatile.  An image was saved and placed in the patient's medical record.  2.  Abdominal-visceral aortogram.  3.  Selective catheterization of the celiac artery with selective catheterization of the splenic artery.  4.  Angioplasty of the celiac artery using a 3 x 40 mm balloon.  5.  Stenting of the celiac artery using a 7 x 16 mm Lifestream-covered stent with postdilatation via an 8 x 20 mm balloon.    Echo:  07/17/2023  Summary  Mild conc. LVH with normal LV size & wall motion. EF is 70%.   Grade I diastolic dysfunction with elevated LH filling pressures.  The left atrium is severely dilated.  The right ventricle is normal in size and function.  Trivial Mitral and Tricuspid regurgitation.  Large L pleural effusion.    Echo: 3/29/24  Mod conc. LVH with normal LV size & wall motion. EF is  60%. Indeterminate  diastolic function.   Strain- GLS Endo Peak Avg = -18.4%   The left atrium is moderately dilated.   The right ventricle is normal in size and function.   Trivial mitral & Mild tricuspid regurgitation.   There is pleural effusion.    Assessment/Plan:     Junctional bradycardia  S/p dual chamber PPM 11/29/23 per Dr. AUGUSTA Nur  Physical exam today  No complaints   CBC abnormal but stable 9/27/24  10/28/24 device interrogation within normal limits.     Chronic diastolic heart failure   Grade I diastolic dysfunction   Following Nephrology    No signs or symptoms of heart failure  She will remain on Lasix, KCL. She is also on nifedipine, hydralazine   We discussed signs and symptoms of heart failure and when to contact the office.   BMP 9/27/24  NYHA class I/II       Hypertension  BP is stable today for an octogenarian.   Denies s/s of hypotension.   On medical therapy             Mesenteric/celiac artery stenosis   On statin and antiplatelet therapy     Breast Cancer    We

## 2025-02-26 ENCOUNTER — APPOINTMENT (OUTPATIENT)
Dept: CT IMAGING | Age: 85
End: 2025-02-26
Payer: COMMERCIAL

## 2025-02-26 ENCOUNTER — APPOINTMENT (OUTPATIENT)
Dept: GENERAL RADIOLOGY | Age: 85
End: 2025-02-26
Payer: COMMERCIAL

## 2025-02-26 ENCOUNTER — HOSPITAL ENCOUNTER (INPATIENT)
Age: 85
LOS: 3 days | Discharge: NURSING, REHAB OR LONG-TERM CARE | End: 2025-03-01
Attending: EMERGENCY MEDICINE | Admitting: HOSPITALIST
Payer: COMMERCIAL

## 2025-02-26 DIAGNOSIS — R06.02 SHORTNESS OF BREATH: ICD-10-CM

## 2025-02-26 DIAGNOSIS — J18.9 PNEUMONIA OF LEFT LOWER LOBE DUE TO INFECTIOUS ORGANISM: ICD-10-CM

## 2025-02-26 DIAGNOSIS — R07.9 CHEST PAIN, UNSPECIFIED TYPE: Primary | ICD-10-CM

## 2025-02-26 DIAGNOSIS — R06.00 DYSPNEA AND RESPIRATORY ABNORMALITIES: ICD-10-CM

## 2025-02-26 DIAGNOSIS — K21.9 GASTROESOPHAGEAL REFLUX DISEASE WITHOUT ESOPHAGITIS: ICD-10-CM

## 2025-02-26 DIAGNOSIS — R06.89 DYSPNEA AND RESPIRATORY ABNORMALITIES: ICD-10-CM

## 2025-02-26 DIAGNOSIS — R79.89 ELEVATED TROPONIN: ICD-10-CM

## 2025-02-26 DIAGNOSIS — J90 PLEURAL EFFUSION, LEFT: ICD-10-CM

## 2025-02-26 LAB
ALBUMIN SERPL-MCNC: 3.3 G/DL (ref 3.4–5)
ALBUMIN/GLOB SERPL: 1 {RATIO} (ref 1.1–2.2)
ALP SERPL-CCNC: 89 U/L (ref 40–129)
ALT SERPL-CCNC: <5 U/L (ref 10–40)
ANION GAP SERPL CALCULATED.3IONS-SCNC: 13 MMOL/L (ref 3–16)
AST SERPL-CCNC: 24 U/L (ref 15–37)
BASOPHILS # BLD: 0 K/UL (ref 0–0.2)
BASOPHILS NFR BLD: 0.4 %
BILIRUB SERPL-MCNC: <0.2 MG/DL (ref 0–1)
BILIRUB UR QL STRIP.AUTO: NEGATIVE
BUN SERPL-MCNC: 21 MG/DL (ref 7–20)
CALCIUM SERPL-MCNC: 7.9 MG/DL (ref 8.3–10.6)
CHLORIDE SERPL-SCNC: 99 MMOL/L (ref 99–110)
CLARITY UR: CLEAR
CO2 SERPL-SCNC: 21 MMOL/L (ref 21–32)
COLOR UR: YELLOW
CREAT SERPL-MCNC: 1.1 MG/DL (ref 0.6–1.2)
DEPRECATED RDW RBC AUTO: 14.8 % (ref 12.4–15.4)
EOSINOPHIL # BLD: 0 K/UL (ref 0–0.6)
EOSINOPHIL NFR BLD: 0 %
FLUAV + FLUBV AG NOSE IA.RAPID: NOT DETECTED
FLUAV + FLUBV AG NOSE IA.RAPID: NOT DETECTED
GFR SERPLBLD CREATININE-BSD FMLA CKD-EPI: 50 ML/MIN/{1.73_M2}
GLUCOSE SERPL-MCNC: 89 MG/DL (ref 70–99)
GLUCOSE UR STRIP.AUTO-MCNC: NEGATIVE MG/DL
HCT VFR BLD AUTO: 31.8 % (ref 36–48)
HGB BLD-MCNC: 10.7 G/DL (ref 12–16)
HGB UR QL STRIP.AUTO: NEGATIVE
KETONES UR STRIP.AUTO-MCNC: NEGATIVE MG/DL
LEUKOCYTE ESTERASE UR QL STRIP.AUTO: NEGATIVE
LIPASE SERPL-CCNC: 14 U/L (ref 13–60)
LYMPHOCYTES # BLD: 1.4 K/UL (ref 1–5.1)
LYMPHOCYTES NFR BLD: 14.9 %
MCH RBC QN AUTO: 32.6 PG (ref 26–34)
MCHC RBC AUTO-ENTMCNC: 33.8 G/DL (ref 31–36)
MCV RBC AUTO: 96.3 FL (ref 80–100)
MONOCYTES # BLD: 0.6 K/UL (ref 0–1.3)
MONOCYTES NFR BLD: 6.7 %
NEUTROPHILS # BLD: 7.2 K/UL (ref 1.7–7.7)
NEUTROPHILS NFR BLD: 78 %
NITRITE UR QL STRIP.AUTO: NEGATIVE
NT-PROBNP SERPL-MCNC: ABNORMAL PG/ML (ref 0–449)
PH UR STRIP.AUTO: 6 [PH] (ref 5–8)
PLATELET # BLD AUTO: 447 K/UL (ref 135–450)
PMV BLD AUTO: 8.7 FL (ref 5–10.5)
POTASSIUM SERPL-SCNC: 3.9 MMOL/L (ref 3.5–5.1)
PROT SERPL-MCNC: 6.7 G/DL (ref 6.4–8.2)
PROT UR STRIP.AUTO-MCNC: NEGATIVE MG/DL
RBC # BLD AUTO: 3.3 M/UL (ref 4–5.2)
SARS-COV-2 RDRP RESP QL NAA+PROBE: NOT DETECTED
SODIUM SERPL-SCNC: 133 MMOL/L (ref 136–145)
SP GR UR STRIP.AUTO: 1.01 (ref 1–1.03)
TROPONIN, HIGH SENSITIVITY: 116 NG/L (ref 0–14)
TROPONIN, HIGH SENSITIVITY: 126 NG/L (ref 0–14)
UA COMPLETE W REFLEX CULTURE PNL UR: NORMAL
UA DIPSTICK W REFLEX MICRO PNL UR: NORMAL
URN SPEC COLLECT METH UR: NORMAL
UROBILINOGEN UR STRIP-ACNC: 0.2 E.U./DL
WBC # BLD AUTO: 9.2 K/UL (ref 4–11)

## 2025-02-26 PROCEDURE — 71045 X-RAY EXAM CHEST 1 VIEW: CPT

## 2025-02-26 PROCEDURE — 87040 BLOOD CULTURE FOR BACTERIA: CPT

## 2025-02-26 PROCEDURE — 74177 CT ABD & PELVIS W/CONTRAST: CPT

## 2025-02-26 PROCEDURE — 6370000000 HC RX 637 (ALT 250 FOR IP): Performed by: EMERGENCY MEDICINE

## 2025-02-26 PROCEDURE — 71260 CT THORAX DX C+: CPT

## 2025-02-26 PROCEDURE — 87502 INFLUENZA DNA AMP PROBE: CPT

## 2025-02-26 PROCEDURE — 87635 SARS-COV-2 COVID-19 AMP PRB: CPT

## 2025-02-26 PROCEDURE — 99285 EMERGENCY DEPT VISIT HI MDM: CPT

## 2025-02-26 PROCEDURE — 6360000004 HC RX CONTRAST MEDICATION: Performed by: EMERGENCY MEDICINE

## 2025-02-26 PROCEDURE — 1200000000 HC SEMI PRIVATE

## 2025-02-26 PROCEDURE — 80053 COMPREHEN METABOLIC PANEL: CPT

## 2025-02-26 PROCEDURE — 83880 ASSAY OF NATRIURETIC PEPTIDE: CPT

## 2025-02-26 PROCEDURE — 6360000002 HC RX W HCPCS: Performed by: EMERGENCY MEDICINE

## 2025-02-26 PROCEDURE — 2500000003 HC RX 250 WO HCPCS: Performed by: EMERGENCY MEDICINE

## 2025-02-26 PROCEDURE — 84145 PROCALCITONIN (PCT): CPT

## 2025-02-26 PROCEDURE — 36415 COLL VENOUS BLD VENIPUNCTURE: CPT

## 2025-02-26 PROCEDURE — 2580000003 HC RX 258: Performed by: EMERGENCY MEDICINE

## 2025-02-26 PROCEDURE — 81003 URINALYSIS AUTO W/O SCOPE: CPT

## 2025-02-26 PROCEDURE — 84484 ASSAY OF TROPONIN QUANT: CPT

## 2025-02-26 PROCEDURE — 93005 ELECTROCARDIOGRAM TRACING: CPT | Performed by: EMERGENCY MEDICINE

## 2025-02-26 PROCEDURE — 83690 ASSAY OF LIPASE: CPT

## 2025-02-26 PROCEDURE — 85025 COMPLETE CBC W/AUTO DIFF WBC: CPT

## 2025-02-26 RX ORDER — ONDANSETRON 2 MG/ML
4 INJECTION INTRAMUSCULAR; INTRAVENOUS EVERY 6 HOURS PRN
Status: DISCONTINUED | OUTPATIENT
Start: 2025-02-26 | End: 2025-02-26

## 2025-02-26 RX ORDER — HEPARIN SODIUM 1000 [USP'U]/ML
60 INJECTION, SOLUTION INTRAVENOUS; SUBCUTANEOUS ONCE
Status: COMPLETED | OUTPATIENT
Start: 2025-02-27 | End: 2025-02-27

## 2025-02-26 RX ORDER — IOPAMIDOL 755 MG/ML
75 INJECTION, SOLUTION INTRAVASCULAR
Status: COMPLETED | OUTPATIENT
Start: 2025-02-26 | End: 2025-02-26

## 2025-02-26 RX ORDER — HEPARIN SODIUM 10000 [USP'U]/100ML
0-4000 INJECTION, SOLUTION INTRAVENOUS CONTINUOUS
Status: DISCONTINUED | OUTPATIENT
Start: 2025-02-27 | End: 2025-02-27

## 2025-02-26 RX ORDER — NIFEDIPINE 60 MG/1
60 TABLET, EXTENDED RELEASE ORAL DAILY
Status: DISCONTINUED | OUTPATIENT
Start: 2025-02-27 | End: 2025-03-01 | Stop reason: HOSPADM

## 2025-02-26 RX ORDER — HYDROXYCHLOROQUINE SULFATE 200 MG/1
200 TABLET, FILM COATED ORAL 2 TIMES DAILY
Status: DISCONTINUED | OUTPATIENT
Start: 2025-02-27 | End: 2025-03-01 | Stop reason: HOSPADM

## 2025-02-26 RX ORDER — HEPARIN SODIUM 1000 [USP'U]/ML
30 INJECTION, SOLUTION INTRAVENOUS; SUBCUTANEOUS PRN
Status: DISCONTINUED | OUTPATIENT
Start: 2025-02-26 | End: 2025-02-27

## 2025-02-26 RX ORDER — FLUTICASONE PROPIONATE 50 MCG
2 SPRAY, SUSPENSION (ML) NASAL DAILY
Status: DISCONTINUED | OUTPATIENT
Start: 2025-02-27 | End: 2025-03-01 | Stop reason: HOSPADM

## 2025-02-26 RX ORDER — ASPIRIN 81 MG/1
324 TABLET, CHEWABLE ORAL ONCE
Status: COMPLETED | OUTPATIENT
Start: 2025-02-26 | End: 2025-02-26

## 2025-02-26 RX ORDER — POLYETHYLENE GLYCOL 3350 17 G/17G
17 POWDER, FOR SOLUTION ORAL DAILY PRN
Status: DISCONTINUED | OUTPATIENT
Start: 2025-02-26 | End: 2025-03-01 | Stop reason: HOSPADM

## 2025-02-26 RX ORDER — ASPIRIN 81 MG/1
81 TABLET ORAL DAILY
Status: DISCONTINUED | OUTPATIENT
Start: 2025-02-27 | End: 2025-03-01 | Stop reason: HOSPADM

## 2025-02-26 RX ORDER — SODIUM CHLORIDE 9 MG/ML
INJECTION, SOLUTION INTRAVENOUS PRN
Status: DISCONTINUED | OUTPATIENT
Start: 2025-02-26 | End: 2025-03-01 | Stop reason: HOSPADM

## 2025-02-26 RX ORDER — HEPARIN SODIUM 5000 [USP'U]/ML
5000 INJECTION, SOLUTION INTRAVENOUS; SUBCUTANEOUS 2 TIMES DAILY
Status: CANCELLED | OUTPATIENT
Start: 2025-02-26

## 2025-02-26 RX ORDER — SODIUM CHLORIDE 0.9 % (FLUSH) 0.9 %
5-40 SYRINGE (ML) INJECTION PRN
Status: DISCONTINUED | OUTPATIENT
Start: 2025-02-26 | End: 2025-03-01 | Stop reason: HOSPADM

## 2025-02-26 RX ORDER — ONDANSETRON 4 MG/1
4 TABLET, FILM COATED ORAL EVERY 6 HOURS PRN
COMMUNITY

## 2025-02-26 RX ORDER — HYDROCODONE BITARTRATE AND ACETAMINOPHEN 5; 325 MG/1; MG/1
1 TABLET ORAL 2 TIMES DAILY
COMMUNITY

## 2025-02-26 RX ORDER — PANTOPRAZOLE SODIUM 40 MG/1
40 TABLET, DELAYED RELEASE ORAL 2 TIMES DAILY
Status: DISCONTINUED | OUTPATIENT
Start: 2025-02-27 | End: 2025-03-01 | Stop reason: HOSPADM

## 2025-02-26 RX ORDER — LIOTHYRONINE SODIUM 5 UG/1
5 TABLET ORAL DAILY
COMMUNITY

## 2025-02-26 RX ORDER — FOLIC ACID 1 MG/1
1 TABLET ORAL DAILY
Status: DISCONTINUED | OUTPATIENT
Start: 2025-02-27 | End: 2025-03-01 | Stop reason: HOSPADM

## 2025-02-26 RX ORDER — ACETAMINOPHEN 325 MG/1
650 TABLET ORAL EVERY 6 HOURS PRN
Status: DISCONTINUED | OUTPATIENT
Start: 2025-02-26 | End: 2025-03-01 | Stop reason: HOSPADM

## 2025-02-26 RX ORDER — ONDANSETRON 4 MG/1
4 TABLET, ORALLY DISINTEGRATING ORAL EVERY 8 HOURS PRN
Status: DISCONTINUED | OUTPATIENT
Start: 2025-02-26 | End: 2025-02-26

## 2025-02-26 RX ORDER — ACETAMINOPHEN 325 MG/1
650 TABLET ORAL EVERY 6 HOURS PRN
Status: DISCONTINUED | OUTPATIENT
Start: 2025-02-26 | End: 2025-02-26

## 2025-02-26 RX ORDER — GABAPENTIN 100 MG/1
100 CAPSULE ORAL 2 TIMES DAILY
Status: DISCONTINUED | OUTPATIENT
Start: 2025-02-27 | End: 2025-03-01 | Stop reason: HOSPADM

## 2025-02-26 RX ORDER — IPRATROPIUM BROMIDE AND ALBUTEROL SULFATE 2.5; .5 MG/3ML; MG/3ML
1 SOLUTION RESPIRATORY (INHALATION) EVERY 6 HOURS PRN
COMMUNITY

## 2025-02-26 RX ORDER — LATANOPROST 50 UG/ML
1 SOLUTION/ DROPS OPHTHALMIC NIGHTLY
Status: DISCONTINUED | OUTPATIENT
Start: 2025-02-27 | End: 2025-03-01 | Stop reason: HOSPADM

## 2025-02-26 RX ORDER — SODIUM CHLORIDE 0.9 % (FLUSH) 0.9 %
5-40 SYRINGE (ML) INJECTION EVERY 12 HOURS SCHEDULED
Status: DISCONTINUED | OUTPATIENT
Start: 2025-02-26 | End: 2025-03-01 | Stop reason: HOSPADM

## 2025-02-26 RX ORDER — BENZOCAINE/MENTHOL 6 MG-10 MG
LOZENGE MUCOUS MEMBRANE 2 TIMES DAILY
COMMUNITY

## 2025-02-26 RX ORDER — ACETAMINOPHEN 650 MG/1
650 SUPPOSITORY RECTAL EVERY 6 HOURS PRN
Status: DISCONTINUED | OUTPATIENT
Start: 2025-02-26 | End: 2025-03-01 | Stop reason: HOSPADM

## 2025-02-26 RX ORDER — LEVOTHYROXINE SODIUM 100 UG/1
200 TABLET ORAL DAILY
Status: DISCONTINUED | OUTPATIENT
Start: 2025-02-27 | End: 2025-03-01 | Stop reason: HOSPADM

## 2025-02-26 RX ORDER — GUAIFENESIN AND DEXTROMETHORPHAN HYDROBROMIDE 10; 100 MG/5ML; MG/5ML
5 SYRUP ORAL EVERY 6 HOURS PRN
COMMUNITY

## 2025-02-26 RX ORDER — HEPARIN SODIUM 1000 [USP'U]/ML
60 INJECTION, SOLUTION INTRAVENOUS; SUBCUTANEOUS PRN
Status: DISCONTINUED | OUTPATIENT
Start: 2025-02-26 | End: 2025-02-27

## 2025-02-26 RX ORDER — ENOXAPARIN SODIUM 100 MG/ML
1 INJECTION SUBCUTANEOUS 2 TIMES DAILY
Status: DISCONTINUED | OUTPATIENT
Start: 2025-02-26 | End: 2025-02-26

## 2025-02-26 RX ADMIN — WATER 1000 MG: 1 INJECTION INTRAMUSCULAR; INTRAVENOUS; SUBCUTANEOUS at 21:54

## 2025-02-26 RX ADMIN — IOPAMIDOL 75 ML: 755 INJECTION, SOLUTION INTRAVENOUS at 18:22

## 2025-02-26 RX ADMIN — AZITHROMYCIN MONOHYDRATE 500 MG: 500 INJECTION, POWDER, LYOPHILIZED, FOR SOLUTION INTRAVENOUS at 22:16

## 2025-02-26 RX ADMIN — ASPIRIN 324 MG: 81 TABLET, CHEWABLE ORAL at 22:00

## 2025-02-26 ASSESSMENT — PAIN SCALES - GENERAL
PAINLEVEL_OUTOF10: 0
PAINLEVEL_OUTOF10: 5

## 2025-02-26 ASSESSMENT — PAIN - FUNCTIONAL ASSESSMENT: PAIN_FUNCTIONAL_ASSESSMENT: 0-10

## 2025-02-26 NOTE — ED PROVIDER NOTES
[facility] EMERGENCY DEPARTMENT  EMERGENCY DEPARTMENT ENCOUNTER      Pt Name: Eula V Severn  MRN: [id number]  Birthdate 1940  Date of evaluation: 2/26/2025  Provider: ADAM ARAGON DO    CHIEF COMPLAINT       Chief Complaint   Patient presents with    Chest Pain     Patient complains of chest pain for about a year but today she states that it is worse. She has cough for a week which makes it worse. Patient states that she struggles with burning and indigestion.          HISTORY OF PRESENT ILLNESS   (Location/Symptom, Timing/Onset, Context/Setting, Quality, Duration, Modifying Factors, Severity)  Note limiting factors.   Eula V Severn is a 84 y.o. female who presents to the emergency department with a complaint of chest tightness earlier this afternoon.  The patient does not remember exactly what time the tightness began.  She currently rates it a 3/10 intensity.  She also reports some mild shortness of breath.  She also complains of some chronic sharp burning pain in the right upper quadrant and midepigastric area that has been going on for a long time.    Her daughter is at the bedside and reports that she generally has not been feeling well for a couple of days.  She has had a dry nonproductive cough for the last few days but no productive sputum.  The patient denies any fever or chills.  No exposure to illness.    She denies any back pain flank pain, dysuria, hematuria frequency urgency.  No melena hematochezia.  She denies any headache earache or sore throat.  No sinus drainage.  She denies any neck or back pain.    She denies any personal or family history of coronary artery disease or thromboembolic disease.  She does have a pacemaker.    Medical history significant for atrial fibrillation, pericarditis, pulmonary edema, asthma, chronic kidney disease, GERD, diverticulitis, constipation, overactive bladder, rheumatoid arthritis, peripheral vascular disease, polyneuropathy,  provider specified.    DISCHARGE MEDICATIONS:  New Prescriptions    No medications on file     Controlled Substances Monitoring:          No data to display                (Please note that portions of this note were completed with a voice recognition program.  Efforts were made to edit the dictations but occasionally words are mis-transcribed.)    GETACHEW ARAGON DO (electronically signed)  Attending Emergency Physician            Getachew Aragon DO  02/26/25 2027

## 2025-02-27 ENCOUNTER — APPOINTMENT (OUTPATIENT)
Dept: CT IMAGING | Age: 85
End: 2025-02-27
Payer: COMMERCIAL

## 2025-02-27 LAB
ALBUMIN SERPL-MCNC: 3.1 G/DL (ref 3.4–5)
ALP SERPL-CCNC: 83 U/L (ref 40–129)
ALT SERPL-CCNC: <5 U/L (ref 10–40)
ANION GAP SERPL CALCULATED.3IONS-SCNC: 12 MMOL/L (ref 3–16)
ANTI-XA UNFRAC HEPARIN: 0.26 IU/ML (ref 0.3–0.7)
ANTI-XA UNFRAC HEPARIN: <0.1 IU/ML (ref 0.3–0.7)
APTT BLD: 32.7 SEC (ref 22.1–36.4)
AST SERPL-CCNC: 20 U/L (ref 15–37)
BASOPHILS # BLD: 0 K/UL (ref 0–0.2)
BASOPHILS NFR BLD: 0.8 %
BILIRUB DIRECT SERPL-MCNC: <0.1 MG/DL (ref 0–0.3)
BILIRUB INDIRECT SERPL-MCNC: ABNORMAL MG/DL (ref 0–1)
BILIRUB SERPL-MCNC: <0.2 MG/DL (ref 0–1)
BUN SERPL-MCNC: 17 MG/DL (ref 7–20)
CALCIUM SERPL-MCNC: 7.9 MG/DL (ref 8.3–10.6)
CHLORIDE SERPL-SCNC: 104 MMOL/L (ref 99–110)
CK SERPL-CCNC: 78 U/L (ref 26–192)
CO2 SERPL-SCNC: 22 MMOL/L (ref 21–32)
CREAT SERPL-MCNC: 0.9 MG/DL (ref 0.6–1.2)
DEPRECATED RDW RBC AUTO: 14.5 % (ref 12.4–15.4)
EKG ATRIAL RATE: 63 BPM
EKG ATRIAL RATE: 64 BPM
EKG ATRIAL RATE: 67 BPM
EKG DIAGNOSIS: NORMAL
EKG P AXIS: -15 DEGREES
EKG P AXIS: 0 DEGREES
EKG P AXIS: 80 DEGREES
EKG P-R INTERVAL: 240 MS
EKG P-R INTERVAL: 248 MS
EKG P-R INTERVAL: 264 MS
EKG Q-T INTERVAL: 532 MS
EKG Q-T INTERVAL: 542 MS
EKG Q-T INTERVAL: 568 MS
EKG QRS DURATION: 140 MS
EKG QRS DURATION: 150 MS
EKG QRS DURATION: 154 MS
EKG QTC CALCULATION (BAZETT): 559 MS
EKG QTC CALCULATION (BAZETT): 562 MS
EKG QTC CALCULATION (BAZETT): 581 MS
EKG R AXIS: -36 DEGREES
EKG R AXIS: -40 DEGREES
EKG R AXIS: -48 DEGREES
EKG T AXIS: -24 DEGREES
EKG T AXIS: -39 DEGREES
EKG T AXIS: -52 DEGREES
EKG VENTRICULAR RATE: 63 BPM
EKG VENTRICULAR RATE: 64 BPM
EKG VENTRICULAR RATE: 67 BPM
EOSINOPHIL # BLD: 0 K/UL (ref 0–0.6)
EOSINOPHIL NFR BLD: 0.1 %
GFR SERPLBLD CREATININE-BSD FMLA CKD-EPI: 63 ML/MIN/{1.73_M2}
GLUCOSE SERPL-MCNC: 89 MG/DL (ref 70–99)
HCT VFR BLD AUTO: 28.5 % (ref 36–48)
HGB BLD-MCNC: 9.5 G/DL (ref 12–16)
LACTATE BLDV-SCNC: 0.7 MMOL/L (ref 0.4–2)
LDH SERPL L TO P-CCNC: 238 U/L (ref 100–190)
LYMPHOCYTES # BLD: 1.2 K/UL (ref 1–5.1)
LYMPHOCYTES NFR BLD: 20.3 %
MAGNESIUM SERPL-MCNC: 1.84 MG/DL (ref 1.8–2.4)
MCH RBC QN AUTO: 32 PG (ref 26–34)
MCHC RBC AUTO-ENTMCNC: 33.5 G/DL (ref 31–36)
MCV RBC AUTO: 95.5 FL (ref 80–100)
MONOCYTES # BLD: 0.4 K/UL (ref 0–1.3)
MONOCYTES NFR BLD: 6.8 %
NEUTROPHILS # BLD: 4.3 K/UL (ref 1.7–7.7)
NEUTROPHILS NFR BLD: 72 %
PLATELET # BLD AUTO: 398 K/UL (ref 135–450)
PMV BLD AUTO: 9.3 FL (ref 5–10.5)
POTASSIUM SERPL-SCNC: 3.5 MMOL/L (ref 3.5–5.1)
PROCALCITONIN SERPL IA-MCNC: 0.03 NG/ML (ref 0–0.15)
PROT SERPL-MCNC: 6.2 G/DL (ref 6.4–8.2)
RBC # BLD AUTO: 2.98 M/UL (ref 4–5.2)
SODIUM SERPL-SCNC: 138 MMOL/L (ref 136–145)
TROPONIN, HIGH SENSITIVITY: 106 NG/L (ref 0–14)
TROPONIN, HIGH SENSITIVITY: 118 NG/L (ref 0–14)
WBC # BLD AUTO: 6 K/UL (ref 4–11)

## 2025-02-27 PROCEDURE — 94760 N-INVAS EAR/PLS OXIMETRY 1: CPT

## 2025-02-27 PROCEDURE — 6370000000 HC RX 637 (ALT 250 FOR IP)

## 2025-02-27 PROCEDURE — 6360000002 HC RX W HCPCS: Performed by: NURSE PRACTITIONER

## 2025-02-27 PROCEDURE — 82550 ASSAY OF CK (CPK): CPT

## 2025-02-27 PROCEDURE — 83735 ASSAY OF MAGNESIUM: CPT

## 2025-02-27 PROCEDURE — 6360000004 HC RX CONTRAST MEDICATION: Performed by: INTERNAL MEDICINE

## 2025-02-27 PROCEDURE — 9990000010 HC NO CHARGE VISIT

## 2025-02-27 PROCEDURE — 6360000002 HC RX W HCPCS: Performed by: HOSPITALIST

## 2025-02-27 PROCEDURE — 80048 BASIC METABOLIC PNL TOTAL CA: CPT

## 2025-02-27 PROCEDURE — 85520 HEPARIN ASSAY: CPT

## 2025-02-27 PROCEDURE — 93010 ELECTROCARDIOGRAM REPORT: CPT | Performed by: INTERNAL MEDICINE

## 2025-02-27 PROCEDURE — 36415 COLL VENOUS BLD VENIPUNCTURE: CPT

## 2025-02-27 PROCEDURE — 83615 LACTATE (LD) (LDH) ENZYME: CPT

## 2025-02-27 PROCEDURE — 6370000000 HC RX 637 (ALT 250 FOR IP): Performed by: HOSPITALIST

## 2025-02-27 PROCEDURE — 2500000003 HC RX 250 WO HCPCS: Performed by: HOSPITALIST

## 2025-02-27 PROCEDURE — 85025 COMPLETE CBC W/AUTO DIFF WBC: CPT

## 2025-02-27 PROCEDURE — 84484 ASSAY OF TROPONIN QUANT: CPT

## 2025-02-27 PROCEDURE — 93005 ELECTROCARDIOGRAM TRACING: CPT | Performed by: HOSPITALIST

## 2025-02-27 PROCEDURE — 74174 CTA ABD&PLVS W/CONTRAST: CPT

## 2025-02-27 PROCEDURE — 6360000002 HC RX W HCPCS: Performed by: FAMILY MEDICINE

## 2025-02-27 PROCEDURE — 1200000000 HC SEMI PRIVATE

## 2025-02-27 PROCEDURE — 83605 ASSAY OF LACTIC ACID: CPT

## 2025-02-27 PROCEDURE — 85730 THROMBOPLASTIN TIME PARTIAL: CPT

## 2025-02-27 PROCEDURE — 80076 HEPATIC FUNCTION PANEL: CPT

## 2025-02-27 RX ORDER — FUROSEMIDE 40 MG/1
40 TABLET ORAL 2 TIMES DAILY
Status: DISCONTINUED | OUTPATIENT
Start: 2025-02-27 | End: 2025-03-01 | Stop reason: HOSPADM

## 2025-02-27 RX ORDER — IOPAMIDOL 755 MG/ML
75 INJECTION, SOLUTION INTRAVASCULAR
Status: COMPLETED | OUTPATIENT
Start: 2025-02-27 | End: 2025-02-27

## 2025-02-27 RX ORDER — HEPARIN SODIUM 1000 [USP'U]/ML
2700 INJECTION, SOLUTION INTRAVENOUS; SUBCUTANEOUS ONCE
Status: COMPLETED | OUTPATIENT
Start: 2025-02-27 | End: 2025-02-27

## 2025-02-27 RX ADMIN — WATER 1000 MG: 1 INJECTION INTRAMUSCULAR; INTRAVENOUS; SUBCUTANEOUS at 22:56

## 2025-02-27 RX ADMIN — SODIUM CHLORIDE, PRESERVATIVE FREE 10 ML: 5 INJECTION INTRAVENOUS at 09:53

## 2025-02-27 RX ADMIN — ASPIRIN 81 MG: 81 TABLET, COATED ORAL at 09:53

## 2025-02-27 RX ADMIN — FLUTICASONE PROPIONATE 2 SPRAY: 50 SPRAY, METERED NASAL at 09:52

## 2025-02-27 RX ADMIN — NIFEDIPINE 60 MG: 60 TABLET, EXTENDED RELEASE ORAL at 09:53

## 2025-02-27 RX ADMIN — HYDROXYCHLOROQUINE SULFATE 200 MG: 200 TABLET ORAL at 22:56

## 2025-02-27 RX ADMIN — PANTOPRAZOLE SODIUM 40 MG: 40 TABLET, DELAYED RELEASE ORAL at 00:59

## 2025-02-27 RX ADMIN — LATANOPROST 1 DROP: 50 SOLUTION OPHTHALMIC at 00:59

## 2025-02-27 RX ADMIN — LATANOPROST 1 DROP: 50 SOLUTION OPHTHALMIC at 22:57

## 2025-02-27 RX ADMIN — GABAPENTIN 100 MG: 100 CAPSULE ORAL at 22:56

## 2025-02-27 RX ADMIN — HEPARIN SODIUM 2700 UNITS: 1000 INJECTION INTRAVENOUS; SUBCUTANEOUS at 11:00

## 2025-02-27 RX ADMIN — FUROSEMIDE 40 MG: 40 TABLET ORAL at 17:39

## 2025-02-27 RX ADMIN — PANTOPRAZOLE SODIUM 40 MG: 40 TABLET, DELAYED RELEASE ORAL at 09:52

## 2025-02-27 RX ADMIN — HYDROXYCHLOROQUINE SULFATE 200 MG: 200 TABLET ORAL at 09:52

## 2025-02-27 RX ADMIN — FLUOXETINE HYDROCHLORIDE 20 MG: 20 CAPSULE ORAL at 09:52

## 2025-02-27 RX ADMIN — HYDROXYCHLOROQUINE SULFATE 200 MG: 200 TABLET ORAL at 00:59

## 2025-02-27 RX ADMIN — HEPARIN SODIUM 2700 UNITS: 1000 INJECTION INTRAVENOUS; SUBCUTANEOUS at 00:59

## 2025-02-27 RX ADMIN — GABAPENTIN 100 MG: 100 CAPSULE ORAL at 00:59

## 2025-02-27 RX ADMIN — SODIUM CHLORIDE, PRESERVATIVE FREE 10 ML: 5 INJECTION INTRAVENOUS at 22:57

## 2025-02-27 RX ADMIN — GABAPENTIN 100 MG: 100 CAPSULE ORAL at 09:53

## 2025-02-27 RX ADMIN — HEPARIN SODIUM 530 UNITS/HR: 10000 INJECTION, SOLUTION INTRAVENOUS at 01:04

## 2025-02-27 RX ADMIN — FOLIC ACID 1 MG: 1 TABLET ORAL at 09:53

## 2025-02-27 RX ADMIN — PANTOPRAZOLE SODIUM 40 MG: 40 TABLET, DELAYED RELEASE ORAL at 22:56

## 2025-02-27 RX ADMIN — LEVOTHYROXINE SODIUM 200 MCG: 0.1 TABLET ORAL at 05:15

## 2025-02-27 RX ADMIN — IOPAMIDOL 75 ML: 755 INJECTION, SOLUTION INTRAVENOUS at 16:57

## 2025-02-27 RX ADMIN — FUROSEMIDE 40 MG: 40 TABLET ORAL at 09:53

## 2025-02-27 ASSESSMENT — PAIN SCALES - GENERAL
PAINLEVEL_OUTOF10: 0
PAINLEVEL_OUTOF10: 0

## 2025-02-27 NOTE — PROGRESS NOTES
Patient returned to unit from CT.   Electronically signed by Estefany Ahuja RN on 2/27/2025 at 5:30 PM

## 2025-02-27 NOTE — CARE COORDINATION
Case Management Assessment  Initial Evaluation    Date/Time of Evaluation: 2/27/2025 1:51 PM  Assessment Completed by: JUSTICE Mares    If patient is discharged prior to next notation, then this note serves as note for discharge by case management.    Patient Name: Eula V Severn                   YOB: 1940  Diagnosis: Chest pain [R07.9]  Dyspnea and respiratory abnormalities [R06.00, R06.89]  Elevated troponin [R79.89]  Pleural effusion, left [J90]  Pneumonia of left lower lobe due to infectious organism [J18.9]  Chest pain, unspecified type [R07.9]                   Date / Time: 2/26/2025  4:57 PM    Patient Admission Status: Inpatient   Readmission Risk (Low < 19, Mod (19-27), High > 27): Readmission Risk Score: 14.6    Current PCP: Joel Cameron  PCP verified by CM? Yes    Chart Reviewed: Yes      History Provided by: Child/Family, Medical Record, Other (see comment) (nursing facility)  Patient Orientation: Alert and Oriented, Person    Patient Cognition: Alert    Hospitalization in the last 30 days (Readmission):  No    If yes, Readmission Assessment in CM Navigator will be completed.    Advance Directives:      Code Status: Full Code   Patient's Primary Decision Maker is: Named in Scanned ACP Document    Primary Decision Maker: Sana Gomez - Child - [phone]    Discharge Planning:    Patient lives with: Other (Comment) (nursing facility) Type of Home: Long-Term Care  Primary Care Giver: Other (Comment) (nursing facility)  Patient Support Systems include: Family Members, Children   Current Financial resources: Medicare  Current community resources: ECF/Home Care  Current services prior to admission: Extended Care Facility            Current DME:              Type of Home Care services:  None    ADLS  Prior functional level: Assistance with the following:, Bathing, Dressing, Toileting, Cooking, Housework, Shopping, Mobility  Current functional level: Mobility, Shopping, Housework,  Transition of Care is related to the following treatment goals of Chest pain [R07.9]  Dyspnea and respiratory abnormalities [R06.00, R06.89]  Elevated troponin [R79.89]  Pleural effusion, left [J90]  Pneumonia of left lower lobe due to infectious organism [J18.9]  Chest pain, unspecified type [R07.9]    The Patient and/or Patient Representative Agree with the Discharge Plan?  yes    JUSTICE Mares  Case Management Department  Ph: [phone]

## 2025-02-27 NOTE — PROGRESS NOTES
Hospitalist Progress Note      PCP: Joel Cameron    Date of Admission: 2/26/2025    Chief Complaint: Abdominal pain, chest pain    Hospital Course: 84-year-old female with past medical history of mesenteric ischemia, celiac artery stenosis s/p stenting, chronic diastolic heart failure, large hiatal hernia, rheumatoid arthritis, essential hypertension,anemia who presented to the ED from her nursing facility with complaints of epigastric and chest pain.  Patient is a very poor historian and daughter helped with history taking.  Patient reportedly had not been feeling well the last few days and had a dry nonproductive cough.  While in the ED, she was hemodynamically stable.  CBC was only notable for mild anemia which was near her baseline, BMP was notable for mild hyponatremia but otherwise no significant abnormality.  Troponin was elevated at 116 which appeared to be chronic for patient.  NT proBNP was noted to be elevated at 14K but she does not appear fluid overloaded on exam.  CXR obtained that showed stable cardiomegaly and a probable left basilar effusion.  CT PE protocol was obtained that showed consolidation of much of the left lower lobe with air bronchograms, not significantly changed concerning for possible pneumonia or atelectasis.  Also seen was a small left pleural effusion.  CT A/P was obtained that showed a distended gallbladder without concern for acute cholecystitis and a large hiatal hernia.  Patient was started on heparin drip for possible NSTEMI and cardiology was consulted.  She was admitted for further workup/treatment.    Subjective: Patient seen lying in bed, has a very poor historian.  Patient will say yes to most questions, states she is having chest and epigastric pain.  Also having pain after she eats.  When asked if it was burning she said yes.  When asked if it was stabbing she said yes.  She denies any shortness of breath.  Called and spoke to patient's daughter Kitty to get  08:29 PM    RBCUA 0 07/14/2023 08:29 PM    BLOODU Negative 02/26/2025 07:42 PM    GLUCOSEU Negative 02/26/2025 07:42 PM       Radiology:  CT CHEST PULMONARY EMBOLISM W CONTRAST   Final Result   1. No evidence of pulmonary embolism.   2. Consolidation of much of the left lower lobe with air bronchograms, not   significantly changed concerning for pneumonia or atelectasis.  Small left   pleural effusion, decreased.   3. Large hiatal hernia.   4. 1.4 cm soft tissue nodule in the upper inner left breast. 1.7 cm abnormal   lymph node in the left axilla. Soft tissue swelling in the left breast and   left chest wall, worsened compared to prior exam.  Recommend correlation with   mammogram and ultrasound in breast imaging center.   5. Severe compression deformity of T11, unchanged, probably chronic.         CT ABDOMEN PELVIS W IV CONTRAST Additional Contrast? None   Final Result   1. Distended gallbladder without pericholecystic abnormality. If there is   concern for acute cholecystitis, consider ultrasound or HIDA scan.   2. Large hiatal hernia.   3. Small left-sided pleural effusion with pleural thickening and split pleura   sign raise the possibility of empyema with left lower lobe consolidation.   Unchanged from recent priors.         XR CHEST 1 VIEW   Final Result   Elevated left hemidiaphragm with dependent left basilar opacification with   probable effusion.  Underlying infiltrate not excluded.      Stable cardiomegaly.                 DVT Prophylaxis: Heparin drip  Diet: Diet NPO Exceptions are: Sips of Water with Meds  Code Status: Full Code    PT/OT Eval Status: Not needed    Dispo -Home when medically stable, likely 2-3 days pending workup    Carley Mcdermott PA-C      NOTE:  This report was transcribed using voice recognition software.  Every effort was made to ensure accuracy; however, inadvertent computerized transcription errors may be present.

## 2025-02-27 NOTE — CONSULTS
HF RN consult noted per order set, chart reviewed. Pt admitted with CP/abdominal pain. Pt does have a h/o chronic diastolic HF and she follows with Dr Trinh, last office visit 12/19/24. She is not being treated for acute HF this admission. Pt also resides at a long term care facility where her care is managed. I did add HF dc instructions to her AVS as well as HEMAL for ongoing chronic management. Will follow peripherally at this time.

## 2025-02-27 NOTE — PROGRESS NOTES
Pt arrived to floor from ER at 2320 via stretcher. Pt oriented to room, call light, policies and procedures, the menu and ordering. Call light within reach. Bed in lowest position, bed alarm on, and wheels locked. Pt verbalized understanding. No complaints, questions or concerns at this time.

## 2025-02-27 NOTE — DISCHARGE INSTR - COC
Continuity of Care Form    Patient Name: Eula V Severn   :  1940  MRN:  [id number]    Admit date:  2025  Discharge date:  3/1/2025    Code Status Order: Full Code   Advance Directives:   Advance Care Flowsheet Documentation             Admitting Physician:  Sobeida Bowens Jr., MD  PCP: Joel Cameron    [facility]charging [facility] I2[facility]-6272/3125-01  Discharging Unit Phone Number: [phone]    Emergency Contact:   Extended Emergency Contact Information  Primary Emergency Contact: Sana Gomez   [facility]  Home Phone: [phone]  Mobile Phone: [phone]  Relation: Child  Secondary Emergency Contact: Cristina Vaughn   [facility]  Home Phone: [phone]  Mobile Phone: [phone]  Relation: Child    Past Surgical History:  Past Surgical History:   Procedure Laterality Date    IR PORT PLACEMENT > 5 YEARS  10/18/2023    IR PORT PLACEMENT EQUAL OR GREATER THAN 5 YEARS 10/18/2023 [facility] SPECIAL PROCEDURES    PORTACATH PLACEMENT Right 10/18/2023    Power Port; RIJ access; 25 cm; Dr. Harris    WA OFFICE/OUTPT VISIT,PROCEDURE ONLY Right 2018    OPEN REDUCTION INTERNAL FIXATION RIGHT HIP GAMMA NAIL WITH C-ARM performed by Anyi Renee MD at [facility] OR    SIGMOIDOSCOPY N/A 2024    FLEXIBLE SIGMOIDOSCOPY performed by Jet Vicente MD at [facility] ENDOSCOPY    SPINE SURGERY      THYROIDECTOMY      UPPER GASTROINTESTINAL ENDOSCOPY N/A 2020    ESOPHAGOGASTRODUODENOSCOPY performed by Jet Vicente MD at [facility] ENDOSCOPY    US BIOPSY LYMPH NODE  2023    US LYMPH NODE BIOPSY 2023 [facility] ULTRASOUND       Immunization History:   Immunization History   Administered Date(s) Administered    COVID-19, PFIZER PURPLE top, DILUTE for use, (age 12 y+), 30mcg/0.3mL 2021, 2021    Meningococcal B, BEXSERO, (age 10y-25y), IM, 0.5mL 10/29/2013    Pneumococcal, PCV-13, PREVNAR 13, (age 6w+), IM, 0.5mL 2015    TDaP,  LOW SODIUM diet and LIMIT fluid intake to 48 - 64 ounces ( 1.5 - 2 liters) per day.     Call Joel Cameron, facility MD, and [facility] [phone] and/or [facility] Heart Failure Resource Line @ [phone] with any questions or concerns.   Please continue heart failure education to patient and family/support system.  See After Visit Summary for hospital follow up appointment details.  Consider spiritual care referral for support and/or completion of advance directives [phone].  Consider: having the facility MD complete required 7 day follow up and palliative care consult for ongoing goals of care, end of life, and/or chronic disease management discussions.      Patient's personal belongings (please select all that are sent with patient):  Glasses, Hearing Aides bilateral, Dentures upper and lower    RN SIGNATURE:  Electronically signed by Bryan Zelaya RN on 3/1/25 at 3:55 PM EST    CASE MANAGEMENT/SOCIAL WORK SECTION    Inpatient Status Date: ***    Readmission Risk Assessment Score:  [facility] RISK OF UNPLANNED READMISSION 2.0             15.1 Total Score      Discharging to Facility/ Agency   Name: [facility]  Address:  [facility] [address] [place] [address]   Phone:  [phone]  Fax:  [phone]    / signature: {Esignature:[id number]}    PHYSICIAN SECTION    Prognosis: Good    Condition at Discharge: Stable    Rehab Potential (if transferring to Rehab): Good    Recommended Labs or Other Treatments After Discharge: treat GERD with pepcid or pepto bismol as needed. Follow up with PCP in 1 week     Physician Certification: I certify the above information and transfer of Eula V Severn  is necessary for the continuing treatment of the diagnosis listed and that she requires Skilled Nursing Facility for less 30 days.     Update Admission H&P: No change in H&P    PHYSICIAN SIGNATURE:  Electronically signed by Carley Mcdermott PA-C on 3/1/25 at 10:55 AM EST

## 2025-02-27 NOTE — PROGRESS NOTES
Patient is alert & oriented x4, can be disoriented at times. Resting in bed, on room air. Gets up with maximove. Patient is wheelchair dependent at baseline. Heparin gtt infusing in R forearm IV at ordered rate. 2/4 bed rails up, bed in lowest position, fall precautions in place, bed alarm on, call light within reach. Morning medications given as ordered, tolerated well with thin liquid. Patient denies further needs at this time. Will cont to monitor and reassess.  Electronically signed by Estefany Ahuja RN on 2/27/2025 at 11:52 AM

## 2025-02-27 NOTE — PROGRESS NOTES
Pharmacy Medication Reconciliation Note     List of medications patient is currently taking is NOT complete.    Source of information:   1. [facility] at [facility] Summary    Notes regarding home medications:   1. Could not ask patient/ reach facility to see if she had any doses today.  2. List is up to date with [facility] Medication report.      Lucia Rose, Pharmacy Intern  2/26/2025 10:45 PM

## 2025-02-27 NOTE — ACP (ADVANCE CARE PLANNING)
Advance Care Planning   Healthcare Decision Maker:    Primary Decision Maker: Sana Gomez - [phone]    Electronically signed by Mara Rojas, SHAYY, LISW, Case Management on 2/27/2025 at 1:51 PM  [place] [phone]

## 2025-02-27 NOTE — PROGRESS NOTES
Physical Therapy      Eula V Severn  2/27/2025    -chart reviewed  -discussed with OT  -Per chart: from [facility] at [facility], dep OOB via ebonie lift and dep ADLs. Pt at baseline function.  -no acute PT needs   -will sign off     Electronically signed by KAITY AGUIRRE, PT on 2/27/2025 at 12:48 PM

## 2025-02-27 NOTE — PROGRESS NOTES
Rate of heparin drip verified by this RN and LEFTY Allison at the bedside. Heparin drip infusing at 5.3 mL/hour per orders.     Electronically signed by Abbi Mcdermott RN on 2/27/2025 at 7:51 AM  Electronically signed by Estefany Ahuja RN on 2/27/2025 at 7:51 AM

## 2025-02-27 NOTE — PROGRESS NOTES
Clinical Pharmacy Note  Heparin Dosing       Lab Results   Component Value Date/Time    ANTIXAUHEP <0.10 02/27/2025 06:50 AM      Lab Results   Component Value Date/Time    HGB 9.5 02/27/2025 06:50 AM    HCT 28.5 02/27/2025 06:50 AM     02/27/2025 06:50 AM    INR 1.11 10/18/2023 01:20 PM       Current Infusion Rate: 530 units/hr    Plan:  Bolus: 2700 units  Rate: 710 units/hr  Next anti-Xa level: 1630 2/27/25    Pharmacy will continue to monitor and adjust based on anti-Xa results.   Elo Loza RPH, PharmD, 2/27/2025 10:16 AM

## 2025-02-27 NOTE — PLAN OF CARE
Problem: Chronic Conditions and Co-morbidities  Goal: Patient's chronic conditions and co-morbidity symptoms are monitored and maintained or improved  2/27/2025 0755 by Estefany Ahuja RN  Outcome: Progressing  Flowsheets (Taken 2/27/2025 0755)  Care Plan - Patient's Chronic Conditions and Co-Morbidity Symptoms are Monitored and Maintained or Improved:   Monitor and assess patient's chronic conditions and comorbid symptoms for stability, deterioration, or improvement   Collaborate with multidisciplinary team to address chronic and comorbid conditions and prevent exacerbation or deterioration     Problem: Discharge Planning  Goal: Discharge to home or other facility with appropriate resources  2/27/2025 0755 by Estefany Ahuja RN  Outcome: Progressing  Flowsheets (Taken 2/27/2025 0131 by Sabrina Nj RN)  Discharge to home or other facility with appropriate resources:   Identify barriers to discharge with patient and caregiver   Identify discharge learning needs (meds, wound care, etc)   Refer to discharge planning if patient needs post-hospital services based on physician order or complex needs related to functional status, cognitive ability or social support system   Arrange for needed discharge resources and transportation as appropriate   Arrange for interpreters to assist at discharge as needed     Problem: Pain  Goal: Verbalizes/displays adequate comfort level or baseline comfort level  2/27/2025 0755 by Estefany Ahuja RN  Outcome: Progressing  Flowsheets  Taken 2/27/2025 0755 by Estefany Ahuja RN  Verbalizes/displays adequate comfort level or baseline comfort level:   Administer analgesics based on type and severity of pain and evaluate response   Encourage patient to monitor pain and request assistance   Assess pain using appropriate pain scale   Implement non-pharmacological measures as appropriate and evaluate response   Notify Licensed Independent Practitioner if interventions unsuccessful or

## 2025-02-27 NOTE — CONSULTS
Chest and abdominal pain  Most likely mesenteric ischemia from ISR of celiac stent.    Will get CTA abdomen.   If ISR severe then plan angioplasty.     Brennan Mendez MD

## 2025-02-27 NOTE — PLAN OF CARE
Problem: Chronic Conditions and Co-morbidities  Goal: Patient's chronic conditions and co-morbidity symptoms are monitored and maintained or improved  Outcome: Progressing  Flowsheets (Taken 2/26/2025 2321)  Care Plan - Patient's Chronic Conditions and Co-Morbidity Symptoms are Monitored and Maintained or Improved: Monitor and assess patient's chronic conditions and comorbid symptoms for stability, deterioration, or improvement     Problem: Discharge Planning  Goal: Discharge to home or other facility with appropriate resources  Outcome: Progressing  Flowsheets (Taken 2/26/2025 2321)  Discharge to home or other facility with appropriate resources: Identify barriers to discharge with patient and caregiver     Problem: Pain  Goal: Verbalizes/displays adequate comfort level or baseline comfort level  Outcome: Progressing  Flowsheets (Taken 2/27/2025 0127)  Verbalizes/displays adequate comfort level or baseline comfort level: Encourage patient to monitor pain and request assistance     Problem: Skin/Tissue Integrity  Goal: Skin integrity remains intact  Description: 1.  Monitor for areas of redness and/or skin breakdown  2.  Assess vascular access sites hourly  3.  Every 4-6 hours minimum:  Change oxygen saturation probe site  4.  Every 4-6 hours:  If on nasal continuous positive airway pressure, respiratory therapy assess nares and determine need for appliance change or resting period  Outcome: Progressing  Flowsheets (Taken 2/27/2025 0127)  Skin Integrity Remains Intact: Monitor for areas of redness and/or skin breakdown     Problem: Safety - Adult  Goal: Free from fall injury  Outcome: Progressing  Flowsheets (Taken 2/27/2025 0127)  Free From Fall Injury: Instruct family/caregiver on patient safety     Problem: ABCDS Injury Assessment  Goal: Absence of physical injury  Outcome: Progressing  Flowsheets (Taken 2/27/2025 0127)  Absence of Physical Injury: Implement safety measures based on patient assessment

## 2025-02-27 NOTE — PROGRESS NOTES
[facility]  Cardiology Consult    Eula V Severn  1940    February 27, 2025      Reason for Referral: Chest pain        CC: abdominal pain       Subjective:     History of Present Illness:    Eula V Severn is a 84 y.o. patient with a PMH significant for hypertension, hyperlipidemia, thyroid disease, peripheral arterial disease and junction bradycardia.    She presented to the emergency room on 2/26/2025 with chest tightness and mild shortness of breath. She reported chronic sharp burning pain in the right upper quadrant and midepigastric area.     Past Medical History:   has a past medical history of A-fib (HCC), Acute nonspecific idiopathic pericarditis, Acute pulmonary edema (HCC), Adverse effect of antineoplastic and immunosuppressive drugs, sequela, Allergic rhinitis, Anxiety, Arthritis, Asthma, Atherosclerotic heart disease of native coronary artery without angina pectoris, Bradycardia, Chronic kidney disease, CKD (chronic kidney disease), Cognitive communication deficit, Constipation, Depression, Diverticulitis, Dysphagia, oropharyngeal phase, Frequent falls, Generalized edema, GERD (gastroesophageal reflux disease), Heart failure (HCC), Hyperlipidemia, Hypertension, Hypokalemia, Hypovolemia, Insomnia, Malignant neoplasm of central portion of unspecified female breast (HCC), Nausea, Other forms of acute ischemic heart disease (HCC), Other pulmonary collapse, Other specified cardiac arrhythmias, Overactive bladder, Pacemaker, Pleural effusion, Polymyalgia rheumatica, Polyneuropathy, Primary open angle glaucoma, PVD (peripheral vascular disease), PVD (peripheral vascular disease), RA (rheumatoid arthritis) (HCC), Stricture of artery, Thyroid disease, Tinea corporis, and Vitamin B12 deficiency.    Surgical History:   has a past surgical history that includes Thyroidectomy; Spine surgery; pr office/outpt visit,procedure only (Right, 11/08/2018); Upper gastrointestinal endoscopy (N/A, 09/14/2020); US

## 2025-02-27 NOTE — PROGRESS NOTES
4 Eyes Skin Assessment     NAME:  Eula V Severn  YOB: 1940  MEDICAL RECORD NUMBER:  [id number]    The patient is being assessed for  Admission    I agree that at least one RN has performed a thorough Head to Toe Skin Assessment on the patient. ALL assessment sites listed below have been assessed.      Areas assessed by both nurses:    Head, Face, Ears, Shoulders, Back, Chest, Arms, Elbows, Hands, Sacrum. Buttock, Coccyx, Ischium, Legs. Feet and Heels, and Under Medical Devices         Does the Patient have a Wound? stage one coccyx       Ryley Prevention initiated by RN: Yes  Wound Care Orders initiated by RN: Yes    Pressure Injury (Stage 3,4, Unstageable, DTI, NWPT, and Complex wounds) if present, place Wound referral order by RN under : no    New Ostomies, if present place, Ostomy referral order under :no    Nurse 1 eSignature: Electronically signed by Sabrina Nj RN on 2/26/25 at 11:30 PM EST    **SHARE this note so that the co-signing nurse can place an eSignature**    Nurse 2 eSignature: Electronically signed by Mimi Ya RN on 2/27/25 at 3:32 AM EST

## 2025-02-27 NOTE — H&P
HISTORY AND PHYSICAL             Date: 2/26/2025        Patient Name: Eula V Severn     YOB: 1940      Age:  84 y.o.    Chief Complaint     Chief Complaint   Patient presents with    Chest Pain     Patient complains of chest pain for about a year but today she states that it is worse. She has cough for a week which makes it worse. Patient states that she struggles with burning and indigestion.           History Obtained From   Patient / emr    History of Present Illness   84f with history of mesenteric ischemia s/p celiac stent 2021, presents to the ER with complaint of abdominal pain and sob. She denies nausea, vomting, diarrhea, but notes abdominal / epigastric pain to worsen after eating. She has had a cough, but denies fever, chills, She notes worsening sob, but denies any chest discomfort. She is hard of hearing, but is currently seen on room air in no distress.    Past Medical History     Past Medical History:   Diagnosis Date    A-fib (HCC)     Acute nonspecific idiopathic pericarditis     Acute pulmonary edema (HCC)     Adverse effect of antineoplastic and immunosuppressive drugs, sequela     Allergic rhinitis     Anxiety     Arthritis     Asthma     Atherosclerotic heart disease of native coronary artery without angina pectoris     Bradycardia     Chronic kidney disease     CKD (chronic kidney disease)     Cognitive communication deficit     Constipation     Depression     Diverticulitis     Dysphagia, oropharyngeal phase     Frequent falls     Generalized edema     GERD (gastroesophageal reflux disease)     Heart failure (HCC)     Hyperlipidemia     Hypertension     Hypokalemia     Hypovolemia     Insomnia     Malignant neoplasm of central portion of unspecified female breast (HCC)     Nausea     Other forms of acute ischemic heart disease (HCC)     Other pulmonary collapse     Other specified cardiac arrhythmias     Overactive bladder     Pacemaker     Pleural effusion     Polymyalgia  Retroperitoneal structures:  Unremarkable Small bowel and colon: Small to moderate amount of stool. Appendix: Not seen Urinary bladder: Unremarkable Free fluid/air: None Lymph nodes: No enlarged lymph nodes Osseus structures: T12 compression deformity is unchanged. Vasculature: No aneurysm Other: None     1. Distended gallbladder without pericholecystic abnormality. If there is concern for acute cholecystitis, consider ultrasound or HIDA scan. 2. Large hiatal hernia. 3. Small left-sided pleural effusion with pleural thickening and split pleura sign raise the possibility of empyema with left lower lobe consolidation. Unchanged from recent priors.     XR CHEST 1 VIEW    Result Date: 2/26/2025  EXAMINATION: ONE XRAY VIEW OF THE CHEST 2/26/2025 5:35 pm COMPARISON: 09/23/2024. HISTORY: ORDERING SYSTEM PROVIDED HISTORY: pneumonia TECHNOLOGIST PROVIDED HISTORY: Reason for exam:->pneumonia FINDINGS: The cardiac silhouette is enlarged and stable.  Elevated left hemidiaphragm with dependent left basilar opacification and probable small effusion.  The right lung is clear.  No overt failure.  Right-sided venous access port. Left-sided transvenous pacer.     Elevated left hemidiaphragm with dependent left basilar opacification with probable effusion.  Underlying infiltrate not excluded. Stable cardiomegaly.       Assessment      Hospital Problems             Last Modified POA    * (Principal) Chest pain 2/26/2025 Yes       Plan   Chest pain - nstemi vs cap. Afebrile no leukocytosis. Currently patient denies chest pain.   Nstemi -  history fo mesenteric ischemia, prior celiac stent 2021, trend trop, anticoagulate, Card consult  Cap iv abx, currently stable on room air  Abdominal pain - check cpk ldh, may need reeval nesenteric aa    Consultations Ordered:  IP CONSULT TO CARDIOLOGY    Electronically signed by Sobeida Bowens Jr, MD on 2/26/25 at 9:41 PM EST

## 2025-02-27 NOTE — DISCHARGE INSTRUCTIONS
Extra Heart Failure Education/ Tools/ Resources:     https://[employer].com/publication/?q=494271   --- this is American Heart Association interactive Healthier Living with Heart Failure guidebook.  Please click hyperlink or copy / paste link into search bar. The QR Code is also available below. Use your mouse to scroll through the pages.  Lots of information about weight monitoring, diet tips, activity, meds, etc    Heart Failure Tools and Resources QR Code is below. It includes multiple resources to include symptom tracker, med tracker, further HF info, and access to a HF Support Network online Community    HF [place] [employer]  -- this is a free smart phone helder available for iPhone and Android download.  Use your phone to track sodium / fluid intake, zone tool symptom tracking, weights, medications, etc. Click on this hyperlink  HF [place] [employer]   for QR code for easy download or the link is also found in the below HF Tools and Resources.      DASH (Dietary Approach to Stop Hypertension) diet --  https://www.nhlbi.nih.gov/education/dash-eating-plan -- this diet is a flexible eating plan that promotes heart healthy eating style.  Click on hyperlink or copy / paste link into search bar.  Lots of low sodium recipes and tips.    https://www.[employer]/recipes  -- more free recipes

## 2025-02-27 NOTE — PROGRESS NOTES
Occupational Therapy  Estrella V Severn  [id number]  [id number]    OT orders received and pt's chart reviewed. Per chart and confirmation with pt, pt from [place] [facility] [place], dep OOB via ebonie lift and dep ADLs. Pt at baseline function. No acute OT needs identified at this time. Will sign off    Meli Cardozo, OTR/L [id number]

## 2025-02-27 NOTE — PROGRESS NOTES
Clinical Pharmacy Note  Heparin Dosing Consult    Eula V Severn is a 84 y.o. female ordered heparin per CAD/STEMI/NSTEMI/UA/AFIB nomogram by LAURIE Swain.     Lab Results   Component Value Date/Time    APTT 31.8 12/22/2017 03:27 PM     Lab Results   Component Value Date/Time    HGB 10.7 02/26/2025 05:19 PM    HCT 31.8 02/26/2025 05:19 PM     02/26/2025 05:19 PM    INR 1.11 10/18/2023 01:20 PM       Ht Readings from Last 1 Encounters:   02/26/25 1.524 m (5')        Wt Readings from Last 1 Encounters:   02/26/25 44.5 kg (98 lb 3.2 oz)        Assessment/Plan:  Initial bolus: 2700 units  Initial infusion rate: 530 units/hr  Next anti-Xa:: 0700 02/27/25    Pharmacy will continue to monitor adjust heparin based on anti-Xa results using nomogram below:     CAD/STEMI/NSTEMI/UA/AFIB Heparin Nomogram     Initial Bolus: 60 units/kg Max Bolus: 4,000 units       Initial Rate: 12 units/kg/hr Max Initial Rate: 1,000 units/hr     anti-Xa Bolus Titration   < 0.1 Heparin 60 units/kg bolus Increase drip by 4 units/kg/hr   0.1 - 0.29 Heparin 30 units/kg bolus Increase drip by 2 units/kg/hr   0.3 - 0.7 No Bolus No Change   0.71 - 0.8 No Bolus Decrease drip by 1 units/kg/hr   0.81 - 0.99 No Bolus Decrease drip by 2 units/kg/hr   > 1 Hold Heparin for 1 hour Decrease drip by 3 units/kg/hr     Obtain anti-Xa 6 hours after initial bolus and 6 hours after any dose change until two consecutive therapeutic anti-Xa levels are achieved - then daily.    Kandi Spence, ReenaD

## 2025-02-27 NOTE — ED NOTES
Report given to Halley OLEA    Opzelura Counseling:  I discussed with the patient the risks of Opzelura including but not limited to nasopharngitis, bronchitis, ear infection, eosinophila, hives, diarrhea, folliculitis, tonsillitis, and rhinorrhea.  Taken orally, this medication has been linked to serious infections; higher rate of mortality; malignancy and lymphoproliferative disorders; major adverse cardiovascular events; thrombosis; thrombocytopenia, anemia, and neutropenia; and lipid elevations.

## 2025-02-28 ENCOUNTER — APPOINTMENT (OUTPATIENT)
Age: 85
End: 2025-02-28
Attending: INTERNAL MEDICINE
Payer: COMMERCIAL

## 2025-02-28 PROBLEM — R79.89 ELEVATED TROPONIN: Status: ACTIVE | Noted: 2025-02-28

## 2025-02-28 PROBLEM — K55.9 MESENTERIC ISCHEMIA: Status: ACTIVE | Noted: 2025-02-28

## 2025-02-28 LAB
ANION GAP SERPL CALCULATED.3IONS-SCNC: 15 MMOL/L (ref 3–16)
BUN SERPL-MCNC: 16 MG/DL (ref 7–20)
CALCIUM SERPL-MCNC: 7.5 MG/DL (ref 8.3–10.6)
CHLORIDE SERPL-SCNC: 104 MMOL/L (ref 99–110)
CO2 SERPL-SCNC: 23 MMOL/L (ref 21–32)
CREAT SERPL-MCNC: 0.8 MG/DL (ref 0.6–1.2)
DEPRECATED RDW RBC AUTO: 14.5 % (ref 12.4–15.4)
ECHO AO ASC DIAM: 3.5 CM
ECHO AO ASCENDING AORTA INDEX: 2.54 CM/M2
ECHO AO ROOT DIAM: 3.2 CM
ECHO AO ROOT INDEX: 2.32 CM/M2
ECHO AV AREA PEAK VELOCITY: 1.8 CM2
ECHO AV AREA VTI: 2 CM2
ECHO AV AREA/BSA PEAK VELOCITY: 1.3 CM2/M2
ECHO AV AREA/BSA VTI: 1.4 CM2/M2
ECHO AV CUSP MM: 1.2 CM
ECHO AV MEAN GRADIENT: 7 MMHG
ECHO AV MEAN VELOCITY: 1.2 M/S
ECHO AV PEAK GRADIENT: 13 MMHG
ECHO AV PEAK VELOCITY: 1.8 M/S
ECHO AV VELOCITY RATIO: 0.67
ECHO AV VTI: 36.5 CM
ECHO BSA: 1.37 M2
ECHO EST RA PRESSURE: 3 MMHG
ECHO LA AREA 2C: 22.4 CM2
ECHO LA AREA 4C: 24.5 CM2
ECHO LA DIAMETER INDEX: 3.12 CM/M2
ECHO LA DIAMETER: 4.3 CM
ECHO LA MAJOR AXIS: 6.3 CM
ECHO LA MINOR AXIS: 6 CM
ECHO LA TO AORTIC ROOT RATIO: 1.34
ECHO LA VOL BP: 74 ML (ref 22–52)
ECHO LA VOL MOD A2C: 68 ML (ref 22–52)
ECHO LA VOL MOD A4C: 77 ML (ref 22–52)
ECHO LA VOL/BSA BIPLANE: 54 ML/M2 (ref 16–34)
ECHO LA VOLUME INDEX MOD A2C: 49 ML/M2 (ref 16–34)
ECHO LA VOLUME INDEX MOD A4C: 56 ML/M2 (ref 16–34)
ECHO LV E' LATERAL VELOCITY: 7.18 CM/S
ECHO LV E' SEPTAL VELOCITY: 4.57 CM/S
ECHO LV EDV A4C: 81 ML
ECHO LV EDV INDEX A4C: 59 ML/M2
ECHO LV EF PHYSICIAN: 60 %
ECHO LV EJECTION FRACTION A4C: 60 %
ECHO LV ESV A4C: 32 ML
ECHO LV ESV INDEX A4C: 23 ML/M2
ECHO LV FRACTIONAL SHORTENING: 23 % (ref 28–44)
ECHO LV INTERNAL DIMENSION DIASTOLE INDEX: 2.54 CM/M2
ECHO LV INTERNAL DIMENSION DIASTOLIC: 3.5 CM (ref 3.9–5.3)
ECHO LV INTERNAL DIMENSION SYSTOLIC INDEX: 1.96 CM/M2
ECHO LV INTERNAL DIMENSION SYSTOLIC: 2.7 CM
ECHO LV IVSD: 1.4 CM (ref 0.6–0.9)
ECHO LV MASS 2D: 163.2 G (ref 67–162)
ECHO LV MASS INDEX 2D: 118.3 G/M2 (ref 43–95)
ECHO LV POSTERIOR WALL DIASTOLIC: 1.3 CM (ref 0.6–0.9)
ECHO LV RELATIVE WALL THICKNESS RATIO: 0.74
ECHO LVOT AREA: 2.8 CM2
ECHO LVOT AV VTI INDEX: 0.7
ECHO LVOT DIAM: 1.9 CM
ECHO LVOT MEAN GRADIENT: 3 MMHG
ECHO LVOT PEAK GRADIENT: 6 MMHG
ECHO LVOT PEAK VELOCITY: 1.2 M/S
ECHO LVOT STROKE VOLUME INDEX: 52.2 ML/M2
ECHO LVOT SV: 72 ML
ECHO LVOT VTI: 25.4 CM
ECHO MV A VELOCITY: 1.04 M/S
ECHO MV AREA VTI: 2.3 CM2
ECHO MV E DECELERATION TIME (DT): 312 MS
ECHO MV E VELOCITY: 0.86 M/S
ECHO MV E/A RATIO: 0.83
ECHO MV E/E' LATERAL: 11.98
ECHO MV E/E' RATIO (AVERAGED): 15.4
ECHO MV E/E' SEPTAL: 18.82
ECHO MV LVOT VTI INDEX: 1.24
ECHO MV MAX VELOCITY: 1.2 M/S
ECHO MV MEAN GRADIENT: 2 MMHG
ECHO MV MEAN VELOCITY: 0.7 M/S
ECHO MV PEAK GRADIENT: 5 MMHG
ECHO MV REGURGITANT PEAK GRADIENT: 71 MMHG
ECHO MV REGURGITANT PEAK VELOCITY: 4.2 M/S
ECHO MV VTI: 31.6 CM
ECHO PV ACCELERATION TIME (AT): 85 MS
ECHO PV MAX VELOCITY: 1.6 M/S
ECHO PV PEAK GRADIENT: 10 MMHG
ECHO RA AREA 4C: 13.7 CM2
ECHO RA END SYSTOLIC VOLUME APICAL 4 CHAMBER INDEX BSA: 23 ML/M2
ECHO RA VOLUME: 32 ML
ECHO RIGHT VENTRICULAR SYSTOLIC PRESSURE (RVSP): 32 MMHG
ECHO RV FREE WALL PEAK S': 12.4 CM/S
ECHO RV INTERNAL DIMENSION: 3.5 CM
ECHO RV TAPSE: 2 CM (ref 1.7–?)
ECHO TV E WAVE: 0.7 M/S
ECHO TV PEAK GRADIENT: 3 MMHG
ECHO TV REGURGITANT MAX VELOCITY: 2.68 M/S
ECHO TV REGURGITANT PEAK GRADIENT: 29 MMHG
ECHO TV REGURGITANT VTI: 91.3 CM
GFR SERPLBLD CREATININE-BSD FMLA CKD-EPI: 73 ML/MIN/{1.73_M2}
GLUCOSE SERPL-MCNC: 78 MG/DL (ref 70–99)
HCT VFR BLD AUTO: 30.1 % (ref 36–48)
HGB BLD-MCNC: 10 G/DL (ref 12–16)
MAGNESIUM SERPL-MCNC: 1.79 MG/DL (ref 1.8–2.4)
MCH RBC QN AUTO: 31.8 PG (ref 26–34)
MCHC RBC AUTO-ENTMCNC: 33.4 G/DL (ref 31–36)
MCV RBC AUTO: 95.4 FL (ref 80–100)
PLATELET # BLD AUTO: 434 K/UL (ref 135–450)
PMV BLD AUTO: 9.1 FL (ref 5–10.5)
POTASSIUM SERPL-SCNC: 3.1 MMOL/L (ref 3.5–5.1)
RBC # BLD AUTO: 3.15 M/UL (ref 4–5.2)
SODIUM SERPL-SCNC: 142 MMOL/L (ref 136–145)
WBC # BLD AUTO: 7.4 K/UL (ref 4–11)

## 2025-02-28 PROCEDURE — 80048 BASIC METABOLIC PNL TOTAL CA: CPT

## 2025-02-28 PROCEDURE — 6370000000 HC RX 637 (ALT 250 FOR IP): Performed by: HOSPITALIST

## 2025-02-28 PROCEDURE — 99223 1ST HOSP IP/OBS HIGH 75: CPT | Performed by: INTERNAL MEDICINE

## 2025-02-28 PROCEDURE — 6360000002 HC RX W HCPCS

## 2025-02-28 PROCEDURE — 94760 N-INVAS EAR/PLS OXIMETRY 1: CPT

## 2025-02-28 PROCEDURE — 85027 COMPLETE CBC AUTOMATED: CPT

## 2025-02-28 PROCEDURE — 36415 COLL VENOUS BLD VENIPUNCTURE: CPT

## 2025-02-28 PROCEDURE — 2500000003 HC RX 250 WO HCPCS: Performed by: HOSPITALIST

## 2025-02-28 PROCEDURE — 1200000000 HC SEMI PRIVATE

## 2025-02-28 PROCEDURE — 93306 TTE W/DOPPLER COMPLETE: CPT | Performed by: INTERNAL MEDICINE

## 2025-02-28 PROCEDURE — 6360000002 HC RX W HCPCS: Performed by: HOSPITALIST

## 2025-02-28 PROCEDURE — 83735 ASSAY OF MAGNESIUM: CPT

## 2025-02-28 PROCEDURE — 6370000000 HC RX 637 (ALT 250 FOR IP)

## 2025-02-28 PROCEDURE — 93306 TTE W/DOPPLER COMPLETE: CPT

## 2025-02-28 RX ORDER — ENOXAPARIN SODIUM 100 MG/ML
30 INJECTION SUBCUTANEOUS DAILY
Status: DISCONTINUED | OUTPATIENT
Start: 2025-02-28 | End: 2025-03-01 | Stop reason: HOSPADM

## 2025-02-28 RX ADMIN — GABAPENTIN 100 MG: 100 CAPSULE ORAL at 08:38

## 2025-02-28 RX ADMIN — LEVOTHYROXINE SODIUM 200 MCG: 0.1 TABLET ORAL at 05:52

## 2025-02-28 RX ADMIN — NIFEDIPINE 60 MG: 60 TABLET, EXTENDED RELEASE ORAL at 08:39

## 2025-02-28 RX ADMIN — ASPIRIN 81 MG: 81 TABLET, COATED ORAL at 08:39

## 2025-02-28 RX ADMIN — ACETAMINOPHEN 650 MG: 325 TABLET ORAL at 08:38

## 2025-02-28 RX ADMIN — SODIUM CHLORIDE, PRESERVATIVE FREE 10 ML: 5 INJECTION INTRAVENOUS at 20:09

## 2025-02-28 RX ADMIN — ENOXAPARIN SODIUM 30 MG: 100 INJECTION SUBCUTANEOUS at 12:50

## 2025-02-28 RX ADMIN — GABAPENTIN 100 MG: 100 CAPSULE ORAL at 20:15

## 2025-02-28 RX ADMIN — FOLIC ACID 1 MG: 1 TABLET ORAL at 08:38

## 2025-02-28 RX ADMIN — FUROSEMIDE 40 MG: 40 TABLET ORAL at 08:39

## 2025-02-28 RX ADMIN — HYDROXYCHLOROQUINE SULFATE 200 MG: 200 TABLET ORAL at 20:15

## 2025-02-28 RX ADMIN — PANTOPRAZOLE SODIUM 40 MG: 40 TABLET, DELAYED RELEASE ORAL at 08:38

## 2025-02-28 RX ADMIN — LATANOPROST 1 DROP: 50 SOLUTION OPHTHALMIC at 20:15

## 2025-02-28 RX ADMIN — PANTOPRAZOLE SODIUM 40 MG: 40 TABLET, DELAYED RELEASE ORAL at 20:15

## 2025-02-28 RX ADMIN — SODIUM CHLORIDE, PRESERVATIVE FREE 10 ML: 5 INJECTION INTRAVENOUS at 08:38

## 2025-02-28 RX ADMIN — FLUOXETINE HYDROCHLORIDE 20 MG: 20 CAPSULE ORAL at 08:38

## 2025-02-28 RX ADMIN — FLUTICASONE PROPIONATE 2 SPRAY: 50 SPRAY, METERED NASAL at 08:37

## 2025-02-28 RX ADMIN — HYDROXYCHLOROQUINE SULFATE 200 MG: 200 TABLET ORAL at 08:39

## 2025-02-28 RX ADMIN — FUROSEMIDE 40 MG: 40 TABLET ORAL at 18:17

## 2025-02-28 RX ADMIN — LIDOCAINE HYDROCHLORIDE: 20 SOLUTION ORAL at 08:36

## 2025-02-28 RX ADMIN — WATER 1000 MG: 1 INJECTION INTRAMUSCULAR; INTRAVENOUS; SUBCUTANEOUS at 20:09

## 2025-02-28 ASSESSMENT — PAIN DESCRIPTION - LOCATION: LOCATION: ABDOMEN

## 2025-02-28 ASSESSMENT — PAIN SCALES - GENERAL
PAINLEVEL_OUTOF10: 0
PAINLEVEL_OUTOF10: 0

## 2025-02-28 NOTE — PLAN OF CARE
Problem: Chronic Conditions and Co-morbidities  Goal: Patient's chronic conditions and co-morbidity symptoms are monitored and maintained or improved  2/28/2025 1233 by Bryan Zelaya, RN  Outcome: Progressing  Flowsheets (Taken 2/28/2025 1131)  Care Plan - Patient's Chronic Conditions and Co-Morbidity Symptoms are Monitored and Maintained or Improved:   Monitor and assess patient's chronic conditions and comorbid symptoms for stability, deterioration, or improvement   Collaborate with multidisciplinary team to address chronic and comorbid conditions and prevent exacerbation or deterioration  2/28/2025 0210 by Sabrina Nj RN  Outcome: Progressing  Flowsheets (Taken 2/28/2025 0153)  Care Plan - Patient's Chronic Conditions and Co-Morbidity Symptoms are Monitored and Maintained or Improved: Monitor and assess patient's chronic conditions and comorbid symptoms for stability, deterioration, or improvement  2/28/2025 0149 by Sabrina Nj RN  Outcome: Progressing  Flowsheets (Taken 2/27/2025 0755 by Estefany Ahuja, RN)  Care Plan - Patient's Chronic Conditions and Co-Morbidity Symptoms are Monitored and Maintained or Improved:   Monitor and assess patient's chronic conditions and comorbid symptoms for stability, deterioration, or improvement   Collaborate with multidisciplinary team to address chronic and comorbid conditions and prevent exacerbation or deterioration     Problem: Discharge Planning  Goal: Discharge to home or other facility with appropriate resources  2/28/2025 1233 by Bryan Zelaya RN  Outcome: Progressing  Flowsheets (Taken 2/28/2025 1131)  Discharge to home or other facility with appropriate resources:   Identify barriers to discharge with patient and caregiver   Identify discharge learning needs (meds, wound care, etc)   Arrange for needed discharge resources and transportation as appropriate  2/28/2025 0210 by Sabrina Nj RN  Outcome: Progressing  Flowsheets (Taken  2/27/2025 0755 by Estefany Ahuja RN  Verbalizes/displays adequate comfort level or baseline comfort level:   Administer analgesics based on type and severity of pain and evaluate response   Encourage patient to monitor pain and request assistance   Assess pain using appropriate pain scale   Implement non-pharmacological measures as appropriate and evaluate response   Notify Licensed Independent Practitioner if interventions unsuccessful or patient reports new pain     Problem: Skin/Tissue Integrity  Goal: Skin integrity remains intact  Description: 1.  Monitor for areas of redness and/or skin breakdown  2.  Assess vascular access sites hourly  3.  Every 4-6 hours minimum:  Change oxygen saturation probe site  4.  Every 4-6 hours:  If on nasal continuous positive airway pressure, respiratory therapy assess nares and determine need for appliance change or resting period  2/28/2025 1233 by Bryan Zelaya RN  Outcome: Progressing  Flowsheets (Taken 2/28/2025 1131)  Skin Integrity Remains Intact: Monitor for areas of redness and/or skin breakdown  2/28/2025 0210 by Sabrina Nj RN  Outcome: Progressing  Flowsheets  Taken 2/28/2025 0209  Skin Integrity Remains Intact: Monitor for areas of redness and/or skin breakdown  Taken 2/28/2025 0153  Skin Integrity Remains Intact: Monitor for areas of redness and/or skin breakdown  2/28/2025 0149 by Sabrina Nj RN  Outcome: Progressing  Flowsheets (Taken 2/27/2025 0127)  Skin Integrity Remains Intact: Monitor for areas of redness and/or skin breakdown     Problem: Safety - Adult  Goal: Free from fall injury  2/28/2025 1233 by Bryan Zelaya RN  Outcome: Progressing  Flowsheets (Taken 2/28/2025 0209 by Sabrina Nj RN)  Free From Fall Injury: Instruct family/caregiver on patient safety  2/28/2025 0210 by Sabrina Nj RN  Outcome: Progressing  Flowsheets (Taken 2/28/2025 0209)  Free From Fall Injury: Instruct family/caregiver on patient  Progressing  Flowsheets (Taken 2/28/2025 0153)  Hemodynamic stability and optimal renal function maintained: Monitor labs and assess for signs and symptoms of volume excess or deficit  2/28/2025 0149 by Sabrina Nj RN  Outcome: Progressing  Flowsheets (Taken 2/28/2025 0149)  Hemodynamic stability and optimal renal function maintained: Monitor labs and assess for signs and symptoms of volume excess or deficit     Problem: Metabolic/Fluid and Electrolytes - Adult  Goal: Glucose maintained within prescribed range  2/28/2025 1233 by Bryan Zelaya RN  Outcome: Progressing  Flowsheets (Taken 2/28/2025 1131)  Glucose maintained within prescribed range:   Assess for signs and symptoms of hyperglycemia and hypoglycemia   Monitor blood glucose as ordered  2/28/2025 0210 by Sabrina Nj RN  Outcome: Progressing  Flowsheets (Taken 2/28/2025 0153)  Glucose maintained within prescribed range: Monitor blood glucose as ordered  2/28/2025 0149 by Sabrina Nj RN  Outcome: Progressing  Flowsheets (Taken 2/28/2025 0149)  Glucose maintained within prescribed range: Monitor blood glucose as ordered     Problem: Hematologic - Adult  Goal: Maintains hematologic stability  2/28/2025 1233 by Bryan Zelaya RN  Outcome: Progressing  Flowsheets (Taken 2/28/2025 1131)  Maintains hematologic stability:   Assess for signs and symptoms of bleeding or hemorrhage   Monitor labs for bleeding or clotting disorders  2/28/2025 0210 by Sabrina Nj RN  Outcome: Progressing  Flowsheets (Taken 2/28/2025 0153)  Maintains hematologic stability: Assess for signs and symptoms of bleeding or hemorrhage  2/28/2025 0149 by Sabrina Nj RN  Outcome: Progressing  Flowsheets (Taken 2/28/2025 0149)  Maintains hematologic stability: Assess for signs and symptoms of bleeding or hemorrhage

## 2025-02-28 NOTE — PLAN OF CARE
Problem: Chronic Conditions and Co-morbidities  Goal: Patient's chronic conditions and co-morbidity symptoms are monitored and maintained or improved  Outcome: Progressing  Flowsheets (Taken 2/27/2025 0755 by Estefany Ahuja, RN)  Care Plan - Patient's Chronic Conditions and Co-Morbidity Symptoms are Monitored and Maintained or Improved:   Monitor and assess patient's chronic conditions and comorbid symptoms for stability, deterioration, or improvement   Collaborate with multidisciplinary team to address chronic and comorbid conditions and prevent exacerbation or deterioration     Problem: Discharge Planning  Goal: Discharge to home or other facility with appropriate resources  Outcome: Progressing  Flowsheets (Taken 2/27/2025 0131)  Discharge to home or other facility with appropriate resources:   Identify barriers to discharge with patient and caregiver   Identify discharge learning needs (meds, wound care, etc)   Refer to discharge planning if patient needs post-hospital services based on physician order or complex needs related to functional status, cognitive ability or social support system   Arrange for needed discharge resources and transportation as appropriate   Arrange for interpreters to assist at discharge as needed     Problem: Pain  Goal: Verbalizes/displays adequate comfort level or baseline comfort level  Outcome: Progressing  Flowsheets (Taken 2/27/2025 0755 by Estefany Ahuja, RN)  Verbalizes/displays adequate comfort level or baseline comfort level:   Administer analgesics based on type and severity of pain and evaluate response   Encourage patient to monitor pain and request assistance   Assess pain using appropriate pain scale   Implement non-pharmacological measures as appropriate and evaluate response   Notify Licensed Independent Practitioner if interventions unsuccessful or patient reports new pain     Problem: Skin/Tissue Integrity  Goal: Skin integrity remains intact  Description: 1.   Monitor for areas of redness and/or skin breakdown  2.  Assess vascular access sites hourly  3.  Every 4-6 hours minimum:  Change oxygen saturation probe site  4.  Every 4-6 hours:  If on nasal continuous positive airway pressure, respiratory therapy assess nares and determine need for appliance change or resting period  Outcome: Progressing  Flowsheets (Taken 2/27/2025 0127)  Skin Integrity Remains Intact: Monitor for areas of redness and/or skin breakdown     Problem: Safety - Adult  Goal: Free from fall injury  Outcome: Progressing  Flowsheets (Taken 2/27/2025 0755 by Estefany Ahuja, RN)  Free From Fall Injury: Instruct family/caregiver on patient safety     Problem: ABCDS Injury Assessment  Goal: Absence of physical injury  Outcome: Progressing  Flowsheets (Taken 2/27/2025 0127)  Absence of Physical Injury: Implement safety measures based on patient assessment     Problem: Respiratory - Adult  Goal: Achieves optimal ventilation and oxygenation  Outcome: Progressing  Flowsheets (Taken 2/28/2025 0149)  Achieves optimal ventilation and oxygenation: Assess for changes in respiratory status     Problem: Cardiovascular - Adult  Goal: Maintains optimal cardiac output and hemodynamic stability  Outcome: Progressing  Flowsheets (Taken 2/28/2025 0149)  Maintains optimal cardiac output and hemodynamic stability: Monitor blood pressure and heart rate  Goal: Absence of cardiac dysrhythmias or at baseline  Outcome: Progressing  Flowsheets (Taken 2/28/2025 0149)  Absence of cardiac dysrhythmias or at baseline: Monitor cardiac rate and rhythm     Problem: Infection - Adult  Goal: Absence of infection at discharge  Outcome: Progressing  Flowsheets (Taken 2/28/2025 0149)  Absence of infection at discharge: Assess and monitor for signs and symptoms of infection  Goal: Absence of infection during hospitalization  Outcome: Progressing  Flowsheets (Taken 2/28/2025 0149)  Absence of infection during hospitalization: Assess and monitor

## 2025-02-28 NOTE — CONSULTS
REASON FOR CONSULTATION/CC: abnormal radiograph      Consult at request of Elias Gordon MD for     PCP: Joel Cameron Pulmonologist:  None    HISTORY OF PRESENT ILLNESS: Eula V Severn is a 84 y.o. year old female with a history of  who presents with       Patient was admitted for chest pain / epigastric pain found to have abnormal CT chest.       This note may have been  transcribed using Dragon Dictation software. Please disregard any translational errors.      Assessment:     diastolic dysfunction   Hx mesenteric ischemia celiac artery stensosi     Plan:      Hospital Day 2     Abnormal radiograph  Pleural effusion  Left thoracentesis last completed 2023, transudate.    Next CT in Jan 2024 with small pleural effusion with atelectasis.  This was unchanged Sept 2024 and Feb 2025.    There is a rim enhancement but has not changed.  Further, nl WBC and procal.    Therefore, it is extremely unlikely, despite the appearance, to be empyema.  No shortness of breath.  No need for thoracentesis or chest tube.  No further work up.   the patient expressed understanding    Will sign off at this time.  Thanks for the consult.   Please call with questions.     Hernia  No change from prior.              This note was transcribed using Dragon Dictation software. Please disregard any translational errors.    Thank you for the consult    WARD JUAN YAKOV   [facility]  [phone]             Data:     PAST MEDICAL HISTORY:  Past Medical History:   Diagnosis Date    A-fib (HCC)     Acute nonspecific idiopathic pericarditis     Acute pulmonary edema (HCC)     Adverse effect of antineoplastic and immunosuppressive drugs, sequela     Allergic rhinitis     Anxiety     Arthritis     Asthma     Atherosclerotic heart disease of native coronary artery without angina pectoris     Bradycardia     Chronic kidney disease     CKD (chronic kidney disease)     Cognitive communication deficit  position.    HEART/MEDIASTINUM: The cardiac silhouette is enlarged.    PLEURA/LUNGS: There is left basilar atelectasis.  Interval resolution of left  pleural effusion status post left thoracentesis.  There is no appreciable  pneumothorax.    BONES/SOFT TISSUE: No acute abnormality.    Impression  No appreciable pneumothorax status post left thoracentesis.

## 2025-02-28 NOTE — PROGRESS NOTES
Patient in bed, awake, a&ox4. Patient tolerating PO intake, no n/v voiced. Patient voiding, no issues. Patient IV flushed, and locked at this time. Patient took medications whole, with sips of water. Patient did go to echo this morning, results pending. Patient daughter did call for updates and was notified about patient going for echo this morning. Patient needs assessed and addressed, no other needs voiced at this time. Call light and bedside table within reach.

## 2025-02-28 NOTE — PROGRESS NOTES
Hospitalist Progress Note      PCP: Joel Cameron    Date of Admission: 2/26/2025    Chief Complaint: Abdominal pain, chest pain    Hospital Course: 84-year-old female with past medical history of mesenteric ischemia, celiac artery stenosis s/p stenting, chronic diastolic heart failure, large hiatal hernia, rheumatoid arthritis, essential hypertension,anemia who presented to the ED from her nursing facility with complaints of epigastric and chest pain.  Patient is a very poor historian and daughter helped with history taking.  Patient reportedly had not been feeling well the last few days and had a dry nonproductive cough.  While in the ED, she was hemodynamically stable.  CBC was only notable for mild anemia which was near her baseline, BMP was notable for mild hyponatremia but otherwise no significant abnormality.  Troponin was elevated at 116 which appeared to be chronic for patient.  NT proBNP was noted to be elevated at 14K but she does not appear fluid overloaded on exam.  CXR obtained that showed stable cardiomegaly and a probable left basilar effusion.  CT PE protocol was obtained that showed consolidation of much of the left lower lobe with air bronchograms, not significantly changed concerning for possible pneumonia or atelectasis.  Also seen was a small left pleural effusion.  CT A/P was obtained that showed a distended gallbladder without concern for acute cholecystitis and a large hiatal hernia.  Patient was started on heparin drip for possible NSTEMI and cardiology was consulted.  She was admitted for further workup/treatment.    CTA A/P was ordered to evaluate for mesenteric ischemia, showed no evidence suggest acute mesenteric ischemia.  Echocardiogram ordered by cardiology..    Pulmonology consulted for possible empyema    Subjective: Patient seen resting in bed, very hard of hearing but able to answer questions.  States she is no longer having chest pain.  Pain is mostly in her  lower lobe consolidation.   Unchanged from recent priors.         XR CHEST 1 VIEW   Final Result   Elevated left hemidiaphragm with dependent left basilar opacification with   probable effusion.  Underlying infiltrate not excluded.      Stable cardiomegaly.                 DVT Prophylaxis: Lovenox  Diet: ADULT DIET; Regular; Low Fat/Low Chol/High Fiber/2 gm Na  Code Status: Full Code    PT/OT Eval Status: Not needed    Dispo -Home when medically stable, likely 1-2 days pending further workup    Carley Mcdermott PA-C      NOTE:  This report was transcribed using voice recognition software.  Every effort was made to ensure accuracy; however, inadvertent computerized transcription errors may be present.

## 2025-02-28 NOTE — PLAN OF CARE
Problem: Chronic Conditions and Co-morbidities  Goal: Patient's chronic conditions and co-morbidity symptoms are monitored and maintained or improved  2/28/2025 0210 by Sabrina Nj RN  Outcome: Progressing  Flowsheets (Taken 2/28/2025 0153)  Care Plan - Patient's Chronic Conditions and Co-Morbidity Symptoms are Monitored and Maintained or Improved: Monitor and assess patient's chronic conditions and comorbid symptoms for stability, deterioration, or improvement  2/28/2025 0149 by Sabrina Nj RN  Outcome: Progressing  Flowsheets (Taken 2/27/2025 0755 by Estefany Ahuja, RN)  Care Plan - Patient's Chronic Conditions and Co-Morbidity Symptoms are Monitored and Maintained or Improved:   Monitor and assess patient's chronic conditions and comorbid symptoms for stability, deterioration, or improvement   Collaborate with multidisciplinary team to address chronic and comorbid conditions and prevent exacerbation or deterioration     Problem: Discharge Planning  Goal: Discharge to home or other facility with appropriate resources  2/28/2025 0210 by Sabrina Nj RN  Outcome: Progressing  Flowsheets (Taken 2/28/2025 0153)  Discharge to home or other facility with appropriate resources: Identify barriers to discharge with patient and caregiver  2/28/2025 0149 by Sabrina Nj RN  Outcome: Progressing  Flowsheets (Taken 2/27/2025 0131)  Discharge to home or other facility with appropriate resources:   Identify barriers to discharge with patient and caregiver   Identify discharge learning needs (meds, wound care, etc)   Refer to discharge planning if patient needs post-hospital services based on physician order or complex needs related to functional status, cognitive ability or social support system   Arrange for needed discharge resources and transportation as appropriate   Arrange for interpreters to assist at discharge as needed     Problem: Pain  Goal: Verbalizes/displays adequate comfort

## 2025-03-01 VITALS
BODY MASS INDEX: 19.53 KG/M2 | OXYGEN SATURATION: 97 % | HEART RATE: 67 BPM | WEIGHT: 99.5 LBS | SYSTOLIC BLOOD PRESSURE: 109 MMHG | RESPIRATION RATE: 19 BRPM | HEIGHT: 60 IN | DIASTOLIC BLOOD PRESSURE: 53 MMHG | TEMPERATURE: 98.7 F

## 2025-03-01 LAB
DEPRECATED RDW RBC AUTO: 14.8 % (ref 12.4–15.4)
HCT VFR BLD AUTO: 28 % (ref 36–48)
HGB BLD-MCNC: 9.5 G/DL (ref 12–16)
MAGNESIUM SERPL-MCNC: 1.82 MG/DL (ref 1.8–2.4)
MCH RBC QN AUTO: 32.3 PG (ref 26–34)
MCHC RBC AUTO-ENTMCNC: 33.9 G/DL (ref 31–36)
MCV RBC AUTO: 95.4 FL (ref 80–100)
PLATELET # BLD AUTO: 401 K/UL (ref 135–450)
PMV BLD AUTO: 8.8 FL (ref 5–10.5)
POTASSIUM SERPL-SCNC: 3.1 MMOL/L (ref 3.5–5.1)
RBC # BLD AUTO: 2.94 M/UL (ref 4–5.2)
WBC # BLD AUTO: 6.5 K/UL (ref 4–11)

## 2025-03-01 PROCEDURE — 6370000000 HC RX 637 (ALT 250 FOR IP): Performed by: HOSPITALIST

## 2025-03-01 PROCEDURE — 85027 COMPLETE CBC AUTOMATED: CPT

## 2025-03-01 PROCEDURE — 2500000003 HC RX 250 WO HCPCS: Performed by: HOSPITALIST

## 2025-03-01 PROCEDURE — 6370000000 HC RX 637 (ALT 250 FOR IP)

## 2025-03-01 PROCEDURE — 36415 COLL VENOUS BLD VENIPUNCTURE: CPT

## 2025-03-01 PROCEDURE — 6360000002 HC RX W HCPCS

## 2025-03-01 PROCEDURE — 84132 ASSAY OF SERUM POTASSIUM: CPT

## 2025-03-01 PROCEDURE — 94760 N-INVAS EAR/PLS OXIMETRY 1: CPT

## 2025-03-01 PROCEDURE — 83735 ASSAY OF MAGNESIUM: CPT

## 2025-03-01 RX ORDER — PANTOPRAZOLE SODIUM 40 MG/1
40 TABLET, DELAYED RELEASE ORAL 2 TIMES DAILY
Qty: 180 TABLET | Refills: 3 | Status: SHIPPED | OUTPATIENT
Start: 2025-03-01 | End: 2026-02-24

## 2025-03-01 RX ORDER — POTASSIUM CHLORIDE 1500 MG/1
40 TABLET, EXTENDED RELEASE ORAL ONCE
Status: COMPLETED | OUTPATIENT
Start: 2025-03-01 | End: 2025-03-01

## 2025-03-01 RX ORDER — FUROSEMIDE 40 MG/1
40 TABLET ORAL 2 TIMES DAILY
Qty: 60 TABLET | Refills: 2
Start: 2025-03-01 | End: 2025-05-30

## 2025-03-01 RX ADMIN — LEVOTHYROXINE SODIUM 200 MCG: 0.1 TABLET ORAL at 06:11

## 2025-03-01 RX ADMIN — FOLIC ACID 1 MG: 1 TABLET ORAL at 09:57

## 2025-03-01 RX ADMIN — ASPIRIN 81 MG: 81 TABLET, COATED ORAL at 09:57

## 2025-03-01 RX ADMIN — PANTOPRAZOLE SODIUM 40 MG: 40 TABLET, DELAYED RELEASE ORAL at 09:57

## 2025-03-01 RX ADMIN — FLUTICASONE PROPIONATE 2 SPRAY: 50 SPRAY, METERED NASAL at 09:59

## 2025-03-01 RX ADMIN — POTASSIUM CHLORIDE 40 MEQ: 1500 TABLET, EXTENDED RELEASE ORAL at 12:43

## 2025-03-01 RX ADMIN — ENOXAPARIN SODIUM 30 MG: 100 INJECTION SUBCUTANEOUS at 09:56

## 2025-03-01 RX ADMIN — SODIUM CHLORIDE, PRESERVATIVE FREE 10 ML: 5 INJECTION INTRAVENOUS at 10:00

## 2025-03-01 RX ADMIN — GABAPENTIN 100 MG: 100 CAPSULE ORAL at 09:57

## 2025-03-01 RX ADMIN — NIFEDIPINE 60 MG: 60 TABLET, EXTENDED RELEASE ORAL at 09:57

## 2025-03-01 RX ADMIN — FLUOXETINE HYDROCHLORIDE 20 MG: 20 CAPSULE ORAL at 09:57

## 2025-03-01 RX ADMIN — FUROSEMIDE 40 MG: 40 TABLET ORAL at 09:57

## 2025-03-01 RX ADMIN — HYDROXYCHLOROQUINE SULFATE 200 MG: 200 TABLET ORAL at 09:57

## 2025-03-01 NOTE — PLAN OF CARE
Problem: Discharge Planning  Goal: Discharge to home or other facility with appropriate resources  Outcome: Progressing  Flowsheets (Taken 3/1/2025 0620)  Discharge to home or other facility with appropriate resources:   Identify barriers to discharge with patient and caregiver   Identify discharge learning needs (meds, wound care, etc)     Problem: Pain  Goal: Verbalizes/displays adequate comfort level or baseline comfort level  Outcome: Progressing  Flowsheets (Taken 3/1/2025 0620)  Verbalizes/displays adequate comfort level or baseline comfort level:   Encourage patient to monitor pain and request assistance   Assess pain using appropriate pain scale     Problem: Skin/Tissue Integrity  Goal: Skin integrity remains intact  Description: 1.  Monitor for areas of redness and/or skin breakdown  2.  Assess vascular access sites hourly  3.  Every 4-6 hours minimum:  Change oxygen saturation probe site  4.  Every 4-6 hours:  If on nasal continuous positive airway pressure, respiratory therapy assess nares and determine need for appliance change or resting period  Outcome: Progressing  Flowsheets (Taken 3/1/2025 0620)  Skin Integrity Remains Intact: Monitor for areas of redness and/or skin breakdown     Problem: Safety - Adult  Goal: Free from fall injury  Outcome: Progressing  Flowsheets (Taken 3/1/2025 0620)  Free From Fall Injury: Instruct family/caregiver on patient safety     Problem: Cardiovascular - Adult  Goal: Maintains optimal cardiac output and hemodynamic stability  Outcome: Progressing  Flowsheets (Taken 3/1/2025 0620)  Maintains optimal cardiac output and hemodynamic stability:   Monitor blood pressure and heart rate   Assess for signs of decreased cardiac output

## 2025-03-01 NOTE — CARE COORDINATION
DISCHARGE SUMMARY     DATE OF DISCHARGE: 3/1/2025    DISCHARGE DESTINATION: LTC at [facility]     FACILITY    Level of Care: Long Term  [facility]  [address] [place] [address]  Phone: [phone]  Fax: [phone]  Precert Obtained: N/A    Hector Completed: N/A    PASARR: N/A    Notified: RN, Family, and Facility/Agency    TRANSPORTATION: Stretcher    Company Name: [facility]      Time:5:00PM    Phone Number: [phone]    Electronically signed by Dorothea Calero on 3/1/2025 at 1:48 PM  #[phone]

## 2025-03-01 NOTE — DISCHARGE SUMMARY
Hospital Medicine Discharge Summary    Patient ID: Eula V Severn      Patient's PCP: Joel Cameron    Admit Date: 2/26/2025     Discharge Date:   3/1/25    Admitting Physician: Sobeida Bowens Jr., MD     Discharge Physician: Carley Mcdermott PA-C         Hospital Course: 84-year-old female with past medical history of mesenteric ischemia, celiac artery stenosis s/p stenting, chronic diastolic heart failure, large hiatal hernia, rheumatoid arthritis, essential hypertension,anemia who presented to the ED from her nursing facility with complaints of epigastric and chest pain.  Patient is a very poor historian and daughter helped with history taking.  Patient reportedly had not been feeling well the last few days and had a dry nonproductive cough.  While in the ED, she was hemodynamically stable.  CBC was only notable for mild anemia which was near her baseline, BMP was notable for mild hyponatremia but otherwise no significant abnormality.  Troponin was elevated at 116 which appeared to be chronic for patient.  NT proBNP was noted to be elevated at 14K but she does not appear fluid overloaded on exam.  CXR obtained that showed stable cardiomegaly and a probable left basilar effusion.  CT PE protocol was obtained that showed consolidation of much of the left lower lobe with air bronchograms, not significantly changed concerning for possible pneumonia or atelectasis.  Also seen was a small left pleural effusion.  CT A/P was obtained that showed a distended gallbladder without concern for acute cholecystitis and a large hiatal hernia.  Patient was started on heparin drip for possible NSTEMI and cardiology was consulted.  She was admitted for further workup/treatment.     CTA A/P was ordered to evaluate for mesenteric ischemia, showed no evidence suggest acute mesenteric ischemia.  Echocardiogram ordered by cardiology normal EF of 55 to 60%, normal wall motion, diastolic dysfunction which appears stable to  CONSULT TO HEART FAILURE NURSE/COORDINATOR  IP CONSULT TO SOCIAL WORK  IP CONSULT TO CARDIOLOGY  IP CONSULT TO PULMONOLOGY    Disposition: Back to [facility]    Condition at Discharge: Stable    Discharge Instructions/Follow-up: Follow-up with PCP at [facility], continue an acid medication.  Continue antacids as needed    Code Status:  Full Code     Activity: activity as tolerated    Diet: cardiac diet      Discharge Medications:     Current Discharge Medication List             Details   furosemide (LASIX) 40 MG tablet Take 1 tablet by mouth 2 times daily  Qty: 60 tablet, Refills: 2      pantoprazole (PROTONIX) 40 MG tablet Take 1 tablet by mouth 2 times daily  Qty: 180 tablet, Refills: 3    Associated Diagnoses: Gastroesophageal reflux disease without esophagitis                Details   Dextromethorphan-guaiFENesin  MG/5ML SYRP Take 5 mLs by mouth every 6 hours as needed for Cough      HYDROcodone-acetaminophen (NORCO) 5-325 MG per tablet Take 1 tablet by mouth in the morning and 1 tablet in the evening. Max Daily Amount: 2 tablets.      hydrocortisone 1 % cream Apply topically 2 times daily Apply topically 2 times daily.      ipratropium 0.5 mg-albuterol 2.5 mg (DUONEB) 0.5-2.5 (3) MG/3ML SOLN nebulizer solution Inhale 3 mLs into the lungs every 6 hours as needed for Shortness of Breath      liothyronine (CYTOMEL) 5 MCG tablet Take 1 tablet by mouth daily      ondansetron (ZOFRAN) 4 MG tablet Take 1 tablet by mouth every 6 hours as needed for Nausea or Vomiting      ACIDOPHILUS LACTOBACILLUS PO Take 1 tablet by mouth daily      brimonidine (ALPHAGAN) 0.2 % ophthalmic solution 1 drop      loratadine (CLARITIN) 10 MG capsule Take 1 capsule by mouth daily      diphenoxylate-atropine (LOMOTIL) 2.5-0.025 MG per tablet Take 2 tablets by mouth 4 times daily.      droNABinol (MARINOL) 2.5 MG capsule Take 1 capsule by mouth 2 times daily (before meals).      Fexofenadine HCl (MUCINEX ALLERGY PO) Take by mouth

## 2025-03-01 NOTE — PROGRESS NOTES
Tried calling report to phone number listed for report in discharge summary note, and continued to received voicemail message. Voice mail was full and no way to leave any message to return call for report, will attempt again one last time when patient is in route. Patient IV removed. Patient belongings packed and at bedside, and discharge package printed and ready.

## 2025-03-01 NOTE — PROGRESS NOTES
Patient in bed, awake, a&ox3-4. Patient took medications whole with water. Patient voiding, no issues. Patient IV flushed and locked. Patient denies pain at this time. Patient c/o appetite being poor, and still having some abdominal aches. Patient taking GERD medication to assist with those issues. Patient needs assessed and addressed. No needs voiced at this time. Call light and bedside table within reach.

## 2025-03-01 NOTE — PROGRESS NOTES
Patient discharging to [facility] ([facility]) via stretcher by [facility] transport. Patient belongings packed and patient dressed, cleaned up, new brief applied on patient. Attempted to call report to resuming nurse at [facility] for the last time and still no answer and voice mail is full. Daughter called and spoke to charge nurse Sonali who notified her that patient was in route to [facility] facility.

## 2025-03-02 LAB
BACTERIA BLD CULT ORG #2: NORMAL
BACTERIA BLD CULT: NORMAL

## 2025-03-08 NOTE — PROGRESS NOTES
Physician Progress Note      PATIENT:               SEVERN, EULA  CSN #:                  [id number]  :                       1940  ADMIT DATE:       2025 4:57 PM  DISCH DATE:        3/1/2025 5:30 PM  RESPONDING  PROVIDER #:        Carley Mcdermott          QUERY TEXT:    Internal Medicine,    Pt admitted with  chest and epigastric pain initially thought to be due to   NSTEMI. Cardiology ruled out ACS.  Pt noted to have PNA and possible empyema   documented. If possible, please document in progress notes and discharge   summary if you are evaluating and/or treating any of the following as the   cause of the chest pain:    The medical record reflects the following:  Risk Factors: PNA, Gerd, mesenteric stenosis  Clinical Indicators: chest and epigastric pain with unclear cause, Cardiology   ruled out NSTEMI and documents: \"chest and abdominal pain is due to chronic   mesenteric artery stenosis-Status post celiac artery stent which is patent. No   Acute ischemic event.\"  Pulmonology was consulted and per documentation: \" felt not true empyema and   the patient experiencing GERD and or hiatal hernia symptoms.\"  Internal Med documents: \"Epigastric abdominal pain, reports improvement with   GI cocktail.  Likely component of GERD and hiatal hernia. ? Empyema  Pleural effusion  Treatment: labs, imaging, Cardiology and Pulmonology consult, EKG, IV   ceftriaxone and azithromycin, medical management    Thank you  Options provided:  -- Chest pain due to PNA without empyema  -- Chest and epigastric  pain due to PNA with empyema  -- Chest pain due to Chronic Mesenteric artery stenosis  -- Chest pain and epigastric pian due to PNA and GERD  -- Chest and epigastric  pain due to hiatal hernia  -- Chest pain and epigastric pain due to GERD  -- Chest pain due to, please document chest pain cause  -- Other - I will add my own diagnosis  -- Disagree - Not applicable / Not valid  -- Disagree - Clinically unable to determine

## 2025-03-24 ENCOUNTER — APPOINTMENT (OUTPATIENT)
Dept: CT IMAGING | Age: 85
End: 2025-03-24
Payer: COMMERCIAL

## 2025-03-24 ENCOUNTER — HOSPITAL ENCOUNTER (EMERGENCY)
Age: 85
Discharge: HOME | End: 2025-03-24
Attending: EMERGENCY MEDICINE
Payer: COMMERCIAL

## 2025-03-24 ENCOUNTER — APPOINTMENT (OUTPATIENT)
Dept: GENERAL RADIOLOGY | Age: 85
End: 2025-03-24
Payer: COMMERCIAL

## 2025-03-24 VITALS
OXYGEN SATURATION: 97 % | SYSTOLIC BLOOD PRESSURE: 169 MMHG | RESPIRATION RATE: 16 BRPM | HEIGHT: 60 IN | HEART RATE: 74 BPM | DIASTOLIC BLOOD PRESSURE: 70 MMHG | BODY MASS INDEX: 19.65 KG/M2 | WEIGHT: 100.09 LBS | TEMPERATURE: 98.2 F

## 2025-03-24 DIAGNOSIS — K52.9 CHRONIC DIARRHEA: ICD-10-CM

## 2025-03-24 DIAGNOSIS — N39.0 URINARY TRACT INFECTION WITHOUT HEMATURIA, SITE UNSPECIFIED: Primary | ICD-10-CM

## 2025-03-24 DIAGNOSIS — B37.49 CANDIDAL UTI (URINARY TRACT INFECTION): ICD-10-CM

## 2025-03-24 LAB
ALBUMIN SERPL-MCNC: 3.5 G/DL (ref 3.4–5)
ALBUMIN/GLOB SERPL: 1.1 {RATIO} (ref 1.1–2.2)
ALP SERPL-CCNC: 83 U/L (ref 40–129)
ALT SERPL-CCNC: <5 U/L (ref 10–40)
ANION GAP SERPL CALCULATED.3IONS-SCNC: 14 MMOL/L (ref 3–16)
AST SERPL-CCNC: 26 U/L (ref 15–37)
BACTERIA URNS QL MICRO: ABNORMAL /HPF
BASOPHILS # BLD: 0 K/UL (ref 0–0.2)
BASOPHILS NFR BLD: 0.5 %
BILIRUB SERPL-MCNC: 0.3 MG/DL (ref 0–1)
BILIRUB UR QL STRIP.AUTO: NEGATIVE
BUN SERPL-MCNC: 11 MG/DL (ref 7–20)
CALCIUM SERPL-MCNC: 8.4 MG/DL (ref 8.3–10.6)
CHARACTER UR: ABNORMAL
CHLORIDE SERPL-SCNC: 99 MMOL/L (ref 99–110)
CLARITY UR: ABNORMAL
CO2 SERPL-SCNC: 21 MMOL/L (ref 21–32)
COLOR UR: YELLOW
CREAT SERPL-MCNC: 0.6 MG/DL (ref 0.6–1.2)
DEPRECATED RDW RBC AUTO: 15.6 % (ref 12.4–15.4)
EOSINOPHIL # BLD: 0 K/UL (ref 0–0.6)
EOSINOPHIL NFR BLD: 0 %
EPI CELLS #/AREA URNS AUTO: 1 /HPF (ref 0–5)
FLUAV + FLUBV AG NOSE IA.RAPID: NOT DETECTED
FLUAV + FLUBV AG NOSE IA.RAPID: NOT DETECTED
GFR SERPLBLD CREATININE-BSD FMLA CKD-EPI: 88 ML/MIN/{1.73_M2}
GLUCOSE SERPL-MCNC: 98 MG/DL (ref 70–99)
GLUCOSE UR STRIP.AUTO-MCNC: NEGATIVE MG/DL
HCT VFR BLD AUTO: 29.9 % (ref 36–48)
HGB BLD-MCNC: 10 G/DL (ref 12–16)
HGB UR QL STRIP.AUTO: ABNORMAL
HYALINE CASTS #/AREA URNS AUTO: 3 /LPF (ref 0–8)
KETONES UR STRIP.AUTO-MCNC: NEGATIVE MG/DL
LACTATE BLDV-SCNC: 1 MMOL/L (ref 0.4–1.9)
LEUKOCYTE ESTERASE UR QL STRIP.AUTO: ABNORMAL
LIPASE SERPL-CCNC: 15 U/L (ref 13–60)
LYMPHOCYTES # BLD: 0.7 K/UL (ref 1–5.1)
LYMPHOCYTES NFR BLD: 9.8 %
MCH RBC QN AUTO: 32.6 PG (ref 26–34)
MCHC RBC AUTO-ENTMCNC: 33.7 G/DL (ref 31–36)
MCV RBC AUTO: 96.8 FL (ref 80–100)
MONOCYTES # BLD: 0.4 K/UL (ref 0–1.3)
MONOCYTES NFR BLD: 6.5 %
NEUTROPHILS # BLD: 5.7 K/UL (ref 1.7–7.7)
NEUTROPHILS NFR BLD: 83.2 %
NITRITE UR QL STRIP.AUTO: NEGATIVE
PH UR STRIP.AUTO: 5.5 [PH] (ref 5–8)
PLATELET # BLD AUTO: 259 K/UL (ref 135–450)
PMV BLD AUTO: 9.7 FL (ref 5–10.5)
POTASSIUM SERPL-SCNC: 3.5 MMOL/L (ref 3.5–5.1)
PROT SERPL-MCNC: 6.8 G/DL (ref 6.4–8.2)
PROT UR STRIP.AUTO-MCNC: NEGATIVE MG/DL
RBC # BLD AUTO: 3.09 M/UL (ref 4–5.2)
RBC #/AREA URNS HPF: ABNORMAL /HPF (ref 0–4)
REASON FOR REJECTION: NORMAL
REJECTED TEST: NORMAL
SARS-COV-2 RDRP RESP QL NAA+PROBE: NOT DETECTED
SODIUM SERPL-SCNC: 134 MMOL/L (ref 136–145)
SP GR UR STRIP.AUTO: 1.02 (ref 1–1.03)
UA COMPLETE W REFLEX CULTURE PNL UR: YES
UA DIPSTICK W REFLEX MICRO PNL UR: YES
URN SPEC COLLECT METH UR: ABNORMAL
UROBILINOGEN UR STRIP-ACNC: 0.2 E.U./DL
WBC # BLD AUTO: 6.8 K/UL (ref 4–11)
WBC #/AREA URNS AUTO: 98 /HPF (ref 0–5)
YEAST URNS QL MICRO: PRESENT /HPF

## 2025-03-24 PROCEDURE — 87502 INFLUENZA DNA AMP PROBE: CPT

## 2025-03-24 PROCEDURE — 81001 URINALYSIS AUTO W/SCOPE: CPT

## 2025-03-24 PROCEDURE — 96374 THER/PROPH/DIAG INJ IV PUSH: CPT

## 2025-03-24 PROCEDURE — 87086 URINE CULTURE/COLONY COUNT: CPT

## 2025-03-24 PROCEDURE — 6360000002 HC RX W HCPCS: Performed by: EMERGENCY MEDICINE

## 2025-03-24 PROCEDURE — 71045 X-RAY EXAM CHEST 1 VIEW: CPT

## 2025-03-24 PROCEDURE — 80053 COMPREHEN METABOLIC PANEL: CPT

## 2025-03-24 PROCEDURE — 74177 CT ABD & PELVIS W/CONTRAST: CPT

## 2025-03-24 PROCEDURE — 6360000004 HC RX CONTRAST MEDICATION: Performed by: EMERGENCY MEDICINE

## 2025-03-24 PROCEDURE — 87635 SARS-COV-2 COVID-19 AMP PRB: CPT

## 2025-03-24 PROCEDURE — 85025 COMPLETE CBC W/AUTO DIFF WBC: CPT

## 2025-03-24 PROCEDURE — 83690 ASSAY OF LIPASE: CPT

## 2025-03-24 PROCEDURE — 99285 EMERGENCY DEPT VISIT HI MDM: CPT

## 2025-03-24 PROCEDURE — 83605 ASSAY OF LACTIC ACID: CPT

## 2025-03-24 PROCEDURE — 6370000000 HC RX 637 (ALT 250 FOR IP): Performed by: EMERGENCY MEDICINE

## 2025-03-24 RX ORDER — FLUCONAZOLE 100 MG/1
200 TABLET ORAL ONCE
Status: COMPLETED | OUTPATIENT
Start: 2025-03-24 | End: 2025-03-24

## 2025-03-24 RX ORDER — ONDANSETRON 2 MG/ML
4 INJECTION INTRAMUSCULAR; INTRAVENOUS ONCE
Status: COMPLETED | OUTPATIENT
Start: 2025-03-24 | End: 2025-03-24

## 2025-03-24 RX ORDER — FLUCONAZOLE 100 MG/1
100 TABLET ORAL DAILY
Qty: 4 TABLET | Refills: 0 | Status: SHIPPED | OUTPATIENT
Start: 2025-03-24 | End: 2025-03-28

## 2025-03-24 RX ORDER — CEFUROXIME AXETIL 250 MG/1
250 TABLET ORAL 2 TIMES DAILY
Qty: 10 TABLET | Refills: 0 | Status: SHIPPED | OUTPATIENT
Start: 2025-03-24 | End: 2025-03-29

## 2025-03-24 RX ORDER — CEFUROXIME AXETIL 250 MG/1
250 TABLET ORAL ONCE
Status: COMPLETED | OUTPATIENT
Start: 2025-03-24 | End: 2025-03-24

## 2025-03-24 RX ORDER — IOPAMIDOL 755 MG/ML
75 INJECTION, SOLUTION INTRAVASCULAR
Status: COMPLETED | OUTPATIENT
Start: 2025-03-24 | End: 2025-03-24

## 2025-03-24 RX ADMIN — ONDANSETRON 4 MG: 2 INJECTION, SOLUTION INTRAMUSCULAR; INTRAVENOUS at 06:42

## 2025-03-24 RX ADMIN — IOPAMIDOL 75 ML: 755 INJECTION, SOLUTION INTRAVENOUS at 08:30

## 2025-03-24 RX ADMIN — FLUCONAZOLE 200 MG: 100 TABLET ORAL at 10:35

## 2025-03-24 RX ADMIN — CEFUROXIME AXETIL 250 MG: 250 TABLET ORAL at 10:35

## 2025-03-24 ASSESSMENT — LIFESTYLE VARIABLES
HOW OFTEN DO YOU HAVE A DRINK CONTAINING ALCOHOL: NEVER
HOW MANY STANDARD DRINKS CONTAINING ALCOHOL DO YOU HAVE ON A TYPICAL DAY: PATIENT DOES NOT DRINK

## 2025-03-24 ASSESSMENT — PAIN SCALES - GENERAL: PAINLEVEL_OUTOF10: 8

## 2025-03-24 ASSESSMENT — PAIN DESCRIPTION - DESCRIPTORS: DESCRIPTORS: OTHER (COMMENT)

## 2025-03-24 ASSESSMENT — PAIN - FUNCTIONAL ASSESSMENT: PAIN_FUNCTIONAL_ASSESSMENT: 0-10

## 2025-03-24 ASSESSMENT — PAIN DESCRIPTION - LOCATION: LOCATION: ABDOMEN

## 2025-03-24 NOTE — ED NOTES
Pt confirmed d/c paperwork has correct name. Discharge and education instructions reviewed with patient. Teach-back successful.  Pt verbalized understanding and denied questions at this time. No acute distress noted. Patient instructed to follow-up as noted - return to emergency department if symptoms worsen. Patient verbalized understanding. Discharged per EDMD with discharge instructions. Pt discharged to private vehicle. Patient stable upon departure. Thanked patient for choosing [facility] for care. Provider aware of patient pain at time of discharge.

## 2025-03-24 NOTE — DISCHARGE INSTRUCTIONS
Take antibiotic as prescribed until completed.    Follow-up with your provider at the nursing home.    Follow-up with your GI physician for your ongoing diarrhea.

## 2025-03-24 NOTE — ED TRIAGE NOTES
Pt to ED via EMS from nursing home. Per nursing home, pt had abdominal discomfort begin last night, in which the nurse gave tylenol and mylanta for. Pt c/o her abdomen being tender to the touch. This morning she began coughing up green bile.

## 2025-03-24 NOTE — ED NOTES
RN completed incontinence care on pt. Pt had a large bowel movement of diarrhea. Pt was cleaned with bath wipes, brief was changed, gown was changed, and fresh linens were applied. Pt was also placed on a purwick to obtain a urine sample. Pt tolerated well.

## 2025-03-24 NOTE — ED PROVIDER NOTES
[facility] EMERGENCY DEPARTMENT  eMERGENCY dEPARTMENT eNCOUnter      Pt Name: Eula V Severn  MRN: [id number]  Birthdate 1940  Date of evaluation: 3/24/2025  Provider: BRYCE WRIGHT MD    CHIEF COMPLAINT       Chief Complaint   Patient presents with    Cough     Per EMS patient has been coughing up bile all morning.          CRITICAL CARE TIME   Total Critical Care time was 0 minutes, excluding separately reportable procedures.  There was a high probability of clinically significant/life threatening deterioration in the patient's condition which required my urgent intervention.        HISTORY OF PRESENT ILLNESS  (Location/Symptom, Timing/Onset, Context/Setting, Quality, Duration, Modifying Factors, Severity.)   History From: Patient / Nursing Home Report  Limitations to history : Hard of Hearing    Eula V Severn is a 85 y.o. female who presents to the emergency department complaining of abdominal pain and \"coughing up bile\".  This patient was transported to the emergency department via life squad from the nursing home.  Nursing home reported that she was complaining of abdominal pain \"all night\".  They state that she was \"coughing up bile\".  They report giving her Tylenol and Mylanta.  The patient states she felt hot and cold all night.  No documented fever.  Denies chest pain.  No dysuria or frequency.      Nursing Notes were reviewed and I agree.      SCREENINGS        [place] Coma Scale  Eye Opening: Spontaneous  Best Verbal Response: Oriented  Best Motor Response: Obeys commands  [place] Coma Scale Score: 15                CIWA Assessment  BP: (!) 161/62  Pulse: 76           REVIEW OF SYSTEMS    (2-9 systems for level 4, 10 or more for level 5)     General: No reported fever from the nursing home.  Patient reports feeling hot and cold.  HEENT: Hard of hearing.  No sore throat.  Cardiovascular: No chest pain.  Pulmonary: \"Coughing up bile\".  Denies shortness of breath.  GI: Generalized

## 2025-03-25 ENCOUNTER — RESULTS FOLLOW-UP (OUTPATIENT)
Dept: EMERGENCY DEPT | Age: 85
End: 2025-03-25

## 2025-03-25 LAB — BACTERIA UR CULT: NORMAL

## 2025-03-30 PROBLEM — R79.89 ELEVATED TROPONIN: Status: RESOLVED | Noted: 2025-02-28 | Resolved: 2025-03-30

## 2025-04-04 ENCOUNTER — TELEPHONE (OUTPATIENT)
Dept: CARDIOLOGY CLINIC | Age: 85
End: 2025-04-04

## 2025-04-04 NOTE — TELEPHONE ENCOUNTER
Carelink alert transmission received d/t AT/AF Daily [place] > Threshold of 0.5 hrs per day. Stored EGMs are consistent with or suggestive of Atrial Fibrillation, possibly new to pt. 1 event detected 04.04.25 @ 12:33pm, ongoing at the time of transmission. Pt on ASA 81mg, no BB or OAC.

## 2025-04-04 NOTE — TELEPHONE ENCOUNTER
LMOM for patient to return call. Discussed with MKW, should start OAC (Eliquis 2.5 mg BID) and if patient wants appt to discuss this is OK.     Patient has a [id number] Score of 6  (Age1,gender,chf,htn,pad)    [id number] Score for Atrial Fibrillation Stroke Risk   Risk   Factors  Component Value   C CHF Yes 1   H HTN Yes 1   A2 Age >= 75 Yes,  (85 y.o.) 2   D DM No 0   S2 Prior Stroke/TIA No 0   V Vascular Disease Yes 1   A Age 65-74 No,  (85 y.o.) 0   Sc Sex female 1    [id number]  Score  6   Score last updated 4/4/25 4:38 PM EDT    Click here for a link to the UpToDate guideline \"Atrial Fibrillation: Anticoagulation therapy to prevent embolization    Disclaimer: Risk Score calculation is dependent on accuracy of patient problem list and past encounter diagnosis.

## 2025-04-07 NOTE — TELEPHONE ENCOUNTER
Call placed to the patient . Spoke with the patient's daughter. She reports she will have to call the facility to coordinate care as she would like this to be discussed further in clinic. Will await return call

## 2025-04-08 NOTE — PROGRESS NOTES
stenting of the celiac artery using a 7 x 16 mm Lifestream-covered stent with postdilatation via an 8 x 20 mm balloo  - Continue statin and Asprin.   - Following with Rl Trinh MD for management.     HTN  - Controlled: 138/86  - BP goal <130/80  - Home BP monitoring encouraged, printed information provided on how to accurately measure BP at home.  - Counseled to follow a low salt diet to assure blood pressure remains controlled for cardiovascular risk factor modification.   - The patient is counseled to get regular exercise 3-5 times per week and maintain a healthy weight reduce cardiovascular risk factors.      Breast Cancer    - follows with oncology, in remission    Start Eliquis 2.5 mg BID  Follow up in 1 year     Thank you for allowing me to participate in the care of Eula V Severn. All questions and concerns were addressed to the patient/family. Alternatives to my treatment were discussed.     Scribe attestation: This note was scribed in the presence of Lefty Lopez MD by Sarah Ye RN    Physician attestation: The scribe's documentation has been prepared under my direction and has been personally reviewed by me in its entirety. I confirm that the note above reflects all work, treatment, procedures, and medical decision making performed by me.     Lefty Lopez MD  Cardiac Electrophysiology  [facility]

## 2025-04-09 ENCOUNTER — OFFICE VISIT (OUTPATIENT)
Dept: CARDIOLOGY CLINIC | Age: 85
End: 2025-04-09
Payer: COMMERCIAL

## 2025-04-09 ENCOUNTER — TELEPHONE (OUTPATIENT)
Dept: CARDIOLOGY CLINIC | Age: 85
End: 2025-04-09

## 2025-04-09 ENCOUNTER — CLINICAL SUPPORT (OUTPATIENT)
Dept: CARDIOLOGY CLINIC | Age: 85
End: 2025-04-09

## 2025-04-09 VITALS
HEART RATE: 64 BPM | HEIGHT: 60 IN | OXYGEN SATURATION: 96 % | SYSTOLIC BLOOD PRESSURE: 138 MMHG | DIASTOLIC BLOOD PRESSURE: 86 MMHG | BODY MASS INDEX: 19.55 KG/M2

## 2025-04-09 DIAGNOSIS — K55.1 MESENTERIC ARTERY STENOSIS: ICD-10-CM

## 2025-04-09 DIAGNOSIS — I77.4 CELIAC ARTERY STENOSIS: ICD-10-CM

## 2025-04-09 DIAGNOSIS — I10 PRIMARY HYPERTENSION: Primary | ICD-10-CM

## 2025-04-09 DIAGNOSIS — I50.30 DIASTOLIC CONGESTIVE HEART FAILURE, UNSPECIFIED HF CHRONICITY (HCC): ICD-10-CM

## 2025-04-09 DIAGNOSIS — I48.0 PAROXYSMAL ATRIAL FIBRILLATION (HCC): ICD-10-CM

## 2025-04-09 PROCEDURE — 3075F SYST BP GE 130 - 139MM HG: CPT | Performed by: INTERNAL MEDICINE

## 2025-04-09 PROCEDURE — 1036F TOBACCO NON-USER: CPT | Performed by: INTERNAL MEDICINE

## 2025-04-09 PROCEDURE — 93000 ELECTROCARDIOGRAM COMPLETE: CPT | Performed by: INTERNAL MEDICINE

## 2025-04-09 PROCEDURE — G8400 PT W/DXA NO RESULTS DOC: HCPCS | Performed by: INTERNAL MEDICINE

## 2025-04-09 PROCEDURE — 1159F MED LIST DOCD IN RCRD: CPT | Performed by: INTERNAL MEDICINE

## 2025-04-09 PROCEDURE — G2211 COMPLEX E/M VISIT ADD ON: HCPCS | Performed by: INTERNAL MEDICINE

## 2025-04-09 PROCEDURE — 1123F ACP DISCUSS/DSCN MKR DOCD: CPT | Performed by: INTERNAL MEDICINE

## 2025-04-09 PROCEDURE — G8427 DOCREV CUR MEDS BY ELIG CLIN: HCPCS | Performed by: INTERNAL MEDICINE

## 2025-04-09 PROCEDURE — G8420 CALC BMI NORM PARAMETERS: HCPCS | Performed by: INTERNAL MEDICINE

## 2025-04-09 PROCEDURE — 3079F DIAST BP 80-89 MM HG: CPT | Performed by: INTERNAL MEDICINE

## 2025-04-09 PROCEDURE — 99214 OFFICE O/P EST MOD 30 MIN: CPT | Performed by: INTERNAL MEDICINE

## 2025-04-09 PROCEDURE — 1090F PRES/ABSN URINE INCON ASSESS: CPT | Performed by: INTERNAL MEDICINE

## 2025-04-09 NOTE — TELEPHONE ENCOUNTER
Called Aggie to follow up no answer left message.  [phone]    If she  calls back please send to me directly.

## 2025-04-09 NOTE — TELEPHONE ENCOUNTER
Confirmed rep for Friday at 1030, spoke with Aggie at [facility] and she stated she will set up transportation to appointment Friday 4/11/24 at 1030am.    Placed on device schedule.

## 2025-04-09 NOTE — TELEPHONE ENCOUNTER
Spoke with Aggie, Thursday 4/10 would not work well as they would not be able to set up transportation    She stated Friday at 1030 am would work better for them.  I advised we would call with update if this work after speaking with [employer] rep.

## 2025-04-09 NOTE — TELEPHONE ENCOUNTER
NATASHAOM chandler Marcial from facility to return call . Need to schedule patient today or tomorrow to have device check completed. Amee from [employer] will come in and complete device check

## 2025-04-10 ENCOUNTER — CLINICAL SUPPORT (OUTPATIENT)
Dept: CARDIOLOGY CLINIC | Age: 85
End: 2025-04-10

## 2025-04-10 DIAGNOSIS — R00.1 SYMPTOMATIC BRADYCARDIA: ICD-10-CM

## 2025-04-10 DIAGNOSIS — Z95.0 CARDIAC PACEMAKER IN SITU: Primary | ICD-10-CM

## 2025-04-10 DIAGNOSIS — I49.8 JUNCTIONAL RHYTHM: ICD-10-CM

## 2025-04-25 ENCOUNTER — TELEPHONE (OUTPATIENT)
Dept: CARDIOLOGY CLINIC | Age: 85
End: 2025-04-25

## 2025-04-25 NOTE — TELEPHONE ENCOUNTER
Dr. Lopez please advise.  Pt has large wound that is bleeding and nursing home would like to discontinue her eliquis.  She currently is on Eliquis 2.5 mg BID.  Pt is currently being treated for breast cancer.     [id number] Score 6 (CHF, HTN, Age1, CAD, Gender)    Last ov 4/9/2025  Paroxsymal Atrial Fibrillation               - episode lasting 9 hours               - ECG today shows sinus rhythm with RBBB               - 7.5% AT/AF burden per device interrogation today.                - Patient has a [id number] Score of 6 (age1,gender,htn,chf,pad)              - will start on Eliquis 2.5 mg BID

## 2025-04-25 NOTE — TELEPHONE ENCOUNTER
Patient's facility called the office to report Estrella has breast cancer and currently has a large wound that is bleeding, a lot. They are wanting advisement om managing her blood thinner. Should they decrease the Eliquis?     Please advise.      Cherie: [phone]

## 2025-04-30 NOTE — TELEPHONE ENCOUNTER
Called spoke with Cherie gave Dr. Lopez message, Risk vs benefits, seems reasonable to hold Eliquis for now.     Cherie states understanding.

## (undated) DEVICE — Z DISCONTINUED USE 2275686 GLOVE SURG SZ 8 L12IN FNGR THK13MIL WHT ISOLEX POLYISOPRENE

## (undated) DEVICE — STAPLER SKIN H3.9MM WIRE DIA0.58MM CRWN 6.9MM 35 STPL FIX

## (undated) DEVICE — SOLUTION IV IRRIG POUR BRL 0.9% SODIUM CHL 2F7124

## (undated) DEVICE — Z DISCONTINUED PER MEDLINE USE 2752023 DRESSING FOAM W7.62XL7.62CM SIL ADH WTRPRF FLM BK SQ SHP

## (undated) DEVICE — SHEET,DRAPE,53X77,STERILE: Brand: MEDLINE

## (undated) DEVICE — SUTURE VCRL UD BR CT 3-0 18IN CT1 J838D

## (undated) DEVICE — ELECTRODE PT RET AD L9FT HI MOIST COND ADH HYDRGEL CORDED

## (undated) DEVICE — BITE BLOCK ENDOSCP AD 60 FR W/ ADJ STRP PLAS GRN BLOX

## (undated) DEVICE — PADDING UNDERCAST W4INXL4YD 100% COT CRIMPED FINISH WBRL II

## (undated) DEVICE — K-WIRE

## (undated) DEVICE — GLOVE SURG SZ 6 L12IN FNGR THK87MIL DK GRN LTX FREE ISOLEX

## (undated) DEVICE — CANISTER, RIGID, 1200CC: Brand: MEDLINE INDUSTRIES, INC.

## (undated) DEVICE — BANDAGE COMPR W6INXL4.5YD LTWT E EC SGL LAYERED CLP CLSR

## (undated) DEVICE — SYRINGE MED 10ML LUERLOCK TIP W/O SFTY DISP

## (undated) DEVICE — GLOVE SURG SZ 55 THK91MIL ORANGE  LTX FREE SYN POLYISOPRENE

## (undated) DEVICE — CHLORAPREP 26ML ORANGE

## (undated) DEVICE — DRILL, AO, STERILE

## (undated) DEVICE — NEEDLE HYPO 22GA L1 1/2IN PIVOTING SHLD FOR LUERLOCK SYR

## (undated) DEVICE — GOWN SIRUS NONREIN XL W/TWL: Brand: MEDLINE INDUSTRIES, INC.

## (undated) DEVICE — GLOVE SURG SZ 8 L12IN FNGR THK87MIL WHT LTX FREE

## (undated) DEVICE — KIT OR ROOM TURNOVER W/STRAP

## (undated) DEVICE — ENDOSCOPY KIT: Brand: MEDLINE INDUSTRIES, INC.

## (undated) DEVICE — MAJOR SET UP PK

## (undated) DEVICE — SUTURE VCRL SZ 2-0 L18IN ABSRB UD CT-1 L36MM 1/2 CIR J839D

## (undated) DEVICE — 6619 2 PTNT ISO SYS INCISE AREA&LT;(&GT;&&LT;)&GT;P: Brand: STERI-DRAPE™ IOBAN™ 2

## (undated) DEVICE — PAD DRY FLOOR ABS 32X58IN GRN

## (undated) DEVICE — PAD,ABDOMINAL,8"X7.5",STERILE,LF,1/PK: Brand: MEDLINE

## (undated) DEVICE — 3M™ COBAN™ NL STERILE NON-LATEX SELF-ADHERENT WRAP, 2084S, 4 IN X 5 YD (10 CM X 4,5 M), 18 ROLLS/CASE: Brand: 3M™ COBAN™

## (undated) DEVICE — GUIDE WIRE, BALL-TIPPED, STERILE

## (undated) DEVICE — COVER LT HNDL BLU PLAS